# Patient Record
Sex: FEMALE | Race: WHITE | NOT HISPANIC OR LATINO | Employment: FULL TIME | URBAN - METROPOLITAN AREA
[De-identification: names, ages, dates, MRNs, and addresses within clinical notes are randomized per-mention and may not be internally consistent; named-entity substitution may affect disease eponyms.]

---

## 2017-03-03 ENCOUNTER — OFFICE VISIT (OUTPATIENT)
Dept: CARDIOLOGY | Facility: MEDICAL CENTER | Age: 57
End: 2017-03-03
Payer: COMMERCIAL

## 2017-03-03 VITALS
OXYGEN SATURATION: 95 % | DIASTOLIC BLOOD PRESSURE: 92 MMHG | HEART RATE: 62 BPM | SYSTOLIC BLOOD PRESSURE: 150 MMHG | BODY MASS INDEX: 28.68 KG/M2 | WEIGHT: 168 LBS | HEIGHT: 64 IN

## 2017-03-03 DIAGNOSIS — E78.2 MIXED HYPERLIPIDEMIA: ICD-10-CM

## 2017-03-03 DIAGNOSIS — F17.200 SMOKING: ICD-10-CM

## 2017-03-03 DIAGNOSIS — I10 ESSENTIAL HYPERTENSION: ICD-10-CM

## 2017-03-03 DIAGNOSIS — I70.90 ATHEROSCLEROSIS OF ARTERIES: ICD-10-CM

## 2017-03-03 DIAGNOSIS — R06.83 SNORING: ICD-10-CM

## 2017-03-03 PROCEDURE — 99214 OFFICE O/P EST MOD 30 MIN: CPT | Performed by: INTERNAL MEDICINE

## 2017-03-03 RX ORDER — CLINDAMYCIN HYDROCHLORIDE 300 MG/1
300 CAPSULE ORAL 3 TIMES DAILY
COMMUNITY
End: 2017-05-05

## 2017-03-03 RX ORDER — PRAVASTATIN SODIUM 20 MG
20 TABLET ORAL DAILY
Qty: 90 TAB | Refills: 3 | Status: SHIPPED | OUTPATIENT
Start: 2017-03-03 | End: 2018-07-16 | Stop reason: SDUPTHER

## 2017-03-03 RX ORDER — AMLODIPINE BESYLATE 10 MG/1
10 TABLET ORAL DAILY
Qty: 90 TAB | Refills: 3 | Status: SHIPPED | OUTPATIENT
Start: 2017-03-03 | End: 2017-05-05

## 2017-03-03 ASSESSMENT — ENCOUNTER SYMPTOMS
SHORTNESS OF BREATH: 0
NAUSEA: 0
COUGH: 1
EYE PAIN: 0
DEPRESSION: 0
ORTHOPNEA: 0
CLAUDICATION: 0
BRUISES/BLEEDS EASILY: 0
SPEECH CHANGE: 0
EYE DISCHARGE: 0
CHILLS: 0
PALPITATIONS: 0
LOSS OF CONSCIOUSNESS: 0
PND: 0
ABDOMINAL PAIN: 0
HEADACHES: 0
BLURRED VISION: 0
DOUBLE VISION: 0
HALLUCINATIONS: 0
FEVER: 0
SENSORY CHANGE: 0
DIZZINESS: 0
VOMITING: 0
BLOOD IN STOOL: 0
MYALGIAS: 0
FALLS: 0
WEIGHT LOSS: 0

## 2017-03-03 NOTE — MR AVS SNAPSHOT
"        Kelly Oakley   3/3/2017 1:00 PM   Office Visit   MRN: [id number]    Department:  [facility]   Dept Phone:  [phone]    Description:  Female : 1960   Provider:  Geoffrey Hi M.D.           Reason for Visit     Follow-Up           Allergies as of 3/3/2017     Allergen Noted Reactions    Macrodantin [Nitrofurantoin Macrocrystal] 10/25/2012   Anaphylaxis    Pcn [Penicillins] 10/25/2012   Anaphylaxis    Doxycycline 2013   Itching    Bactrim 2013   Vomiting    Biaxin [Clarithromycin] 10/25/2012   Vomiting    Omnicef 2013   Itching      You were diagnosed with     Essential hypertension   [6773489]       Mixed hyperlipidemia   [272.2.ICD-9-CM]       Atherosclerosis of arteries   [033996]       Snoring   [532775]       Smoking   [785453]         Vital Signs     Blood Pressure Pulse Height Weight Body Mass Index Oxygen Saturation    150/92 mmHg 62 1.626 m (5' 4.02\") 76.204 kg (168 lb) 28.82 kg/m2 95%    Smoking Status                   Current Every Day Smoker           Basic Information     Date Of Birth Sex Race Ethnicity Preferred Language    1960 Female White Non- English      Problem List              ICD-10-CM Priority Class Noted - Resolved    Lichen planus L43.9   2012 - Present    Briseno's esophagus K22.70   2012 - Present    MRSA (methicillin resistant Staphylococcus aureus) A49.02   2012 - Present    Adrenal mass (CMS-HCC) E27.9 High  2012 - Present    Snoring R06.83 High  2013 - Present    Atherosclerosis of arteries I70.8   Unknown - Present    Cigarette smoker one half pack a day or less F17.210 High  2015 - Present    HTN (hypertension) I10 High  2015 - Present    Chest pain at rest R07.9 High  2015 - Present    Fatigue R53.83 High  2015 - Present    HLD (hyperlipidemia) E78.5   2015 - Present    Adrenal nodule    Unknown - Present    Nocturnal hypoxemia G47.34   10/15/2015 - Present    Osteopenia " M85.80   11/20/2015 - Present    Subacute maxillary sinusitis J01.00   3/15/2016 - Present      Health Maintenance        Date Due Completion Dates    IMM DTaP/Tdap/Td Vaccine (1 - Tdap) 10/23/1979 ---    IMM INFLUENZA (1) 9/1/2016 ---    MAMMOGRAM 10/7/2017 10/7/2016, 10/2/2015, 7/25/2014, 6/28/2013, 2/17/2012    PAP SMEAR 9/23/2019 9/23/2016, 10/20/2013 (Done)    Override on 10/20/2013: Done    COLONOSCOPY 10/19/2022 10/19/2012            Current Immunizations     Pneumococcal polysaccharide vaccine (PPSV-23) 5/17/2013 12:30 PM      Below and/or attached are the medications your provider expects you to take. Review all of your home medications and newly ordered medications with your provider and/or pharmacist. Follow medication instructions as directed by your provider and/or pharmacist. Please keep your medication list with you and share with your provider. Update the information when medications are discontinued, doses are changed, or new medications (including over-the-counter products) are added; and carry medication information at all times in the event of emergency situations     Allergies:  MACRODANTIN - Anaphylaxis     PCN - Anaphylaxis     DOXYCYCLINE - Itching     BACTRIM - Vomiting     BIAXIN - Vomiting     OMNICEF - Itching               Medications  Valid as of: March 03, 2017 -  1:21 PM    Generic Name Brand Name Tablet Size Instructions for use    Albuterol Sulfate (Aero Soln) albuterol 108 (90 BASE) MCG/ACT Inhale 2 Puffs by mouth every 6 hours as needed for Shortness of Breath.        Alendronate Sodium (Tab) FOSAMAX 70 MG Take 1 Tab by mouth every 7 days.        ALPRAZolam (Tab) XANAX 0.5 MG Take 1 Tab by mouth 3 times a day as needed for Sleep or Anxiety.        AmLODIPine Besylate (Tab) NORVASC 10 MG Take 1 Tab by mouth every day.        Aspirin (Tablet Delayed Response) ECOTRIN 81 MG Take 81 mg by mouth every day.        BusPIRone HCl (Tab) BUSPAR 15 MG Take 1 Tab by mouth 2 times a day.           Chlorphen-Pseudoephed-APAP   Take  by mouth.        Clindamycin HCl (Cap) CLEOCIN 300 MG Take 300 mg by mouth 3 times a day.        Fluticasone Propionate (Suspension) FLONASE 50 MCG/ACT Spray 2 Sprays in nose every day.        Hydrocodone-Chlorpheniramine   Take  by mouth.        Loratadine (Tab) CLARITIN 10 MG Take 10 mg by mouth every day.        Omeprazole (CAPSULE DELAYED RELEASE) PRILOSEC 40 MG TAKE ONE CAPSULE BY MOUTH DAILY (GENERIC PRILOSEC)        Pravastatin Sodium (Tab) PRAVACHOL 20 MG Take 1 Tab by mouth every day.        Triamcinolone Acetonide (Cream) KENALOG 0.1 % Apply  to affected area(s) 2 times a day.        .                 Medicines prescribed today were sent to:     [facility][id number] - [place] - [address]    [address] [place] [address]    Phone: [phone] Fax: [phone]    Open 24 Hours?: No      Medication refill instructions:       If your prescription bottle indicates you have medication refills left, it is not necessary to call your provider’s office. Please contact your pharmacy and they will refill your medication.    If your prescription bottle indicates you do not have any refills left, you may request refills at any time through one of the following ways: The online [employer] system (except Urgent Care), by calling your provider’s office, or by asking your pharmacy to contact your provider’s office with a refill request. Medication refills are processed only during regular business hours and may not be available until the next business day. Your provider may request additional information or to have a follow-up visit with you prior to refilling your medication.   *Please Note: Medication refills are assigned a new Rx number when refilled electronically. Your pharmacy may indicate that no refills were authorized even though a new prescription for the same medication is available at the pharmacy. Please request the medicine by name with the pharmacy before contacting  your provider for a refill.        Your To Do List     Future Labs/Procedures Complete By Expires    COMP METABOLIC PANEL  As directed 3/4/2018      Referral     A referral request has been sent to our patient care coordination department. Please allow 3-5 business days for us to process this request and contact you either by phone or mail. If you do not hear from us by the 5th business day, please call us at [phone].           [employer] Access Code: Activation code not generated  Current Golf121t Status: Active

## 2017-03-03 NOTE — Clinical Note
[facility]   [address], [address]  [place] [address]  Phone: [phone]  Fax: [phone]              Kelly Oakley  1960    Encounter Date: 3/3/2017    Geoffrey Hi M.D.          PROGRESS NOTE:  Subjective:   Kelly Oakley is a 55 y.o. female who presents today cardiac care for hypertension and hyper lipidemia. She is tolerating pravastatin well. Her LDL has significantly decreased and normalized with pravastatin. Patient feels well overall. No chest pain or shortness of breath. Blood pressure is high today. She is still recovering from bronchitis since December. She has been dry coughing quite a bit.    Past Medical History   Diagnosis Date   • Anemia    • Depression    • Anxiety      Panic   • Arrhythmia    • Arthritis      Morning stiffness   • Briseno's  esophagus    • GERD (gastroesophageal reflux disease)    • Headache(784.0)      sinus headache   • Heart murmur      Dr. Krause-Stress trend   • IBD (inflammatory bowel disease)      Dr. Cunningham   • OSTEOPOROSIS      Osteopenia   • Ulcer (CMS-HCC)      Barretts esophogitis   • Urinary tract infection, site not specified    • History of HPV infection    • MRSA carrier 2008     nose cellulitis   • Adrenal nodule 2012 2015 / nonfunctional / benign   • S/P cholecystectomy    • S/P appendectomy    • S/P hysterectomy with oophorectomy      endometriosis   • Atherosclerosis of arteries 2013     seen on abdominal CT   • Cigarette smoker one half pack a day or less 5/28/2015   • HTN (hypertension) 9/11/2015   • Chest pain at rest 9/11/2015   • Fatigue 9/11/2015   • HLD (hyperlipidemia) 9/11/2015   • Nocturnal hypoxemia 10/15/2015     Past Surgical History   Procedure Laterality Date   • Abdominal hysterectomy total       mennorrogia   • Endometrial ablation     • Cholecystectomy     • Appendectomy     • Tonsillectomy     • Primary c section     • Tubal coagulation laparoscopic bilateral     • Colon  resection       Polyp removal   • Breast biopsy  1980's     benign left breast 35 years ago   • Us-needle core bx-breast panel       Family History   Problem Relation Age of Onset   • Cancer Mother      Breast/Liver   • Diabetes Mother    • Hypertension Mother    • Hyperlipidemia Mother    • Heart Disease Mother    • Lung Disease Father      Emphysema   • Psychiatry Father      Paranoid schitzophenia   • Psychiatry Sister      Paronoid Schitzo-disorder, Bipolar   • Arthritis Sister      RA, Fibromyalgia   • Cancer Maternal Aunt      Breast   • Cancer Paternal Aunt      Bone   • Arthritis Paternal Aunt    • Genetic Paternal Aunt      SLE   • Heart Disease Maternal Grandmother    • Hypertension Maternal Grandmother    • Hyperlipidemia Maternal Grandmother    • Genetic Maternal Grandmother      Brights disease   • Stroke Maternal Grandmother    • Heart Disease Maternal Grandfather    • Hypertension Maternal Grandfather    • Hyperlipidemia Maternal Grandfather    • Stroke Paternal Grandmother    • Hyperlipidemia Paternal Grandmother    • Hypertension Paternal Grandmother    • Heart Disease Paternal Grandfather    • Hypertension Paternal Grandfather    • Hyperlipidemia Paternal Grandfather      History   Smoking status   • Current Every Day Smoker -- 0.25 packs/day for 40 years   • Types: Cigarettes   Smokeless tobacco   • Never Used     Allergies   Allergen Reactions   • Macrodantin [Nitrofurantoin Macrocrystal] Anaphylaxis   • Pcn [Penicillins] Anaphylaxis   • Doxycycline Itching   • Bactrim Vomiting   • Biaxin [Clarithromycin] Vomiting   • Omnicef Itching     Outpatient Encounter Prescriptions as of 3/3/2017   Medication Sig Dispense Refill   • HYDROCODONE-CHLORPHENIRAMINE PO Take  by mouth.     • clindamycin (CLEOCIN) 300 MG Cap Take 300 mg by mouth 3 times a day.     • Chlorphen-Pseudoephed-APAP (CORICIDIN D PO) Take  by mouth.     • amlodipine (NORVASC) 10 MG Tab Take 1 Tab by mouth every day. 90 Tab 3   •  pravastatin (PRAVACHOL) 20 MG Tab Take 1 Tab by mouth every day. 90 Tab 3   • fluticasone (FLONASE) 50 MCG/ACT nasal spray Spray 2 Sprays in nose every day. 16 g 0   • omeprazole (PRILOSEC) 40 MG delayed-release capsule TAKE ONE CAPSULE BY MOUTH DAILY (GENERIC PRILOSEC) 90 Cap 4   • busPIRone (BUSPAR) 15 MG tablet Take 1 Tab by mouth 2 times a day. 180 Tab 4   • alprazolam (XANAX) 0.5 MG Tab Take 1 Tab by mouth 3 times a day as needed for Sleep or Anxiety. 270 Tab 4   • aspirin EC (ECOTRIN) 81 MG Tablet Delayed Response Take 81 mg by mouth every day.     • triamcinolone acetonide (KENALOG) 0.1 % Cream Apply  to affected area(s) 2 times a day.     • loratadine (CLARITIN) 10 MG TABS Take 10 mg by mouth every day.     • albuterol (VENTOLIN OR PROVENTIL) 108 (90 BASE) MCG/ACT AERS inhalation aerosol Inhale 2 Puffs by mouth every 6 hours as needed for Shortness of Breath. 1 Inhaler 0   • [DISCONTINUED] pravastatin (PRAVACHOL) 20 MG Tab Take 1 Tab by mouth every day. 90 Tab 3   • [DISCONTINUED] amlodipine (NORVASC) 2.5 MG Tab TAKE ONE TABLET BY MOUTH DAILY 90 Tab 2   • [DISCONTINUED] lisinopril (PRINIVIL) 5 MG Tab Take 1 Tab by mouth every day. 90 Tab 3   • [DISCONTINUED] amlodipine (NORVASC) 2.5 MG Tab Take 1 Tab by mouth every day. 90 Tab 4   • alendronate (FOSAMAX) 70 MG Tab Take 1 Tab by mouth every 7 days. 12 Tab 4     No facility-administered encounter medications on file as of 3/3/2017.     Review of Systems   Constitutional: Negative for fever, chills, weight loss and malaise/fatigue.   HENT: Negative for ear discharge, ear pain, hearing loss and nosebleeds.    Eyes: Negative for blurred vision, double vision, pain and discharge.   Respiratory: Positive for cough. Negative for shortness of breath.    Cardiovascular: Negative for chest pain, palpitations, orthopnea, claudication, leg swelling and PND.   Gastrointestinal: Negative for nausea, vomiting, abdominal pain, blood in stool and melena.   Genitourinary:  "Negative for dysuria and hematuria.   Musculoskeletal: Negative for myalgias, joint pain and falls.   Skin: Negative for itching and rash.   Neurological: Negative for dizziness, sensory change, speech change, loss of consciousness and headaches.   Endo/Heme/Allergies: Negative for environmental allergies. Does not bruise/bleed easily.   Psychiatric/Behavioral: Negative for depression, suicidal ideas and hallucinations.        Objective:   /92 mmHg  Pulse 62  Ht 1.626 m (5' 4.02\")  Wt 76.204 kg (168 lb)  BMI 28.82 kg/m2  SpO2 95%    Physical Exam   Constitutional: She is oriented to person, place, and time. No distress.   HENT:   Head: Normocephalic and atraumatic.   Eyes: EOM are normal.   Neck: No JVD present.   Cardiovascular: Normal rate, regular rhythm, normal heart sounds and intact distal pulses.  Exam reveals no gallop and no friction rub.    No murmur heard.  Pulmonary/Chest: No respiratory distress. She has no wheezes. She has no rales. She exhibits no tenderness.   Abdominal: She exhibits no distension. There is no tenderness. There is no rebound and no guarding.   Musculoskeletal: She exhibits no edema or tenderness.   Lymphadenopathy:     She has no cervical adenopathy.   Neurological: She is alert and oriented to person, place, and time.   Skin: Skin is dry.   Psychiatric: She has a normal mood and affect.   Nursing note and vitals reviewed.      Assessment:     1. Essential hypertension  amlodipine (NORVASC) 10 MG Tab    pravastatin (PRAVACHOL) 20 MG Tab    COMP METABOLIC PANEL    LIPID PANEL   2. Mixed hyperlipidemia  COMP METABOLIC PANEL    LIPID PANEL   3. Atherosclerosis of arteries  pravastatin (PRAVACHOL) 20 MG Tab    COMP METABOLIC PANEL    LIPID PANEL   4. Snoring  pravastatin (PRAVACHOL) 20 MG Tab    COMP METABOLIC PANEL    LIPID PANEL   5. Smoking  REFERRAL TO TOBACCO CESSATION PROGRAM       Medical Decision Making:  Today's Assessment / Status / Plan:     We will stop " lisinopril.    I will increase amlodipine to 10 mg by mouth once a day.  Continue pravastatin.  We'll refer for smoking cessation program.    I will see patient back in clinic with lab tests and studies results in 6 months.    I thank you Dr. Fajardo for referring patient to our Cardiology Clinic today.        Saulo Fajardo M.D.  [facility] [address] [facility] [address]  VIA Facsimile: [phone]

## 2017-03-03 NOTE — PROGRESS NOTES
Subjective:   Kelly Oakley is a 55 y.o. female who presents today cardiac care for hypertension and hyper lipidemia. She is tolerating pravastatin well. Her LDL has significantly decreased and normalized with pravastatin. Patient feels well overall. No chest pain or shortness of breath. Blood pressure is high today. She is still recovering from bronchitis since December. She has been dry coughing quite a bit.    Past Medical History   Diagnosis Date   • Anemia    • Depression    • Anxiety      Panic   • Arrhythmia    • Arthritis      Morning stiffness   • Briseno's  esophagus    • GERD (gastroesophageal reflux disease)    • Headache(784.0)      sinus headache   • Heart murmur      Dr. Krause-Stress trend   • IBD (inflammatory bowel disease)      Dr. Cunningham   • OSTEOPOROSIS      Osteopenia   • Ulcer (CMS-HCC)      Barretts esophogitis   • Urinary tract infection, site not specified    • History of HPV infection    • MRSA carrier 2008     nose cellulitis   • Adrenal nodule 2012 2015 / nonfunctional / benign   • S/P cholecystectomy    • S/P appendectomy    • S/P hysterectomy with oophorectomy      endometriosis   • Atherosclerosis of arteries 2013     seen on abdominal CT   • Cigarette smoker one half pack a day or less 5/28/2015   • HTN (hypertension) 9/11/2015   • Chest pain at rest 9/11/2015   • Fatigue 9/11/2015   • HLD (hyperlipidemia) 9/11/2015   • Nocturnal hypoxemia 10/15/2015     Past Surgical History   Procedure Laterality Date   • Abdominal hysterectomy total       mennorrogia   • Endometrial ablation     • Cholecystectomy     • Appendectomy     • Tonsillectomy     • Primary c section     • Tubal coagulation laparoscopic bilateral     • Colon resection       Polyp removal   • Breast biopsy  1980's     benign left breast 35 years ago   • Us-needle core bx-breast panel       Family History   Problem Relation Age of Onset   • Cancer Mother      Breast/Liver   • Diabetes Mother    • Hypertension Mother     • Hyperlipidemia Mother    • Heart Disease Mother    • Lung Disease Father      Emphysema   • Psychiatry Father      Paranoid schitzophenia   • Psychiatry Sister      Paronoid Schitzo-disorder, Bipolar   • Arthritis Sister      RA, Fibromyalgia   • Cancer Maternal Aunt      Breast   • Cancer Paternal Aunt      Bone   • Arthritis Paternal Aunt    • Genetic Paternal Aunt      SLE   • Heart Disease Maternal Grandmother    • Hypertension Maternal Grandmother    • Hyperlipidemia Maternal Grandmother    • Genetic Maternal Grandmother      Brights disease   • Stroke Maternal Grandmother    • Heart Disease Maternal Grandfather    • Hypertension Maternal Grandfather    • Hyperlipidemia Maternal Grandfather    • Stroke Paternal Grandmother    • Hyperlipidemia Paternal Grandmother    • Hypertension Paternal Grandmother    • Heart Disease Paternal Grandfather    • Hypertension Paternal Grandfather    • Hyperlipidemia Paternal Grandfather      History   Smoking status   • Current Every Day Smoker -- 0.25 packs/day for 40 years   • Types: Cigarettes   Smokeless tobacco   • Never Used     Allergies   Allergen Reactions   • Macrodantin [Nitrofurantoin Macrocrystal] Anaphylaxis   • Pcn [Penicillins] Anaphylaxis   • Doxycycline Itching   • Bactrim Vomiting   • Biaxin [Clarithromycin] Vomiting   • Omnicef Itching     Outpatient Encounter Prescriptions as of 3/3/2017   Medication Sig Dispense Refill   • HYDROCODONE-CHLORPHENIRAMINE PO Take  by mouth.     • clindamycin (CLEOCIN) 300 MG Cap Take 300 mg by mouth 3 times a day.     • Chlorphen-Pseudoephed-APAP (CORICIDIN D PO) Take  by mouth.     • amlodipine (NORVASC) 10 MG Tab Take 1 Tab by mouth every day. 90 Tab 3   • pravastatin (PRAVACHOL) 20 MG Tab Take 1 Tab by mouth every day. 90 Tab 3   • fluticasone (FLONASE) 50 MCG/ACT nasal spray Spray 2 Sprays in nose every day. 16 g 0   • omeprazole (PRILOSEC) 40 MG delayed-release capsule TAKE ONE CAPSULE BY MOUTH DAILY (GENERIC PRILOSEC)  90 Cap 4   • busPIRone (BUSPAR) 15 MG tablet Take 1 Tab by mouth 2 times a day. 180 Tab 4   • alprazolam (XANAX) 0.5 MG Tab Take 1 Tab by mouth 3 times a day as needed for Sleep or Anxiety. 270 Tab 4   • aspirin EC (ECOTRIN) 81 MG Tablet Delayed Response Take 81 mg by mouth every day.     • triamcinolone acetonide (KENALOG) 0.1 % Cream Apply  to affected area(s) 2 times a day.     • loratadine (CLARITIN) 10 MG TABS Take 10 mg by mouth every day.     • albuterol (VENTOLIN OR PROVENTIL) 108 (90 BASE) MCG/ACT AERS inhalation aerosol Inhale 2 Puffs by mouth every 6 hours as needed for Shortness of Breath. 1 Inhaler 0   • [DISCONTINUED] pravastatin (PRAVACHOL) 20 MG Tab Take 1 Tab by mouth every day. 90 Tab 3   • [DISCONTINUED] amlodipine (NORVASC) 2.5 MG Tab TAKE ONE TABLET BY MOUTH DAILY 90 Tab 2   • [DISCONTINUED] lisinopril (PRINIVIL) 5 MG Tab Take 1 Tab by mouth every day. 90 Tab 3   • [DISCONTINUED] amlodipine (NORVASC) 2.5 MG Tab Take 1 Tab by mouth every day. 90 Tab 4   • alendronate (FOSAMAX) 70 MG Tab Take 1 Tab by mouth every 7 days. 12 Tab 4     No facility-administered encounter medications on file as of 3/3/2017.     Review of Systems   Constitutional: Negative for fever, chills, weight loss and malaise/fatigue.   HENT: Negative for ear discharge, ear pain, hearing loss and nosebleeds.    Eyes: Negative for blurred vision, double vision, pain and discharge.   Respiratory: Positive for cough. Negative for shortness of breath.    Cardiovascular: Negative for chest pain, palpitations, orthopnea, claudication, leg swelling and PND.   Gastrointestinal: Negative for nausea, vomiting, abdominal pain, blood in stool and melena.   Genitourinary: Negative for dysuria and hematuria.   Musculoskeletal: Negative for myalgias, joint pain and falls.   Skin: Negative for itching and rash.   Neurological: Negative for dizziness, sensory change, speech change, loss of consciousness and headaches.   Endo/Heme/Allergies:  "Negative for environmental allergies. Does not bruise/bleed easily.   Psychiatric/Behavioral: Negative for depression, suicidal ideas and hallucinations.        Objective:   /92 mmHg  Pulse 62  Ht 1.626 m (5' 4.02\")  Wt 76.204 kg (168 lb)  BMI 28.82 kg/m2  SpO2 95%    Physical Exam   Constitutional: She is oriented to person, place, and time. No distress.   HENT:   Head: Normocephalic and atraumatic.   Eyes: EOM are normal.   Neck: No JVD present.   Cardiovascular: Normal rate, regular rhythm, normal heart sounds and intact distal pulses.  Exam reveals no gallop and no friction rub.    No murmur heard.  Pulmonary/Chest: No respiratory distress. She has no wheezes. She has no rales. She exhibits no tenderness.   Abdominal: She exhibits no distension. There is no tenderness. There is no rebound and no guarding.   Musculoskeletal: She exhibits no edema or tenderness.   Lymphadenopathy:     She has no cervical adenopathy.   Neurological: She is alert and oriented to person, place, and time.   Skin: Skin is dry.   Psychiatric: She has a normal mood and affect.   Nursing note and vitals reviewed.      Assessment:     1. Essential hypertension  amlodipine (NORVASC) 10 MG Tab    pravastatin (PRAVACHOL) 20 MG Tab    COMP METABOLIC PANEL    LIPID PANEL   2. Mixed hyperlipidemia  COMP METABOLIC PANEL    LIPID PANEL   3. Atherosclerosis of arteries  pravastatin (PRAVACHOL) 20 MG Tab    COMP METABOLIC PANEL    LIPID PANEL   4. Snoring  pravastatin (PRAVACHOL) 20 MG Tab    COMP METABOLIC PANEL    LIPID PANEL   5. Smoking  REFERRAL TO TOBACCO CESSATION PROGRAM       Medical Decision Making:  Today's Assessment / Status / Plan:     We will stop lisinopril.    I will increase amlodipine to 10 mg by mouth once a day.  Continue pravastatin.  We'll refer for smoking cessation program.    I will see patient back in clinic with lab tests and studies results in 6 months.    I thank you Dr. Fajardo for referring patient to our " Cardiology Clinic today.

## 2017-04-19 ENCOUNTER — HOSPITAL ENCOUNTER (OUTPATIENT)
Dept: RADIOLOGY | Facility: MEDICAL CENTER | Age: 57
End: 2017-04-19
Attending: FAMILY MEDICINE
Payer: COMMERCIAL

## 2017-04-19 DIAGNOSIS — M79.89 LEFT LEG SWELLING: ICD-10-CM

## 2017-04-19 PROCEDURE — 93971 EXTREMITY STUDY: CPT | Mod: LT

## 2017-04-25 RX ORDER — LISINOPRIL 5 MG/1
TABLET ORAL
Refills: 2 | OUTPATIENT
Start: 2017-04-25

## 2017-05-05 ENCOUNTER — APPOINTMENT (OUTPATIENT)
Dept: RADIOLOGY | Facility: MEDICAL CENTER | Age: 57
End: 2017-05-05
Attending: EMERGENCY MEDICINE
Payer: COMMERCIAL

## 2017-05-05 ENCOUNTER — HOSPITAL ENCOUNTER (OUTPATIENT)
Facility: MEDICAL CENTER | Age: 57
End: 2017-05-06
Attending: EMERGENCY MEDICINE | Admitting: HOSPITALIST
Payer: COMMERCIAL

## 2017-05-05 ENCOUNTER — RESOLUTE PROFESSIONAL BILLING HOSPITAL PROF FEE (OUTPATIENT)
Dept: HOSPITALIST | Facility: MEDICAL CENTER | Age: 57
End: 2017-05-05
Payer: COMMERCIAL

## 2017-05-05 DIAGNOSIS — R06.02 SHORTNESS OF BREATH: ICD-10-CM

## 2017-05-05 DIAGNOSIS — R07.9 ACUTE CHEST PAIN: ICD-10-CM

## 2017-05-05 PROBLEM — K21.9 GERD (GASTROESOPHAGEAL REFLUX DISEASE): Status: ACTIVE | Noted: 2017-05-05

## 2017-05-05 LAB
ALBUMIN SERPL BCP-MCNC: 4.8 G/DL (ref 3.2–4.9)
ALBUMIN/GLOB SERPL: 1.5 G/DL
ALP SERPL-CCNC: 72 U/L (ref 30–99)
ALT SERPL-CCNC: 19 U/L (ref 2–50)
ANION GAP SERPL CALC-SCNC: 10 MMOL/L (ref 0–11.9)
AST SERPL-CCNC: 21 U/L (ref 12–45)
BASOPHILS # BLD AUTO: 0.5 % (ref 0–1.8)
BASOPHILS # BLD: 0.03 K/UL (ref 0–0.12)
BILIRUB SERPL-MCNC: 0.4 MG/DL (ref 0.1–1.5)
BNP SERPL-MCNC: 22 PG/ML (ref 0–100)
BUN SERPL-MCNC: 12 MG/DL (ref 8–22)
CALCIUM SERPL-MCNC: 9.9 MG/DL (ref 8.5–10.5)
CHLORIDE SERPL-SCNC: 106 MMOL/L (ref 96–112)
CO2 SERPL-SCNC: 24 MMOL/L (ref 20–33)
CREAT SERPL-MCNC: 0.74 MG/DL (ref 0.5–1.4)
EKG IMPRESSION: NORMAL
EOSINOPHIL # BLD AUTO: 0.23 K/UL (ref 0–0.51)
EOSINOPHIL NFR BLD: 3.6 % (ref 0–6.9)
ERYTHROCYTE [DISTWIDTH] IN BLOOD BY AUTOMATED COUNT: 41 FL (ref 35.9–50)
FLUAV H1 2009 PAND RNA SPEC QL NAA+PROBE: NOT DETECTED
FLUAV RNA SPEC QL NAA+PROBE: NEGATIVE
FLUAV+FLUBV AG SPEC QL IA: NORMAL
FLUBV RNA SPEC QL NAA+PROBE: NEGATIVE
GFR SERPL CREATININE-BSD FRML MDRD: >60 ML/MIN/1.73 M 2
GLOBULIN SER CALC-MCNC: 3.3 G/DL (ref 1.9–3.5)
GLUCOSE SERPL-MCNC: 91 MG/DL (ref 65–99)
HCT VFR BLD AUTO: 41.7 % (ref 37–47)
HGB BLD-MCNC: 14.2 G/DL (ref 12–16)
IMM GRANULOCYTES # BLD AUTO: 0.03 K/UL (ref 0–0.11)
IMM GRANULOCYTES NFR BLD AUTO: 0.5 % (ref 0–0.9)
LYMPHOCYTES # BLD AUTO: 2.28 K/UL (ref 1–4.8)
LYMPHOCYTES NFR BLD: 35.7 % (ref 22–41)
MCH RBC QN AUTO: 30.2 PG (ref 27–33)
MCHC RBC AUTO-ENTMCNC: 34.1 G/DL (ref 33.6–35)
MCV RBC AUTO: 88.7 FL (ref 81.4–97.8)
MONOCYTES # BLD AUTO: 0.7 K/UL (ref 0–0.85)
MONOCYTES NFR BLD AUTO: 11 % (ref 0–13.4)
NEUTROPHILS # BLD AUTO: 3.11 K/UL (ref 2–7.15)
NEUTROPHILS NFR BLD: 48.7 % (ref 44–72)
NRBC # BLD AUTO: 0 K/UL
NRBC BLD AUTO-RTO: 0 /100 WBC
PLATELET # BLD AUTO: 211 K/UL (ref 164–446)
PMV BLD AUTO: 10.3 FL (ref 9–12.9)
POTASSIUM SERPL-SCNC: 4 MMOL/L (ref 3.6–5.5)
PROT SERPL-MCNC: 8.1 G/DL (ref 6–8.2)
RBC # BLD AUTO: 4.7 M/UL (ref 4.2–5.4)
SIGNIFICANT IND 70042: NORMAL
SITE SITE: NORMAL
SODIUM SERPL-SCNC: 140 MMOL/L (ref 135–145)
SOURCE SOURCE: NORMAL
TROPONIN I SERPL-MCNC: <0.01 NG/ML (ref 0–0.04)
TROPONIN I SERPL-MCNC: <0.01 NG/ML (ref 0–0.04)
WBC # BLD AUTO: 6.4 K/UL (ref 4.8–10.8)

## 2017-05-05 PROCEDURE — 71275 CT ANGIOGRAPHY CHEST: CPT

## 2017-05-05 PROCEDURE — 99285 EMERGENCY DEPT VISIT HI MDM: CPT

## 2017-05-05 PROCEDURE — G0378 HOSPITAL OBSERVATION PER HR: HCPCS

## 2017-05-05 PROCEDURE — 87400 INFLUENZA A/B EACH AG IA: CPT

## 2017-05-05 PROCEDURE — 93005 ELECTROCARDIOGRAM TRACING: CPT

## 2017-05-05 PROCEDURE — 93005 ELECTROCARDIOGRAM TRACING: CPT | Performed by: HOSPITALIST

## 2017-05-05 PROCEDURE — 93010 ELECTROCARDIOGRAM REPORT: CPT | Performed by: INTERNAL MEDICINE

## 2017-05-05 PROCEDURE — 700117 HCHG RX CONTRAST REV CODE 255: Performed by: EMERGENCY MEDICINE

## 2017-05-05 PROCEDURE — 83880 ASSAY OF NATRIURETIC PEPTIDE: CPT

## 2017-05-05 PROCEDURE — A9270 NON-COVERED ITEM OR SERVICE: HCPCS | Performed by: HOSPITALIST

## 2017-05-05 PROCEDURE — 87502 INFLUENZA DNA AMP PROBE: CPT

## 2017-05-05 PROCEDURE — 87503 INFLUENZA DNA AMP PROB ADDL: CPT

## 2017-05-05 PROCEDURE — 36415 COLL VENOUS BLD VENIPUNCTURE: CPT

## 2017-05-05 PROCEDURE — 99220 PR INITIAL OBSERVATION CARE,LEVL III: CPT | Performed by: HOSPITALIST

## 2017-05-05 PROCEDURE — 84484 ASSAY OF TROPONIN QUANT: CPT

## 2017-05-05 PROCEDURE — 85025 COMPLETE CBC W/AUTO DIFF WBC: CPT

## 2017-05-05 PROCEDURE — 700102 HCHG RX REV CODE 250 W/ 637 OVERRIDE(OP): Performed by: HOSPITALIST

## 2017-05-05 PROCEDURE — 80053 COMPREHEN METABOLIC PANEL: CPT

## 2017-05-05 RX ORDER — ASPIRIN 81 MG/1
324 TABLET, CHEWABLE ORAL DAILY
Status: DISCONTINUED | OUTPATIENT
Start: 2017-05-06 | End: 2017-05-06 | Stop reason: HOSPADM

## 2017-05-05 RX ORDER — ONDANSETRON 4 MG/1
4 TABLET, ORALLY DISINTEGRATING ORAL EVERY 4 HOURS PRN
Status: DISCONTINUED | OUTPATIENT
Start: 2017-05-05 | End: 2017-05-06 | Stop reason: HOSPADM

## 2017-05-05 RX ORDER — POLYETHYLENE GLYCOL 3350 17 G/17G
1 POWDER, FOR SOLUTION ORAL
Status: DISCONTINUED | OUTPATIENT
Start: 2017-05-05 | End: 2017-05-06 | Stop reason: HOSPADM

## 2017-05-05 RX ORDER — PRAVASTATIN SODIUM 20 MG
20 TABLET ORAL
Status: DISCONTINUED | OUTPATIENT
Start: 2017-05-05 | End: 2017-05-06 | Stop reason: HOSPADM

## 2017-05-05 RX ORDER — AMLODIPINE BESYLATE 5 MG/1
5 TABLET ORAL EVERY MORNING
COMMUNITY
End: 2017-09-01

## 2017-05-05 RX ORDER — ACETAMINOPHEN 325 MG/1
650 TABLET ORAL EVERY 6 HOURS PRN
Status: DISCONTINUED | OUTPATIENT
Start: 2017-05-05 | End: 2017-05-06 | Stop reason: HOSPADM

## 2017-05-05 RX ORDER — AMOXICILLIN 250 MG
2 CAPSULE ORAL 2 TIMES DAILY
Status: DISCONTINUED | OUTPATIENT
Start: 2017-05-05 | End: 2017-05-06 | Stop reason: HOSPADM

## 2017-05-05 RX ORDER — ONDANSETRON 2 MG/ML
4 INJECTION INTRAMUSCULAR; INTRAVENOUS EVERY 4 HOURS PRN
Status: DISCONTINUED | OUTPATIENT
Start: 2017-05-05 | End: 2017-05-06 | Stop reason: HOSPADM

## 2017-05-05 RX ORDER — PROMETHAZINE HYDROCHLORIDE 25 MG/1
12.5-25 SUPPOSITORY RECTAL EVERY 4 HOURS PRN
Status: DISCONTINUED | OUTPATIENT
Start: 2017-05-05 | End: 2017-05-06 | Stop reason: HOSPADM

## 2017-05-05 RX ORDER — PROMETHAZINE HYDROCHLORIDE 25 MG/1
12.5-25 TABLET ORAL EVERY 4 HOURS PRN
Status: DISCONTINUED | OUTPATIENT
Start: 2017-05-05 | End: 2017-05-06 | Stop reason: HOSPADM

## 2017-05-05 RX ORDER — HYDROCHLOROTHIAZIDE 25 MG/1
25 TABLET ORAL EVERY MORNING
COMMUNITY
End: 2017-09-01 | Stop reason: SDUPTHER

## 2017-05-05 RX ORDER — OMEPRAZOLE 20 MG/1
20 CAPSULE, DELAYED RELEASE ORAL DAILY
Status: DISCONTINUED | OUTPATIENT
Start: 2017-05-06 | End: 2017-05-06 | Stop reason: HOSPADM

## 2017-05-05 RX ORDER — AMLODIPINE BESYLATE 5 MG/1
5 TABLET ORAL EVERY MORNING
Status: DISCONTINUED | OUTPATIENT
Start: 2017-05-06 | End: 2017-05-06 | Stop reason: HOSPADM

## 2017-05-05 RX ORDER — NICOTINE 21 MG/24HR
14 PATCH, TRANSDERMAL 24 HOURS TRANSDERMAL
Status: DISCONTINUED | OUTPATIENT
Start: 2017-05-06 | End: 2017-05-06 | Stop reason: HOSPADM

## 2017-05-05 RX ORDER — ASPIRIN 300 MG/1
300 SUPPOSITORY RECTAL DAILY
Status: DISCONTINUED | OUTPATIENT
Start: 2017-05-06 | End: 2017-05-06 | Stop reason: HOSPADM

## 2017-05-05 RX ORDER — ASPIRIN 325 MG
325 TABLET ORAL DAILY
Status: DISCONTINUED | OUTPATIENT
Start: 2017-05-06 | End: 2017-05-06 | Stop reason: HOSPADM

## 2017-05-05 RX ORDER — BISACODYL 10 MG
10 SUPPOSITORY, RECTAL RECTAL
Status: DISCONTINUED | OUTPATIENT
Start: 2017-05-05 | End: 2017-05-06 | Stop reason: HOSPADM

## 2017-05-05 RX ORDER — HYDROCHLOROTHIAZIDE 25 MG/1
25 TABLET ORAL EVERY MORNING
Status: DISCONTINUED | OUTPATIENT
Start: 2017-05-06 | End: 2017-05-06 | Stop reason: HOSPADM

## 2017-05-05 RX ADMIN — IOHEXOL 80 ML: 350 INJECTION, SOLUTION INTRAVENOUS at 15:30

## 2017-05-05 RX ADMIN — PRAVASTATIN SODIUM 20 MG: 20 TABLET ORAL at 21:33

## 2017-05-05 RX ADMIN — NICOTINE POLACRILEX 2 MG: 2 GUM, CHEWING BUCCAL at 21:33

## 2017-05-05 ASSESSMENT — LIFESTYLE VARIABLES
EVER_SMOKED: YES
DO YOU DRINK ALCOHOL: NO
DO YOU DRINK ALCOHOL: NO

## 2017-05-05 ASSESSMENT — PAIN SCALES - WONG BAKER
WONGBAKER_NUMERICALRESPONSE: DOESN'T HURT AT ALL
WONGBAKER_NUMERICALRESPONSE: DOESN'T HURT AT ALL

## 2017-05-05 ASSESSMENT — ENCOUNTER SYMPTOMS
CHILLS: 1
DIAPHORESIS: 1
SHORTNESS OF BREATH: 1
HEMOPTYSIS: 0
NAUSEA: 1
COUGH: 1
BACK PAIN: 1

## 2017-05-05 ASSESSMENT — PAIN SCALES - GENERAL
PAINLEVEL_OUTOF10: 0
PAINLEVEL_OUTOF10: 0

## 2017-05-05 NOTE — ED NOTES
"Pt c/o chest discomfort , started on Tuesday , \" felt like flu-like sx\" , c/o dizziness, nausea, SOB, EKG done in triage   "

## 2017-05-05 NOTE — ED PROVIDER NOTES
"ED Provider Note    Scribed for Dr. Jaime Gamble M.D. by Prosper Kapoor. 5/5/2017, 12:53 PM.    Primary care provider: Saulo Fajardo M.D.  Means of arrival: Walk-in  History obtained from: Patient  History limited by: None    CHIEF COMPLAINT  Chief Complaint   Patient presents with   • Chest Pain   • Shortness of Breath       HPI  Kelly Oakley is a 56 y.o. female who presents to the Emergency Department due to shortness of breath, onset 2 weeks ago and worsened Tuesday morning. Per patient, she noticed left lower extremity edema when she first started feeling short of breath,. She was seen in the ED on 4/19/17 for her symptoms and had an ultrasound, which was overall unremarkable. The patient reports that the swelling has improved. However, she has had worsening shortness of breath and reports nausea, chills, and diaphoresis when she woke up Tuesday morning. The patient also states that she has had intermittent chest pain, which she describes as \"feeling gassy\" and \"crushing\" in nature. Per patient, her chest pain worsens with inspiration and occasionally radiates to her back or from the left to the center of her chest. She has associated symptoms of tingling to her left upper extremity but denies any left upper extremity pain.  The patient does not report any alleviating factors. She also reports fatigue but has been difficulties sleeping. She has had a recent cough but denies hemoptysis. Denies a previous history of cardiac problems, pulmonary problems, kidney problems, or DVT's. Per patient, she was taking lisinopril but her physician took her off of it and instead increased her dosage of amlodipine in March 2017. The patient has not completed a stress test since she \"cannot walk on treadmills\".    Review of chart shows no previous ED visits. Outpatient evaluation for low back pain stomach pain joint pain and essential hypertension.    REVIEW OF SYSTEMS  Review of Systems   Constitutional: Positive for " chills, malaise/fatigue and diaphoresis.   Respiratory: Positive for cough and shortness of breath. Negative for hemoptysis.    Cardiovascular: Positive for chest pain and leg swelling (Left (improving)).   Gastrointestinal: Positive for nausea.   Musculoskeletal: Positive for back pain.        Negative for left upper extremity pain.    Neurological:        Positive for tingling to left upper extremity.    All other systems reviewed and are negative.  C    PAST MEDICAL HISTORY   has a past medical history of Anemia; Depression; Anxiety; Arrhythmia; Arthritis; Briseno's  esophagus; GERD (gastroesophageal reflux disease); Headache; Heart murmur; IBD (inflammatory bowel disease); OSTEOPOROSIS; Ulcer (CMS-[facility]); Urinary tract infection, site not specified; History of HPV infection; MRSA carrier (2008); Adrenal nodule (2012); S/P cholecystectomy; S/P appendectomy; S/P hysterectomy with oophorectomy; Atherosclerosis of arteries (2013); Cigarette smoker one half pack a day or less (5/28/2015); HTN (hypertension) (9/11/2015); Chest pain at rest (9/11/2015); Fatigue (9/11/2015); HLD (hyperlipidemia) (9/11/2015); and Nocturnal hypoxemia (10/15/2015).    SURGICAL HISTORY   has past surgical history that includes abdominal hysterectomy total; endometrial ablation; cholecystectomy; appendectomy; tonsillectomy; primary c section; tubal coagulation laparoscopic bilateral; colon resection; breast biopsy (1980's); and us-needle core bx-breast panel.    SOCIAL HISTORY  Social History   Substance Use Topics   • Smoking status: Current Every Day Smoker -- 0.25 packs/day for 40 years     Types: Cigarettes   • Smokeless tobacco: Never Used   • Alcohol Use: 0.6 oz/week     1 Standard drinks or equivalent per week      Comment: rarely      History   Drug Use No       FAMILY HISTORY  Family History   Problem Relation Age of Onset   • Cancer Mother      Breast/Liver   • Diabetes Mother    • Hypertension Mother    • Hyperlipidemia Mother    •  "Heart Disease Mother    • Lung Disease Father      Emphysema   • Psychiatry Father      Paranoid schitzophenia   • Psychiatry Sister      Paronoid Schitzo-disorder, Bipolar   • Arthritis Sister      RA, Fibromyalgia   • Cancer Maternal Aunt      Breast   • Cancer Paternal Aunt      Bone   • Arthritis Paternal Aunt    • Genetic Paternal Aunt      SLE   • Heart Disease Maternal Grandmother    • Hypertension Maternal Grandmother    • Hyperlipidemia Maternal Grandmother    • Genetic Maternal Grandmother      Brights disease   • Stroke Maternal Grandmother    • Heart Disease Maternal Grandfather    • Hypertension Maternal Grandfather    • Hyperlipidemia Maternal Grandfather    • Stroke Paternal Grandmother    • Hyperlipidemia Paternal Grandmother    • Hypertension Paternal Grandmother    • Heart Disease Paternal Grandfather    • Hypertension Paternal Grandfather    • Hyperlipidemia Paternal Grandfather        CURRENT MEDICATIONS  No current facility-administered medications on file prior to encounter.     Current Outpatient Prescriptions on File Prior to Encounter   Medication Sig Dispense Refill   • pravastatin (PRAVACHOL) 20 MG Tab Take 1 Tab by mouth every day. 90 Tab 3   • omeprazole (PRILOSEC) 40 MG delayed-release capsule TAKE ONE CAPSULE BY MOUTH DAILY (GENERIC PRILOSEC) 90 Cap 4   • aspirin EC (ECOTRIN) 81 MG Tablet Delayed Response Take 81 mg by mouth every day.     • triamcinolone acetonide (KENALOG) 0.1 % Cream Apply 1 Application to affected area(s) 2 times a day.         ALLERGIES  Allergies   Allergen Reactions   • Macrodantin [Nitrofurantoin Macrocrystal] Anaphylaxis   • Pcn [Penicillins] Anaphylaxis   • Doxycycline Itching   • Bactrim Vomiting   • Biaxin [Clarithromycin] Vomiting   • Omnicef Itching       PHYSICAL EXAM  VITAL SIGNS: /80 mmHg  Pulse 70  Temp(Src) 36.2 °C (97.2 °F)  Resp 16  Ht 1.6 m (5' 2.99\")  Wt 77.3 kg (170 lb 6.7 oz)  BMI 30.20 kg/m2  SpO2 98%    Constitutional: Well " developed, Well nourished, No acute distress, Non-toxic appearance.   HENT: Normocephalic, Atraumatic, Bilateral external ears normal, Oropharynx moist, no evidence of dehydration, No oral exudates, Nose normal.   Eyes: PERRLA, EOMI, Conjunctiva normal, No discharge.   Neck: Normal range of motion, No tenderness, Supple, No stridor. No masses. No evidence of meningitis or meningismus.   Lymphatic: No lymphadenopathy noted.   Cardiovascular: Normal heart rate, Normal rhythm, No murmurs, No rubs, No gallops.   Thorax & Lungs: Normal breath sounds, No respiratory distress, No wheezing or rhonchi, No chest tenderness.   Abdomen: Bowel sounds normal, Soft, No tenderness, No masses, No pulsatile masses. No guarding or rebound. No evidence of peritoneal findings.  Skin: Warm, Dry, No erythema, No rash. No exanthem.   Back: No tenderness.   Extremities:  No edema, No tenderness, No cyanosis, No clubbing.   Musculoskeletal: Good range of motion in all major joints. No major deformities noted.   Neurologic: Alert & oriented x 3, Normal motor function, No focal deficits noted.   Psychiatric: Affect normal, mood normal.                                                              LABS  Results for orders placed or performed during the hospital encounter of 05/05/17   CBC w/ Differential   Result Value Ref Range    WBC 6.4 4.8 - 10.8 K/uL    RBC 4.70 4.20 - 5.40 M/uL    Hemoglobin 14.2 12.0 - 16.0 g/dL    Hematocrit 41.7 37.0 - 47.0 %    MCV 88.7 81.4 - 97.8 fL    MCH 30.2 27.0 - 33.0 pg    MCHC 34.1 33.6 - 35.0 g/dL    RDW 41.0 35.9 - 50.0 fL    Platelet Count 211 164 - 446 K/uL    MPV 10.3 9.0 - 12.9 fL    Neutrophils-Polys 48.70 44.00 - 72.00 %    Lymphocytes 35.70 22.00 - 41.00 %    Monocytes 11.00 0.00 - 13.40 %    Eosinophils 3.60 0.00 - 6.90 %    Basophils 0.50 0.00 - 1.80 %    Immature Granulocytes 0.50 0.00 - 0.90 %    Nucleated RBC 0.00 /100 WBC    Neutrophils (Absolute) 3.11 2.00 - 7.15 K/uL    Lymphs (Absolute) 2.28  1.00 - 4.80 K/uL    Monos (Absolute) 0.70 0.00 - 0.85 K/uL    Eos (Absolute) 0.23 0.00 - 0.51 K/uL    Baso (Absolute) 0.03 0.00 - 0.12 K/uL    Immature Granulocytes (abs) 0.03 0.00 - 0.11 K/uL    NRBC (Absolute) 0.00 K/uL   Complete Metabolic Panel (CMP)   Result Value Ref Range    Sodium 140 135 - 145 mmol/L    Potassium 4.0 3.6 - 5.5 mmol/L    Chloride 106 96 - 112 mmol/L    Co2 24 20 - 33 mmol/L    Anion Gap 10.0 0.0 - 11.9    Glucose 91 65 - 99 mg/dL    Bun 12 8 - 22 mg/dL    Creatinine 0.74 0.50 - 1.40 mg/dL    Calcium 9.9 8.5 - 10.5 mg/dL    AST(SGOT) 21 12 - 45 U/L    ALT(SGPT) 19 2 - 50 U/L    Alkaline Phosphatase 72 30 - 99 U/L    Total Bilirubin 0.4 0.1 - 1.5 mg/dL    Albumin 4.8 3.2 - 4.9 g/dL    Total Protein 8.1 6.0 - 8.2 g/dL    Globulin 3.3 1.9 - 3.5 g/dL    A-G Ratio 1.5 g/dL   Btype Natriuretic Peptide   Result Value Ref Range    B Natriuretic Peptide 22 0 - 100 pg/mL   Troponin STAT   Result Value Ref Range    Troponin I <0.01 0.00 - 0.04 ng/mL   Influenza Rapid   Result Value Ref Range    Significant Indicator NEG     Source RESP     Site Nasopharyngeal     Rapid Influenza A-B       Negative for Influenza A and Influenza B antigens.  Infection due to influenza A or B cannot be ruled out  since the antigen present in the specimen may be below the  detection limit of the test. Culture confirmation of  negative samples is recommended.     Influenza By PCR, A/B/H1N1   Result Value Ref Range    Influenza virus A RNA Negative Negative    Influenza virus B RNA Negative Negative    Influenza A 2009, H1N1, PCR Not Detected Negative   ESTIMATED GFR   Result Value Ref Range    GFR If African American >60 >60 mL/min/1.73 m 2    GFR If Non African American >60 >60 mL/min/1.73 m 2     *9All labs reviewed by me.    EKG  Interpreted by me    Rhythm: normal sinus   Rate: 69 which is normal  Axis: normal  Ectopy: none  Conduction: normal  ST Segments: no acute change  T Waves: no acute change  Q Waves:  none    Clinical Impression: no acute changes from EKG in July 2015 and normal EKG    RADIOLOGY  CT-CTA CHEST PULMONARY ARTERY W/ RECONS   Final Result      1.  No evidence pulmonary emboli.   2.  No pulmonary consolidation or pleural effusion identified.   3.  Mildly ectatic ascending thoracic aorta.   4.  1.1 cm left adrenal gland nodule and smaller left adrenal gland nodules.                 The radiologist's interpretation of all radiological studies have been reviewed by me.    COURSE & MEDICAL DECISION MAKING  Pertinent Labs & Imaging studies reviewed. (See chart for details)    Review of chart shows no previous ED visits. Outpatient evaluation for low back pain stomach pain joint pain and essential hypertension.    12:53 PM - Patient seen and examined at bedside. Ordered EKG and estimated GFR to evaluate her symptoms. The differential diagnoses include but are not limited to: Pulmonary embolism coronary artery syndrome noncardiac chest pain, generalized weakness    1:21 PM- Ordered Influenza by PCR, influenza rapid, troponin stat, BNP, CMP, CBC with differential, and CT-CTA chest pulmonary to further evaluate symptoms.     3:15 PM- Reviewed labs and radiological studies, which are overall unremarkable.     3:25 PM - Re-examined; The patient is resting in bed comfortably. I explained that her labs and radiological studies are overall unremarkable at this point, without evidence of pulmonary emboli. However, she will need to be admitted for further evaluation. Patient understands and agrees.     3:27 PM- Paged hospitalist.     5:45 PM - I discussed the patient's case and the above findings with Dr. Copeland (Hospitalist) who will see the patient.     During her observation she's had no significant dysrhythmia hypoxia or hypotension.     DISPOSITION:  Patient will be admitted to Dr. Copeland (Hospitalist) in guarded condition.     FINAL IMPRESSION  1. Acute chest pain    2. Shortness of breath          Prosper NICHOLSON  Antwon (Scribe), am scribing for, and in the presence of, Jaime Gamlbe M.D..    Electronically signed by: Prosper Kapoor ([place]), 5/5/2017    IJaime M.D. personally performed the services described in this documentation, as scribed by Prosper Kapoor in my presence, and it is both accurate and complete.    The note accurately reflects work and decisions made by me.  Jaime Gamble  5/5/2017  6:48 PM

## 2017-05-05 NOTE — IP AVS SNAPSHOT
" Home Care Instructions                                                                                                                  Name:Kelly Oakley  Medical Record Number:[id number]  CSN: [id number]    YOB: 1960   Age: 56 y.o.  Sex: female  HT:1.6 m (5' 2.99\") WT: 76.8 kg (169 lb 5 oz)          Admit Date: 5/5/2017     Discharge Date:   Today's Date: 5/6/2017  Attending Doctor:  No att. providers found                  Allergies:  Macrodantin; Pcn; Doxycycline; Bactrim; Biaxin; and Omnicef            Discharge Instructions       Discharge Instructions    Discharged to home by car with self. Discharged via walking, hospital escort: Yes.  Special equipment needed: Not Applicable    Be sure to schedule a follow-up appointment with your primary care doctor or any specialists as instructed.     Discharge Plan:   Diet Plan: Discussed  Activity Level: Discussed  Smoking Cessation Offered: Patient Counseled  Confirmed Follow up Appointment: Patient to Call and Schedule Appointment  Confirmed Symptoms Management: Discussed  Medication Reconciliation Updated: Yes  Influenza Vaccine Indication: Patient Refuses    I understand that a diet low in cholesterol, fat, and sodium is recommended for good health. Unless I have been given specific instructions below for another diet, I accept this instruction as my diet prescription.   Other diet:     Special Instructions: None    · Is patient discharged on Warfarin / Coumadin?   No     · Is patient Post Blood Transfusion?  No    Depression / Suicide Risk    As you are discharged from this [facility] facility, it is important to learn how to keep safe from harming yourself.    Recognize the warning signs:  · Abrupt changes in personality, positive or negative- including increase in energy   · Giving away possessions  · Change in eating patterns- significant weight changes-  positive or negative  · Change in sleeping patterns- unable to sleep or sleeping all the " time   · Unwillingness or inability to communicate  · Depression  · Unusual sadness, discouragement and loneliness  · Talk of wanting to die  · Neglect of personal appearance   · Rebelliousness- reckless behavior  · Withdrawal from people/activities they love  · Confusion- inability to concentrate     If you or a loved one observes any of these behaviors or has concerns about self-harm, here's what you can do:  · Talk about it- your feelings and reasons for harming yourself  · Remove any means that you might use to hurt yourself (examples: pills, rope, extension cords, firearm)  · Get professional help from the community (Mental Health, Substance Abuse, psychological counseling)  · Do not be alone:Call your Safe Contact- someone whom you trust who will be there for you.  · Call your local CRISIS HOTLINE 844-8513 or 923-711-7580  · Call your local Children's Mobile Crisis Response Team [place] (193) 389-1959 or www.Groove  · Call the toll free National Suicide Prevention Hotlines   · National Suicide Prevention Lifeline 017-601-YDPS (1107)  · National Hope Line Network 800-SUICIDE (325-1118)    Nonspecific Chest Pain   Chest pain can be caused by many different conditions. There is always a chance that your pain could be related to something serious, such as a heart attack or a blood clot in your lungs. Chest pain can also be caused by conditions that are not life-threatening. If you have chest pain, it is very important to follow up with your health care provider.  CAUSES   Chest pain can be caused by:  · Heartburn.  · Pneumonia or bronchitis.  · Anxiety or stress.  · Inflammation around your heart (pericarditis) or lung (pleuritis or pleurisy).  · A blood clot in your lung.  · A collapsed lung (pneumothorax). It can develop suddenly on its own (spontaneous pneumothorax) or from trauma to the chest.  · Shingles infection (varicella-zoster virus).  · Heart attack.  · Damage to the bones, muscles, and  cartilage that make up your chest wall. This can include:  ¨ Bruised bones due to injury.  ¨ Strained muscles or cartilage due to frequent or repeated coughing or overwork.  ¨ Fracture to one or more ribs.  ¨ Sore cartilage due to inflammation (costochondritis).  RISK FACTORS   Risk factors for chest pain may include:  · Activities that increase your risk for trauma or injury to your chest.  · Respiratory infections or conditions that cause frequent coughing.  · Medical conditions or overeating that can cause heartburn.  · Heart disease or family history of heart disease.  · Conditions or health behaviors that increase your risk of developing a blood clot.  · Having had chicken pox (varicella zoster).  SIGNS AND SYMPTOMS  Chest pain can feel like:  · Burning or tingling on the surface of your chest or deep in your chest.  · Crushing, pressure, aching, or squeezing pain.  · Dull or sharp pain that is worse when you move, cough, or take a deep breath.  · Pain that is also felt in your back, neck, shoulder, or arm, or pain that spreads to any of these areas.  Your chest pain may come and go, or it may stay constant.  DIAGNOSIS  Lab tests or other studies may be needed to find the cause of your pain. Your health care provider may have you take a test called an ambulatory ECG (electrocardiogram). An ECG records your heartbeat patterns at the time the test is performed. You may also have other tests, such as:  · Transthoracic echocardiogram (TTE). During echocardiography, sound waves are used to create a picture of all of the heart structures and to look at how blood flows through your heart.  · Transesophageal echocardiogram (JOSEY). This is a more advanced imaging test that obtains images from inside your body. It allows your health care provider to see your heart in finer detail.  · Cardiac monitoring. This allows your health care provider to monitor your heart rate and rhythm in real time.  · Holter monitor. This is a  portable device that records your heartbeat and can help to diagnose abnormal heartbeats. It allows your health care provider to track your heart activity for several days, if needed.  · Stress tests. These can be done through exercise or by taking medicine that makes your heart beat more quickly.  · Blood tests.  · Imaging tests.  TREATMENT   Your treatment depends on what is causing your chest pain. Treatment may include:  · Medicines. These may include:  ¨ Acid blockers for heartburn.  ¨ Anti-inflammatory medicine.  ¨ Pain medicine for inflammatory conditions.  ¨ Antibiotic medicine, if an infection is present.  ¨ Medicines to dissolve blood clots.  ¨ Medicines to treat coronary artery disease.  · Supportive care for conditions that do not require medicines. This may include:  ¨ Resting.  ¨ Applying heat or cold packs to injured areas.  ¨ Limiting activities until pain decreases.  HOME CARE INSTRUCTIONS  · If you were prescribed an antibiotic medicine, finish it all even if you start to feel better.  · Avoid any activities that bring on chest pain.  · Do not use any tobacco products, including cigarettes, chewing tobacco, or electronic cigarettes. If you need help quitting, ask your health care provider.  · Do not drink alcohol.  · Take medicines only as directed by your health care provider.  · Keep all follow-up visits as directed by your health care provider. This is important. This includes any further testing if your chest pain does not go away.  · If heartburn is the cause for your chest pain, you may be told to keep your head raised (elevated) while sleeping. This reduces the chance that acid will go from your stomach into your esophagus.  · Make lifestyle changes as directed by your health care provider. These may include:  ¨ Getting regular exercise. Ask your health care provider to suggest some activities that are safe for you.  ¨ Eating a heart-healthy diet. A registered dietitian can help you to learn  healthy eating options.  ¨ Maintaining a healthy weight.  ¨ Managing diabetes, if necessary.  ¨ Reducing stress.  SEEK MEDICAL CARE IF:  · Your chest pain does not go away after treatment.  · You have a rash with blisters on your chest.  · You have a fever.  SEEK IMMEDIATE MEDICAL CARE IF:   · Your chest pain is worse.  · You have an increasing cough, or you cough up blood.  · You have severe abdominal pain.  · You have severe weakness.  · You faint.  · You have chills.  · You have sudden, unexplained chest discomfort.  · You have sudden, unexplained discomfort in your arms, back, neck, or jaw.  · You have shortness of breath at any time.  · You suddenly start to sweat, or your skin gets clammy.  · You feel nauseous or you vomit.  · You suddenly feel light-headed or dizzy.  · Your heart begins to beat quickly, or it feels like it is skipping beats.  These symptoms may represent a serious problem that is an emergency. Do not wait to see if the symptoms will go away. Get medical help right away. Call your local emergency services (911 in the [place] Do not drive yourself to the hospital.     This information is not intended to replace advice given to you by your health care provider. Make sure you discuss any questions you have with your health care provider.     Document Released: 09/27/2006 Document Revised: 01/08/2016 Document Reviewed: 07/24/2015  [employer] Interactive Patient Education ©2016 [employer]        Your appointments     May 30, 2017 10:00 AM   HOSPITAL FOLLOW UP with CARL Dee   [facility] (--)    [address]  [facility] [address] [phone]              Follow-up Information     1. Follow up with John Cunningham M.D.. Call in 2 days.    Specialty:  Gastroenterology    Why:  to discuss recent hospitalization and need for follow-up appointment    Contact information    [address]  [address]  [facility] [id number] [phone]          2.  Follow up with Saulo Fajardo M.D.. Call in 2 days.    Specialty:  Family Medicine    Why:  to discuss recent hospitalization and need for follow-up appointment    Contact information    [address]  [place] [id number] [phone]          3. Follow up with CARL Dee. Go on 5/30/2017.    Specialty:  Cardiology    Why:  your appointment has been scheduled for 10am. This is a Nurse Practitioner who works with Dr. Hi.    Contact information    [address]  [address]  [place] [address] [phone]           Discharge Medication Instructions:    Below are the medications your physician expects you to take upon discharge:    Review all your home medications and newly ordered medications with your doctor and/or pharmacist. Follow medication instructions as directed by your doctor and/or pharmacist.    Please keep your medication list with you and share with your physician.               Medication List      START taking these medications        Instructions    Morning Afternoon Evening Bedtime    nicotine 7 MG/24HR Pt24   Commonly known as:  NICODERM        Apply 1 Patch to skin as directed every 24 hours.   Dose:  1 Patch                          CONTINUE taking these medications        Instructions    Morning Afternoon Evening Bedtime    amlodipine 5 MG Tabs   Last time this was given:  5 mg on 5/6/2017  9:37 AM   Commonly known as:  NORVASC        Take 5 mg by mouth every morning.   Dose:  5 mg                        aspirin EC 81 MG Tbec   Commonly known as:  ECOTRIN        Take 81 mg by mouth every day.   Dose:  81 mg                        hydrochlorothiazide 25 MG Tabs   Last time this was given:  25 mg on 5/6/2017  9:37 AM   Commonly known as:  HYDRODIURIL        Take 25 mg by mouth every morning.   Dose:  25 mg                        omeprazole 40 MG delayed-release capsule   Last time this was given:  20 mg on 5/6/2017 10:21 AM   Commonly known as:  PRILOSEC        TAKE ONE CAPSULE BY MOUTH  DAILY (GENERIC PRILOSEC)                        pravastatin 20 MG Tabs   Last time this was given:  20 mg on 5/5/2017  9:33 PM   Commonly known as:  PRAVACHOL        Doctor's comments:  Request for 90 day supply   Take 1 Tab by mouth every day.   Dose:  20 mg                        triamcinolone acetonide 0.1 % Crea   Commonly known as:  KENALOG        Apply 1 Application to affected area(s) 2 times a day.   Dose:  1 Application                             Where to Get Your Medications      Information about where to get these medications is not yet available     ! Ask your nurse or doctor about these medications    - nicotine 7 MG/24HR Pt24            Instructions           Diet / Nutrition:    Follow any diet instructions given to you by your doctor or the dietician, including how much salt (sodium) you are allowed each day.    If you are overweight, talk to your doctor about a weight reduction plan.    Activity:    Remain physically active following your doctor's instructions about exercise and activity.    Rest often.     Any time you become even a little tired or short of breath, SIT DOWN and rest.    Worsening Symptoms:    Report any of the following signs and symptoms to the doctor's office immediately:    *Pain of jaw, arm, or neck  *Chest pain not relieved by medication                               *Dizziness or loss of consciousness  *Difficulty breathing even when at rest   *More tired than usual                                       *Bleeding drainage or swelling of surgical site  *Swelling of feet, ankles, legs or stomach                 *Fever (>100ºF)  *Pink or blood tinged sputum  *Weight gain (3lbs/day or 5lbs /week)           *Shock from internal defibrillator (if applicable)  *Palpitations or irregular heartbeats                *Cool and/or numb extremities    Stroke Awareness    Common Risk Factors for Stroke include:    Age  Atrial Fibrillation  Carotid Artery Stenosis  Diabetes  Mellitus  Excessive alcohol consumption  High blood pressure  Overweight   Physical inactivity  Smoking    Warning signs and symptoms of a stroke include:    *Sudden numbness or weakness of the face, arm or leg (especially on one side of the body).  *Sudden confusion, trouble speaking or understanding.  *Sudden trouble seeing in one or both eyes.  *Sudden trouble walking, dizziness, loss of balance or coordination.Sudden severe headache with no known cause.    It is very important to get treatment quickly when a stroke occurs. If you experience any of the above warning signs, call 911 immediately.                   Disclaimer         Quit Smoking / Tobacco Use:    I understand the use of any tobacco products increases my chance of suffering from future heart disease or stroke and could cause other illnesses which may shorten my life. Quitting the use of tobacco products is the single most important thing I can do to improve my health. For further information on smoking / tobacco cessation call a Toll Free Quit Line at 1-423.389.2373 (*National Cancer [place]) or 1-724.533.7753 (American Lung Association) or you can access the web based program at www.lungDoormen..org.    [place] Tobacco Users Help Line:  (235) 230-6217       Toll Free: 1-270.600.2911  Quit Tobacco Program [facility] Management Services [phone]    Crisis Hotline:    [place] Crisis Hotline:  5-777-CFVNTFS or 1-670.114.4354    [place] Crisis Hotline:    1-567.162.9471 or 161-067-8522    Discharge Survey:   Thank you for choosing [facility]. We hope we did everything we could to make your hospital stay a pleasant one. You may be receiving a phone survey and we would appreciate your time and participation in answering the questions. Your input is very valuable to us in our efforts to improve our service to our patients and their families.        My signature on this form indicates that:    1. I have reviewed and understand the above  information.  2. My questions regarding this information have been answered to my satisfaction.  3. I have formulated a plan with my discharge nurse to obtain my prescribed medications for home.                  Disclaimer         __________________________________                     __________       ________                       Patient Signature                                                 Date                    Time

## 2017-05-06 ENCOUNTER — APPOINTMENT (OUTPATIENT)
Dept: RADIOLOGY | Facility: MEDICAL CENTER | Age: 57
End: 2017-05-06
Attending: HOSPITALIST
Payer: COMMERCIAL

## 2017-05-06 ENCOUNTER — PATIENT OUTREACH (OUTPATIENT)
Dept: HEALTH INFORMATION MANAGEMENT | Facility: OTHER | Age: 57
End: 2017-05-06

## 2017-05-06 VITALS
TEMPERATURE: 98 F | SYSTOLIC BLOOD PRESSURE: 136 MMHG | HEART RATE: 70 BPM | RESPIRATION RATE: 18 BRPM | BODY MASS INDEX: 30 KG/M2 | WEIGHT: 169.31 LBS | OXYGEN SATURATION: 94 % | HEIGHT: 63 IN | DIASTOLIC BLOOD PRESSURE: 65 MMHG

## 2017-05-06 LAB
ANION GAP SERPL CALC-SCNC: 10 MMOL/L (ref 0–11.9)
BASOPHILS # BLD AUTO: 1.1 % (ref 0–1.8)
BASOPHILS # BLD: 0.07 K/UL (ref 0–0.12)
BUN SERPL-MCNC: 17 MG/DL (ref 8–22)
CALCIUM SERPL-MCNC: 9 MG/DL (ref 8.5–10.5)
CHLORIDE SERPL-SCNC: 108 MMOL/L (ref 96–112)
CHOLEST SERPL-MCNC: 152 MG/DL (ref 100–199)
CO2 SERPL-SCNC: 22 MMOL/L (ref 20–33)
CREAT SERPL-MCNC: 0.88 MG/DL (ref 0.5–1.4)
EOSINOPHIL # BLD AUTO: 0.22 K/UL (ref 0–0.51)
EOSINOPHIL NFR BLD: 3.4 % (ref 0–6.9)
ERYTHROCYTE [DISTWIDTH] IN BLOOD BY AUTOMATED COUNT: 40.9 FL (ref 35.9–50)
GFR SERPL CREATININE-BSD FRML MDRD: >60 ML/MIN/1.73 M 2
GLUCOSE SERPL-MCNC: 119 MG/DL (ref 65–99)
HCT VFR BLD AUTO: 38.2 % (ref 37–47)
HDLC SERPL-MCNC: 37 MG/DL
HGB BLD-MCNC: 13.1 G/DL (ref 12–16)
IMM GRANULOCYTES # BLD AUTO: 0.04 K/UL (ref 0–0.11)
IMM GRANULOCYTES NFR BLD AUTO: 0.6 % (ref 0–0.9)
LDLC SERPL CALC-MCNC: 83 MG/DL
LYMPHOCYTES # BLD AUTO: 2.22 K/UL (ref 1–4.8)
LYMPHOCYTES NFR BLD: 34.5 % (ref 22–41)
MCH RBC QN AUTO: 30.5 PG (ref 27–33)
MCHC RBC AUTO-ENTMCNC: 34.3 G/DL (ref 33.6–35)
MCV RBC AUTO: 88.8 FL (ref 81.4–97.8)
MONOCYTES # BLD AUTO: 0.75 K/UL (ref 0–0.85)
MONOCYTES NFR BLD AUTO: 11.7 % (ref 0–13.4)
NEUTROPHILS # BLD AUTO: 3.13 K/UL (ref 2–7.15)
NEUTROPHILS NFR BLD: 48.7 % (ref 44–72)
NRBC # BLD AUTO: 0 K/UL
NRBC BLD AUTO-RTO: 0 /100 WBC
PLATELET # BLD AUTO: 189 K/UL (ref 164–446)
PMV BLD AUTO: 10.2 FL (ref 9–12.9)
POTASSIUM SERPL-SCNC: 3.8 MMOL/L (ref 3.6–5.5)
RBC # BLD AUTO: 4.3 M/UL (ref 4.2–5.4)
SODIUM SERPL-SCNC: 140 MMOL/L (ref 135–145)
TRIGL SERPL-MCNC: 161 MG/DL (ref 0–149)
TROPONIN I SERPL-MCNC: <0.01 NG/ML (ref 0–0.04)
WBC # BLD AUTO: 6.4 K/UL (ref 4.8–10.8)

## 2017-05-06 PROCEDURE — 80061 LIPID PANEL: CPT

## 2017-05-06 PROCEDURE — A9270 NON-COVERED ITEM OR SERVICE: HCPCS | Performed by: HOSPITALIST

## 2017-05-06 PROCEDURE — 700102 HCHG RX REV CODE 250 W/ 637 OVERRIDE(OP): Performed by: NURSE PRACTITIONER

## 2017-05-06 PROCEDURE — 700111 HCHG RX REV CODE 636 W/ 250 OVERRIDE (IP): Performed by: HOSPITALIST

## 2017-05-06 PROCEDURE — 93970 EXTREMITY STUDY: CPT | Mod: 26 | Performed by: SURGERY

## 2017-05-06 PROCEDURE — A9270 NON-COVERED ITEM OR SERVICE: HCPCS | Performed by: NURSE PRACTITIONER

## 2017-05-06 PROCEDURE — 85025 COMPLETE CBC W/AUTO DIFF WBC: CPT

## 2017-05-06 PROCEDURE — G0378 HOSPITAL OBSERVATION PER HR: HCPCS

## 2017-05-06 PROCEDURE — 99217 PR OBSERVATION CARE DISCHARGE: CPT | Performed by: HOSPITALIST

## 2017-05-06 PROCEDURE — 36415 COLL VENOUS BLD VENIPUNCTURE: CPT

## 2017-05-06 PROCEDURE — A9502 TC99M TETROFOSMIN: HCPCS

## 2017-05-06 PROCEDURE — 84484 ASSAY OF TROPONIN QUANT: CPT

## 2017-05-06 PROCEDURE — 700102 HCHG RX REV CODE 250 W/ 637 OVERRIDE(OP): Performed by: HOSPITALIST

## 2017-05-06 PROCEDURE — 80048 BASIC METABOLIC PNL TOTAL CA: CPT

## 2017-05-06 PROCEDURE — 93970 EXTREMITY STUDY: CPT

## 2017-05-06 PROCEDURE — 700111 HCHG RX REV CODE 636 W/ 250 OVERRIDE (IP)

## 2017-05-06 RX ORDER — OMEPRAZOLE 20 MG/1
20 CAPSULE, DELAYED RELEASE ORAL ONCE
Status: COMPLETED | OUTPATIENT
Start: 2017-05-06 | End: 2017-05-06

## 2017-05-06 RX ORDER — REGADENOSON 0.08 MG/ML
INJECTION, SOLUTION INTRAVENOUS
Status: COMPLETED
Start: 2017-05-06 | End: 2017-05-06

## 2017-05-06 RX ADMIN — OMEPRAZOLE 20 MG: 20 CAPSULE, DELAYED RELEASE ORAL at 10:21

## 2017-05-06 RX ADMIN — HYDROCHLOROTHIAZIDE 25 MG: 25 TABLET ORAL at 09:37

## 2017-05-06 RX ADMIN — ASPIRIN 325 MG: 325 TABLET, COATED ORAL at 09:37

## 2017-05-06 RX ADMIN — AMLODIPINE BESYLATE 5 MG: 5 TABLET ORAL at 09:37

## 2017-05-06 RX ADMIN — OMEPRAZOLE 20 MG: 20 CAPSULE, DELAYED RELEASE ORAL at 09:36

## 2017-05-06 RX ADMIN — NICOTINE 14 MG: 14 PATCH, EXTENDED RELEASE TRANSDERMAL at 05:42

## 2017-05-06 RX ADMIN — REGADENOSON 0.4 MG: 0.08 INJECTION, SOLUTION INTRAVENOUS at 08:18

## 2017-05-06 ASSESSMENT — PAIN SCALES - GENERAL
PAINLEVEL_OUTOF10: 0

## 2017-05-06 ASSESSMENT — PAIN SCALES - WONG BAKER
WONGBAKER_NUMERICALRESPONSE: DOESN'T HURT AT ALL
WONGBAKER_NUMERICALRESPONSE: DOESN'T HURT AT ALL

## 2017-05-06 NOTE — PROGRESS NOTES
IV D/C'd.  Discharge instructions provided to pt.  Pt states understanding.  Pt states all questions have been answered.  Copy of discharge provided to pt.  Signed copy in chart.  Prescriptions provided to pt, copy in chart. Pt states that all personal belongings are in possession.Pt escorted off unit without incident.

## 2017-05-06 NOTE — H&P
PRIMARY CARE PROVIDER:  Saulo Fajardo MD    CHIEF COMPLAINT:  Chest pain.    HISTORY OF PRESENT ILLNESS:  This is a 56-year-old female, who presents to the   ER with onset of a 2-week history of shortness of breath.  Patient states she   just walks 5:00 last week and was feeling very winded and short of breath   where she normally was able to walk without any difficulties.  She was noted   by her  to be extremely short of breath.  She is a longtime smoker,   quarter pack of cigarettes per day for the last 40 years.  No history of COPD.    She has had multiple stress tests in the past for chest pain, last being 3   years ago.  She also had a cardiac catheterization in her 20s that was clean.    She is followed by Dr. Hauser, cardiology.  She also states that she had onset   of chest pain at rest that felt like a sharp, burning and heaviness.  She   also describes as crushing of the anterior chest wall, radiated to the left   posterior back of chest wall as well as to the left shoulder and left upper   arm.  She is nauseated and this woke patient Tuesday morning with a cold   sweat.  Patient was also quite concerned of a left lower extremity edema.  She   states she has never had edema in her left lower leg before that is clearly   larger than her right leg.  She was seen 04/19/2017 for lower extremity edema   and had an ultrasound, which was negative for DVT.  She states that the   anterior chest pain is going off and on for the past week.  She is not sure if   its her Briseno's esophagus, but she is concerned enough she is worried about   her that it could be cardiac.  She denies cough or hemoptysis.  She denies   any previous DVT or PE.    REVIEW OF SYSTEMS:  Patient denies any nausea, vomiting, or abdominal pain.    She does have some nausea.  She denies any neurological deficits, headache, or   seizure.    SOCIAL HISTORY:  She smokes a quarter-pack of cigarettes per day for 40 years.    She has been trying  to quit, but unable to.  She does drink only   occasionally.  She lives with her .  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  She does take care of her   daughter, granddaughter, and is quite active with housecleaning and other   activities.  She is feeling somewhat troubled and fact that she is having the   food down because of shortness of breath.    PAST SURGICAL HISTORY:  Total abdominal hysterectomy, endometrial ablation,   cholecystectomy, appendectomy, tonsillectomy, primary , tubal   ligation, colon resection, and breast biopsy in .    CURRENT MEDICATIONS:  Pravachol, Prilosec, aspirin enteric-coated 81 mg daily,   and Kenalog 0.1% cream.    ALLERGIES:  TO MACRODANTIN, PENICILLIN, DOXYCYCLINE, BACTRIM, BIAXIN, AND   OMNICEF.    PAST MEDICAL HISTORY:  Anemia, depression, anxiety, panic attacks, Briseno's   esophagitis, arthritis, gastroesophageal reflux disease, inflammatory bowel   disease, osteoporosis, urinary tract infection, adrenal nodule, benign   nonfunctional, hyperlipidemia, nocturnal hypoxemia history, fatigue, and   hypertension.    FAMILY HISTORY:  Her mother had breast and liver cancer as well as diabetes,   hypertension, hyperlipidemia, and heart disease.  Father had emphysema and   paranoid schizophrenia.  Sister has also paranoid schizophrenia, bipolar   disorder, rheumatoid arthritis, and fibromyalgia.    PHYSICAL EXAMINATION:  VITAL SIGNS:  Blood pressure 139/80, pulse 78, temperature 36.2, respirations   16, weight 77 kilograms, BMI 30, and O2 sat 98% on room air.  GENERAL:  She is quite does appear to be quite anxious, but overall does not   appear to be in toxic appearance.  She appears to be well-nourished,   well-developed female.  I do smell cigarette smoking patient.  HEENT:  Normocephalic, atraumatic, moist mucous membranes.  Symmetrical smile   and eyebrow raise.  Otherwise, pupils equal, round, reactive to light    bilaterally.  EOMI intact.  Conjunctivae are normal.  No icterus.  No   discharge.  NECK:  Supple.  Trachea is midline.  No JVD or crepitus.  LYMPHATICS:  There is no palpable lymphadenopathy of the anterior cervical or   supraclavicular.  CARDIOVASCULAR:  Normal rate and rhythm.  No murmur, rub or gallop.  No   reproducible chest wall pain with palpation.  LUNGS:  Clear to auscultation in all lung fields.  No crackles, wheezes, or   rhonchi appreciated.  BACK:  There is no CVA tenderness.  There is no step-off.  SKIN:  There is no cellulitis, rash, or bruising.  EXTREMITIES:  She does have increased edema of the left leg versus the right   leg and to miss of the calf on the left.  She has no cyanosis or clubbing.    Normal distal pulses apparent.  MUSCULOSKELETAL:  Full range of motion in all major joints and all 4   extremities without pain with palpation of both lower extremities other than   the left calf.  NEUROLOGIC:  Cranial nerves II-XII normal.  Motor 5/5 in all 4 extremities.    Sensory intact in all 4 extremities.  No focal neurological deficits.  PSYCHIATRIC:  Normal affect, mood, and judgment; however, patient does appear   to be quite anxious, alert and oriented x3.    DIAGNOSTICS:  She did have a CTA of the chest that was negative for PE.  It   does; however, note that 1.1 cm left adrenal gland nodule, which patient has a   history of unknown benign adrenal gland nodule.  No pulmonary consolidation   seen.  EKG 12-lead, normal sinus rhythm.  No ST elevations or depressions are   seen, rate is 69.  Influenza A and B negative.    LABORATORY DATA:  WBC 6.4, H and H 14 and 41, platelets 211 with a normal   differential.  Sodium 140, potassium 4.0, chloride 106, carbon dioxide 24,   glucose 91, BUN 12, and creatinine 0.74.  LFTs normal.  Alk phos 72.  BNP 22.    Troponin negative.    IMPRESSION:  1.  Acute chest pain, off and on for 2 weeks, dyspnea on exertion, no   orthopnea, in a smoker for 40 years.   This very well could be related to her   smoking.  I did  the patient for cessation.  Nicotine replacement will   be provided.  Oxygen, RT protocol as needed with albuterol p.r.n.  She does   not appear to have any wheezing currently.  2.  Chest pain, anterior chest wall, occurring at rest, radiating to arm and   back.  CTA of the chest negative and her EKG is negative.  Troponin negative.    We will set up for nuclear medicine stress test for her in the morning after   serial troponins, aspirin, lipid profile.  She will continue Pravachol 20 mg   at bedtime.  3.  Hypertension.  Continue with amlodipine and hydrochlorothiazide 25 mg   daily.  4.  History of Briseno's esophagitis with gastroesophageal reflux disease.    Continue Prilosec 20 mg daily.  5.  Left lower extremity edema.  She has had 1 Doppler ultrasound on   04/19/2017 that was negative for deep venous thrombosis; however, it is quite   impressive to be greater than the right given her symptoms.  May need to be   checked a repeat ultrasound of the lower extremities, telemetry monitoring.    Anticipate less than 2 midnights stay.  Admission time is 65 minutes.       ____________________________________     MD JOSE RAUL Alicea / JOE    DD:  05/05/2017 23:24:10  DT:  05/06/2017 00:46:17    D#:  [id number]  Job#:  [id number]

## 2017-05-06 NOTE — PROGRESS NOTES
Received from green pod, aox4, sr on monitor, steady on her  feet. Denies pain or sob. Call light within reach. Needs attended. Plan of care discussed and understood.

## 2017-05-06 NOTE — PROGRESS NOTES
Received report from evening RN.  Pt is A/Ox4.  Respirations are even and unlabored.  Pt denies any CP or SOB at this time.  POC reviewed, verbalized understanding.  Will continue to closely monitor. Call light within reach.

## 2017-05-06 NOTE — DISCHARGE PLANNING
Care Transition Team Assessment    Information Source  Orientation : Oriented x 4  Information Given By: Patient  Informant's Name:  (Kelly Oakley)  Who is responsible for making decisions for patient? : Patient    Readmission Evaluation  Is this a readmission?: No    Elopement Risk  Legal Hold: No  Ambulatory or Self Mobile in Wheelchair: No-Not an Elopement Risk  Elopement Risk: Not at Risk for Elopement    Interdisciplinary Discharge Planning  Does Admitting Nurse Feel This Could be a Complex Discharge?: No  Primary Care Physician:  (Saulo Fajardo MD)  Lives with - Patient's Self Care Capacity: Spouse  Patient or legal guardian wants to designate a caregiver (see row info): No  Support Systems: Spouse / Significant Other  Housing / Facility: [address]  Do You Take your Prescribed Medications Regularly: Yes  Able to Return to Previous ADL's: Yes  Mobility Issues: No  Prior Services: None  Patient Expects to be Discharged to::  (Home)  Assistance Needed: No  Durable Medical Equipment: Not Applicable    Discharge Preparedness  What is your plan after discharge?:  (Home)  What are your discharge supports?: Spouse  Prior Functional Level: Ambulatory  Difficulity with ADLs: None  Difficulity with IADLs: None    Functional Assesment  Prior Functional Level: Ambulatory    Finances  Financial Barriers to Discharge: No  Prescription Coverage: Yes    Vision / Hearing Impairment  Vision Impairment : No  Hearing Impairment : No         Advance Directive  Advance Directive?: None  Advance Directive offered?: AD Booklet given    Domestic Abuse  Have you ever been the victim of abuse or violence?: No  Physical Abuse or Sexual Abuse: No  Verbal Abuse or Emotional Abuse: No  Possible Abuse Reported to:: Not Applicable    Psychological Assessment  History of Substance Abuse: None  History of Psychiatric Problems: Yes (Depression & Anxiety)  Non-compliant with Treatment: No  Newly Diagnosed Illness: No    Discharge Risks or  Barriers  Discharge risks or barriers?: No    Anticipated Discharge Information  Anticipated discharge disposition: Home  Discharge Address:  ([address] [place])  Discharge Contact Phone Number:  (Fernando Oakley  Spouse  [phone])

## 2017-05-06 NOTE — DISCHARGE INSTRUCTIONS
Discharge Instructions    Discharged to home by car with self. Discharged via walking, hospital escort: Yes.  Special equipment needed: Not Applicable    Be sure to schedule a follow-up appointment with your primary care doctor or any specialists as instructed.     Discharge Plan:   Diet Plan: Discussed  Activity Level: Discussed  Smoking Cessation Offered: Patient Counseled  Confirmed Follow up Appointment: Patient to Call and Schedule Appointment  Confirmed Symptoms Management: Discussed  Medication Reconciliation Updated: Yes  Influenza Vaccine Indication: Patient Refuses    I understand that a diet low in cholesterol, fat, and sodium is recommended for good health. Unless I have been given specific instructions below for another diet, I accept this instruction as my diet prescription.   Other diet:     Special Instructions: None    · Is patient discharged on Warfarin / Coumadin?   No     · Is patient Post Blood Transfusion?  No    Depression / Suicide Risk    As you are discharged from this [facility] facility, it is important to learn how to keep safe from harming yourself.    Recognize the warning signs:  · Abrupt changes in personality, positive or negative- including increase in energy   · Giving away possessions  · Change in eating patterns- significant weight changes-  positive or negative  · Change in sleeping patterns- unable to sleep or sleeping all the time   · Unwillingness or inability to communicate  · Depression  · Unusual sadness, discouragement and loneliness  · Talk of wanting to die  · Neglect of personal appearance   · Rebelliousness- reckless behavior  · Withdrawal from people/activities they love  · Confusion- inability to concentrate     If you or a loved one observes any of these behaviors or has concerns about self-harm, here's what you can do:  · Talk about it- your feelings and reasons for harming yourself  · Remove any means that you might use to hurt yourself (examples: pills,  rope, extension cords, firearm)  · Get professional help from the community (Mental Health, Substance Abuse, psychological counseling)  · Do not be alone:Call your Safe Contact- someone whom you trust who will be there for you.  · Call your local CRISIS HOTLINE 733-1297 or 920-096-5521  · Call your local Children's Mobile Crisis Response Team [place] (694) 514-1896 or www.TravelAI  · Call the toll free National Suicide Prevention Hotlines   · National Suicide Prevention Lifeline 609-723-UUDG (3760)  · [employer] Hope Line Network 800-SUICIDE (175-4808)    Nonspecific Chest Pain   Chest pain can be caused by many different conditions. There is always a chance that your pain could be related to something serious, such as a heart attack or a blood clot in your lungs. Chest pain can also be caused by conditions that are not life-threatening. If you have chest pain, it is very important to follow up with your health care provider.  CAUSES   Chest pain can be caused by:  · Heartburn.  · Pneumonia or bronchitis.  · Anxiety or stress.  · Inflammation around your heart (pericarditis) or lung (pleuritis or pleurisy).  · A blood clot in your lung.  · A collapsed lung (pneumothorax). It can develop suddenly on its own (spontaneous pneumothorax) or from trauma to the chest.  · Shingles infection (varicella-zoster virus).  · Heart attack.  · Damage to the bones, muscles, and cartilage that make up your chest wall. This can include:  ¨ Bruised bones due to injury.  ¨ Strained muscles or cartilage due to frequent or repeated coughing or overwork.  ¨ Fracture to one or more ribs.  ¨ Sore cartilage due to inflammation (costochondritis).  RISK FACTORS   Risk factors for chest pain may include:  · Activities that increase your risk for trauma or injury to your chest.  · Respiratory infections or conditions that cause frequent coughing.  · Medical conditions or overeating that can cause heartburn.  · Heart disease or family  history of heart disease.  · Conditions or health behaviors that increase your risk of developing a blood clot.  · Having had chicken pox (varicella zoster).  SIGNS AND SYMPTOMS  Chest pain can feel like:  · Burning or tingling on the surface of your chest or deep in your chest.  · Crushing, pressure, aching, or squeezing pain.  · Dull or sharp pain that is worse when you move, cough, or take a deep breath.  · Pain that is also felt in your back, neck, shoulder, or arm, or pain that spreads to any of these areas.  Your chest pain may come and go, or it may stay constant.  DIAGNOSIS  Lab tests or other studies may be needed to find the cause of your pain. Your health care provider may have you take a test called an ambulatory ECG (electrocardiogram). An ECG records your heartbeat patterns at the time the test is performed. You may also have other tests, such as:  · Transthoracic echocardiogram (TTE). During echocardiography, sound waves are used to create a picture of all of the heart structures and to look at how blood flows through your heart.  · Transesophageal echocardiogram (JOSEY). This is a more advanced imaging test that obtains images from inside your body. It allows your health care provider to see your heart in finer detail.  · Cardiac monitoring. This allows your health care provider to monitor your heart rate and rhythm in real time.  · Holter monitor. This is a portable device that records your heartbeat and can help to diagnose abnormal heartbeats. It allows your health care provider to track your heart activity for several days, if needed.  · Stress tests. These can be done through exercise or by taking medicine that makes your heart beat more quickly.  · Blood tests.  · Imaging tests.  TREATMENT   Your treatment depends on what is causing your chest pain. Treatment may include:  · Medicines. These may include:  ¨ Acid blockers for heartburn.  ¨ Anti-inflammatory medicine.  ¨ Pain medicine for  inflammatory conditions.  ¨ Antibiotic medicine, if an infection is present.  ¨ Medicines to dissolve blood clots.  ¨ Medicines to treat coronary artery disease.  · Supportive care for conditions that do not require medicines. This may include:  ¨ Resting.  ¨ Applying heat or cold packs to injured areas.  ¨ Limiting activities until pain decreases.  HOME CARE INSTRUCTIONS  · If you were prescribed an antibiotic medicine, finish it all even if you start to feel better.  · Avoid any activities that bring on chest pain.  · Do not use any tobacco products, including cigarettes, chewing tobacco, or electronic cigarettes. If you need help quitting, ask your health care provider.  · Do not drink alcohol.  · Take medicines only as directed by your health care provider.  · Keep all follow-up visits as directed by your health care provider. This is important. This includes any further testing if your chest pain does not go away.  · If heartburn is the cause for your chest pain, you may be told to keep your head raised (elevated) while sleeping. This reduces the chance that acid will go from your stomach into your esophagus.  · Make lifestyle changes as directed by your health care provider. These may include:  ¨ Getting regular exercise. Ask your health care provider to suggest some activities that are safe for you.  ¨ Eating a heart-healthy diet. A registered dietitian can help you to learn healthy eating options.  ¨ Maintaining a healthy weight.  ¨ Managing diabetes, if necessary.  ¨ Reducing stress.  SEEK MEDICAL CARE IF:  · Your chest pain does not go away after treatment.  · You have a rash with blisters on your chest.  · You have a fever.  SEEK IMMEDIATE MEDICAL CARE IF:   · Your chest pain is worse.  · You have an increasing cough, or you cough up blood.  · You have severe abdominal pain.  · You have severe weakness.  · You faint.  · You have chills.  · You have sudden, unexplained chest discomfort.  · You have sudden,  unexplained discomfort in your arms, back, neck, or jaw.  · You have shortness of breath at any time.  · You suddenly start to sweat, or your skin gets clammy.  · You feel nauseous or you vomit.  · You suddenly feel light-headed or dizzy.  · Your heart begins to beat quickly, or it feels like it is skipping beats.  These symptoms may represent a serious problem that is an emergency. Do not wait to see if the symptoms will go away. Get medical help right away. Call your local emergency services (911 in the [place] Do not drive yourself to the hospital.     This information is not intended to replace advice given to you by your health care provider. Make sure you discuss any questions you have with your health care provider.     Document Released: 09/27/2006 Document Revised: 01/08/2016 Document Reviewed: 07/24/2015  [employer] Interactive Patient Education ©2016 [employer]

## 2017-05-07 NOTE — DISCHARGE SUMMARY
DATE OF ADMISSION:  05/05/2017    DATE OF DISCHARGE:  05/06/2017    DISCHARGE DIAGNOSIS:  Chest pain, resolved.    CHRONIC MEDICAL PROBLEMS:  1.  Hypertension.  2.  Hyperlipidemia.  3.  Cigarette smoker, one half pack a day or less.  4.  Gastroesophageal reflux disease.  5.  Briseno's esophagus.    STUDIES:  Hematology:  WBC 6.4, hemoglobin and hematocrit 13.1 and 38.2,   platelets of 189.  Chemistry panel:  Sodium 140, potassium 3.8, otherwise   unremarkable chemistry panel.  Lipid panel, total cholesterol 152,   triglycerides 161, HDL 37 and LDL 83.  Troponins were negative x3, 0.01.  BNP   of 22.  Influenza A or B not detected or negative.    IMAGING STUDIES:  1.  CTA of the chest indicates no evidence of pulmonary emboli.  No pulmonary   consolidation or pleural effusion identified.  Mildly ectatic ascending   thoracic aorta.  A 1.1 cm left adrenal gland nodule and smaller left adrenal   gland nodule.  This is similar to previous history.  2.  Nuclear medicine cardiac stress test indicates no definite myocardial   infarct or reversible ischemia.  Normal left ventricular function.  3.  Lower extremity venous duplex indicates complete color filling and   compressibility with normal venous flow dynamics in bilateral lower   extremities.  4.  EKG indicates sinus bradycardia with atrial premature complex and a rate   of 59.    CONSULTS:  None.    PROCEDURES:  None.    PRIMARY CARE PROVIDER:  Saulo Fajardo MD    CODE STATUS:  Full.    HOSPITAL COURSE:  H and P on admission done by Dr. Marianela Copeland, please see   her full H and P for further details.  Briefly, this is a 56-year-old female,   who presents to the emergency department with chest pain.  Patient was   reporting a 2-week history of shortness of breath and some chest pain and   cough.  Patient is a longtime smoker; however, she recently discontinued the   ACE inhibitor as this was thought to may be contributing to her cough.  Her   Norvasc was increased and  then she noticed increased bilateral lower extremity   swelling.  Patient was concerned about the chest pain and then was having leg   swelling and presented to the emergency department for further evaluation.    She was admitted to the clinical decision unit in stable condition.  We   obtained serial troponins, which were negative and then we went ahead with CTA   of the chest, nuclear medicine cardiac stress test and the lower extremity   venous duplex ultrasound, which were all essentially unremarkable for any   acute findings.  We did discuss the role of Briseno's esophagus and how this   can contribute to the heartburn/chest pain sensation.  The patient was   agreeable that this may be contributing.  We also discussed the possibility of   a Baker's cyst given the location of the patient's lower extremity pain, but   we have definitively ruled out any deep vein thrombosis in lower extremities   and in the chest.  The patient was appreciative of this information.  She was   reassured by the nuclear medicine stress test being normal and she reports   that she will follow up with Dr. Hi's office as requested.    An appointment was made for this patient with Dr. Hi's nurse practitioner and   patient was appreciative of this.  On day of discharge, the patient is sitting   up in bed, eating and drinking without any difficulty and reports that she   feels well and would like to go home.  We discussed the importance of   following up with her primary care provider and then letting Dr. Cunningham know as   well.  He is her gastroenterologist.  Lastly, an appointment was made with   cardiology for this patient, and she was appreciative of the care.    DISPOSITION:  To home.    CONDITION:  He is stable.    ACTIVITY:  As tolerated.    DIET:  Continue healthy cardiac diet, avoiding drug, alcohol, and continuing   to decrease nicotine as able.    MEDICATIONS:  1.  Nicotine 7 mg 24-hour patch, apply to skin every 24 hours.  2.   Amlodipine 5 mg tablets, take 1 by mouth every morning.  3.  Aspirin 81 mg tablet, take 1 by mouth every day.  4.  Hydrochlorothiazide 25 mg tablets, take 1 by mouth every morning.  5.  Prilosec 40 mg delayed release capsule, take 1 by mouth daily.  6.  Pravastatin 20 mg tablets, take 1 by mouth every day.  7.  Kenalog cream, use as directed.    FOLLOWUP:  1.  Dr. John Cunningham, patient is advised to call in 2 days to discuss recent   hospitalization and need for followup.  2.  Dr. Saulo Fajardo, patient is advised to call in 2 days to discuss   hospitalization and followup appointments.  3.  Geovanny Giles, patient is advised to go to the appointment that has been   scheduled for you on May 30th at 10:00 a.m.    INSTRUCTIONS:  Advised to return to the emergency department with any   worsening or concerning symptoms and also strongly advised to follow up as   discussed with the primary care provider and specialists.    TIME SPENT ON DISCHARGE:  37 minutes.       ____________________________________     PERFECTO Heredia / JOE    DD:  05/06/2017 11:46:16  DT:  05/07/2017 09:54:57    D#:  [id number]  Job#:  [id number]    cc: JOHN CUNNINGHAM MD, GEOVANNY GROVE, Saulo Fajardo MD

## 2017-05-08 LAB — EKG IMPRESSION: NORMAL

## 2017-07-29 NOTE — DISCHARGE SUMMARY
DATE OF ADMISSION:  05/05/2017    DATE OF DISCHARGE:  05/06/2017    DISCHARGE DIAGNOSIS:  Chest pain, resolved.    CHRONIC MEDICAL PROBLEMS:  1.  Hypertension.  2.  Hyperlipidemia.  3.  Cigarette smoker, one half pack a day or less.  4.  Gastroesophageal reflux disease.  5.  Briseno's esophagus.    STUDIES:  Hematology:  WBC 6.4, hemoglobin and hematocrit 13.1 and 38.2,   platelets of 189.  Chemistry panel:  Sodium 140, potassium 3.8, otherwise   unremarkable chemistry panel.  Lipid panel, total cholesterol 152,   triglycerides 161, HDL 37 and LDL 83.  Troponins were negative x3, 0.01.  BNP   of 22.  Influenza A or B not detected or negative.    IMAGING STUDIES:  1.  CTA of the chest indicates no evidence of pulmonary emboli.  No pulmonary   consolidation or pleural effusion identified.  Mildly ectatic ascending   thoracic aorta.  A 1.1 cm left adrenal gland nodule and smaller left adrenal   gland nodule.  This is similar to previous history.  2.  Nuclear medicine cardiac stress test indicates no definite myocardial   infarct or reversible ischemia.  Normal left ventricular function.  3.  Lower extremity venous duplex indicates complete color filling and   compressibility with normal venous flow dynamics in bilateral lower   extremities.  4.  EKG indicates sinus bradycardia with atrial premature complex and a rate   of 59.    CONSULTS:  None.    PROCEDURES:  None.    PRIMARY CARE PROVIDER:  Saulo Fajardo MD    CODE STATUS:  Full.    HOSPITAL COURSE:  H and P on admission done by Dr. Marianela Copeland, please see   her full H and P for further details.  Briefly, this is a 56-year-old female,   who presents to the emergency department with chest pain.  Patient was   reporting a 2-week history of shortness of breath and some chest pain and   cough.  Patient is a longtime smoker; however, she recently discontinued the   ACE inhibitor as this was thought to may be contributing to her cough.  Her   Norvasc was increased and  then she noticed increased bilateral lower extremity   swelling.  Patient was concerned about the chest pain and then was having leg   swelling and presented to the emergency department for further evaluation.    She was admitted to the clinical decision unit in stable condition.  We   obtained serial troponins, which were negative and then we went ahead with CTA   of the chest, nuclear medicine cardiac stress test and the lower extremity   venous duplex ultrasound, which were all essentially unremarkable for any   acute findings.  We did discuss the role of Briseno's esophagus and how this   can contribute to the heartburn/chest pain sensation.  The patient was   agreeable that this may be contributing.  We also discussed the possibility of   a Baker's cyst given the location of the patient's lower extremity pain, but   we have definitively ruled out any deep vein thrombosis in lower extremities   and in the chest.  The patient was appreciative of this information.  She was   reassured by the nuclear medicine stress test being normal and she reports   that she will follow up with Dr. Hi's office as requested.    An appointment was made for this patient with Dr. Hi's nurse practitioner and   patient was appreciative of this.  On day of discharge, the patient is sitting   up in bed, eating and drinking without any difficulty and reports that she   feels well and would like to go home.  We discussed the importance of   following up with her primary care provider and then letting Dr. Cunningham know as   well.  He is her gastroenterologist.  Lastly, an appointment was made with   cardiology for this patient, and she was appreciative of the care.    DISPOSITION:  To home.    CONDITION:  He is stable.    ACTIVITY:  As tolerated.    DIET:  Continue healthy cardiac diet, avoiding drug, alcohol, and continuing   to decrease nicotine as able.    MEDICATIONS:  1.  Nicotine 7 mg 24-hour patch, apply to skin every 24 hours.  2.   Amlodipine 5 mg tablets, take 1 by mouth every morning.  3.  Aspirin 81 mg tablet, take 1 by mouth every day.  4.  Hydrochlorothiazide 25 mg tablets, take 1 by mouth every morning.  5.  Prilosec 40 mg delayed release capsule, take 1 by mouth daily.  6.  Pravastatin 20 mg tablets, take 1 by mouth every day.  7.  Kenalog cream, use as directed.    FOLLOWUP:  1.  Dr. John Cunningham, patient is advised to call in 2 days to discuss recent   hospitalization and need for followup.  2.  Dr. Saulo Fajardo, patient is advised to call in 2 days to discuss   hospitalization and followup appointments.  3.  Geovanny Giles, patient is advised to go to the appointment that has been   scheduled for you on May 30th at 10:00 a.m.    INSTRUCTIONS:  Advised to return to the emergency department with any   worsening or concerning symptoms and also strongly advised to follow up as   discussed with the primary care provider and specialists.    TIME SPENT ON DISCHARGE:  37 minutes.       ____________________________________     PERFECTO Heredia / JOE    DD:  05/06/2017 11:46:16  DT:  05/07/2017 09:54:57    D#:  [id number]  Job#:  [id number]    cc: JOHN CUNNINGHAM MD, GEOVANNY GROVE, Saulo Fajardo MD

## 2017-08-30 ENCOUNTER — HOSPITAL ENCOUNTER (OUTPATIENT)
Dept: LAB | Facility: MEDICAL CENTER | Age: 57
End: 2017-08-30
Attending: INTERNAL MEDICINE
Payer: COMMERCIAL

## 2017-08-30 DIAGNOSIS — R06.83 SNORING: ICD-10-CM

## 2017-08-30 DIAGNOSIS — E78.2 MIXED HYPERLIPIDEMIA: ICD-10-CM

## 2017-08-30 DIAGNOSIS — I70.90 ATHEROSCLEROSIS OF ARTERIES: ICD-10-CM

## 2017-08-30 DIAGNOSIS — I10 ESSENTIAL HYPERTENSION: ICD-10-CM

## 2017-08-30 LAB
ALBUMIN SERPL BCP-MCNC: 4.2 G/DL (ref 3.2–4.9)
ALBUMIN/GLOB SERPL: 1.5 G/DL
ALP SERPL-CCNC: 66 U/L (ref 30–99)
ALT SERPL-CCNC: 29 U/L (ref 2–50)
ANION GAP SERPL CALC-SCNC: 8 MMOL/L (ref 0–11.9)
AST SERPL-CCNC: 24 U/L (ref 12–45)
BILIRUB SERPL-MCNC: 0.6 MG/DL (ref 0.1–1.5)
BUN SERPL-MCNC: 15 MG/DL (ref 8–22)
CALCIUM SERPL-MCNC: 9.4 MG/DL (ref 8.5–10.5)
CHLORIDE SERPL-SCNC: 110 MMOL/L (ref 96–112)
CHOLEST SERPL-MCNC: 165 MG/DL (ref 100–199)
CO2 SERPL-SCNC: 23 MMOL/L (ref 20–33)
CREAT SERPL-MCNC: 0.84 MG/DL (ref 0.5–1.4)
FASTING STATUS PATIENT QL REPORTED: NORMAL
GFR SERPL CREATININE-BSD FRML MDRD: >60 ML/MIN/1.73 M 2
GLOBULIN SER CALC-MCNC: 2.8 G/DL (ref 1.9–3.5)
GLUCOSE SERPL-MCNC: 94 MG/DL (ref 65–99)
HDLC SERPL-MCNC: 46 MG/DL
LDLC SERPL CALC-MCNC: 98 MG/DL
POTASSIUM SERPL-SCNC: 3.9 MMOL/L (ref 3.6–5.5)
PROT SERPL-MCNC: 7 G/DL (ref 6–8.2)
SODIUM SERPL-SCNC: 141 MMOL/L (ref 135–145)
TRIGL SERPL-MCNC: 106 MG/DL (ref 0–149)

## 2017-08-30 PROCEDURE — 80061 LIPID PANEL: CPT

## 2017-08-30 PROCEDURE — 36415 COLL VENOUS BLD VENIPUNCTURE: CPT

## 2017-08-30 PROCEDURE — 80053 COMPREHEN METABOLIC PANEL: CPT

## 2017-09-01 ENCOUNTER — OFFICE VISIT (OUTPATIENT)
Dept: CARDIOLOGY | Facility: MEDICAL CENTER | Age: 57
End: 2017-09-01
Payer: COMMERCIAL

## 2017-09-01 VITALS
HEIGHT: 63 IN | HEART RATE: 62 BPM | DIASTOLIC BLOOD PRESSURE: 80 MMHG | SYSTOLIC BLOOD PRESSURE: 136 MMHG | OXYGEN SATURATION: 95 % | WEIGHT: 174 LBS | BODY MASS INDEX: 30.83 KG/M2

## 2017-09-01 DIAGNOSIS — E78.2 MIXED HYPERLIPIDEMIA: ICD-10-CM

## 2017-09-01 DIAGNOSIS — M79.89 LEG SWELLING: ICD-10-CM

## 2017-09-01 DIAGNOSIS — I10 ESSENTIAL HYPERTENSION: ICD-10-CM

## 2017-09-01 DIAGNOSIS — Z72.0 TOBACCO ABUSE: ICD-10-CM

## 2017-09-01 PROCEDURE — 99214 OFFICE O/P EST MOD 30 MIN: CPT | Performed by: INTERNAL MEDICINE

## 2017-09-01 RX ORDER — HYDROCHLOROTHIAZIDE 25 MG/1
25 TABLET ORAL 2 TIMES DAILY
Qty: 60 TAB | Refills: 11 | Status: SHIPPED | OUTPATIENT
Start: 2017-09-01 | End: 2019-04-12 | Stop reason: SDUPTHER

## 2017-09-01 RX ORDER — HYDROCHLOROTHIAZIDE 12.5 MG/1
TABLET ORAL
COMMUNITY
Start: 2017-08-20 | End: 2017-09-01

## 2017-09-01 ASSESSMENT — ENCOUNTER SYMPTOMS
BLURRED VISION: 0
MYALGIAS: 0
SENSORY CHANGE: 0
SPEECH CHANGE: 0
WEIGHT LOSS: 0
EYE PAIN: 0
HEADACHES: 0
FEVER: 0
BRUISES/BLEEDS EASILY: 0
ORTHOPNEA: 0
DIZZINESS: 0
PALPITATIONS: 0
EYE DISCHARGE: 0
CLAUDICATION: 0
NAUSEA: 0
HALLUCINATIONS: 0
DEPRESSION: 0
BLOOD IN STOOL: 0
ABDOMINAL PAIN: 0
LOSS OF CONSCIOUSNESS: 0
FALLS: 0
CHILLS: 0
SHORTNESS OF BREATH: 0
COUGH: 0
PND: 0
DOUBLE VISION: 0
VOMITING: 0

## 2017-09-01 NOTE — PROGRESS NOTES
Subjective:   Kelly Oakley is a 55 y.o. female who presents today cardiac care for hypertension and hyper lipidemia. She is tolerating pravastatin well. Her LDL has significantly decreased and normalized with pravastatin. Patient feels well overall. No chest pain or shortness of breath.     She reports of having significant legs edema. She does have significant varicose veins. She was on amlodipine therapy and hydrochlorothiazide.    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 Krause-Stress trend   • History of HPV infection    • IBD (inflammatory bowel disease)     Dr. Cunningham   • OSTEOPOROSIS     Osteopenia   • S/P appendectomy    • S/P cholecystectomy    • S/P hysterectomy with oophorectomy     endometriosis   • Ulcer (CMS-HCC)     Barretts esophogitis   • Urinary tract infection, site not specified      Past Surgical History:   Procedure Laterality Date   • BREAST BIOPSY  1980's    benign left breast 35 years ago   • ABDOMINAL HYSTERECTOMY TOTAL      mennorrogia   • APPENDECTOMY     • CHOLECYSTECTOMY     • COLON RESECTION      Polyp removal   • ENDOMETRIAL ABLATION     • PRIMARY C SECTION     • TONSILLECTOMY     • TUBAL COAGULATION LAPAROSCOPIC BILATERAL     • US-NEEDLE CORE BX-BREAST PANEL       Family History   Problem Relation Age of Onset   • Cancer Mother      Breast/Liver   • Diabetes Mother    • Hypertension Mother     • Hyperlipidemia Mother    • Heart Disease Mother    • Lung Disease Father      Emphysema   • Psychiatry Father      Paranoid schitzophenia   • Psychiatry Sister      Paronoid Schitzo-disorder, Bipolar   • Arthritis Sister      RA, Fibromyalgia   • Cancer Maternal Aunt      Breast   • Cancer Paternal Aunt      Bone   • Arthritis Paternal Aunt    • Genetic Paternal Aunt      SLE   • Heart Disease Maternal Grandmother    • Hypertension Maternal Grandmother    • Hyperlipidemia Maternal Grandmother    • Genetic Maternal Grandmother      Brights disease   • Stroke Maternal Grandmother    • Heart Disease Maternal Grandfather    • Hypertension Maternal Grandfather    • Hyperlipidemia Maternal Grandfather    • Stroke Paternal Grandmother    • Hyperlipidemia Paternal Grandmother    • Hypertension Paternal Grandmother    • Heart Disease Paternal Grandfather    • Hypertension Paternal Grandfather    • Hyperlipidemia Paternal Grandfather      History   Smoking Status   • Current Every Day Smoker   • Packs/day: 0.25   • Years: 40.00   • Types: Cigarettes   Smokeless Tobacco   • Never Used     Allergies   Allergen Reactions   • Macrodantin [Nitrofurantoin Macrocrystal] Anaphylaxis   • Pcn [Penicillins] Anaphylaxis   • Doxycycline Itching   • Bactrim Vomiting   • Biaxin [Clarithromycin] Vomiting   • Omnicef Itching     Outpatient Encounter Prescriptions as of 9/1/2017   Medication Sig Dispense Refill   • hydrochlorothiazide (HYDRODIURIL) 25 MG Tab Take 1 Tab by mouth 2 Times a Day. 60 Tab 11   • nicotine (NICODERM) 7 MG/24HR PATCH 24 HR Apply 1 Patch to skin as directed every 24 hours. 30 Patch 0   • pravastatin (PRAVACHOL) 20 MG Tab Take 1 Tab by mouth every day. 90 Tab 3   • omeprazole (PRILOSEC) 40 MG delayed-release capsule TAKE ONE CAPSULE BY MOUTH DAILY (GENERIC PRILOSEC) 90 Cap 4   • aspirin EC (ECOTRIN) 81 MG Tablet Delayed Response Take 81 mg by mouth every day.     • [DISCONTINUED] hydrochlorothiazide (HYDRODIURIL)  "12.5 MG tablet      • [DISCONTINUED] amlodipine (NORVASC) 5 MG Tab Take 5 mg by mouth every morning.     • [DISCONTINUED] hydrochlorothiazide (HYDRODIURIL) 25 MG Tab Take 25 mg by mouth every morning.     • triamcinolone acetonide (KENALOG) 0.1 % Cream Apply 1 Application to affected area(s) 2 times a day.       No facility-administered encounter medications on file as of 9/1/2017.      Review of Systems   Constitutional: Negative for chills, fever, malaise/fatigue and weight loss.   HENT: Negative for ear discharge, ear pain, hearing loss and nosebleeds.    Eyes: Negative for blurred vision, double vision, pain and discharge.   Respiratory: Negative for cough and shortness of breath.    Cardiovascular: Positive for leg swelling. Negative for chest pain, palpitations, orthopnea, claudication and PND.   Gastrointestinal: Negative for abdominal pain, blood in stool, melena, nausea and vomiting.   Genitourinary: Negative for dysuria and hematuria.   Musculoskeletal: Negative for falls, joint pain and myalgias.   Skin: Negative for itching and rash.   Neurological: Negative for dizziness, sensory change, speech change, loss of consciousness and headaches.   Endo/Heme/Allergies: Negative for environmental allergies. Does not bruise/bleed easily.   Psychiatric/Behavioral: Negative for depression, hallucinations and suicidal ideas.        Objective:   /80   Pulse 62   Ht 1.6 m (5' 3\")   Wt 78.9 kg (174 lb)   SpO2 95%   BMI 30.82 kg/m²     Physical Exam   Constitutional: She is oriented to person, place, and time. She appears well-developed and well-nourished.   HENT:   Head: Normocephalic and atraumatic.   Eyes: EOM are normal.   Neck: No JVD present.   Cardiovascular: Normal rate, regular rhythm, normal heart sounds and intact distal pulses.  Exam reveals no gallop and no friction rub.    No murmur heard.  Pulmonary/Chest: No respiratory distress. She has no wheezes. She has no rales. She exhibits no " tenderness.   Abdominal: She exhibits no distension. There is no tenderness. There is no rebound and no guarding.   Musculoskeletal: She exhibits edema. She exhibits no tenderness.   Lymphadenopathy:     She has no cervical adenopathy.   Neurological: She is alert and oriented to person, place, and time.   Skin: Skin is dry.   Psychiatric: She has a normal mood and affect.       Assessment:     1. Essential hypertension  hydrochlorothiazide (HYDRODIURIL) 25 MG Tab    BASIC METABOLIC PANEL   2. Mixed hyperlipidemia     3. Leg swelling  hydrochlorothiazide (HYDRODIURIL) 25 MG Tab    BASIC METABOLIC PANEL   4. Tobacco abuse         Medical Decision Making:  Today's Assessment / Status / Plan:   I do suspect that her legs swelling might be related to amlodipine therapy exacerbating her baseline venous insufficiency.  We will stop amlodipine therapy.  I will change hydrochlorothiazide therapy to 25 mg by mouth twice a day.  In the future, we will consider venous ablation procedure.

## 2017-09-01 NOTE — LETTER
[facility]   [address] [place] [address]  [place] [address]  Phone: [phone]  Fax: [phone]              Kelly Oakley  1960    Encounter Date: 9/1/2017    Geoffrey Hi M.D.          PROGRESS NOTE:  Subjective:   Kelly Oakley is a 55 y.o. female who presents today cardiac care for hypertension and hyper lipidemia. She is tolerating pravastatin well. Her LDL has significantly decreased and normalized with pravastatin. Patient feels well overall. No chest pain or shortness of breath.     She reports of having significant legs edema. She does have significant varicose veins. She was on amlodipine therapy and hydrochlorothiazide.    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 Krause-Stress trend   • History of HPV infection    • IBD (inflammatory bowel disease)     Dr. Cunningham   • OSTEOPOROSIS     Osteopenia   • S/P appendectomy    • S/P cholecystectomy    • S/P hysterectomy with oophorectomy     endometriosis   • Ulcer (CMS-HCC)     Barretts esophogitis   • Urinary tract infection, site not specified      Past Surgical History:   Procedure Laterality Date   • BREAST BIOPSY  1980's    benign left breast 35 years ago   • ABDOMINAL HYSTERECTOMY TOTAL      mennorrogia   • APPENDECTOMY     • CHOLECYSTECTOMY     • COLON RESECTION      Polyp removal   • ENDOMETRIAL  ABLATION     • PRIMARY C SECTION     • TONSILLECTOMY     • TUBAL COAGULATION LAPAROSCOPIC BILATERAL     • US-NEEDLE CORE BX-BREAST PANEL       Family History   Problem Relation Age of Onset   • Cancer Mother      Breast/Liver   • Diabetes Mother    • Hypertension Mother    • Hyperlipidemia Mother    • Heart Disease Mother    • Lung Disease Father      Emphysema   • Psychiatry Father      Paranoid schitzophenia   • Psychiatry Sister      Paronoid Schitzo-disorder, Bipolar   • Arthritis Sister      RA, Fibromyalgia   • Cancer Maternal Aunt      Breast   • Cancer Paternal Aunt      Bone   • Arthritis Paternal Aunt    • Genetic Paternal Aunt      SLE   • Heart Disease Maternal Grandmother    • Hypertension Maternal Grandmother    • Hyperlipidemia Maternal Grandmother    • Genetic Maternal Grandmother      Brights disease   • Stroke Maternal Grandmother    • Heart Disease Maternal Grandfather    • Hypertension Maternal Grandfather    • Hyperlipidemia Maternal Grandfather    • Stroke Paternal Grandmother    • Hyperlipidemia Paternal Grandmother    • Hypertension Paternal Grandmother    • Heart Disease Paternal Grandfather    • Hypertension Paternal Grandfather    • Hyperlipidemia Paternal Grandfather      History   Smoking Status   • Current Every Day Smoker   • Packs/day: 0.25   • Years: 40.00   • Types: Cigarettes   Smokeless Tobacco   • Never Used     Allergies   Allergen Reactions   • Macrodantin [Nitrofurantoin Macrocrystal] Anaphylaxis   • Pcn [Penicillins] Anaphylaxis   • Doxycycline Itching   • Bactrim Vomiting   • Biaxin [Clarithromycin] Vomiting   • Omnicef Itching     Outpatient Encounter Prescriptions as of 9/1/2017   Medication Sig Dispense Refill   • hydrochlorothiazide (HYDRODIURIL) 25 MG Tab Take 1 Tab by mouth 2 Times a Day. 60 Tab 11   • nicotine (NICODERM) 7 MG/24HR PATCH 24 HR Apply 1 Patch to skin as directed every 24 hours. 30 Patch 0   • pravastatin (PRAVACHOL) 20 MG Tab Take 1 Tab by mouth every  "day. 90 Tab 3   • omeprazole (PRILOSEC) 40 MG delayed-release capsule TAKE ONE CAPSULE BY MOUTH DAILY (GENERIC PRILOSEC) 90 Cap 4   • aspirin EC (ECOTRIN) 81 MG Tablet Delayed Response Take 81 mg by mouth every day.     • [DISCONTINUED] hydrochlorothiazide (HYDRODIURIL) 12.5 MG tablet      • [DISCONTINUED] amlodipine (NORVASC) 5 MG Tab Take 5 mg by mouth every morning.     • [DISCONTINUED] hydrochlorothiazide (HYDRODIURIL) 25 MG Tab Take 25 mg by mouth every morning.     • triamcinolone acetonide (KENALOG) 0.1 % Cream Apply 1 Application to affected area(s) 2 times a day.       No facility-administered encounter medications on file as of 9/1/2017.      Review of Systems   Constitutional: Negative for chills, fever, malaise/fatigue and weight loss.   HENT: Negative for ear discharge, ear pain, hearing loss and nosebleeds.    Eyes: Negative for blurred vision, double vision, pain and discharge.   Respiratory: Negative for cough and shortness of breath.    Cardiovascular: Positive for leg swelling. Negative for chest pain, palpitations, orthopnea, claudication and PND.   Gastrointestinal: Negative for abdominal pain, blood in stool, melena, nausea and vomiting.   Genitourinary: Negative for dysuria and hematuria.   Musculoskeletal: Negative for falls, joint pain and myalgias.   Skin: Negative for itching and rash.   Neurological: Negative for dizziness, sensory change, speech change, loss of consciousness and headaches.   Endo/Heme/Allergies: Negative for environmental allergies. Does not bruise/bleed easily.   Psychiatric/Behavioral: Negative for depression, hallucinations and suicidal ideas.        Objective:   /80   Pulse 62   Ht 1.6 m (5' 3\")   Wt 78.9 kg (174 lb)   SpO2 95%   BMI 30.82 kg/m²      Physical Exam   Constitutional: She is oriented to person, place, and time. She appears well-developed and well-nourished.   HENT:   Head: Normocephalic and atraumatic.   Eyes: EOM are normal.   Neck: No JVD " present.   Cardiovascular: Normal rate, regular rhythm, normal heart sounds and intact distal pulses.  Exam reveals no gallop and no friction rub.    No murmur heard.  Pulmonary/Chest: No respiratory distress. She has no wheezes. She has no rales. She exhibits no tenderness.   Abdominal: She exhibits no distension. There is no tenderness. There is no rebound and no guarding.   Musculoskeletal: She exhibits edema. She exhibits no tenderness.   Lymphadenopathy:     She has no cervical adenopathy.   Neurological: She is alert and oriented to person, place, and time.   Skin: Skin is dry.   Psychiatric: She has a normal mood and affect.       Assessment:     1. Essential hypertension  hydrochlorothiazide (HYDRODIURIL) 25 MG Tab    BASIC METABOLIC PANEL   2. Mixed hyperlipidemia     3. Leg swelling  hydrochlorothiazide (HYDRODIURIL) 25 MG Tab    BASIC METABOLIC PANEL   4. Tobacco abuse         Medical Decision Making:  Today's Assessment / Status / Plan:   I do suspect that her legs swelling might be related to amlodipine therapy exacerbating her baseline venous insufficiency.  We will stop amlodipine therapy.  I will change hydrochlorothiazide therapy to 25 mg by mouth twice a day.  In the future, we will consider venous ablation procedure.      Saulo Fajardo M.D.  [facility] [address] [facility] [address]  VIA Facsimile: [phone]

## 2017-10-12 ENCOUNTER — HOSPITAL ENCOUNTER (OUTPATIENT)
Dept: LAB | Facility: MEDICAL CENTER | Age: 57
End: 2017-10-12
Attending: INTERNAL MEDICINE
Payer: COMMERCIAL

## 2017-10-12 DIAGNOSIS — I10 ESSENTIAL HYPERTENSION: ICD-10-CM

## 2017-10-12 DIAGNOSIS — M79.89 LEG SWELLING: ICD-10-CM

## 2017-10-12 LAB
ANION GAP SERPL CALC-SCNC: 11 MMOL/L (ref 0–11.9)
BUN SERPL-MCNC: 14 MG/DL (ref 8–22)
CALCIUM SERPL-MCNC: 9.4 MG/DL (ref 8.5–10.5)
CHLORIDE SERPL-SCNC: 100 MMOL/L (ref 96–112)
CO2 SERPL-SCNC: 21 MMOL/L (ref 20–33)
CREAT SERPL-MCNC: 0.89 MG/DL (ref 0.5–1.4)
GFR SERPL CREATININE-BSD FRML MDRD: >60 ML/MIN/1.73 M 2
GLUCOSE SERPL-MCNC: 118 MG/DL (ref 65–99)
POTASSIUM SERPL-SCNC: 3.6 MMOL/L (ref 3.6–5.5)
SODIUM SERPL-SCNC: 132 MMOL/L (ref 135–145)

## 2017-10-12 PROCEDURE — 36415 COLL VENOUS BLD VENIPUNCTURE: CPT

## 2017-10-12 PROCEDURE — 80048 BASIC METABOLIC PNL TOTAL CA: CPT

## 2017-10-18 ENCOUNTER — TELEPHONE (OUTPATIENT)
Dept: CARDIOLOGY | Facility: MEDICAL CENTER | Age: 57
End: 2017-10-18

## 2017-10-18 NOTE — TELEPHONE ENCOUNTER
losing health insurance, has questions   Received: Yesterday   Message Contents   Soniya Quintero R.N.   Phone Number: [phone]             TT/trevor     Pt calling to ask for TT's opinion.  Pt is being laid off next month and losing her health insurance, possibly going on Medicaid.  She wants to know if, based on recent blood work & visits, could her PCP continue with her cardiac care.  She is very worried and attempting to make financial decisions going forward.  Pt would appreciate a call tomorrow, will be home all day .      Returned patient call. Discussed 10/31 appt for FU and check-in. Pt will be seen at 4:30. Pt very appreciative of call.

## 2017-10-31 ENCOUNTER — OFFICE VISIT (OUTPATIENT)
Dept: CARDIOLOGY | Facility: MEDICAL CENTER | Age: 57
End: 2017-10-31
Payer: COMMERCIAL

## 2017-10-31 VITALS
SYSTOLIC BLOOD PRESSURE: 160 MMHG | WEIGHT: 175 LBS | HEART RATE: 79 BPM | OXYGEN SATURATION: 95 % | BODY MASS INDEX: 29.88 KG/M2 | HEIGHT: 64 IN | DIASTOLIC BLOOD PRESSURE: 90 MMHG

## 2017-10-31 DIAGNOSIS — I10 ESSENTIAL HYPERTENSION: ICD-10-CM

## 2017-10-31 DIAGNOSIS — E78.2 MIXED HYPERLIPIDEMIA: ICD-10-CM

## 2017-10-31 PROCEDURE — 99214 OFFICE O/P EST MOD 30 MIN: CPT | Performed by: INTERNAL MEDICINE

## 2017-10-31 RX ORDER — ESTRADIOL 2 MG/1
RING VAGINAL
COMMUNITY
Start: 2017-10-16 | End: 2019-04-12

## 2017-10-31 ASSESSMENT — ENCOUNTER SYMPTOMS
SHORTNESS OF BREATH: 0
CLAUDICATION: 0
DEPRESSION: 0
WEIGHT LOSS: 0
LOSS OF CONSCIOUSNESS: 0
BLOOD IN STOOL: 0
MYALGIAS: 0
DIZZINESS: 0
FEVER: 0
VOMITING: 0
EYE PAIN: 0
ABDOMINAL PAIN: 0
PND: 0
FALLS: 0
BLURRED VISION: 0
COUGH: 0
HALLUCINATIONS: 0
BRUISES/BLEEDS EASILY: 0
DOUBLE VISION: 0
EYE DISCHARGE: 0
SPEECH CHANGE: 0
ORTHOPNEA: 0
NAUSEA: 0
SENSORY CHANGE: 0
CHILLS: 0
PALPITATIONS: 0
HEADACHES: 0

## 2017-10-31 NOTE — LETTER
[facility]   [address] [facility] [address]  [place] [address]  Phone: [phone]  Fax: [phone]              Kelly Oakley  1960    Encounter Date: 10/31/2017    Geoffrey Hi M.D.          PROGRESS NOTE:  Subjective:   Kelly Oakley is a 57 y.o. female who presents today cardiac care for hypertension and hyper lipidemia. She is tolerating pravastatin well. Her LDL has significantly decreased and normalized with pravastatin. Patient feels well overall. No chest pain or shortness of breath.      She reports of having significant legs edema. She does have significant varicose veins. She was on amlodipine therapy and hydrochlorothiazide.     Blood pressure is high.    Past Medical History:   Diagnosis Date   • Adrenal nodule 2012 2015 / nonfunctional / benign   • Anemia    • Anxiety     Panic   • Arrhythmia    • Arthritis     Morning stiffness   • Atherosclerosis of arteries 2013    seen on abdominal CT   • Briseno's  esophagus    • Chest pain at rest 9/11/2015   • Cigarette smoker one half pack a day or less 5/28/2015   • Depression    • Fatigue 9/11/2015   • GERD (gastroesophageal reflux disease)    • Headache(784.0)     sinus headache   • Heart murmur     Dr. Krause-Stress trend   • History of HPV infection    • HLD (hyperlipidemia) 9/11/2015   • HTN (hypertension) 9/11/2015   • IBD (inflammatory bowel disease)     Dr. Cunningham   • MRSA carrier 2008    nose cellulitis   • Nocturnal hypoxemia 10/15/2015   • OSTEOPOROSIS     Osteopenia   • S/P appendectomy    • S/P cholecystectomy    • S/P hysterectomy with oophorectomy     endometriosis   • Ulcer (CMS-HCC)     Barretts esophogitis   • Urinary tract infection, site not specified      Past Surgical History:   Procedure Laterality Date   • BREAST BIOPSY  1980's    benign left breast 35 years ago   • ABDOMINAL HYSTERECTOMY TOTAL      mennorrogia   • APPENDECTOMY     • CHOLECYSTECTOMY     • COLON RESECTION         Polyp removal   • ENDOMETRIAL ABLATION     • PRIMARY C SECTION     • TONSILLECTOMY     • TUBAL COAGULATION LAPAROSCOPIC BILATERAL     • US-NEEDLE CORE BX-BREAST PANEL       Family History   Problem Relation Age of Onset   • Cancer Mother      Breast/Liver   • Diabetes Mother    • Hypertension Mother    • Hyperlipidemia Mother    • Heart Disease Mother    • Lung Disease Father      Emphysema   • Psychiatry Father      Paranoid schitzophenia   • Psychiatry Sister      Paronoid Schitzo-disorder, Bipolar   • Arthritis Sister      RA, Fibromyalgia   • Cancer Maternal Aunt      Breast   • Cancer Paternal Aunt      Bone   • Arthritis Paternal Aunt    • Genetic Paternal Aunt      SLE   • Heart Disease Maternal Grandmother    • Hypertension Maternal Grandmother    • Hyperlipidemia Maternal Grandmother    • Genetic Maternal Grandmother      Brights disease   • Stroke Maternal Grandmother    • Heart Disease Maternal Grandfather    • Hypertension Maternal Grandfather    • Hyperlipidemia Maternal Grandfather    • Stroke Paternal Grandmother    • Hyperlipidemia Paternal Grandmother    • Hypertension Paternal Grandmother    • Heart Disease Paternal Grandfather    • Hypertension Paternal Grandfather    • Hyperlipidemia Paternal Grandfather      History   Smoking Status   • Current Every Day Smoker   • Packs/day: 0.25   • Years: 40.00   • Types: Cigarettes   Smokeless Tobacco   • Never Used     Allergies   Allergen Reactions   • Macrodantin [Nitrofurantoin Macrocrystal] Anaphylaxis   • Pcn [Penicillins] Anaphylaxis   • Doxycycline Itching   • Bactrim Vomiting   • Biaxin [Clarithromycin] Vomiting   • Norvasc [Amlodipine]      Leg swelling   • Omnicef Itching     Outpatient Encounter Prescriptions as of 10/31/2017   Medication Sig Dispense Refill   • ESTRING 2 MG vaginal ring      • metoprolol (LOPRESSOR) 25 MG Tab Take 1 Tab by mouth 2 times a day. 60 Tab 11   • hydrochlorothiazide (HYDRODIURIL) 25 MG Tab Take 1 Tab by mouth 2  "Times a Day. 60 Tab 11   • pravastatin (PRAVACHOL) 20 MG Tab Take 1 Tab by mouth every day. 90 Tab 3   • omeprazole (PRILOSEC) 40 MG delayed-release capsule TAKE ONE CAPSULE BY MOUTH DAILY (GENERIC PRILOSEC) 90 Cap 4   • aspirin EC (ECOTRIN) 81 MG Tablet Delayed Response Take 81 mg by mouth every day.     • triamcinolone acetonide (KENALOG) 0.1 % Cream Apply 1 Application to affected area(s) 2 times a day.     • [DISCONTINUED] nicotine (NICODERM) 7 MG/24HR PATCH 24 HR Apply 1 Patch to skin as directed every 24 hours. 30 Patch 0     No facility-administered encounter medications on file as of 10/31/2017.      Review of Systems   Constitutional: Negative for chills, fever, malaise/fatigue and weight loss.   HENT: Negative for ear discharge, ear pain, hearing loss and nosebleeds.    Eyes: Negative for blurred vision, double vision, pain and discharge.   Respiratory: Negative for cough and shortness of breath.    Cardiovascular: Negative for chest pain, palpitations, orthopnea, claudication, leg swelling and PND.   Gastrointestinal: Negative for abdominal pain, blood in stool, melena, nausea and vomiting.   Genitourinary: Negative for dysuria and hematuria.   Musculoskeletal: Negative for falls, joint pain and myalgias.   Skin: Negative for itching and rash.   Neurological: Negative for dizziness, sensory change, speech change, loss of consciousness and headaches.   Endo/Heme/Allergies: Negative for environmental allergies. Does not bruise/bleed easily.   Psychiatric/Behavioral: Negative for depression, hallucinations and suicidal ideas.        Objective:   /90   Pulse 79   Ht 1.626 m (5' 4\")   Wt 79.4 kg (175 lb)   SpO2 95%   BMI 30.04 kg/m²      Physical Exam   Constitutional: She is oriented to person, place, and time. She appears well-developed and well-nourished.   HENT:   Head: Normocephalic and atraumatic.   Eyes: EOM are normal.   Neck: No JVD present.   Cardiovascular: Normal rate, regular rhythm, " normal heart sounds and intact distal pulses.  Exam reveals no gallop and no friction rub.    No murmur heard.  Pulmonary/Chest: No respiratory distress. She has no wheezes. She has no rales. She exhibits no tenderness.   Abdominal: She exhibits no distension. There is no tenderness. There is no rebound and no guarding.   Musculoskeletal: She exhibits no edema or tenderness.   Lymphadenopathy:     She has no cervical adenopathy.   Neurological: She is alert and oriented to person, place, and time.   Skin: Skin is dry.   Psychiatric: She has a normal mood and affect.   Nursing note and vitals reviewed.      Assessment:     1. Essential hypertension     2. Mixed hyperlipidemia         Medical Decision Making:  Today's Assessment / Status / Plan:   Will start Metoprolol 25 mg po bid.  Continue HCTZ at 25 mg po bid.    I will see patient back in clinic with lab tests and studies results in 2 months.    I thank you Dr. Fajardo for referring patient to our Cardiology Clinic today.        Saulo Fajardo M.D.  [address] [place] [id number]  VIA Facsimile: [phone]

## 2017-10-31 NOTE — PROGRESS NOTES
Subjective:   Kelly Oakley is a 57 y.o. female who presents today cardiac care for hypertension and hyper lipidemia. She is tolerating pravastatin well. Her LDL has significantly decreased and normalized with pravastatin. Patient feels well overall. No chest pain or shortness of breath.      She reports of having significant legs edema. She does have significant varicose veins. She was on amlodipine therapy and hydrochlorothiazide.     Blood pressure is high.    Past Medical History:   Diagnosis Date   • Adrenal nodule 2012 2015 / nonfunctional / benign   • Anemia    • Anxiety     Panic   • Arrhythmia    • Arthritis     Morning stiffness   • Atherosclerosis of arteries 2013    seen on abdominal CT   • Briseno's  esophagus    • Chest pain at rest 9/11/2015   • Cigarette smoker one half pack a day or less 5/28/2015   • Depression    • Fatigue 9/11/2015   • GERD (gastroesophageal reflux disease)    • Headache(784.0)     sinus headache   • Heart murmur     Dr. Krause-Stress trend   • History of HPV infection    • HLD (hyperlipidemia) 9/11/2015   • HTN (hypertension) 9/11/2015   • IBD (inflammatory bowel disease)     Dr. Cunningham   • MRSA carrier 2008    nose cellulitis   • Nocturnal hypoxemia 10/15/2015   • OSTEOPOROSIS     Osteopenia   • S/P appendectomy    • S/P cholecystectomy    • S/P hysterectomy with oophorectomy     endometriosis   • Ulcer (CMS-HCC)     Barretts esophogitis   • Urinary tract infection, site not specified      Past Surgical History:   Procedure Laterality Date   • BREAST BIOPSY  1980's    benign left breast 35 years ago   • ABDOMINAL HYSTERECTOMY TOTAL      mennorrogia   • APPENDECTOMY     • CHOLECYSTECTOMY     • COLON RESECTION      Polyp removal   • ENDOMETRIAL ABLATION     • PRIMARY C SECTION     • TONSILLECTOMY     • TUBAL COAGULATION LAPAROSCOPIC BILATERAL     • US-NEEDLE CORE BX-BREAST PANEL       Family History   Problem Relation Age of Onset   • Cancer Mother      Breast/Liver   •  Diabetes Mother    • Hypertension Mother    • Hyperlipidemia Mother    • Heart Disease Mother    • Lung Disease Father      Emphysema   • Psychiatry Father      Paranoid schitzophenia   • Psychiatry Sister      Paronoid Schitzo-disorder, Bipolar   • Arthritis Sister      RA, Fibromyalgia   • Cancer Maternal Aunt      Breast   • Cancer Paternal Aunt      Bone   • Arthritis Paternal Aunt    • Genetic Paternal Aunt      SLE   • Heart Disease Maternal Grandmother    • Hypertension Maternal Grandmother    • Hyperlipidemia Maternal Grandmother    • Genetic Maternal Grandmother      Brights disease   • Stroke Maternal Grandmother    • Heart Disease Maternal Grandfather    • Hypertension Maternal Grandfather    • Hyperlipidemia Maternal Grandfather    • Stroke Paternal Grandmother    • Hyperlipidemia Paternal Grandmother    • Hypertension Paternal Grandmother    • Heart Disease Paternal Grandfather    • Hypertension Paternal Grandfather    • Hyperlipidemia Paternal Grandfather      History   Smoking Status   • Current Every Day Smoker   • Packs/day: 0.25   • Years: 40.00   • Types: Cigarettes   Smokeless Tobacco   • Never Used     Allergies   Allergen Reactions   • Macrodantin [Nitrofurantoin Macrocrystal] Anaphylaxis   • Pcn [Penicillins] Anaphylaxis   • Doxycycline Itching   • Bactrim Vomiting   • Biaxin [Clarithromycin] Vomiting   • Norvasc [Amlodipine]      Leg swelling   • Omnicef Itching     Outpatient Encounter Prescriptions as of 10/31/2017   Medication Sig Dispense Refill   • ESTRING 2 MG vaginal ring      • metoprolol (LOPRESSOR) 25 MG Tab Take 1 Tab by mouth 2 times a day. 60 Tab 11   • hydrochlorothiazide (HYDRODIURIL) 25 MG Tab Take 1 Tab by mouth 2 Times a Day. 60 Tab 11   • pravastatin (PRAVACHOL) 20 MG Tab Take 1 Tab by mouth every day. 90 Tab 3   • omeprazole (PRILOSEC) 40 MG delayed-release capsule TAKE ONE CAPSULE BY MOUTH DAILY (GENERIC PRILOSEC) 90 Cap 4   • aspirin EC (ECOTRIN) 81 MG Tablet Delayed  "Response Take 81 mg by mouth every day.     • triamcinolone acetonide (KENALOG) 0.1 % Cream Apply 1 Application to affected area(s) 2 times a day.     • [DISCONTINUED] nicotine (NICODERM) 7 MG/24HR PATCH 24 HR Apply 1 Patch to skin as directed every 24 hours. 30 Patch 0     No facility-administered encounter medications on file as of 10/31/2017.      Review of Systems   Constitutional: Negative for chills, fever, malaise/fatigue and weight loss.   HENT: Negative for ear discharge, ear pain, hearing loss and nosebleeds.    Eyes: Negative for blurred vision, double vision, pain and discharge.   Respiratory: Negative for cough and shortness of breath.    Cardiovascular: Negative for chest pain, palpitations, orthopnea, claudication, leg swelling and PND.   Gastrointestinal: Negative for abdominal pain, blood in stool, melena, nausea and vomiting.   Genitourinary: Negative for dysuria and hematuria.   Musculoskeletal: Negative for falls, joint pain and myalgias.   Skin: Negative for itching and rash.   Neurological: Negative for dizziness, sensory change, speech change, loss of consciousness and headaches.   Endo/Heme/Allergies: Negative for environmental allergies. Does not bruise/bleed easily.   Psychiatric/Behavioral: Negative for depression, hallucinations and suicidal ideas.        Objective:   /90   Pulse 79   Ht 1.626 m (5' 4\")   Wt 79.4 kg (175 lb)   SpO2 95%   BMI 30.04 kg/m²     Physical Exam   Constitutional: She is oriented to person, place, and time. She appears well-developed and well-nourished.   HENT:   Head: Normocephalic and atraumatic.   Eyes: EOM are normal.   Neck: No JVD present.   Cardiovascular: Normal rate, regular rhythm, normal heart sounds and intact distal pulses.  Exam reveals no gallop and no friction rub.    No murmur heard.  Pulmonary/Chest: No respiratory distress. She has no wheezes. She has no rales. She exhibits no tenderness.   Abdominal: She exhibits no distension. There " is no tenderness. There is no rebound and no guarding.   Musculoskeletal: She exhibits no edema or tenderness.   Lymphadenopathy:     She has no cervical adenopathy.   Neurological: She is alert and oriented to person, place, and time.   Skin: Skin is dry.   Psychiatric: She has a normal mood and affect.   Nursing note and vitals reviewed.      Assessment:     1. Essential hypertension     2. Mixed hyperlipidemia         Medical Decision Making:  Today's Assessment / Status / Plan:   Will start Metoprolol 25 mg po bid.  Continue HCTZ at 25 mg po bid.    I will see patient back in clinic with lab tests and studies results in 2 months.    I thank you Dr. Fajardo for referring patient to our Cardiology Clinic today.

## 2017-11-02 ENCOUNTER — TELEPHONE (OUTPATIENT)
Dept: CARDIOLOGY | Facility: MEDICAL CENTER | Age: 57
End: 2017-11-02

## 2017-11-02 NOTE — TELEPHONE ENCOUNTER
"refusing to take the Metoprolol   Received: Today   Message Contents   Agatha Quintero R.N.             TT/Ela       Patient doesn't want to take the Metoprolol because the side effects scare her. She wants a different medication. She can be reached at [phone].      Returned patient call. Pt does not want to take after reading warning labels. Pt is also concerned about what if she forgets to take it. She says the warnings states if she skips a dose she can have a heart attack. Attempted to educate patient on type of medication, need for medication. Pt would like either a different medication or one she only has to take once per day. Pt wants to know if she can take something else \"just to control my blood pressure\". To  To to advise.  "

## 2017-11-15 ENCOUNTER — HOSPITAL ENCOUNTER (OUTPATIENT)
Dept: RADIOLOGY | Facility: MEDICAL CENTER | Age: 57
End: 2017-11-15
Attending: NURSE PRACTITIONER
Payer: COMMERCIAL

## 2017-11-15 DIAGNOSIS — Z12.31 SCREENING MAMMOGRAM, ENCOUNTER FOR: ICD-10-CM

## 2017-11-15 DIAGNOSIS — M85.80 OSTEOPENIA, UNSPECIFIED LOCATION: ICD-10-CM

## 2017-11-15 DIAGNOSIS — F17.200 SMOKER: ICD-10-CM

## 2017-11-15 PROCEDURE — G0202 SCR MAMMO BI INCL CAD: HCPCS

## 2017-11-15 PROCEDURE — 77080 DXA BONE DENSITY AXIAL: CPT

## 2017-12-15 ENCOUNTER — HOSPITAL ENCOUNTER (OUTPATIENT)
Dept: LAB | Facility: MEDICAL CENTER | Age: 57
End: 2017-12-15
Attending: FAMILY MEDICINE
Payer: COMMERCIAL

## 2017-12-15 LAB
ALBUMIN SERPL BCP-MCNC: 4.3 G/DL (ref 3.2–4.9)
ALBUMIN/GLOB SERPL: 1.5 G/DL
ALP SERPL-CCNC: 62 U/L (ref 30–99)
ALT SERPL-CCNC: 25 U/L (ref 2–50)
ANION GAP SERPL CALC-SCNC: 9 MMOL/L (ref 0–11.9)
AST SERPL-CCNC: 25 U/L (ref 12–45)
BILIRUB SERPL-MCNC: 0.5 MG/DL (ref 0.1–1.5)
BUN SERPL-MCNC: 17 MG/DL (ref 8–22)
CALCIUM SERPL-MCNC: 9.5 MG/DL (ref 8.5–10.5)
CHLORIDE SERPL-SCNC: 90 MMOL/L (ref 96–112)
CHOLEST SERPL-MCNC: 141 MG/DL (ref 100–199)
CO2 SERPL-SCNC: 25 MMOL/L (ref 20–33)
CREAT SERPL-MCNC: 0.85 MG/DL (ref 0.5–1.4)
ERYTHROCYTE [DISTWIDTH] IN BLOOD BY AUTOMATED COUNT: 38.7 FL (ref 35.9–50)
EST. AVERAGE GLUCOSE BLD GHB EST-MCNC: 126 MG/DL
GFR SERPL CREATININE-BSD FRML MDRD: >60 ML/MIN/1.73 M 2
GLOBULIN SER CALC-MCNC: 2.8 G/DL (ref 1.9–3.5)
GLUCOSE SERPL-MCNC: 94 MG/DL (ref 65–99)
HBA1C MFR BLD: 6 % (ref 0–5.6)
HCT VFR BLD AUTO: 39.3 % (ref 37–47)
HDLC SERPL-MCNC: 47 MG/DL
HGB BLD-MCNC: 14.2 G/DL (ref 12–16)
LDLC SERPL CALC-MCNC: 67 MG/DL
MCH RBC QN AUTO: 31.1 PG (ref 27–33)
MCHC RBC AUTO-ENTMCNC: 36.1 G/DL (ref 33.6–35)
MCV RBC AUTO: 86.2 FL (ref 81.4–97.8)
PLATELET # BLD AUTO: 212 K/UL (ref 164–446)
PMV BLD AUTO: 10.1 FL (ref 9–12.9)
POTASSIUM SERPL-SCNC: 3.6 MMOL/L (ref 3.6–5.5)
PROT SERPL-MCNC: 7.1 G/DL (ref 6–8.2)
RBC # BLD AUTO: 4.56 M/UL (ref 4.2–5.4)
SODIUM SERPL-SCNC: 124 MMOL/L (ref 135–145)
TRIGL SERPL-MCNC: 136 MG/DL (ref 0–149)
WBC # BLD AUTO: 5.5 K/UL (ref 4.8–10.8)

## 2017-12-15 PROCEDURE — 85027 COMPLETE CBC AUTOMATED: CPT

## 2017-12-15 PROCEDURE — 36415 COLL VENOUS BLD VENIPUNCTURE: CPT

## 2017-12-15 PROCEDURE — 80053 COMPREHEN METABOLIC PANEL: CPT

## 2017-12-15 PROCEDURE — 83036 HEMOGLOBIN GLYCOSYLATED A1C: CPT

## 2017-12-15 PROCEDURE — 80061 LIPID PANEL: CPT

## 2018-02-09 ENCOUNTER — OFFICE VISIT (OUTPATIENT)
Dept: CARDIOLOGY | Facility: MEDICAL CENTER | Age: 58
End: 2018-02-09
Payer: MEDICAID

## 2018-02-09 VITALS
BODY MASS INDEX: 30.05 KG/M2 | WEIGHT: 176 LBS | SYSTOLIC BLOOD PRESSURE: 128 MMHG | HEIGHT: 64 IN | DIASTOLIC BLOOD PRESSURE: 66 MMHG | HEART RATE: 74 BPM | OXYGEN SATURATION: 96 %

## 2018-02-09 DIAGNOSIS — E78.5 DYSLIPIDEMIA: ICD-10-CM

## 2018-02-09 DIAGNOSIS — I10 HTN (HYPERTENSION), MALIGNANT: ICD-10-CM

## 2018-02-09 DIAGNOSIS — Z72.0 TOBACCO ABUSE: ICD-10-CM

## 2018-02-09 DIAGNOSIS — Z79.899 HIGH RISK MEDICATION USE: ICD-10-CM

## 2018-02-09 PROCEDURE — 99406 BEHAV CHNG SMOKING 3-10 MIN: CPT | Performed by: INTERNAL MEDICINE

## 2018-02-09 PROCEDURE — 99214 OFFICE O/P EST MOD 30 MIN: CPT | Mod: 25 | Performed by: INTERNAL MEDICINE

## 2018-02-09 RX ORDER — BUSPIRONE HYDROCHLORIDE 7.5 MG/1
7.5 TABLET ORAL EVERY EVENING
Status: ON HOLD | COMMUNITY
End: 2020-10-01

## 2018-02-09 RX ORDER — LOSARTAN POTASSIUM 100 MG/1
100 TABLET ORAL DAILY
COMMUNITY
End: 2019-04-12 | Stop reason: SDUPTHER

## 2018-02-09 ASSESSMENT — ENCOUNTER SYMPTOMS
DEPRESSION: 0
HALLUCINATIONS: 0
SHORTNESS OF BREATH: 0
DIZZINESS: 0
LOSS OF CONSCIOUSNESS: 0
FEVER: 0
CHILLS: 0
SENSORY CHANGE: 0
HEADACHES: 0
BRUISES/BLEEDS EASILY: 0
EYE DISCHARGE: 0
ORTHOPNEA: 0
WEIGHT LOSS: 0
COUGH: 0
SPEECH CHANGE: 0
BLOOD IN STOOL: 0
BLURRED VISION: 0
PND: 0
PALPITATIONS: 0
FALLS: 0
NAUSEA: 0
EYE PAIN: 0
ABDOMINAL PAIN: 0
DOUBLE VISION: 0
VOMITING: 0
MYALGIAS: 0
CLAUDICATION: 0

## 2018-02-09 NOTE — LETTER
[facility]   [address] [place] [address] [address]  [place] [address]  Phone: [phone]  Fax: [phone]              Kelly Oakley  1960    Encounter Date: 2/9/2018    Geoffrey Hi M.D.          PROGRESS NOTE:  Subjective:   Kelly Oakley is a 57 y.o. female who presents today cardiac care for hypertension and hyper lipidemia. She is tolerating pravastatin well. Her LDL has significantly decreased and normalized with pravastatin. Patient feels well overall. No chest pain or shortness of breath.      On losartan and HCTZ    Chief Complaint: HTN, HLP.    Past Medical History:   Diagnosis Date   • Adrenal nodule 2012 2015 / nonfunctional / benign   • Anemia    • Anxiety     Panic   • Arrhythmia    • Arthritis     Morning stiffness   • Atherosclerosis of arteries 2013    seen on abdominal CT   • Briseno's  esophagus    • Chest pain at rest 9/11/2015   • Cigarette smoker one half pack a day or less 5/28/2015   • Depression    • Fatigue 9/11/2015   • GERD (gastroesophageal reflux disease)    • Headache(784.0)     sinus headache   • Heart murmur     Dr. Krause-Stress trend   • History of HPV infection    • HLD (hyperlipidemia) 9/11/2015   • HTN (hypertension) 9/11/2015   • IBD (inflammatory bowel disease)     Dr. Cunningham   • MRSA carrier 2008    nose cellulitis   • Nocturnal hypoxemia 10/15/2015   • OSTEOPOROSIS     Osteopenia   • S/P appendectomy    • S/P cholecystectomy    • S/P hysterectomy with oophorectomy     endometriosis   • Ulcer (CMS-HCC)     Barretts esophogitis   • Urinary tract infection, site not specified      Past Surgical History:   Procedure Laterality Date   • BREAST BIOPSY  1980's    benign left breast 35 years ago   • ABDOMINAL HYSTERECTOMY TOTAL      mennorrogia   • APPENDECTOMY     • CHOLECYSTECTOMY     • COLON RESECTION      Polyp removal   • ENDOMETRIAL ABLATION     • PRIMARY C SECTION     • TONSILLECTOMY     • TUBAL COAGULATION LAPAROSCOPIC  BILATERAL     • US-NEEDLE CORE BX-BREAST PANEL       Family History   Problem Relation Age of Onset   • Cancer Mother      Breast/Liver   • Diabetes Mother    • Hypertension Mother    • Hyperlipidemia Mother    • Heart Disease Mother    • Lung Disease Father      Emphysema   • Psychiatry Father      Paranoid schitzophenia   • Psychiatry Sister      Paronoid Schitzo-disorder, Bipolar   • Arthritis Sister      RA, Fibromyalgia   • Cancer Maternal Aunt      Breast   • Cancer Paternal Aunt      Bone   • Arthritis Paternal Aunt    • Genetic Paternal Aunt      SLE   • Heart Disease Maternal Grandmother    • Hypertension Maternal Grandmother    • Hyperlipidemia Maternal Grandmother    • Genetic Maternal Grandmother      Brights disease   • Stroke Maternal Grandmother    • Heart Disease Maternal Grandfather    • Hypertension Maternal Grandfather    • Hyperlipidemia Maternal Grandfather    • Stroke Paternal Grandmother    • Hyperlipidemia Paternal Grandmother    • Hypertension Paternal Grandmother    • Heart Disease Paternal Grandfather    • Hypertension Paternal Grandfather    • Hyperlipidemia Paternal Grandfather      History   Smoking Status   • Current Every Day Smoker   • Packs/day: 0.25   • Years: 40.00   • Types: Cigarettes   Smokeless Tobacco   • Never Used     Allergies   Allergen Reactions   • Macrodantin [Nitrofurantoin Macrocrystal] Anaphylaxis   • Pcn [Penicillins] Anaphylaxis   • Doxycycline Itching   • Bactrim Vomiting   • Biaxin [Clarithromycin] Vomiting   • Norvasc [Amlodipine]      Leg swelling   • Omnicef Itching     Outpatient Encounter Prescriptions as of 2/9/2018   Medication Sig Dispense Refill   • busPIRone (BUSPAR) 7.5 MG tablet Take 7.5 mg by mouth 2 times a day.     • losartan (COZAAR) 100 MG Tab Take 100 mg by mouth every day.     • ESTRING 2 MG vaginal ring      • hydrochlorothiazide (HYDRODIURIL) 25 MG Tab Take 1 Tab by mouth 2 Times a Day. 60 Tab 11   • pravastatin (PRAVACHOL) 20 MG Tab Take 1  "Tab by mouth every day. 90 Tab 3   • omeprazole (PRILOSEC) 40 MG delayed-release capsule TAKE ONE CAPSULE BY MOUTH DAILY (GENERIC PRILOSEC) 90 Cap 4   • aspirin EC (ECOTRIN) 81 MG Tablet Delayed Response Take 81 mg by mouth every day.     • triamcinolone acetonide (KENALOG) 0.1 % Cream Apply 1 Application to affected area(s) 2 times a day.     • [DISCONTINUED] metoprolol (LOPRESSOR) 25 MG Tab Take 1 Tab by mouth 2 times a day. 60 Tab 11     No facility-administered encounter medications on file as of 2/9/2018.      Review of Systems   Constitutional: Negative for chills, fever, malaise/fatigue and weight loss.   HENT: Negative for ear discharge, ear pain, hearing loss and nosebleeds.    Eyes: Negative for blurred vision, double vision, pain and discharge.   Respiratory: Negative for cough and shortness of breath.    Cardiovascular: Negative for chest pain, palpitations, orthopnea, claudication, leg swelling and PND.   Gastrointestinal: Negative for abdominal pain, blood in stool, melena, nausea and vomiting.   Genitourinary: Negative for dysuria and hematuria.   Musculoskeletal: Negative for falls, joint pain and myalgias.   Skin: Negative for itching and rash.   Neurological: Negative for dizziness, sensory change, speech change, loss of consciousness and headaches.   Endo/Heme/Allergies: Negative for environmental allergies. Does not bruise/bleed easily.   Psychiatric/Behavioral: Negative for depression, hallucinations and suicidal ideas.        Objective:   /66   Pulse 74   Ht 1.626 m (5' 4\")   Wt 79.8 kg (176 lb)   SpO2 96%   BMI 30.21 kg/m²      Physical Exam   Constitutional: She is oriented to person, place, and time. She appears well-developed and well-nourished.   HENT:   Head: Normocephalic and atraumatic.   Eyes: EOM are normal.   Neck: No JVD present.   Cardiovascular: Normal rate, regular rhythm, normal heart sounds and intact distal pulses.  Exam reveals no gallop and no friction rub.    No " murmur heard.  Pulmonary/Chest: No respiratory distress. She has no wheezes. She has no rales. She exhibits no tenderness.   Abdominal: She exhibits no distension. There is no tenderness. There is no rebound and no guarding.   Musculoskeletal: She exhibits no edema or tenderness.   Lymphadenopathy:     She has no cervical adenopathy.   Neurological: She is alert and oriented to person, place, and time.   Skin: Skin is dry.   Psychiatric: She has a normal mood and affect.   Nursing note and vitals reviewed.      Assessment:     1. HTN (hypertension), malignant  COMP METABOLIC PANEL    LIPID PANEL   2. Dyslipidemia  COMP METABOLIC PANEL    LIPID PANEL   3. High risk medication use  COMP METABOLIC PANEL    LIPID PANEL   4. Tobacco abuse         Medical Decision Making:  Today's Assessment / Status / Plan:     Blood pressure is well controlled.    Cont current medications at current dose.     I spent 5 minutes advising patient on the importance of smoking cessation.    I will see patient back in clinic with lab tests and studies results in 12 months.    I thank you Dr. Fajardo for referring patient to our Cardiology Clinic today.          Saulo Fajardo M.D.  [address] [place] [id number]  VIA Facsimile: [phone]

## 2018-02-10 NOTE — PROGRESS NOTES
Subjective:   Kelly Oakley is a 57 y.o. female who presents today cardiac care for hypertension and hyper lipidemia. She is tolerating pravastatin well. Her LDL has significantly decreased and normalized with pravastatin. Patient feels well overall. No chest pain or shortness of breath.      On losartan and HCTZ    Chief Complaint: HTN, HLP.    Past Medical History:   Diagnosis Date   • Adrenal nodule 2012 2015 / nonfunctional / benign   • Anemia    • Anxiety     Panic   • Arrhythmia    • Arthritis     Morning stiffness   • Atherosclerosis of arteries 2013    seen on abdominal CT   • Briseno's  esophagus    • Chest pain at rest 9/11/2015   • Cigarette smoker one half pack a day or less 5/28/2015   • Depression    • Fatigue 9/11/2015   • GERD (gastroesophageal reflux disease)    • Headache(784.0)     sinus headache   • Heart murmur     Dr. Krause-Stress trend   • History of HPV infection    • HLD (hyperlipidemia) 9/11/2015   • HTN (hypertension) 9/11/2015   • IBD (inflammatory bowel disease)     Dr. Cunningham   • MRSA carrier 2008    nose cellulitis   • Nocturnal hypoxemia 10/15/2015   • OSTEOPOROSIS     Osteopenia   • S/P appendectomy    • S/P cholecystectomy    • S/P hysterectomy with oophorectomy     endometriosis   • Ulcer (CMS-HCC)     Barretts esophogitis   • Urinary tract infection, site not specified      Past Surgical History:   Procedure Laterality Date   • BREAST BIOPSY  1980's    benign left breast 35 years ago   • ABDOMINAL HYSTERECTOMY TOTAL      mennorrogia   • APPENDECTOMY     • CHOLECYSTECTOMY     • COLON RESECTION      Polyp removal   • ENDOMETRIAL ABLATION     • PRIMARY C SECTION     • TONSILLECTOMY     • TUBAL COAGULATION LAPAROSCOPIC BILATERAL     • US-NEEDLE CORE BX-BREAST PANEL       Family History   Problem Relation Age of Onset   • Cancer Mother      Breast/Liver   • Diabetes Mother    • Hypertension Mother    • Hyperlipidemia Mother    • Heart Disease Mother    • Lung Disease Father       Emphysema   • Psychiatry Father      Paranoid schitzophenia   • Psychiatry Sister      Paronoid Schitzo-disorder, Bipolar   • Arthritis Sister      RA, Fibromyalgia   • Cancer Maternal Aunt      Breast   • Cancer Paternal Aunt      Bone   • Arthritis Paternal Aunt    • Genetic Paternal Aunt      SLE   • Heart Disease Maternal Grandmother    • Hypertension Maternal Grandmother    • Hyperlipidemia Maternal Grandmother    • Genetic Maternal Grandmother      Brights disease   • Stroke Maternal Grandmother    • Heart Disease Maternal Grandfather    • Hypertension Maternal Grandfather    • Hyperlipidemia Maternal Grandfather    • Stroke Paternal Grandmother    • Hyperlipidemia Paternal Grandmother    • Hypertension Paternal Grandmother    • Heart Disease Paternal Grandfather    • Hypertension Paternal Grandfather    • Hyperlipidemia Paternal Grandfather      History   Smoking Status   • Current Every Day Smoker   • Packs/day: 0.25   • Years: 40.00   • Types: Cigarettes   Smokeless Tobacco   • Never Used     Allergies   Allergen Reactions   • Macrodantin [Nitrofurantoin Macrocrystal] Anaphylaxis   • Pcn [Penicillins] Anaphylaxis   • Doxycycline Itching   • Bactrim Vomiting   • Biaxin [Clarithromycin] Vomiting   • Norvasc [Amlodipine]      Leg swelling   • Omnicef Itching     Outpatient Encounter Prescriptions as of 2/9/2018   Medication Sig Dispense Refill   • busPIRone (BUSPAR) 7.5 MG tablet Take 7.5 mg by mouth 2 times a day.     • losartan (COZAAR) 100 MG Tab Take 100 mg by mouth every day.     • ESTRING 2 MG vaginal ring      • hydrochlorothiazide (HYDRODIURIL) 25 MG Tab Take 1 Tab by mouth 2 Times a Day. 60 Tab 11   • pravastatin (PRAVACHOL) 20 MG Tab Take 1 Tab by mouth every day. 90 Tab 3   • omeprazole (PRILOSEC) 40 MG delayed-release capsule TAKE ONE CAPSULE BY MOUTH DAILY (GENERIC PRILOSEC) 90 Cap 4   • aspirin EC (ECOTRIN) 81 MG Tablet Delayed Response Take 81 mg by mouth every day.     • triamcinolone acetonide  "(KENALOG) 0.1 % Cream Apply 1 Application to affected area(s) 2 times a day.     • [DISCONTINUED] metoprolol (LOPRESSOR) 25 MG Tab Take 1 Tab by mouth 2 times a day. 60 Tab 11     No facility-administered encounter medications on file as of 2/9/2018.      Review of Systems   Constitutional: Negative for chills, fever, malaise/fatigue and weight loss.   HENT: Negative for ear discharge, ear pain, hearing loss and nosebleeds.    Eyes: Negative for blurred vision, double vision, pain and discharge.   Respiratory: Negative for cough and shortness of breath.    Cardiovascular: Negative for chest pain, palpitations, orthopnea, claudication, leg swelling and PND.   Gastrointestinal: Negative for abdominal pain, blood in stool, melena, nausea and vomiting.   Genitourinary: Negative for dysuria and hematuria.   Musculoskeletal: Negative for falls, joint pain and myalgias.   Skin: Negative for itching and rash.   Neurological: Negative for dizziness, sensory change, speech change, loss of consciousness and headaches.   Endo/Heme/Allergies: Negative for environmental allergies. Does not bruise/bleed easily.   Psychiatric/Behavioral: Negative for depression, hallucinations and suicidal ideas.        Objective:   /66   Pulse 74   Ht 1.626 m (5' 4\")   Wt 79.8 kg (176 lb)   SpO2 96%   BMI 30.21 kg/m²     Physical Exam   Constitutional: She is oriented to person, place, and time. She appears well-developed and well-nourished.   HENT:   Head: Normocephalic and atraumatic.   Eyes: EOM are normal.   Neck: No JVD present.   Cardiovascular: Normal rate, regular rhythm, normal heart sounds and intact distal pulses.  Exam reveals no gallop and no friction rub.    No murmur heard.  Pulmonary/Chest: No respiratory distress. She has no wheezes. She has no rales. She exhibits no tenderness.   Abdominal: She exhibits no distension. There is no tenderness. There is no rebound and no guarding.   Musculoskeletal: She exhibits no edema " or tenderness.   Lymphadenopathy:     She has no cervical adenopathy.   Neurological: She is alert and oriented to person, place, and time.   Skin: Skin is dry.   Psychiatric: She has a normal mood and affect.   Nursing note and vitals reviewed.      Assessment:     1. HTN (hypertension), malignant  COMP METABOLIC PANEL    LIPID PANEL   2. Dyslipidemia  COMP METABOLIC PANEL    LIPID PANEL   3. High risk medication use  COMP METABOLIC PANEL    LIPID PANEL   4. Tobacco abuse         Medical Decision Making:  Today's Assessment / Status / Plan:     Blood pressure is well controlled.    Cont current medications at current dose.     I spent 5 minutes advising patient on the importance of smoking cessation.    I will see patient back in clinic with lab tests and studies results in 12 months.    I thank you Dr. Fajardo for referring patient to our Cardiology Clinic today.

## 2018-05-25 ENCOUNTER — HOSPITAL ENCOUNTER (OUTPATIENT)
Dept: RADIOLOGY | Facility: MEDICAL CENTER | Age: 58
End: 2018-05-25
Attending: STUDENT IN AN ORGANIZED HEALTH CARE EDUCATION/TRAINING PROGRAM
Payer: MEDICAID

## 2018-05-25 DIAGNOSIS — R07.81 PLEURITIC CHEST PAIN: ICD-10-CM

## 2018-05-25 PROCEDURE — 700117 HCHG RX CONTRAST REV CODE 255: Performed by: STUDENT IN AN ORGANIZED HEALTH CARE EDUCATION/TRAINING PROGRAM

## 2018-05-25 PROCEDURE — 71260 CT THORAX DX C+: CPT

## 2018-05-25 RX ADMIN — IOHEXOL 100 ML: 350 INJECTION, SOLUTION INTRAVENOUS at 14:56

## 2018-07-16 DIAGNOSIS — I10 ESSENTIAL HYPERTENSION: ICD-10-CM

## 2018-07-16 DIAGNOSIS — I70.90 ATHEROSCLEROSIS OF ARTERIES: ICD-10-CM

## 2018-07-16 DIAGNOSIS — R06.83 SNORING: ICD-10-CM

## 2018-07-16 RX ORDER — PRAVASTATIN SODIUM 20 MG
20 TABLET ORAL DAILY
Qty: 90 TAB | Refills: 3 | Status: SHIPPED | OUTPATIENT
Start: 2018-07-16 | End: 2019-04-12 | Stop reason: SDUPTHER

## 2018-12-08 ENCOUNTER — APPOINTMENT (OUTPATIENT)
Dept: RADIOLOGY | Facility: MEDICAL CENTER | Age: 58
End: 2018-12-08
Attending: FAMILY MEDICINE
Payer: MEDICAID

## 2019-01-05 ENCOUNTER — APPOINTMENT (OUTPATIENT)
Dept: RADIOLOGY | Facility: MEDICAL CENTER | Age: 59
End: 2019-01-05
Attending: FAMILY MEDICINE
Payer: MEDICAID

## 2019-01-17 ENCOUNTER — TELEPHONE (OUTPATIENT)
Dept: CARDIOLOGY | Facility: MEDICAL CENTER | Age: 59
End: 2019-01-17

## 2019-03-07 ENCOUNTER — TELEPHONE (OUTPATIENT)
Dept: CARDIOLOGY | Facility: MEDICAL CENTER | Age: 59
End: 2019-03-07

## 2019-03-07 NOTE — TELEPHONE ENCOUNTER
b/p high - pt has flu   Received: Today   Message Contents   Soniya Quintero R.N.   Phone Number: [phone]             TT/trevor     Pt calling to report visit to [facility] for flu yesterday, at which time B/P was 199/96.       This morning b/p is: 167/84; 166/91, pt is concerned about how high it is.  Please call to discuss, [phone].      Returned patient call. Pt states BP this AM was 156/91, per timing this was approx 40 mins after AM meds were taken.  Pt confirms she has been coughing. Pt reports being started on Tamiflu. Denies n/v. Keeping cardiac meds down OK. No antibiotic. UC advised to return for chest discomfort which may be pneumonia. Smoking cessation discussed. Pt states she is down to 5-6 cigs a day. Pt quit, but her dog passed and she resumed. She states she has a step 2 patch and may try that again. Pt urged to return to  as directed and advised and BP parameters given. Instructions on when to take BP and to records and worsening symptoms discussed. Pt appreciative of call back.

## 2019-04-12 ENCOUNTER — OFFICE VISIT (OUTPATIENT)
Dept: CARDIOLOGY | Facility: MEDICAL CENTER | Age: 59
End: 2019-04-12
Payer: MEDICAID

## 2019-04-12 VITALS
BODY MASS INDEX: 29.23 KG/M2 | HEIGHT: 63 IN | OXYGEN SATURATION: 98 % | HEART RATE: 60 BPM | SYSTOLIC BLOOD PRESSURE: 136 MMHG | WEIGHT: 165 LBS | DIASTOLIC BLOOD PRESSURE: 74 MMHG

## 2019-04-12 DIAGNOSIS — Z72.0 TOBACCO ABUSE: ICD-10-CM

## 2019-04-12 DIAGNOSIS — I70.90 ATHEROSCLEROSIS OF ARTERIES: ICD-10-CM

## 2019-04-12 DIAGNOSIS — R06.83 SNORING: ICD-10-CM

## 2019-04-12 DIAGNOSIS — M79.89 LEG SWELLING: ICD-10-CM

## 2019-04-12 DIAGNOSIS — E78.2 MIXED HYPERLIPIDEMIA: ICD-10-CM

## 2019-04-12 DIAGNOSIS — I10 HTN (HYPERTENSION), MALIGNANT: ICD-10-CM

## 2019-04-12 DIAGNOSIS — I10 ESSENTIAL HYPERTENSION: ICD-10-CM

## 2019-04-12 PROCEDURE — 99406 BEHAV CHNG SMOKING 3-10 MIN: CPT | Performed by: INTERNAL MEDICINE

## 2019-04-12 PROCEDURE — 99214 OFFICE O/P EST MOD 30 MIN: CPT | Mod: 25 | Performed by: INTERNAL MEDICINE

## 2019-04-12 RX ORDER — LOSARTAN POTASSIUM 100 MG/1
100 TABLET ORAL DAILY
Qty: 30 TAB | Refills: 11 | Status: SHIPPED | OUTPATIENT
Start: 2019-04-12 | End: 2019-10-16

## 2019-04-12 RX ORDER — ESOMEPRAZOLE MAGNESIUM 20 MG/1
TABLET ORAL
Refills: 11 | COMMUNITY
Start: 2019-03-24 | End: 2019-04-12

## 2019-04-12 RX ORDER — HYDROCHLOROTHIAZIDE 25 MG/1
25 TABLET ORAL 2 TIMES DAILY
Qty: 60 TAB | Refills: 11 | Status: SHIPPED | OUTPATIENT
Start: 2019-04-12 | End: 2020-03-20 | Stop reason: SDUPTHER

## 2019-04-12 RX ORDER — PRAVASTATIN SODIUM 20 MG
20 TABLET ORAL DAILY
Qty: 90 TAB | Refills: 3 | Status: SHIPPED | OUTPATIENT
Start: 2019-04-12 | End: 2020-03-17 | Stop reason: SDUPTHER

## 2019-04-12 RX ORDER — CEFIXIME 400 MG/1
CAPSULE ORAL DAILY
Status: ON HOLD | COMMUNITY
End: 2020-09-24

## 2019-04-12 ASSESSMENT — ENCOUNTER SYMPTOMS
CLAUDICATION: 0
NAUSEA: 0
EYE DISCHARGE: 0
BLOOD IN STOOL: 0
VOMITING: 0
WEIGHT LOSS: 0
HEADACHES: 0
MYALGIAS: 0
HALLUCINATIONS: 0
SENSORY CHANGE: 0
FEVER: 0
PALPITATIONS: 0
ORTHOPNEA: 0
ABDOMINAL PAIN: 0
DEPRESSION: 0
BLURRED VISION: 0
DOUBLE VISION: 0
COUGH: 0
SHORTNESS OF BREATH: 0
PND: 0
DIZZINESS: 0
FALLS: 0
SPEECH CHANGE: 0
EYE PAIN: 0
BRUISES/BLEEDS EASILY: 0
CHILLS: 0
LOSS OF CONSCIOUSNESS: 0

## 2019-04-12 NOTE — PROGRESS NOTES
Chief Complaint   Patient presents with   • Hypertension     F/V: 1 YR       Subjective:   Kelly Oakley is a 57 y.o. female who presents today cardiac care for hypertension and hyper lipidemia. She is tolerating pravastatin well. Her LDL has significantly decreased and normalized with pravastatin. Patient feels well overall. No chest pain or shortness of breath.     Blood pressure is well controlled.    Still smoking.    Past Medical History:   Diagnosis Date   • Adrenal nodule 2012 2015 / nonfunctional / benign   • Anemia    • Anxiety     Panic   • Arrhythmia    • Arthritis     Morning stiffness   • Atherosclerosis of arteries 2013    seen on abdominal CT   • Briseno's  esophagus    • Chest pain at rest 9/11/2015   • Cigarette smoker one half pack a day or less 5/28/2015   • Depression    • Fatigue 9/11/2015   • GERD (gastroesophageal reflux disease)    • Headache(784.0)     sinus headache   • Heart murmur     Dr. Krause-Stress trend   • History of HPV infection    • HLD (hyperlipidemia) 9/11/2015   • HTN (hypertension) 9/11/2015   • IBD (inflammatory bowel disease)     Dr. Cunningham   • MRSA carrier 2008    nose cellulitis   • Nocturnal hypoxemia 10/15/2015   • OSTEOPOROSIS     Osteopenia   • S/P appendectomy    • S/P cholecystectomy    • S/P hysterectomy with oophorectomy     endometriosis   • Ulcer     Barretts esophogitis   • Urinary tract infection, site not specified      Past Surgical History:   Procedure Laterality Date   • BREAST BIOPSY  1980's    benign left breast 35 years ago   • ABDOMINAL HYSTERECTOMY TOTAL      mennorrogia   • APPENDECTOMY     • CHOLECYSTECTOMY     • COLON RESECTION      Polyp removal   • ENDOMETRIAL ABLATION     • PRIMARY C SECTION     • TONSILLECTOMY     • TUBAL COAGULATION LAPAROSCOPIC BILATERAL     • US-NEEDLE CORE BX-BREAST PANEL       Family History   Problem Relation Age of Onset   • Cancer Mother         Breast/Liver   • Diabetes Mother    • Hypertension Mother    •  Hyperlipidemia Mother    • Heart Disease Mother    • Lung Disease Father         Emphysema   • Psychiatry Father         Paranoid schitzophenia   • Psychiatry Sister         Paronoid Schitzo-disorder, Bipolar   • Arthritis Sister         RA, Fibromyalgia   • Cancer Maternal Aunt         Breast   • Cancer Paternal Aunt         Bone   • Arthritis Paternal Aunt    • Genetic Paternal Aunt         SLE   • Heart Disease Maternal Grandmother    • Hypertension Maternal Grandmother    • Hyperlipidemia Maternal Grandmother    • Genetic Maternal Grandmother         Brights disease   • Stroke Maternal Grandmother    • Heart Disease Maternal Grandfather    • Hypertension Maternal Grandfather    • Hyperlipidemia Maternal Grandfather    • Stroke Paternal Grandmother    • Hyperlipidemia Paternal Grandmother    • Hypertension Paternal Grandmother    • Heart Disease Paternal Grandfather    • Hypertension Paternal Grandfather    • Hyperlipidemia Paternal Grandfather      Social History     Social History   • Marital status:      Spouse name: N/A   • Number of children: N/A   • Years of education: N/A     Occupational History   • Not on file.     Social History Main Topics   • Smoking status: Current Every Day Smoker     Packs/day: 0.25     Years: 40.00     Types: Cigarettes   • Smokeless tobacco: Never Used   • Alcohol use 0.6 oz/week     1 Standard drinks or equivalent per week      Comment: rarely   • Drug use: No   • Sexual activity: No      Comment:      Other Topics Concern   • Not on file     Social History Narrative   • No narrative on file     Allergies   Allergen Reactions   • Macrodantin [Nitrofurantoin Macrocrystal] Anaphylaxis   • Pcn [Penicillins] Anaphylaxis   • Doxycycline Itching   • Bactrim Vomiting   • Biaxin [Clarithromycin] Vomiting   • Norvasc [Amlodipine]      Leg swelling   • Omnicef Itching     Outpatient Encounter Prescriptions as of 4/12/2019   Medication Sig Dispense Refill   • Cefixime  (SUPRAX) 400 MG Cap Take  by mouth every day. X 5 days     • losartan (COZAAR) 100 MG Tab Take 1 Tab by mouth every day. 30 Tab 11   • pravastatin (PRAVACHOL) 20 MG Tab Take 1 Tab by mouth every day. 90 Tab 3   • hydroCHLOROthiazide (HYDRODIURIL) 25 MG Tab Take 1 Tab by mouth 2 Times a Day. 60 Tab 11   • busPIRone (BUSPAR) 7.5 MG tablet Take 7.5 mg by mouth 2 times a day.     • omeprazole (PRILOSEC) 40 MG delayed-release capsule TAKE ONE CAPSULE BY MOUTH DAILY (GENERIC PRILOSEC) 90 Cap 4   • aspirin EC (ECOTRIN) 81 MG Tablet Delayed Response Take 81 mg by mouth every day.     • triamcinolone acetonide (KENALOG) 0.1 % Cream Apply 1 Application to affected area(s) 2 times a day.     • [DISCONTINUED] NEXIUM 24HR 20 MG Tablet Delayed Response   11   • [DISCONTINUED] pravastatin (PRAVACHOL) 20 MG Tab Take 1 Tab by mouth every day. 90 Tab 3   • [DISCONTINUED] losartan (COZAAR) 100 MG Tab Take 100 mg by mouth every day.     • [DISCONTINUED] ESTRING 2 MG vaginal ring      • [DISCONTINUED] hydrochlorothiazide (HYDRODIURIL) 25 MG Tab Take 1 Tab by mouth 2 Times a Day. 60 Tab 11     No facility-administered encounter medications on file as of 4/12/2019.      Review of Systems   Constitutional: Negative for chills, fever, malaise/fatigue and weight loss.   HENT: Negative for ear discharge, ear pain, hearing loss and nosebleeds.    Eyes: Negative for blurred vision, double vision, pain and discharge.   Respiratory: Negative for cough and shortness of breath.    Cardiovascular: Negative for chest pain, palpitations, orthopnea, claudication, leg swelling and PND.   Gastrointestinal: Negative for abdominal pain, blood in stool, melena, nausea and vomiting.   Genitourinary: Negative for dysuria and hematuria.   Musculoskeletal: Negative for falls, joint pain and myalgias.   Skin: Negative for itching and rash.   Neurological: Negative for dizziness, sensory change, speech change, loss of consciousness and headaches.  "  Endo/Heme/Allergies: Negative for environmental allergies. Does not bruise/bleed easily.   Psychiatric/Behavioral: Negative for depression, hallucinations and suicidal ideas.        Objective:   /74 (BP Location: Right arm, Patient Position: Sitting, BP Cuff Size: Adult)   Pulse 60   Ht 1.6 m (5' 3\")   Wt 74.8 kg (165 lb)   SpO2 98%   BMI 29.23 kg/m²     Physical Exam   Constitutional: She is oriented to person, place, and time. No distress.   HENT:   Head: Normocephalic and atraumatic.   Right Ear: External ear normal.   Left Ear: External ear normal.   Eyes: Right eye exhibits no discharge. Left eye exhibits no discharge.   Neck: No JVD present. No thyromegaly present.   Cardiovascular: Normal rate, regular rhythm, normal heart sounds and intact distal pulses.  Exam reveals no gallop and no friction rub.    No murmur heard.  Pulmonary/Chest: Breath sounds normal. No respiratory distress.   Abdominal: Bowel sounds are normal. She exhibits no distension. There is no tenderness.   Musculoskeletal: She exhibits no edema or tenderness.   Neurological: She is alert and oriented to person, place, and time. No cranial nerve deficit.   Skin: Skin is warm and dry. She is not diaphoretic.   Psychiatric: She has a normal mood and affect. Her behavior is normal.   Nursing note and vitals reviewed.      Assessment:     1. Atherosclerosis of arteries  Comp Metabolic Panel    LIPID PANEL    pravastatin (PRAVACHOL) 20 MG Tab   2. HTN (hypertension), malignant  Comp Metabolic Panel    LIPID PANEL    losartan (COZAAR) 100 MG Tab    hydroCHLOROthiazide (HYDRODIURIL) 25 MG Tab   3. Mixed hyperlipidemia  Comp Metabolic Panel    LIPID PANEL   4. Tobacco abuse  Comp Metabolic Panel    LIPID PANEL   5. Essential hypertension     6. Snoring  pravastatin (PRAVACHOL) 20 MG Tab   7. Leg swelling  hydroCHLOROthiazide (HYDRODIURIL) 25 MG Tab       Medical Decision Making:  Today's Assessment / Status / Plan:   At this time patient " is clinically stable in terms of her cardiac standpoint.  Cont current medications at current dose as above.  I spent 5 minutes talking to patient about the danger of smoking. I advised patient and counseled patient on smoking cessation. Patient has promised to achieve goal of zero cigarettes per day.      I will see patient back in clinic with lab tests and studies results in 6 months.    I thank you Dr. Fajardo for referring patient to our Cardiology Clinic today.

## 2019-04-12 NOTE — LETTER
[facility]   [address]  [place] [address]  Phone: [phone]  Fax: [phone]              Kelly Oakley  1960    Encounter Date: 4/12/2019    Geoffrey Hi M.D.          PROGRESS NOTE:  Chief Complaint   Patient presents with   • Hypertension     F/V: 1 YR       Subjective:   Kelly Oakley is a 57 y.o. female who presents today cardiac care for hypertension and hyper lipidemia. She is tolerating pravastatin well. Her LDL has significantly decreased and normalized with pravastatin. Patient feels well overall. No chest pain or shortness of breath.     Blood pressure is well controlled.    Still smoking.    Past Medical History:   Diagnosis Date   • Adrenal nodule 2012 2015 / nonfunctional / benign   • Anemia    • Anxiety     Panic   • Arrhythmia    • Arthritis     Morning stiffness   • Atherosclerosis of arteries 2013    seen on abdominal CT   • Briseno's  esophagus    • Chest pain at rest 9/11/2015   • Cigarette smoker one half pack a day or less 5/28/2015   • Depression    • Fatigue 9/11/2015   • GERD (gastroesophageal reflux disease)    • Headache(784.0)     sinus headache   • Heart murmur     Dr. Krause-Stress trend   • History of HPV infection    • HLD (hyperlipidemia) 9/11/2015   • HTN (hypertension) 9/11/2015   • IBD (inflammatory bowel disease)     Dr. Cunningham   • MRSA carrier 2008    nose cellulitis   • Nocturnal hypoxemia 10/15/2015   • OSTEOPOROSIS     Osteopenia   • S/P appendectomy    • S/P cholecystectomy    • S/P hysterectomy with oophorectomy     endometriosis   • Ulcer     Barretts esophogitis   • Urinary tract infection, site not specified      Past Surgical History:   Procedure Laterality Date   • BREAST BIOPSY  1980's    benign left breast 35 years ago   • ABDOMINAL HYSTERECTOMY TOTAL      mennorrogia   • APPENDECTOMY     • CHOLECYSTECTOMY     • COLON RESECTION      Polyp removal   • ENDOMETRIAL ABLATION     •  PRIMARY C SECTION     • TONSILLECTOMY     • TUBAL COAGULATION LAPAROSCOPIC BILATERAL     • US-NEEDLE CORE BX-BREAST PANEL       Family History   Problem Relation Age of Onset   • Cancer Mother         Breast/Liver   • Diabetes Mother    • Hypertension Mother    • Hyperlipidemia Mother    • Heart Disease Mother    • Lung Disease Father         Emphysema   • Psychiatry Father         Paranoid schitzophenia   • Psychiatry Sister         Paronoid Schitzo-disorder, Bipolar   • Arthritis Sister         RA, Fibromyalgia   • Cancer Maternal Aunt         Breast   • Cancer Paternal Aunt         Bone   • Arthritis Paternal Aunt    • Genetic Paternal Aunt         SLE   • Heart Disease Maternal Grandmother    • Hypertension Maternal Grandmother    • Hyperlipidemia Maternal Grandmother    • Genetic Maternal Grandmother         Brights disease   • Stroke Maternal Grandmother    • Heart Disease Maternal Grandfather    • Hypertension Maternal Grandfather    • Hyperlipidemia Maternal Grandfather    • Stroke Paternal Grandmother    • Hyperlipidemia Paternal Grandmother    • Hypertension Paternal Grandmother    • Heart Disease Paternal Grandfather    • Hypertension Paternal Grandfather    • Hyperlipidemia Paternal Grandfather      Social History     Social History   • Marital status:      Spouse name: N/A   • Number of children: N/A   • Years of education: N/A     Occupational History   • Not on file.     Social History Main Topics   • Smoking status: Current Every Day Smoker     Packs/day: 0.25     Years: 40.00     Types: Cigarettes   • Smokeless tobacco: Never Used   • Alcohol use 0.6 oz/week     1 Standard drinks or equivalent per week      Comment: rarely   • Drug use: No   • Sexual activity: No      Comment:      Other Topics Concern   • Not on file     Social History Narrative   • No narrative on file     Allergies   Allergen Reactions   • Macrodantin [Nitrofurantoin Macrocrystal] Anaphylaxis   • Pcn [Penicillins]  Anaphylaxis   • Doxycycline Itching   • Bactrim Vomiting   • Biaxin [Clarithromycin] Vomiting   • Norvasc [Amlodipine]      Leg swelling   • Omnicef Itching     Outpatient Encounter Prescriptions as of 4/12/2019   Medication Sig Dispense Refill   • Cefixime (SUPRAX) 400 MG Cap Take  by mouth every day. X 5 days     • losartan (COZAAR) 100 MG Tab Take 1 Tab by mouth every day. 30 Tab 11   • pravastatin (PRAVACHOL) 20 MG Tab Take 1 Tab by mouth every day. 90 Tab 3   • hydroCHLOROthiazide (HYDRODIURIL) 25 MG Tab Take 1 Tab by mouth 2 Times a Day. 60 Tab 11   • busPIRone (BUSPAR) 7.5 MG tablet Take 7.5 mg by mouth 2 times a day.     • omeprazole (PRILOSEC) 40 MG delayed-release capsule TAKE ONE CAPSULE BY MOUTH DAILY (GENERIC PRILOSEC) 90 Cap 4   • aspirin EC (ECOTRIN) 81 MG Tablet Delayed Response Take 81 mg by mouth every day.     • triamcinolone acetonide (KENALOG) 0.1 % Cream Apply 1 Application to affected area(s) 2 times a day.     • [DISCONTINUED] NEXIUM 24HR 20 MG Tablet Delayed Response   11   • [DISCONTINUED] pravastatin (PRAVACHOL) 20 MG Tab Take 1 Tab by mouth every day. 90 Tab 3   • [DISCONTINUED] losartan (COZAAR) 100 MG Tab Take 100 mg by mouth every day.     • [DISCONTINUED] ESTRING 2 MG vaginal ring      • [DISCONTINUED] hydrochlorothiazide (HYDRODIURIL) 25 MG Tab Take 1 Tab by mouth 2 Times a Day. 60 Tab 11     No facility-administered encounter medications on file as of 4/12/2019.      Review of Systems   Constitutional: Negative for chills, fever, malaise/fatigue and weight loss.   HENT: Negative for ear discharge, ear pain, hearing loss and nosebleeds.    Eyes: Negative for blurred vision, double vision, pain and discharge.   Respiratory: Negative for cough and shortness of breath.    Cardiovascular: Negative for chest pain, palpitations, orthopnea, claudication, leg swelling and PND.   Gastrointestinal: Negative for abdominal pain, blood in stool, melena, nausea and vomiting.   Genitourinary:  "Negative for dysuria and hematuria.   Musculoskeletal: Negative for falls, joint pain and myalgias.   Skin: Negative for itching and rash.   Neurological: Negative for dizziness, sensory change, speech change, loss of consciousness and headaches.   Endo/Heme/Allergies: Negative for environmental allergies. Does not bruise/bleed easily.   Psychiatric/Behavioral: Negative for depression, hallucinations and suicidal ideas.        Objective:   /74 (BP Location: Right arm, Patient Position: Sitting, BP Cuff Size: Adult)   Pulse 60   Ht 1.6 m (5' 3\")   Wt 74.8 kg (165 lb)   SpO2 98%   BMI 29.23 kg/m²      Physical Exam   Constitutional: She is oriented to person, place, and time. No distress.   HENT:   Head: Normocephalic and atraumatic.   Right Ear: External ear normal.   Left Ear: External ear normal.   Eyes: Right eye exhibits no discharge. Left eye exhibits no discharge.   Neck: No JVD present. No thyromegaly present.   Cardiovascular: Normal rate, regular rhythm, normal heart sounds and intact distal pulses.  Exam reveals no gallop and no friction rub.    No murmur heard.  Pulmonary/Chest: Breath sounds normal. No respiratory distress.   Abdominal: Bowel sounds are normal. She exhibits no distension. There is no tenderness.   Musculoskeletal: She exhibits no edema or tenderness.   Neurological: She is alert and oriented to person, place, and time. No cranial nerve deficit.   Skin: Skin is warm and dry. She is not diaphoretic.   Psychiatric: She has a normal mood and affect. Her behavior is normal.   Nursing note and vitals reviewed.      Assessment:     1. Atherosclerosis of arteries  Comp Metabolic Panel    LIPID PANEL    pravastatin (PRAVACHOL) 20 MG Tab   2. HTN (hypertension), malignant  Comp Metabolic Panel    LIPID PANEL    losartan (COZAAR) 100 MG Tab    hydroCHLOROthiazide (HYDRODIURIL) 25 MG Tab   3. Mixed hyperlipidemia  Comp Metabolic Panel    LIPID PANEL   4. Tobacco abuse  Comp Metabolic Panel "    LIPID PANEL   5. Essential hypertension     6. Snoring  pravastatin (PRAVACHOL) 20 MG Tab   7. Leg swelling  hydroCHLOROthiazide (HYDRODIURIL) 25 MG Tab       Medical Decision Making:  Today's Assessment / Status / Plan:   At this time patient is clinically stable in terms of her cardiac standpoint.  Cont current medications at current dose as above.  I spent 5 minutes talking to patient about the danger of smoking. I advised patient and counseled patient on smoking cessation. Patient has promised to achieve goal of zero cigarettes per day.      I will see patient back in clinic with lab tests and studies results in 6 months.    I thank you Dr. Fajardo for referring patient to our Cardiology Clinic today.          Saulo Fajardo M.D.  [address] [place] [id number]  VIA Facsimile: [phone]

## 2019-04-19 ENCOUNTER — HOSPITAL ENCOUNTER (EMERGENCY)
Facility: MEDICAL CENTER | Age: 59
End: 2019-04-19
Attending: EMERGENCY MEDICINE
Payer: MEDICAID

## 2019-04-19 VITALS
DIASTOLIC BLOOD PRESSURE: 84 MMHG | BODY MASS INDEX: 29.1 KG/M2 | HEART RATE: 61 BPM | HEIGHT: 63 IN | TEMPERATURE: 98.5 F | RESPIRATION RATE: 17 BRPM | SYSTOLIC BLOOD PRESSURE: 161 MMHG | WEIGHT: 164.24 LBS | OXYGEN SATURATION: 97 %

## 2019-04-19 DIAGNOSIS — H04.301 DACROCYSTITIS, RIGHT: ICD-10-CM

## 2019-04-19 PROCEDURE — 99281 EMR DPT VST MAYX REQ PHY/QHP: CPT

## 2019-04-20 NOTE — ED NOTES
Pt ambulatory with steady gait, pt verbalized understanding of poc and discharge , no questions ,  VSS and pt in nad at this time   Natalie

## 2019-04-20 NOTE — ED PROVIDER NOTES
"ED Provider Note    Scribed for Lopez Connell M.D. by Cait Michael. 4/19/2019, 6:29 PM.    Primary care provider: Saulo Fajardo M.D.  Means of arrival: Walk in  History obtained from: Patient  History limited by: None     CHIEF COMPLAINT  Chief Complaint   Patient presents with   • Facial Swelling       HPI  Kelly Oakley is a 58 y.o. female with history of hyperlipidemia and hypertension who presents to the Emergency Department for evaluation of facial swelling onset yesterday. The patient states the swelling initially started to the right side of her face extending to her nose and noticed a mild \"bump\" near the right side of her nose at that time. The swelling to the right side of her face has since improved, but patient reports she awoke with swelling to the left side of her face today. Prior to onset of facial swelling, she saw her PCP 3 days ago for complaints of a headache and fever. The patient was placed on a course of cleocin and found to have fluid collection to the right side of her nose. She was released home and recommended to use warm compresses, but states the compresses have provided no alleviation. The patient is also concerned as she blew her nose today and found a \"rock\" like material that was yellowish-orange and bloody in appearance in her kleenex. She has prior history of MRSA 11 years ago, and states it was localized to her nose at that time. The patient additionally has a significant history of sinus issues including allergies.     REVIEW OF SYSTEMS  See HPI for further details.    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    SURGICAL HISTORY   has a past surgical history that includes abdominal hysterectomy total; endometrial ablation; cholecystectomy; appendectomy; tonsillectomy; primary c section; tubal coagulation laparoscopic bilateral; colon resection; breast biopsy (1980's); and us-needle core bx-breast panel.    SOCIAL HISTORY  Social History   Substance Use Topics   • Smoking status: Current Every Day Smoker     Packs/day: 0.25     Years: 40.00     Types: Cigarettes   • Smokeless tobacco: Never Used   • Alcohol use 0.6 oz/week     1 Standard drinks or equivalent per week      Comment: rarely      History   Drug Use No       FAMILY HISTORY  Family History   Problem Relation Age of Onset   • Cancer Mother         Breast/Liver   • Diabetes Mother    • Hypertension Mother    • Hyperlipidemia Mother    • Heart Disease Mother    • Lung Disease Father         Emphysema   • Psychiatry Father         Paranoid schitzophenia   • Psychiatry Sister         Paronoid Schitzo-disorder, Bipolar   • Arthritis Sister         RA, Fibromyalgia   • Cancer Maternal Aunt         Breast   • Cancer Paternal Aunt         Bone   • Arthritis Paternal Aunt    • Genetic Paternal Aunt         SLE   • Heart Disease Maternal Grandmother    • Hypertension Maternal Grandmother    • Hyperlipidemia Maternal Grandmother    • Genetic Maternal Grandmother         Brights disease   • Stroke Maternal Grandmother    • Heart Disease Maternal Grandfather    • Hypertension Maternal Grandfather    • Hyperlipidemia Maternal Grandfather    • Stroke Paternal Grandmother    • Hyperlipidemia Paternal Grandmother    • Hypertension Paternal Grandmother    • Heart Disease Paternal Grandfather    •  "Hypertension Paternal Grandfather    • Hyperlipidemia Paternal Grandfather        CURRENT MEDICATIONS  Reviewed.  See Encounter Summary.     ALLERGIES  Allergies   Allergen Reactions   • Macrodantin [Nitrofurantoin Macrocrystal] Anaphylaxis   • Pcn [Penicillins] Anaphylaxis   • Doxycycline Itching   • Bactrim Vomiting   • Biaxin [Clarithromycin] Vomiting   • Norvasc [Amlodipine]      Leg swelling   • Omnicef Itching       PHYSICAL EXAM  VITAL SIGNS: BP (!) 161/84   Pulse 61   Temp 36.9 °C (98.5 °F) (Temporal)   Resp 17   Ht 1.6 m (5' 3\")   Wt 74.5 kg (164 lb 3.9 oz)   SpO2 97%   BMI 29.09 kg/m²   Constitutional: Alert in no apparent distress.  HENT: No signs of trauma, Bilateral external ears normal, Nose normal. Tenderness to right nose and medial inferior lid with small fluid collection just inferior to lower lid. Turbinates slightly inflammed bilaterally. No evidence of preseptal or postseptal cellulitis.  Eyes: Pupils are equal and reactive, Conjunctiva normal, Non-icteric. No pain with EOM.  Neck: Normal range of motion, No tenderness, Supple, No stridor.   Cardiovascular: Regular rate and rhythm, no murmurs.   Thorax & Lungs: Normal breath sounds, No respiratory distress, No wheezing, No chest tenderness.   Abdomen: Bowel sounds normal, Soft, No tenderness, No masses, No pulsatile masses. No peritoneal signs.  Skin: Warm, Dry, No erythema, No rash.   Back: No bony tenderness, No CVA tenderness.   Extremities: Intact distal pulses, No edema, No tenderness, No cyanosis  Musculoskeletal: Good range of motion in all major joints. No tenderness to palpation or major deformities noted.   Neurologic: Alert , Normal motor function, Normal sensory function, No focal deficits noted.   Psychiatric: Affect normal, Judgment normal, Mood normal.     COURSE & MEDICAL DECISION MAKING  Pertinent Labs & Imaging studies reviewed. (See chart for details)    6:29 PM - Patient seen and examined at bedside. She is well " appearing with stable vital signs. Patient's physical exam is reassuring. She can be discharged home at this time and is recommended to continue course of antibiotics as prescribed. The patient is additionally recommended to take alternating doses of Tylenol and Motrin as needed for symptom management and utilize warm compresses. She will be referred to PCP for follow up. The patient is instructed to return to the ED for worsening swelling or any other concerning symptoms.     Decision Making:  This is a 58 y.o. year old female who presents with above complaint.  Patient does note that she was diagnosed with influenza in March.  She is now been dealing with ongoing sinus congestion.  She did see her PCP 2 days ago we started her on clindamycin.  I do believe that this is an appropriate antibiotic choice not only given her multitude of antibiotic allergies but given the secondary additional differential from sinusitis to additional dacryocystitis.  I will have her continue with warm compresses and finish the antibiotics.  She is understanding of return precautions should she have any significant changes over the holiday weekend but I do believe that she can otherwise follow-up with her primary care physician at the end of next week to see how she is improving if not resolved.    The patient will return for new or worsening symptoms and is stable at the time of discharge.    DISPOSITION:  Patient will be discharged home in stable condition.    FOLLOW UP:  Saulo Fajardo M.D.  [address] [place] [id number]  [phone]            OUTPATIENT MEDICATIONS:  New Prescriptions    No medications on file       FINAL IMPRESSION  1. Dacrocystitis, right          Cait NICHOLSON), am scribing for, and in the presence of, Lopez Connell M.D..    Electronically signed by: Cait Duenas), 4/19/2019    Lopez NICHOLSON M.D. personally performed the services described in this documentation, as scribed by Cait Michael  in my presence, and it is both accurate and complete.    E.    The note accurately reflects work and decisions made by me.  Lopez Connell  4/19/2019  10:03 PM

## 2019-04-20 NOTE — ED TRIAGE NOTES
"Chief Complaint   Patient presents with   • Facial Swelling     Pt reports that she has nose swelling and periorbital edema x 3 days. Was seen by PCP and started on clindamycin. States that she feels like she has MRSA and it is very painful.  BP (!) 161/84   Pulse 61   Temp 36.9 °C (98.5 °F) (Temporal)   Resp 17   Ht 1.6 m (5' 3\")   Wt 74.5 kg (164 lb 3.9 oz)   SpO2 97%   Pt informed of wait times. Educated on triage process.  Asked to return to triage RN for any new or worsening of symptoms. Thanked for patience.        "

## 2019-09-05 ENCOUNTER — HOSPITAL ENCOUNTER (OUTPATIENT)
Dept: RADIOLOGY | Facility: MEDICAL CENTER | Age: 59
End: 2019-09-05
Attending: SPECIALIST
Payer: MEDICAID

## 2019-09-05 DIAGNOSIS — Z13.820 ENCOUNTER FOR SCREENING FOR OSTEOPOROSIS: ICD-10-CM

## 2019-09-05 DIAGNOSIS — Z12.31 ENCOUNTER FOR SCREENING MAMMOGRAM FOR MALIGNANT NEOPLASM OF BREAST: ICD-10-CM

## 2019-09-05 PROCEDURE — 77080 DXA BONE DENSITY AXIAL: CPT

## 2019-09-05 PROCEDURE — 77063 BREAST TOMOSYNTHESIS BI: CPT

## 2019-10-11 ENCOUNTER — TELEPHONE (OUTPATIENT)
Dept: CARDIOLOGY | Facility: MEDICAL CENTER | Age: 59
End: 2019-10-11

## 2019-10-11 NOTE — TELEPHONE ENCOUNTER
"S/w patient about fasting labs, patient stated \" I don't have my appt until October.\" I explained to the patient that we are in October and that Dr. Hi ordered some labs. Patient stated that she \"spaced \" it and didn't realize the appt was coming up and that she doesn't think that she will be completing her labs before her appt on 10/16.    She will call if she needs to r/s.  "

## 2019-10-16 ENCOUNTER — OFFICE VISIT (OUTPATIENT)
Dept: CARDIOLOGY | Facility: MEDICAL CENTER | Age: 59
End: 2019-10-16
Payer: MEDICAID

## 2019-10-16 VITALS
BODY MASS INDEX: 28.7 KG/M2 | SYSTOLIC BLOOD PRESSURE: 144 MMHG | DIASTOLIC BLOOD PRESSURE: 80 MMHG | HEIGHT: 63 IN | HEART RATE: 66 BPM | WEIGHT: 162 LBS | OXYGEN SATURATION: 96 %

## 2019-10-16 DIAGNOSIS — I10 HTN (HYPERTENSION), MALIGNANT: ICD-10-CM

## 2019-10-16 DIAGNOSIS — Z72.0 TOBACCO ABUSE: ICD-10-CM

## 2019-10-16 DIAGNOSIS — Z79.899 HIGH RISK MEDICATION USE: ICD-10-CM

## 2019-10-16 DIAGNOSIS — E78.2 MIXED HYPERLIPIDEMIA: ICD-10-CM

## 2019-10-16 PROCEDURE — 99406 BEHAV CHNG SMOKING 3-10 MIN: CPT | Performed by: INTERNAL MEDICINE

## 2019-10-16 PROCEDURE — 99214 OFFICE O/P EST MOD 30 MIN: CPT | Mod: 25 | Performed by: INTERNAL MEDICINE

## 2019-10-16 RX ORDER — IRBESARTAN 300 MG/1
300 TABLET ORAL DAILY
Qty: 30 TAB | Refills: 11 | Status: SHIPPED | OUTPATIENT
Start: 2019-10-16 | End: 2020-06-04 | Stop reason: SDUPTHER

## 2019-10-16 ASSESSMENT — ENCOUNTER SYMPTOMS
ORTHOPNEA: 0
NAUSEA: 0
BRUISES/BLEEDS EASILY: 0
CHILLS: 0
COUGH: 0
HALLUCINATIONS: 0
HEADACHES: 0
BLURRED VISION: 0
SPEECH CHANGE: 0
WEIGHT LOSS: 0
BLOOD IN STOOL: 0
DIZZINESS: 0
PND: 0
ABDOMINAL PAIN: 0
LOSS OF CONSCIOUSNESS: 0
SHORTNESS OF BREATH: 0
VOMITING: 0
FEVER: 0
EYE PAIN: 0
FALLS: 0
PALPITATIONS: 0
CLAUDICATION: 0
DOUBLE VISION: 0
DEPRESSION: 0
EYE DISCHARGE: 0
SENSORY CHANGE: 0
MYALGIAS: 0

## 2019-10-16 NOTE — PROGRESS NOTES
Chief Complaint   Patient presents with   • Hypertension     6month F/V        Subjective:   Kelly Oakley is a 58 y.o. female who presents today cardiac care for hypertension and hyper lipidemia. She is tolerating pravastatin well. Her LDL has significantly decreased and normalized with pravastatin. Patient feels well overall. No chest pain or shortness of breath.      Blood pressure is NOT well controlled.    Still smokes cigarettes.    Past Medical History:   Diagnosis Date   • Adrenal nodule 2012 2015 / nonfunctional / benign   • Anemia    • Anxiety     Panic   • Arrhythmia    • Arthritis     Morning stiffness   • Atherosclerosis of arteries 2013    seen on abdominal CT   • Briseno's  esophagus    • Chest pain at rest 9/11/2015   • Cigarette smoker one half pack a day or less 5/28/2015   • Depression    • Fatigue 9/11/2015   • GERD (gastroesophageal reflux disease)    • Headache(784.0)     sinus headache   • Heart murmur     Dr. Krause-Stress trend   • History of HPV infection    • HLD (hyperlipidemia) 9/11/2015   • HTN (hypertension) 9/11/2015   • IBD (inflammatory bowel disease)     Dr. Cunningham   • MRSA carrier 2008    nose cellulitis   • Nocturnal hypoxemia 10/15/2015   • OSTEOPOROSIS     Osteopenia   • S/P appendectomy    • S/P cholecystectomy    • S/P hysterectomy with oophorectomy     endometriosis   • Ulcer     Barretts esophogitis   • Urinary tract infection, site not specified      Past Surgical History:   Procedure Laterality Date   • BREAST BIOPSY  1980's    benign left breast 35 years ago   • ABDOMINAL HYSTERECTOMY TOTAL      mennorrogia   • APPENDECTOMY     • CHOLECYSTECTOMY     • COLON RESECTION      Polyp removal   • ENDOMETRIAL ABLATION     • PRIMARY C SECTION     • TONSILLECTOMY     • TUBAL COAGULATION LAPAROSCOPIC BILATERAL     • US-NEEDLE CORE BX-BREAST PANEL       Family History   Problem Relation Age of Onset   • Cancer Mother         Breast/Liver   • Diabetes Mother    • Hypertension  Mother    • Hyperlipidemia Mother    • Heart Disease Mother    • Lung Disease Father         Emphysema   • Psychiatric Illness Father         Paranoid schitzophenia   • Psychiatric Illness Sister         Paronoid Schitzo-disorder, Bipolar   • Arthritis Sister         RA, Fibromyalgia   • Cancer Maternal Aunt         Breast   • Cancer Paternal Aunt         Bone   • Arthritis Paternal Aunt    • Genetic Disorder Paternal Aunt         SLE   • Heart Disease Maternal Grandmother    • Hypertension Maternal Grandmother    • Hyperlipidemia Maternal Grandmother    • Genetic Disorder Maternal Grandmother         Brights disease   • Stroke Maternal Grandmother    • Heart Disease Maternal Grandfather    • Hypertension Maternal Grandfather    • Hyperlipidemia Maternal Grandfather    • Stroke Paternal Grandmother    • Hyperlipidemia Paternal Grandmother    • Hypertension Paternal Grandmother    • Heart Disease Paternal Grandfather    • Hypertension Paternal Grandfather    • Hyperlipidemia Paternal Grandfather      Social History     Socioeconomic History   • Marital status:      Spouse name: Not on file   • Number of children: Not on file   • Years of education: Not on file   • Highest education level: Not on file   Occupational History   • Not on file   Social Needs   • Financial resource strain: Not on file   • Food insecurity:     Worry: Not on file     Inability: Not on file   • Transportation needs:     Medical: Not on file     Non-medical: Not on file   Tobacco Use   • Smoking status: Current Every Day Smoker     Packs/day: 0.25     Years: 40.00     Pack years: 10.00     Types: Cigarettes   • Smokeless tobacco: Never Used   Substance and Sexual Activity   • Alcohol use: Yes     Alcohol/week: 0.6 oz     Types: 1 Standard drinks or equivalent per week     Comment: rarely   • Drug use: No   • Sexual activity: Never     Comment:    Lifestyle   • Physical activity:     Days per week: Not on file     Minutes per  session: Not on file   • Stress: Not on file   Relationships   • Social connections:     Talks on phone: Not on file     Gets together: Not on file     Attends Taoist service: Not on file     Active member of club or organization: Not on file     Attends meetings of clubs or organizations: Not on file     Relationship status: Not on file   • Intimate partner violence:     Fear of current or ex partner: Not on file     Emotionally abused: Not on file     Physically abused: Not on file     Forced sexual activity: Not on file   Other Topics Concern   • Not on file   Social History Narrative   • Not on file     Allergies   Allergen Reactions   • Macrodantin [Nitrofurantoin Macrocrystal] Anaphylaxis   • Pcn [Penicillins] Anaphylaxis   • Doxycycline Itching   • Bactrim Vomiting   • Biaxin [Clarithromycin] Vomiting   • Norvasc [Amlodipine]      Leg swelling   • Omnicef Itching     Outpatient Encounter Medications as of 10/16/2019   Medication Sig Dispense Refill   • Estradiol (IMVEXXY MAINTENANCE PACK) 10 MCG INSERT IMVEXXY MAINTENANCE PACK 10 MCG INST     • irbesartan (AVAPRO) 300 MG Tab Take 1 Tab by mouth every day. 30 Tab 11   • Cefixime (SUPRAX) 400 MG Cap Take  by mouth every day. X 5 days     • pravastatin (PRAVACHOL) 20 MG Tab Take 1 Tab by mouth every day. 90 Tab 3   • hydroCHLOROthiazide (HYDRODIURIL) 25 MG Tab Take 1 Tab by mouth 2 Times a Day. 60 Tab 11   • busPIRone (BUSPAR) 7.5 MG tablet Take 7.5 mg by mouth 2 times a day.     • omeprazole (PRILOSEC) 40 MG delayed-release capsule TAKE ONE CAPSULE BY MOUTH DAILY (GENERIC PRILOSEC) 90 Cap 4   • aspirin EC (ECOTRIN) 81 MG Tablet Delayed Response Take 81 mg by mouth every day.     • triamcinolone acetonide (KENALOG) 0.1 % Cream Apply 1 Application to affected area(s) 2 times a day.     • [DISCONTINUED] losartan (COZAAR) 100 MG Tab Take 1 Tab by mouth every day. 30 Tab 11     No facility-administered encounter medications on file as of 10/16/2019.      Review  "of Systems   Constitutional: Negative for chills, fever, malaise/fatigue and weight loss.   HENT: Negative for ear discharge, ear pain, hearing loss and nosebleeds.    Eyes: Negative for blurred vision, double vision, pain and discharge.   Respiratory: Negative for cough and shortness of breath.    Cardiovascular: Negative for chest pain, palpitations, orthopnea, claudication, leg swelling and PND.   Gastrointestinal: Negative for abdominal pain, blood in stool, melena, nausea and vomiting.   Genitourinary: Negative for dysuria and hematuria.   Musculoskeletal: Negative for falls, joint pain and myalgias.   Skin: Negative for itching and rash.   Neurological: Negative for dizziness, sensory change, speech change, loss of consciousness and headaches.   Endo/Heme/Allergies: Negative for environmental allergies. Does not bruise/bleed easily.   Psychiatric/Behavioral: Negative for depression, hallucinations and suicidal ideas.        Objective:   /80 (BP Location: Left arm, Patient Position: Sitting, BP Cuff Size: Adult)   Pulse 66   Ht 1.6 m (5' 3\")   Wt 73.5 kg (162 lb)   SpO2 96%   BMI 28.70 kg/m²     Physical Exam   Constitutional: She is oriented to person, place, and time. No distress.   HENT:   Head: Normocephalic and atraumatic.   Right Ear: External ear normal.   Left Ear: External ear normal.   Eyes: Right eye exhibits no discharge. Left eye exhibits no discharge.   Neck: No JVD present. No thyromegaly present.   Cardiovascular: Normal rate, regular rhythm, normal heart sounds and intact distal pulses. Exam reveals no gallop and no friction rub.   No murmur heard.  Pulmonary/Chest: Breath sounds normal. No respiratory distress.   Abdominal: Bowel sounds are normal. She exhibits no distension. There is no tenderness.   Musculoskeletal: She exhibits no edema or tenderness.   Neurological: She is alert and oriented to person, place, and time. No cranial nerve deficit.   Skin: Skin is warm and dry. She " is not diaphoretic.   Psychiatric: She has a normal mood and affect. Her behavior is normal.   Nursing note and vitals reviewed.      Assessment:     1. HTN (hypertension), malignant     2. Mixed hyperlipidemia  LIPID PANEL   3. Tobacco abuse     4. High risk medication use  Comp Metabolic Panel       Medical Decision Making:  Today's Assessment / Status / Plan:   I spent 5 minutes talking to patient about the danger of smoking. I advised patient and counseled patient on smoking cessation. Patient has promised to achieve goal of zero cigarettes per day.    Patient prefers to keep medications the same without change. Except for her concern about Losartan recall.  Will stop Losartan due to concern about recall.    Will start Irbesartan 300 mg once a day.  Continue other current cardiac medications at current dose as above. Refills were prescribed today and patient was instructed with plan of care.

## 2020-02-05 ENCOUNTER — TELEPHONE (OUTPATIENT)
Dept: CARDIOLOGY | Facility: MEDICAL CENTER | Age: 60
End: 2020-02-05

## 2020-03-17 DIAGNOSIS — R06.83 SNORING: ICD-10-CM

## 2020-03-17 DIAGNOSIS — I70.90 ATHEROSCLEROSIS OF ARTERIES: ICD-10-CM

## 2020-03-18 RX ORDER — PRAVASTATIN SODIUM 20 MG
20 TABLET ORAL DAILY
Qty: 90 TAB | Refills: 2 | Status: SHIPPED | OUTPATIENT
Start: 2020-03-18 | End: 2020-06-04 | Stop reason: SDUPTHER

## 2020-03-20 ENCOUNTER — TELEPHONE (OUTPATIENT)
Dept: CARDIOLOGY | Facility: MEDICAL CENTER | Age: 60
End: 2020-03-20

## 2020-03-20 ENCOUNTER — TELEPHONE (OUTPATIENT)
Dept: HEALTH INFORMATION MANAGEMENT | Facility: OTHER | Age: 60
End: 2020-03-20

## 2020-03-20 DIAGNOSIS — M79.89 LEG SWELLING: ICD-10-CM

## 2020-03-20 DIAGNOSIS — I10 HTN (HYPERTENSION), MALIGNANT: ICD-10-CM

## 2020-03-20 RX ORDER — HYDROCHLOROTHIAZIDE 25 MG/1
25 TABLET ORAL 2 TIMES DAILY
Qty: 180 TAB | Refills: 3 | Status: SHIPPED | OUTPATIENT
Start: 2020-03-20 | End: 2020-06-04 | Stop reason: SDUPTHER

## 2020-03-20 NOTE — TELEPHONE ENCOUNTER
TT    Good morning Xena    Pt would like med refills for hydrochlorothiazide and irbesartan sent [facility] on [address]. Also she wants to take Tessalon Perles for her cough and she wants to know if it will effect her BP meds. She would like a call back at: [phone].    Thank you so much,    James

## 2020-03-20 NOTE — TELEPHONE ENCOUNTER
1. Caller Name: pt                      Call Back Number: [phone] (home)     [facility] PCP or Specialty Provider: No        2.  Does patient have any active symptoms of respiratory illness (fever OR cough OR shortness of breath)? Yes, the patient reports the following respiratory symptoms: cough and post nasal drip. Reports chronic sinus issues. .     3.  Does patient have any comoribidities? None     4.  In the last 30 days, has the patient traveled outside of the country OR in a high risk area within the  OR have any known contact with someone who has or is suspected to have COVID-19?  No.    5. Disposition: Advised to perform self care, monitor for worsening symptoms and to call back in 3 days if no improvement    Note routed to PCP: Pt will follow-up with her PCP. Pt will reschedule cardiology appt because of respiratory symptoms.

## 2020-03-20 NOTE — TELEPHONE ENCOUNTER
LVM with pt at [phone] notifying that no interactions documented in [facility] between Tessalon Perles and her current cardiac meds. Also advised that HCTZ refill would be sent, but current refills should still be available for irbesartan. 30 day supply with 11 refills was sent in October 2019.

## 2020-05-13 DIAGNOSIS — Z79.899 HIGH RISK MEDICATION USE: ICD-10-CM

## 2020-06-04 ENCOUNTER — TELEMEDICINE (OUTPATIENT)
Dept: CARDIOLOGY | Facility: MEDICAL CENTER | Age: 60
End: 2020-06-04
Payer: MEDICAID

## 2020-06-04 VITALS
TEMPERATURE: 99.6 F | WEIGHT: 150 LBS | SYSTOLIC BLOOD PRESSURE: 138 MMHG | HEART RATE: 61 BPM | BODY MASS INDEX: 26.58 KG/M2 | DIASTOLIC BLOOD PRESSURE: 72 MMHG | HEIGHT: 63 IN

## 2020-06-04 DIAGNOSIS — Z79.899 HIGH RISK MEDICATION USE: ICD-10-CM

## 2020-06-04 DIAGNOSIS — M79.89 LEG SWELLING: ICD-10-CM

## 2020-06-04 DIAGNOSIS — E78.2 MIXED HYPERLIPIDEMIA: ICD-10-CM

## 2020-06-04 DIAGNOSIS — Z72.0 TOBACCO ABUSE: ICD-10-CM

## 2020-06-04 DIAGNOSIS — I10 HTN (HYPERTENSION), MALIGNANT: ICD-10-CM

## 2020-06-04 DIAGNOSIS — I70.90 ATHEROSCLEROSIS OF ARTERIES: ICD-10-CM

## 2020-06-04 DIAGNOSIS — R06.83 SNORING: ICD-10-CM

## 2020-06-04 PROCEDURE — 99214 OFFICE O/P EST MOD 30 MIN: CPT | Mod: 25,CR | Performed by: INTERNAL MEDICINE

## 2020-06-04 PROCEDURE — 99406 BEHAV CHNG SMOKING 3-10 MIN: CPT | Mod: CR | Performed by: INTERNAL MEDICINE

## 2020-06-04 RX ORDER — HYDROCHLOROTHIAZIDE 25 MG/1
25 TABLET ORAL 2 TIMES DAILY
Qty: 180 TAB | Refills: 3 | Status: ON HOLD
Start: 2020-06-04 | End: 2020-09-27

## 2020-06-04 RX ORDER — IRBESARTAN 300 MG/1
300 TABLET ORAL DAILY
Qty: 90 TAB | Refills: 3 | Status: SHIPPED
Start: 2020-06-04 | End: 2020-11-18

## 2020-06-04 RX ORDER — PRAVASTATIN SODIUM 20 MG
20 TABLET ORAL DAILY
Qty: 90 TAB | Refills: 3 | Status: SHIPPED
Start: 2020-06-04 | End: 2020-10-06

## 2020-06-04 ASSESSMENT — ENCOUNTER SYMPTOMS
COUGH: 0
VOMITING: 0
CHILLS: 0
FALLS: 0
HEADACHES: 0
ORTHOPNEA: 0
BLOOD IN STOOL: 0
PND: 0
CLAUDICATION: 0
MYALGIAS: 0
SHORTNESS OF BREATH: 0
DOUBLE VISION: 0
SENSORY CHANGE: 0
LOSS OF CONSCIOUSNESS: 0
SPEECH CHANGE: 0
FEVER: 0
EYE DISCHARGE: 0
DIZZINESS: 0
DEPRESSION: 0
BLURRED VISION: 0
ABDOMINAL PAIN: 0
WEIGHT LOSS: 0
BRUISES/BLEEDS EASILY: 0
PALPITATIONS: 0
EYE PAIN: 0
NAUSEA: 0
HALLUCINATIONS: 0

## 2020-06-04 NOTE — PROGRESS NOTES
Chief Complaint   Patient presents with   • Hypertension     Of note, this visit was done through telemedicine with video on demand through epic system.  This visit complies with Medicare guidelines during this current COVID-19 pandemic.    This encounter was conducted via Zoom.  Verbal consent was obtained. Patient's identity was verified.    Subjective:   Kelly Oakley is a 59 y.o. female who presents today cardiac care for hypertension and hyper lipidemia. She is tolerating pravastatin well. Her LDL has significantly decreased and normalized with pravastatin. Patient feels well overall. No chest pain or shortness of breath.     Still smokes.    I have independently interpreted and reviewed blood tests results with patient in clinic which shows normal LDL level 85, triglycerides 73, renal and liver function.      Past Medical History:   Diagnosis Date   • Adrenal nodule 2012 2015 / nonfunctional / benign   • Anemia    • Anxiety     Panic   • Arrhythmia    • Arthritis     Morning stiffness   • Atherosclerosis of arteries 2013    seen on abdominal CT   • Briseno's  esophagus    • Chest pain at rest 9/11/2015   • Cigarette smoker one half pack a day or less 5/28/2015   • Depression    • Fatigue 9/11/2015   • GERD (gastroesophageal reflux disease)    • Headache(784.0)     sinus headache   • Heart murmur     Dr. Krause-Stress trend   • History of HPV infection    • HLD (hyperlipidemia) 9/11/2015   • HTN (hypertension) 9/11/2015   • IBD (inflammatory bowel disease)     Dr. Cunningham   • MRSA carrier 2008    nose cellulitis   • Nocturnal hypoxemia 10/15/2015   • OSTEOPOROSIS     Osteopenia   • S/P appendectomy    • S/P cholecystectomy    • S/P hysterectomy with oophorectomy     endometriosis   • Ulcer     Barretts esophogitis   • Urinary tract infection, site not specified      Past Surgical History:   Procedure Laterality Date   • BREAST BIOPSY  1980's    benign left breast 35 years ago   • ABDOMINAL HYSTERECTOMY TOTAL       mennorrogia   • APPENDECTOMY     • CHOLECYSTECTOMY     • COLON RESECTION      Polyp removal   • ENDOMETRIAL ABLATION     • PRIMARY C SECTION     • TONSILLECTOMY     • TUBAL COAGULATION LAPAROSCOPIC BILATERAL     • US-NEEDLE CORE BX-BREAST PANEL       Family History   Problem Relation Age of Onset   • Cancer Mother         Breast/Liver   • Diabetes Mother    • Hypertension Mother    • Hyperlipidemia Mother    • Heart Disease Mother    • Lung Disease Father         Emphysema   • Psychiatric Illness Father         Paranoid schitzophenia   • Psychiatric Illness Sister         Paronoid Schitzo-disorder, Bipolar   • Arthritis Sister         RA, Fibromyalgia   • Cancer Maternal Aunt         Breast   • Cancer Paternal Aunt         Bone   • Arthritis Paternal Aunt    • Genetic Disorder Paternal Aunt         SLE   • Heart Disease Maternal Grandmother    • Hypertension Maternal Grandmother    • Hyperlipidemia Maternal Grandmother    • Genetic Disorder Maternal Grandmother         Brights disease   • Stroke Maternal Grandmother    • Heart Disease Maternal Grandfather    • Hypertension Maternal Grandfather    • Hyperlipidemia Maternal Grandfather    • Stroke Paternal Grandmother    • Hyperlipidemia Paternal Grandmother    • Hypertension Paternal Grandmother    • Heart Disease Paternal Grandfather    • Hypertension Paternal Grandfather    • Hyperlipidemia Paternal Grandfather      Social History     Socioeconomic History   • Marital status:      Spouse name: Not on file   • Number of children: Not on file   • Years of education: Not on file   • Highest education level: Not on file   Occupational History   • Not on file   Social Needs   • Financial resource strain: Not on file   • Food insecurity     Worry: Not on file     Inability: Not on file   • Transportation needs     Medical: Not on file     Non-medical: Not on file   Tobacco Use   • Smoking status: Current Every Day Smoker     Packs/day: 0.25     Years: 40.00      Pack years: 10.00     Types: Cigarettes   • Smokeless tobacco: Never Used   Substance and Sexual Activity   • Alcohol use: Yes     Alcohol/week: 0.6 oz     Types: 1 Standard drinks or equivalent per week     Comment: rarely   • Drug use: No   • Sexual activity: Never     Comment:    Lifestyle   • Physical activity     Days per week: Not on file     Minutes per session: Not on file   • Stress: Not on file   Relationships   • Social connections     Talks on phone: Not on file     Gets together: Not on file     Attends [employer] service: Not on file     Active member of club or organization: Not on file     Attends meetings of clubs or organizations: Not on file     Relationship status: Not on file   • Intimate partner violence     Fear of current or ex partner: Not on file     Emotionally abused: Not on file     Physically abused: Not on file     Forced sexual activity: Not on file   Other Topics Concern   • Not on file   Social History Narrative   • Not on file     Allergies   Allergen Reactions   • Macrodantin [Nitrofurantoin Macrocrystal] Anaphylaxis   • Pcn [Penicillins] Anaphylaxis   • Doxycycline Itching   • Bactrim Vomiting   • Biaxin [Clarithromycin] Vomiting   • Norvasc [Amlodipine]      Leg swelling   • Omnicef Itching     Outpatient Encounter Medications as of 6/4/2020   Medication Sig Dispense Refill   • Esomeprazole Magnesium (NEXIUM PO) Take  by mouth.     • Oxymetazoline HCl (NASAL SPRAY NA) Spray  in nose.     • hydroCHLOROthiazide (HYDRODIURIL) 25 MG Tab Take 1 Tab by mouth 2 Times a Day. 180 Tab 3   • pravastatin (PRAVACHOL) 20 MG Tab Take 1 Tab by mouth every day. 90 Tab 3   • irbesartan (AVAPRO) 300 MG Tab Take 1 Tab by mouth every day. 90 Tab 3   • Estradiol (IMVEXXY MAINTENANCE PACK) 10 MCG INSERT IMVEXXY MAINTENANCE PACK 10 MCG INST     • Cefixime (SUPRAX) 400 MG Cap Take  by mouth every day. X 5 days     • busPIRone (BUSPAR) 7.5 MG tablet Take 7.5 mg by mouth 2 times a day.     •  "triamcinolone acetonide (KENALOG) 0.1 % Cream Apply 1 Application to affected area(s) 2 times a day.     • [DISCONTINUED] hydroCHLOROthiazide (HYDRODIURIL) 25 MG Tab Take 1 Tab by mouth 2 Times a Day. 180 Tab 3   • [DISCONTINUED] pravastatin (PRAVACHOL) 20 MG Tab Take 1 Tab by mouth every day. 90 Tab 2   • [DISCONTINUED] irbesartan (AVAPRO) 300 MG Tab Take 1 Tab by mouth every day. 30 Tab 11   • omeprazole (PRILOSEC) 40 MG delayed-release capsule TAKE ONE CAPSULE BY MOUTH DAILY (GENERIC PRILOSEC) (Patient not taking: Reported on 6/4/2020) 90 Cap 4   • aspirin EC (ECOTRIN) 81 MG Tablet Delayed Response Take 81 mg by mouth every day.       No facility-administered encounter medications on file as of 6/4/2020.      Review of Systems   Constitutional: Negative for chills, fever, malaise/fatigue and weight loss.   HENT: Negative for ear discharge, ear pain, hearing loss and nosebleeds.    Eyes: Negative for blurred vision, double vision, pain and discharge.   Respiratory: Negative for cough and shortness of breath.    Cardiovascular: Negative for chest pain, palpitations, orthopnea, claudication, leg swelling and PND.   Gastrointestinal: Negative for abdominal pain, blood in stool, melena, nausea and vomiting.   Genitourinary: Negative for dysuria and hematuria.   Musculoskeletal: Negative for falls, joint pain and myalgias.   Skin: Negative for itching and rash.   Neurological: Negative for dizziness, sensory change, speech change, loss of consciousness and headaches.   Endo/Heme/Allergies: Negative for environmental allergies. Does not bruise/bleed easily.   Psychiatric/Behavioral: Negative for depression, hallucinations and suicidal ideas.        Objective:   /72 (BP Location: Right arm, Patient Position: Sitting, BP Cuff Size: Adult)   Pulse 61   Temp 37.6 °C (99.6 °F)   Ht 1.6 m (5' 3\")   Wt 68 kg (150 lb)   BMI 26.57 kg/m²     Physical Exam  Constitutional:   Well appearing, no acute distress  Heart: no " pitting edema in BLE.  Lungs: no breathing distress, not tachypneic  Abdomen: no distention  Neurology: no signs of focal deficits.  Mentation is alert.    Assessment:     1. HTN (hypertension), malignant  hydroCHLOROthiazide (HYDRODIURIL) 25 MG Tab   2. Mixed hyperlipidemia     3. Tobacco abuse     4. High risk medication use     5. Leg swelling  hydroCHLOROthiazide (HYDRODIURIL) 25 MG Tab   6. Atherosclerosis of arteries  pravastatin (PRAVACHOL) 20 MG Tab   7. Snoring  pravastatin (PRAVACHOL) 20 MG Tab       Medical Decision Making:  Today's Assessment / Status / Plan:   Today, based on physical examination findings, patient is euvolemic. No JVD, lungs are clear to auscultation, no pitting edema in bilateral lower extremities, no ascites.    Dry weight is 150 lbs.    I spent 5 minutes talking to patient about the danger of smoking. I advised patient and counseled patient on smoking cessation. Patient has promised to achieve goal of zero cigarettes per day.    Continue current medications at current dose as above. Refills were prescribed today and patient was instructed with plan of care.

## 2020-06-15 ENCOUNTER — TELEPHONE (OUTPATIENT)
Dept: CARDIOLOGY | Facility: MEDICAL CENTER | Age: 60
End: 2020-06-15

## 2020-06-15 NOTE — TELEPHONE ENCOUNTER
Attempted call to pt x2, and both times heard her briefly before the call was disconnected. Tried a 3rd time and LVM with pt at [phone] notifying that typically ibuprofen isn't advised for pt's with cardiac conditions, but that if she had to take a dose or two in moderation that would be ok, but that she should not take it long term and should take the smallest dose possible. Also advised ice and contacting PCP for additional recommendations.

## 2020-06-15 NOTE — TELEPHONE ENCOUNTER
TT/chrissy    Pt calling to report injuring herself at work today, now has a swollen right knee. Pt is asking if she can take Ibuprofen or some other OTC med (that is safe because of her b/p), to help with pain.  Took 2 tylenol at 1pm which has not helped at all.    Please call Kelly

## 2020-09-18 ENCOUNTER — HOSPITAL ENCOUNTER (OUTPATIENT)
Dept: RADIOLOGY | Facility: MEDICAL CENTER | Age: 60
End: 2020-09-18
Attending: FAMILY MEDICINE
Payer: MEDICAID

## 2020-09-18 DIAGNOSIS — R10.9 LEFT FLANK PAIN: ICD-10-CM

## 2020-09-18 PROCEDURE — 74176 CT ABD & PELVIS W/O CONTRAST: CPT

## 2020-09-24 ENCOUNTER — HOSPITAL ENCOUNTER (INPATIENT)
Facility: MEDICAL CENTER | Age: 60
LOS: 3 days | DRG: 641 | End: 2020-09-27
Attending: EMERGENCY MEDICINE | Admitting: HOSPITALIST
Payer: MEDICAID

## 2020-09-24 ENCOUNTER — APPOINTMENT (OUTPATIENT)
Dept: RADIOLOGY | Facility: MEDICAL CENTER | Age: 60
DRG: 641 | End: 2020-09-24
Attending: HOSPITALIST
Payer: MEDICAID

## 2020-09-24 DIAGNOSIS — R73.9 HYPERGLYCEMIA: ICD-10-CM

## 2020-09-24 DIAGNOSIS — R11.2 NAUSEA VOMITING AND DIARRHEA: ICD-10-CM

## 2020-09-24 DIAGNOSIS — R10.9 ABDOMINAL PAIN, UNSPECIFIED ABDOMINAL LOCATION: ICD-10-CM

## 2020-09-24 DIAGNOSIS — E27.8 ADRENAL MASS (HCC): ICD-10-CM

## 2020-09-24 DIAGNOSIS — E87.1 HYPONATREMIA: ICD-10-CM

## 2020-09-24 DIAGNOSIS — E87.6 HYPOKALEMIA: ICD-10-CM

## 2020-09-24 DIAGNOSIS — R79.89 ELEVATED LFTS: ICD-10-CM

## 2020-09-24 DIAGNOSIS — R19.7 NAUSEA VOMITING AND DIARRHEA: ICD-10-CM

## 2020-09-24 PROBLEM — R74.8 ELEVATED LIVER ENZYMES: Status: ACTIVE | Noted: 2020-09-24

## 2020-09-24 LAB
ALBUMIN SERPL BCP-MCNC: 4.7 G/DL (ref 3.2–4.9)
ALBUMIN/GLOB SERPL: 1.6 G/DL
ALP SERPL-CCNC: 78 U/L (ref 30–99)
ALT SERPL-CCNC: 115 U/L (ref 2–50)
ANION GAP SERPL CALC-SCNC: 13 MMOL/L (ref 7–16)
ANION GAP SERPL CALC-SCNC: 13 MMOL/L (ref 7–16)
ANION GAP SERPL CALC-SCNC: 17 MMOL/L (ref 7–16)
APPEARANCE UR: CLEAR
AST SERPL-CCNC: 125 U/L (ref 12–45)
BACTERIA #/AREA URNS HPF: NEGATIVE /HPF
BASOPHILS # BLD AUTO: 0.3 % (ref 0–1.8)
BASOPHILS # BLD: 0.02 K/UL (ref 0–0.12)
BILIRUB SERPL-MCNC: 0.8 MG/DL (ref 0.1–1.5)
BILIRUB UR QL STRIP.AUTO: NEGATIVE
BLOOD CULTURE HOLD CXBCH: NORMAL
BUN SERPL-MCNC: 6 MG/DL (ref 8–22)
BUN SERPL-MCNC: 6 MG/DL (ref 8–22)
BUN SERPL-MCNC: 7 MG/DL (ref 8–22)
CALCIUM SERPL-MCNC: 8.7 MG/DL (ref 8.5–10.5)
CALCIUM SERPL-MCNC: 8.9 MG/DL (ref 8.5–10.5)
CALCIUM SERPL-MCNC: 9.2 MG/DL (ref 8.5–10.5)
CHLORIDE SERPL-SCNC: 75 MMOL/L (ref 96–112)
CHLORIDE SERPL-SCNC: 83 MMOL/L (ref 96–112)
CHLORIDE SERPL-SCNC: 85 MMOL/L (ref 96–112)
CO2 SERPL-SCNC: 20 MMOL/L (ref 20–33)
CO2 SERPL-SCNC: 21 MMOL/L (ref 20–33)
CO2 SERPL-SCNC: 24 MMOL/L (ref 20–33)
COLOR UR: YELLOW
COVID ORDER STATUS COVID19: NORMAL
CREAT SERPL-MCNC: 0.5 MG/DL (ref 0.5–1.4)
CREAT SERPL-MCNC: 0.58 MG/DL (ref 0.5–1.4)
CREAT SERPL-MCNC: 0.74 MG/DL (ref 0.5–1.4)
EKG IMPRESSION: NORMAL
EOSINOPHIL # BLD AUTO: 0.1 K/UL (ref 0–0.51)
EOSINOPHIL NFR BLD: 1.6 % (ref 0–6.9)
EPI CELLS #/AREA URNS HPF: NEGATIVE /HPF
ERYTHROCYTE [DISTWIDTH] IN BLOOD BY AUTOMATED COUNT: 38.2 FL (ref 35.9–50)
GLOBULIN SER CALC-MCNC: 3 G/DL (ref 1.9–3.5)
GLUCOSE SERPL-MCNC: 115 MG/DL (ref 65–99)
GLUCOSE SERPL-MCNC: 142 MG/DL (ref 65–99)
GLUCOSE SERPL-MCNC: 92 MG/DL (ref 65–99)
GLUCOSE UR STRIP.AUTO-MCNC: NEGATIVE MG/DL
HCT VFR BLD AUTO: 39 % (ref 37–47)
HGB BLD-MCNC: 14.6 G/DL (ref 12–16)
HYALINE CASTS #/AREA URNS LPF: ABNORMAL /LPF
IMM GRANULOCYTES # BLD AUTO: 0.06 K/UL (ref 0–0.11)
IMM GRANULOCYTES NFR BLD AUTO: 1 % (ref 0–0.9)
KETONES UR STRIP.AUTO-MCNC: NEGATIVE MG/DL
LEUKOCYTE ESTERASE UR QL STRIP.AUTO: ABNORMAL
LIPASE SERPL-CCNC: 22 U/L (ref 11–82)
LYMPHOCYTES # BLD AUTO: 0.6 K/UL (ref 1–4.8)
LYMPHOCYTES NFR BLD: 9.5 % (ref 22–41)
MAGNESIUM SERPL-MCNC: 1.7 MG/DL (ref 1.5–2.5)
MCH RBC QN AUTO: 31.7 PG (ref 27–33)
MCHC RBC AUTO-ENTMCNC: 37.4 G/DL (ref 33.6–35)
MCV RBC AUTO: 84.8 FL (ref 81.4–97.8)
MICRO URNS: ABNORMAL
MONOCYTES # BLD AUTO: 0.67 K/UL (ref 0–0.85)
MONOCYTES NFR BLD AUTO: 10.6 % (ref 0–13.4)
NEUTROPHILS # BLD AUTO: 4.86 K/UL (ref 2–7.15)
NEUTROPHILS NFR BLD: 77 % (ref 44–72)
NITRITE UR QL STRIP.AUTO: NEGATIVE
NRBC # BLD AUTO: 0 K/UL
NRBC BLD-RTO: 0 /100 WBC
OSMOLALITY UR: 101 MOSM/KG H2O (ref 300–900)
PH UR STRIP.AUTO: 7 [PH] (ref 5–8)
PLATELET # BLD AUTO: 199 K/UL (ref 164–446)
PMV BLD AUTO: 9.9 FL (ref 9–12.9)
POTASSIUM SERPL-SCNC: 3.4 MMOL/L (ref 3.6–5.5)
POTASSIUM SERPL-SCNC: 3.4 MMOL/L (ref 3.6–5.5)
POTASSIUM SERPL-SCNC: 3.9 MMOL/L (ref 3.6–5.5)
PROT SERPL-MCNC: 7.7 G/DL (ref 6–8.2)
PROT UR QL STRIP: NEGATIVE MG/DL
RBC # BLD AUTO: 4.6 M/UL (ref 4.2–5.4)
RBC # URNS HPF: ABNORMAL /HPF
RBC UR QL AUTO: NEGATIVE
SARS-COV-2 RNA RESP QL NAA+PROBE: NOTDETECTED
SODIUM SERPL-SCNC: 112 MMOL/L (ref 135–145)
SODIUM SERPL-SCNC: 117 MMOL/L (ref 135–145)
SODIUM SERPL-SCNC: 122 MMOL/L (ref 135–145)
SODIUM UR-SCNC: <20 MMOL/L
SP GR UR STRIP.AUTO: 1.01
SPECIMEN SOURCE: NORMAL
UROBILINOGEN UR STRIP.AUTO-MCNC: 0.2 MG/DL
WBC # BLD AUTO: 6.3 K/UL (ref 4.8–10.8)
WBC #/AREA URNS HPF: ABNORMAL /HPF

## 2020-09-24 PROCEDURE — 83690 ASSAY OF LIPASE: CPT

## 2020-09-24 PROCEDURE — 80048 BASIC METABOLIC PNL TOTAL CA: CPT

## 2020-09-24 PROCEDURE — A9270 NON-COVERED ITEM OR SERVICE: HCPCS | Performed by: EMERGENCY MEDICINE

## 2020-09-24 PROCEDURE — 700111 HCHG RX REV CODE 636 W/ 250 OVERRIDE (IP): Performed by: HOSPITALIST

## 2020-09-24 PROCEDURE — 83935 ASSAY OF URINE OSMOLALITY: CPT

## 2020-09-24 PROCEDURE — 80053 COMPREHEN METABOLIC PANEL: CPT

## 2020-09-24 PROCEDURE — 93010 ELECTROCARDIOGRAM REPORT: CPT | Performed by: INTERNAL MEDICINE

## 2020-09-24 PROCEDURE — 700102 HCHG RX REV CODE 250 W/ 637 OVERRIDE(OP): Performed by: EMERGENCY MEDICINE

## 2020-09-24 PROCEDURE — 76705 ECHO EXAM OF ABDOMEN: CPT

## 2020-09-24 PROCEDURE — 99285 EMERGENCY DEPT VISIT HI MDM: CPT

## 2020-09-24 PROCEDURE — 700105 HCHG RX REV CODE 258: Performed by: EMERGENCY MEDICINE

## 2020-09-24 PROCEDURE — 81003 URINALYSIS AUTO W/O SCOPE: CPT

## 2020-09-24 PROCEDURE — 99223 1ST HOSP IP/OBS HIGH 75: CPT | Performed by: HOSPITALIST

## 2020-09-24 PROCEDURE — 81001 URINALYSIS AUTO W/SCOPE: CPT

## 2020-09-24 PROCEDURE — A9270 NON-COVERED ITEM OR SERVICE: HCPCS | Performed by: HOSPITALIST

## 2020-09-24 PROCEDURE — 700102 HCHG RX REV CODE 250 W/ 637 OVERRIDE(OP): Performed by: HOSPITALIST

## 2020-09-24 PROCEDURE — 700111 HCHG RX REV CODE 636 W/ 250 OVERRIDE (IP): Performed by: EMERGENCY MEDICINE

## 2020-09-24 PROCEDURE — 85025 COMPLETE CBC W/AUTO DIFF WBC: CPT

## 2020-09-24 PROCEDURE — 96374 THER/PROPH/DIAG INJ IV PUSH: CPT

## 2020-09-24 PROCEDURE — 700105 HCHG RX REV CODE 258: Performed by: HOSPITALIST

## 2020-09-24 PROCEDURE — 36415 COLL VENOUS BLD VENIPUNCTURE: CPT

## 2020-09-24 PROCEDURE — 84300 ASSAY OF URINE SODIUM: CPT

## 2020-09-24 PROCEDURE — U0003 INFECTIOUS AGENT DETECTION BY NUCLEIC ACID (DNA OR RNA); SEVERE ACUTE RESPIRATORY SYNDROME CORONAVIRUS 2 (SARS-COV-2) (CORONAVIRUS DISEASE [COVID-19]), AMPLIFIED PROBE TECHNIQUE, MAKING USE OF HIGH THROUGHPUT TECHNOLOGIES AS DESCRIBED BY CMS-2020-01-R: HCPCS

## 2020-09-24 PROCEDURE — 93005 ELECTROCARDIOGRAM TRACING: CPT | Performed by: HOSPITALIST

## 2020-09-24 PROCEDURE — C9803 HOPD COVID-19 SPEC COLLECT: HCPCS | Performed by: INTERNAL MEDICINE

## 2020-09-24 PROCEDURE — 83735 ASSAY OF MAGNESIUM: CPT

## 2020-09-24 PROCEDURE — 770020 HCHG ROOM/CARE - TELE (206)

## 2020-09-24 RX ORDER — ONDANSETRON 2 MG/ML
4 INJECTION INTRAMUSCULAR; INTRAVENOUS ONCE
Status: COMPLETED | OUTPATIENT
Start: 2020-09-24 | End: 2020-09-24

## 2020-09-24 RX ORDER — MAGNESIUM SULFATE 1 G/100ML
1 INJECTION INTRAVENOUS ONCE
Status: COMPLETED | OUTPATIENT
Start: 2020-09-24 | End: 2020-09-24

## 2020-09-24 RX ORDER — CLONIDINE HYDROCHLORIDE 0.1 MG/1
0.1 TABLET ORAL EVERY 6 HOURS PRN
Status: DISCONTINUED | OUTPATIENT
Start: 2020-09-24 | End: 2020-09-27 | Stop reason: HOSPADM

## 2020-09-24 RX ORDER — POLYETHYLENE GLYCOL 3350 17 G/17G
1 POWDER, FOR SOLUTION ORAL
Status: DISCONTINUED | OUTPATIENT
Start: 2020-09-24 | End: 2020-09-24

## 2020-09-24 RX ORDER — BUSPIRONE HYDROCHLORIDE 5 MG/1
7.5 TABLET ORAL 2 TIMES DAILY
Status: DISCONTINUED | OUTPATIENT
Start: 2020-09-24 | End: 2020-09-26

## 2020-09-24 RX ORDER — ONDANSETRON 2 MG/ML
4 INJECTION INTRAMUSCULAR; INTRAVENOUS EVERY 4 HOURS PRN
Status: DISCONTINUED | OUTPATIENT
Start: 2020-09-24 | End: 2020-09-27 | Stop reason: HOSPADM

## 2020-09-24 RX ORDER — PROCHLORPERAZINE EDISYLATE 5 MG/ML
5-10 INJECTION INTRAMUSCULAR; INTRAVENOUS EVERY 4 HOURS PRN
Status: DISCONTINUED | OUTPATIENT
Start: 2020-09-24 | End: 2020-09-27 | Stop reason: HOSPADM

## 2020-09-24 RX ORDER — AMOXICILLIN 250 MG
2 CAPSULE ORAL 2 TIMES DAILY
Status: DISCONTINUED | OUTPATIENT
Start: 2020-09-24 | End: 2020-09-24

## 2020-09-24 RX ORDER — IRBESARTAN 150 MG/1
300 TABLET ORAL DAILY
Status: DISCONTINUED | OUTPATIENT
Start: 2020-09-24 | End: 2020-09-27 | Stop reason: HOSPADM

## 2020-09-24 RX ORDER — FAMOTIDINE 20 MG/1
40 TABLET, FILM COATED ORAL DAILY
Status: DISCONTINUED | OUTPATIENT
Start: 2020-09-24 | End: 2020-09-27 | Stop reason: HOSPADM

## 2020-09-24 RX ORDER — POTASSIUM CHLORIDE 20 MEQ/1
40 TABLET, EXTENDED RELEASE ORAL ONCE
Status: COMPLETED | OUTPATIENT
Start: 2020-09-24 | End: 2020-09-24

## 2020-09-24 RX ORDER — ONDANSETRON 4 MG/1
4 TABLET, ORALLY DISINTEGRATING ORAL EVERY 4 HOURS PRN
Status: DISCONTINUED | OUTPATIENT
Start: 2020-09-24 | End: 2020-09-27 | Stop reason: HOSPADM

## 2020-09-24 RX ORDER — FLUTICASONE PROPIONATE 50 MCG
2 SPRAY, SUSPENSION (ML) NASAL DAILY
Status: DISCONTINUED | OUTPATIENT
Start: 2020-09-25 | End: 2020-09-27 | Stop reason: HOSPADM

## 2020-09-24 RX ORDER — ENALAPRILAT 1.25 MG/ML
1.25 INJECTION INTRAVENOUS EVERY 6 HOURS PRN
Status: DISCONTINUED | OUTPATIENT
Start: 2020-09-24 | End: 2020-09-27 | Stop reason: HOSPADM

## 2020-09-24 RX ORDER — SODIUM CHLORIDE 9 MG/ML
1000 INJECTION, SOLUTION INTRAVENOUS ONCE
Status: COMPLETED | OUTPATIENT
Start: 2020-09-24 | End: 2020-09-24

## 2020-09-24 RX ORDER — PROMETHAZINE HYDROCHLORIDE 25 MG/1
12.5-25 SUPPOSITORY RECTAL EVERY 4 HOURS PRN
Status: DISCONTINUED | OUTPATIENT
Start: 2020-09-24 | End: 2020-09-27 | Stop reason: HOSPADM

## 2020-09-24 RX ORDER — PROMETHAZINE HYDROCHLORIDE 25 MG/1
12.5-25 TABLET ORAL EVERY 4 HOURS PRN
Status: DISCONTINUED | OUTPATIENT
Start: 2020-09-24 | End: 2020-09-27 | Stop reason: HOSPADM

## 2020-09-24 RX ORDER — SODIUM CHLORIDE 9 MG/ML
INJECTION, SOLUTION INTRAVENOUS CONTINUOUS
Status: DISCONTINUED | OUTPATIENT
Start: 2020-09-24 | End: 2020-09-24

## 2020-09-24 RX ORDER — BISACODYL 10 MG
10 SUPPOSITORY, RECTAL RECTAL
Status: DISCONTINUED | OUTPATIENT
Start: 2020-09-24 | End: 2020-09-24

## 2020-09-24 RX ADMIN — BUSPIRONE HYDROCHLORIDE 7.5 MG: 5 TABLET ORAL at 11:56

## 2020-09-24 RX ADMIN — SODIUM CHLORIDE: 9 INJECTION, SOLUTION INTRAVENOUS at 11:00

## 2020-09-24 RX ADMIN — CLONIDINE HYDROCHLORIDE 0.1 MG: 0.1 TABLET ORAL at 11:55

## 2020-09-24 RX ADMIN — SODIUM CHLORIDE 1000 ML: 9 INJECTION, SOLUTION INTRAVENOUS at 07:48

## 2020-09-24 RX ADMIN — PROMETHAZINE HYDROCHLORIDE 25 MG: 25 TABLET ORAL at 10:36

## 2020-09-24 RX ADMIN — POTASSIUM CHLORIDE 40 MEQ: 1500 TABLET, EXTENDED RELEASE ORAL at 10:36

## 2020-09-24 RX ADMIN — ONDANSETRON 4 MG: 2 INJECTION INTRAMUSCULAR; INTRAVENOUS at 12:04

## 2020-09-24 RX ADMIN — ONDANSETRON 4 MG: 2 INJECTION INTRAMUSCULAR; INTRAVENOUS at 17:37

## 2020-09-24 RX ADMIN — BUSPIRONE HYDROCHLORIDE 7.5 MG: 5 TABLET ORAL at 17:38

## 2020-09-24 RX ADMIN — LIDOCAINE HYDROCHLORIDE 30 ML: 20 SOLUTION OROPHARYNGEAL at 08:15

## 2020-09-24 RX ADMIN — IRBESARTAN 300 MG: 150 TABLET ORAL at 11:57

## 2020-09-24 RX ADMIN — POTASSIUM CHLORIDE 40 MEQ: 1500 TABLET, EXTENDED RELEASE ORAL at 14:39

## 2020-09-24 RX ADMIN — FAMOTIDINE 40 MG: 20 TABLET, FILM COATED ORAL at 10:39

## 2020-09-24 RX ADMIN — MAGNESIUM SULFATE 1 G: 1 INJECTION INTRAVENOUS at 15:31

## 2020-09-24 RX ADMIN — ONDANSETRON 4 MG: 2 INJECTION INTRAMUSCULAR; INTRAVENOUS at 07:42

## 2020-09-24 ASSESSMENT — ENCOUNTER SYMPTOMS
BACK PAIN: 0
BLOOD IN STOOL: 0
SINUS PAIN: 0
DIARRHEA: 0
BLURRED VISION: 0
COUGH: 0
CLAUDICATION: 0
BRUISES/BLEEDS EASILY: 0
SORE THROAT: 0
HALLUCINATIONS: 0
DEPRESSION: 0
HEARTBURN: 0
NAUSEA: 1
FEVER: 0
WHEEZING: 0
ABDOMINAL PAIN: 1
SPUTUM PRODUCTION: 0
HEADACHES: 0
DOUBLE VISION: 0
DIZZINESS: 0
PND: 0
VOMITING: 1
FLANK PAIN: 0
TINGLING: 0
SHORTNESS OF BREATH: 0
EYE PAIN: 0
PHOTOPHOBIA: 0
DIAPHORESIS: 0
CHILLS: 0
MYALGIAS: 0
WEAKNESS: 0
ORTHOPNEA: 0
NECK PAIN: 0
STRIDOR: 0
POLYDIPSIA: 0
TREMORS: 0
HEMOPTYSIS: 0
CONSTIPATION: 0
FALLS: 0
PALPITATIONS: 0

## 2020-09-24 ASSESSMENT — COGNITIVE AND FUNCTIONAL STATUS - GENERAL
SUGGESTED CMS G CODE MODIFIER MOBILITY: CH
SUGGESTED CMS G CODE MODIFIER DAILY ACTIVITY: CH
DAILY ACTIVITIY SCORE: 24
MOBILITY SCORE: 24

## 2020-09-24 ASSESSMENT — LIFESTYLE VARIABLES
HAVE PEOPLE ANNOYED YOU BY CRITICIZING YOUR DRINKING: NO
HOW MANY TIMES IN THE PAST YEAR HAVE YOU HAD 5 OR MORE DRINKS IN A DAY: 0
TOTAL SCORE: 0
TOTAL SCORE: 0
AVERAGE NUMBER OF DAYS PER WEEK YOU HAVE A DRINK CONTAINING ALCOHOL: 0
EVER HAD A DRINK FIRST THING IN THE MORNING TO STEADY YOUR NERVES TO GET RID OF A HANGOVER: NO
HAVE YOU EVER FELT YOU SHOULD CUT DOWN ON YOUR DRINKING: NO
EVER FELT BAD OR GUILTY ABOUT YOUR DRINKING: NO
SUBSTANCE_ABUSE: 0
ALCOHOL_USE: NO
TOTAL SCORE: 0
ON A TYPICAL DAY WHEN YOU DRINK ALCOHOL HOW MANY DRINKS DO YOU HAVE: 0
DOES PATIENT WANT TO STOP DRINKING: CANNOT ASSESS
CONSUMPTION TOTAL: NEGATIVE

## 2020-09-24 ASSESSMENT — PATIENT HEALTH QUESTIONNAIRE - PHQ9
2. FEELING DOWN, DEPRESSED, IRRITABLE, OR HOPELESS: NOT AT ALL
SUM OF ALL RESPONSES TO PHQ9 QUESTIONS 1 AND 2: 0
1. LITTLE INTEREST OR PLEASURE IN DOING THINGS: NOT AT ALL

## 2020-09-24 ASSESSMENT — FIBROSIS 4 INDEX: FIB4 SCORE: 3.46

## 2020-09-24 NOTE — ASSESSMENT & PLAN NOTE
Hypovolemic hyponatremia  IV fluid hydration with normal saline  Monitor BMP and assess response  Check serum and urine osmolarity  Check urine osmolarity  Daily correction by more than 4 to 6 mEq/L should be avoided to minimize the risk of central demyelinating lesions with neurologic dysfunction

## 2020-09-24 NOTE — ED NOTES
tech from Lab called with critical result of Na 112 at 0854. Critical lab result read back to tech.   Dr. Odom notified of critical lab result at 0854.  Critical lab result read back by Dr. Odom.

## 2020-09-24 NOTE — ASSESSMENT & PLAN NOTE
Uncontrolled  Hold home hydrochlorothiazide since she is hyponatremic  IV as needed medications have been ordered  It is likely that the patient has been hypertensive since she is not able to tolerate her oral medications

## 2020-09-24 NOTE — ED PROVIDER NOTES
ED Provider Note    Scribed for Eh Odom M.D. by Leann Barrientos. 9/24/2020  7:40 AM    Primary care provider: Tonya Toth M.D.  Means of arrival: Walk-in  History obtained from: Patient  History limited by: None    CHIEF COMPLAINT  Chief Complaint   Patient presents with   • N/V   • Diarrhea       HPI  Kelly Oakley is a 59 y.o. female with GERD who presents to the Emergency Department with constant upper abdominal pain onset yesterday. Patient states she was told she has Diverticulitis, and was started on antibiotics, however her CT scan showed diverticulosis. Patient endorses associated diarrhea, vomiting and nausea, but denies any fever. No alleviating factors attempted. No exacerbating factors noted. She notes she is regularly followed by GI. Patient has a past medical history of Adrenal mass.   Patient is also complaining of chronic left lower back pain which radiates to her flank. Per patient, she has been told in the past that she may have pulled a muscle. She reports taking muscle relaxer's with little alleviation. She notes that her back pain is more mild then normal. The patient does not drink alcohol.    REVIEW OF SYSTEMS  Pertinent positives include back pain, upper abdominal pain, diarrhea, vomiting, and nasuea.   Pertinent negatives include no fever.    All other systems reviewed and negative. See HPI for further details.       PAST MEDICAL HISTORY   has a past medical history of Adrenal nodule (2012), Anemia, Anxiety, Arrhythmia, Arthritis, Atherosclerosis of arteries (2013), Briseno's  esophagus, Chest pain at rest (9/11/2015), Cigarette smoker one half pack a day or less (5/28/2015), Depression, Fatigue (9/11/2015), GERD (gastroesophageal reflux disease), Headache(784.0), Heart murmur, History of HPV infection, HLD (hyperlipidemia) (9/11/2015), HTN (hypertension) (9/11/2015), IBD (inflammatory bowel disease), MRSA carrier (2008), Nocturnal hypoxemia (10/15/2015), OSTEOPOROSIS, S/P  appendectomy, S/P cholecystectomy, S/P hysterectomy with oophorectomy, Ulcer, and Urinary tract infection, site not specified.    SURGICAL HISTORY   has a past surgical history that includes abdominal hysterectomy total; endometrial ablation; cholecystectomy; appendectomy; tonsillectomy; primary c section; tubal coagulation laparoscopic bilateral; colon resection; breast biopsy (1980's); and us-needle core bx-breast panel.    SOCIAL HISTORY  Social History     Tobacco Use   • Smoking status: Current Every Day Smoker     Packs/day: 0.25     Years: 40.00     Pack years: 10.00     Types: Cigarettes   • Smokeless tobacco: Never Used   Substance Use Topics   • Alcohol use: Yes     Alcohol/week: 0.6 oz     Types: 1 Standard drinks or equivalent per week     Comment: rarely   • Drug use: No      Social History     Substance and Sexual Activity   Drug Use No       FAMILY HISTORY  Family History   Problem Relation Age of Onset   • Cancer Mother         Breast/Liver   • Diabetes Mother    • Hypertension Mother    • Hyperlipidemia Mother    • Heart Disease Mother    • Lung Disease Father         Emphysema   • Psychiatric Illness Father         Paranoid schitzophenia   • Psychiatric Illness Sister         Paronoid Schitzo-disorder, Bipolar   • Arthritis Sister         RA, Fibromyalgia   • Cancer Maternal Aunt         Breast   • Cancer Paternal Aunt         Bone   • Arthritis Paternal Aunt    • Genetic Disorder Paternal Aunt         SLE   • Heart Disease Maternal Grandmother    • Hypertension Maternal Grandmother    • Hyperlipidemia Maternal Grandmother    • Genetic Disorder Maternal Grandmother         Brights disease   • Stroke Maternal Grandmother    • Heart Disease Maternal Grandfather    • Hypertension Maternal Grandfather    • Hyperlipidemia Maternal Grandfather    • Stroke Paternal Grandmother    • Hyperlipidemia Paternal Grandmother    • Hypertension Paternal Grandmother    • Heart Disease Paternal Grandfather    •  "Hypertension Paternal Grandfather    • Hyperlipidemia Paternal Grandfather        CURRENT MEDICATIONS  Home Medications    **Home medications have not yet been reviewed for this encounter**         ALLERGIES  Allergies   Allergen Reactions   • Macrodantin [Nitrofurantoin Macrocrystal] Anaphylaxis   • Pcn [Penicillins] Anaphylaxis   • Doxycycline Itching   • Bactrim Vomiting   • Biaxin [Clarithromycin] Vomiting   • Norvasc [Amlodipine]      Leg swelling   • Omnicef Itching       PHYSICAL EXAM  VITAL SIGNS: /94   Pulse 75   Temp 36 °C (96.8 °F) (Temporal)   Resp 17   Ht 1.613 m (5' 3.5\")   Wt 70.3 kg (155 lb)   SpO2 95%   BMI 27.03 kg/m²     Nursing note and vitals reviewed.  Constitutional: Well-developed and well-nourished. No distress.   HENT: Head is normocephalic and atraumatic. Oropharynx is clear and moist without exudate or erythema.   Eyes: Pupils are equal, round, and reactive to light. Conjunctiva are normal.   Cardiovascular: Normal rate and regular rhythm. No murmur heard. Normal radial pulses.  Pulmonary/Chest: Breath sounds normal. No wheezes or rales.   Abdominal: Soft. No distention. Mild tenderness to the left posterior flank and epigastrium.  Musculoskeletal: Extremities exhibit normal range of motion without edema or tenderness.   Neurological: Awake, alert and oriented to person, place, and time. No focal deficits noted.  Skin: Skin is warm and dry. No rash.   Psychiatric: Normal mood and affect. Appropriate for clinical situation.    DIAGNOSTIC STUDIES / PROCEDURES    LABS  Results for orders placed or performed during the hospital encounter of 09/24/20   CBC WITH DIFFERENTIAL   Result Value Ref Range    WBC 6.3 4.8 - 10.8 K/uL    RBC 4.60 4.20 - 5.40 M/uL    Hemoglobin 14.6 12.0 - 16.0 g/dL    Hematocrit 39.0 37.0 - 47.0 %    MCV 84.8 81.4 - 97.8 fL    MCH 31.7 27.0 - 33.0 pg    MCHC 37.4 (H) 33.6 - 35.0 g/dL    RDW 38.2 35.9 - 50.0 fL    Platelet Count 199 164 - 446 K/uL    MPV 9.9 " 9.0 - 12.9 fL    Neutrophils-Polys 77.00 (H) 44.00 - 72.00 %    Lymphocytes 9.50 (L) 22.00 - 41.00 %    Monocytes 10.60 0.00 - 13.40 %    Eosinophils 1.60 0.00 - 6.90 %    Basophils 0.30 0.00 - 1.80 %    Immature Granulocytes 1.00 (H) 0.00 - 0.90 %    Nucleated RBC 0.00 /100 WBC    Neutrophils (Absolute) 4.86 2.00 - 7.15 K/uL    Lymphs (Absolute) 0.60 (L) 1.00 - 4.80 K/uL    Monos (Absolute) 0.67 0.00 - 0.85 K/uL    Eos (Absolute) 0.10 0.00 - 0.51 K/uL    Baso (Absolute) 0.02 0.00 - 0.12 K/uL    Immature Granulocytes (abs) 0.06 0.00 - 0.11 K/uL    NRBC (Absolute) 0.00 K/uL   COMP METABOLIC PANEL   Result Value Ref Range    Sodium 112 (LL) 135 - 145 mmol/L    Potassium 3.4 (L) 3.6 - 5.5 mmol/L    Chloride 75 (L) 96 - 112 mmol/L    Co2 20 20 - 33 mmol/L    Anion Gap 17.0 (H) 7.0 - 16.0    Glucose 142 (H) 65 - 99 mg/dL    Bun 7 (L) 8 - 22 mg/dL    Creatinine 0.50 0.50 - 1.40 mg/dL    Calcium 9.2 8.5 - 10.5 mg/dL    AST(SGOT) 125 (H) 12 - 45 U/L    ALT(SGPT) 115 (H) 2 - 50 U/L    Alkaline Phosphatase 78 30 - 99 U/L    Total Bilirubin 0.8 0.1 - 1.5 mg/dL    Albumin 4.7 3.2 - 4.9 g/dL    Total Protein 7.7 6.0 - 8.2 g/dL    Globulin 3.0 1.9 - 3.5 g/dL    A-G Ratio 1.6 g/dL   LIPASE   Result Value Ref Range    Lipase 22 11 - 82 U/L   URINALYSIS,CULTURE IF INDICATED    Specimen: Urine   Result Value Ref Range    Color Yellow     Character Clear     Specific Gravity 1.007 <1.035    Ph 7.0 5.0 - 8.0    Glucose Negative Negative mg/dL    Ketones Negative Negative mg/dL    Protein Negative Negative mg/dL    Bilirubin Negative Negative    Urobilinogen, Urine 0.2 Negative    Nitrite Negative Negative    Leukocyte Esterase Trace (A) Negative    Occult Blood Negative Negative    Micro Urine Req Microscopic    Blood Culture,Hold   Result Value Ref Range    Blood Culture Hold Collected    ESTIMATED GFR   Result Value Ref Range    GFR If African American >60 >60 mL/min/1.73 m 2    GFR If Non African American >60 >60 mL/min/1.73 m 2    Magnesium   Result Value Ref Range    Magnesium 1.7 1.5 - 2.5 mg/dL   URINE MICROSCOPIC (W/UA)   Result Value Ref Range    WBC 0-2 /hpf    RBC 2-5 (A) /hpf    Bacteria Negative None /hpf    Epithelial Cells Negative /hpf    Hyaline Cast 0-2 /lpf     All labs reviewed by me.    COURSE & MEDICAL DECISION MAKING  Nursing notes, VS, PMSFHx reviewed in chart.     Review of past medical records shows the patient had a CT that showed diverticulosis, she was told she had diverticulitis and started on Zithromax.    7:40 AM - Patient seen and examined at bedside. Patient will be treated with GI cocktail and Zofran 4 mg.  Ordered Blood Culture, Urinalysis, Culture, Lipase, CMP, and CBC with differential to evaluate her symptoms. The differential diagnoses include but are not limited to: viral syndrome, electrolyte abnormality, or medication reaction.    8:40 AM - Per nursing staff, the patient continues to have abdominal patient despite GI cocktail.    8:59 AM - Patient has extremely low Sodium, but has normal mental status. I suspect this is a chronic problem, will treat with normal saline. Laura hospitalist.  Will require gradual replacement.    9:10 AM I discussed the patient's case and the above findings with Dr. Leiva (hospitalist) who accepted the patient for hospitalization.     9:12 AM - Patient was seen at bedside. I updated her on all diagnostic results as detailed above. I advised her of all return precautions. Patient verbalizes understanding and agreement to this plan of care.     HYDRATION: Based on the patient's presentation of extremely low Sodium the patient was given IV fluids. IV Hydration was used because oral hydration was not adequate alone. Upon recheck following hydration, the patient was improved.    DISPOSITION:  [facility]    FINAL IMPRESSION  1. Abdominal pain, unspecified abdominal location    2. Hyponatremia    3. Adrenal mass (HCC)    4.  Hypokalemia    5. Hyperglycemia    6. Elevated LFTs    7. Nausea vomiting and diarrhea          ILeann (Scribe), am scribing for, and in the presence of, Eh Odom M.D..    Electronically signed by: Leann Barrientos (Scribe), 9/24/2020    Eh NICHOLSON M.D. personally performed the services described in this documentation, as scribed by Leann Barrientos in my presence, and it is both accurate and complete. C.    The note accurately reflects work and decisions made by me.  Eh Odom M.D.  9/24/2020  11:32 AM

## 2020-09-24 NOTE — CARE PLAN
Problem: Safety  Goal: Will remain free from injury  Outcome: PROGRESSING AS EXPECTED     Problem: Venous Thromboembolism (VTW)/Deep Vein Thrombosis (DVT) Prevention:  Goal: Patient will participate in Venous Thrombosis (VTE)/Deep Vein Thrombosis (DVT)Prevention Measures  Outcome: PROGRESSING AS EXPECTED     Problem: Fluid Volume:  Goal: Will maintain balanced intake and output  Outcome: PROGRESSING AS EXPECTED

## 2020-09-24 NOTE — ASSESSMENT & PLAN NOTE
Check for C. difficile since she is received antibiotics  Stool cultures ordered  CT scan did not find evidence of diverticulitis

## 2020-09-24 NOTE — PROGRESS NOTES
Report received from  ED RN Germaine; assumed pt care. Pt assessment complete. Pt A&Ox4. Reviewed plan of care with pt. Tele box on and rhythm verified. Chart and labs reviewed. Bed in lowest position, and 2 side rails up. Pt educated on call light; call light with in reach.

## 2020-09-24 NOTE — ED TRIAGE NOTES
Last week patient was having flank and lower back pain.  Patient had a CT scan and was diagnosed with diverticulitis.  Flagyl and ciprofloxacin were started.  Patient started having n/v/d since waking up yesterday.  Patient vomiting in triage. Patient thinks she may be allergic to the medications.  Speaking in full sentences.  No respiratory distress

## 2020-09-24 NOTE — PROGRESS NOTES
[facility]ER ADMISSION ACCEPTANCE NOTE:    - I spoke and discussed the case with the ER physician, Dr. Odom.  - This is a 59 y.o. female with history of adrenal adenoma, who presented to the ED with abdominal pain, along with nausea, vomiting, and diarrhea.  She had a CT on 9/18 which showed diverticulosis, but was told that she had diverticulitis and started on antibiotics. VSS.  Initial work-up showed sodium of 112, and potassium 3.4, with mild elevated transaminases.  Per the ERP, she is mentating well with no signs of encephalopathy/confusion.  - Patient now being admitted to the hospital for further management.   - Hospitalist, Dr. LASHAWN Irizarry will be admitting the patient.  - will place inpatient admission order to telemetry. Patient will not require isolation.   - Please call admitting physician for full admission orders, and cross-coverage issues on patient's arrival to the unit.

## 2020-09-24 NOTE — H&P
Hospital Medicine History & Physical Note    Date of Service  9/24/2020    Primary Care Physician  Tonya Toth M.D.    Consultants  None    Code Status  Full Code    Chief Complaint  Chief Complaint   Patient presents with   • N/V   • Diarrhea       History of Presenting Illness  59 y.o. female who presented 9/24/2020 with past medical history of adrenal nodule, diverticulosis, GERD, hypertension who comes in the emergency room with complaints of nausea vomiting diarrhea for the past 3 days.  Patient was having lower abdominal pain and back pain in which she visited a her primary care doctor and was prescribed Flagyl and ciprofloxacin for diverticulitis.  Patient states that she started developing diarrhea with mucus, nonbloody and profuse.  She states that her lower abdomen pain has resolved and now complains of epigastric pain, nonradiating, feels a cramp.  Patient states that whenever she would intake food she had increased amount of pain.  Patient with past been told that she has had a adrenal nodule but repeated CT scans found no growth.  She is been worked up in the past for adrenal hyperplasia and pheochromocytoma that have been negative.  Patient arrived to the hospital hypertensive and bradycardic.    Review of Systems  Review of Systems   Constitutional: Negative for chills, diaphoresis, fever and malaise/fatigue.   HENT: Negative for congestion, ear discharge, ear pain, hearing loss, nosebleeds, sinus pain, sore throat and tinnitus.    Eyes: Negative for blurred vision, double vision, photophobia and pain.   Respiratory: Negative for cough, hemoptysis, sputum production, shortness of breath, wheezing and stridor.    Cardiovascular: Negative for chest pain, palpitations, orthopnea, claudication, leg swelling and PND.   Gastrointestinal: Positive for abdominal pain, nausea and vomiting. Negative for blood in stool, constipation, diarrhea, heartburn and melena.   Genitourinary: Negative for dysuria,  flank pain, frequency, hematuria and urgency.   Musculoskeletal: Negative for back pain, falls, joint pain, myalgias and neck pain.   Skin: Negative for itching and rash.   Neurological: Negative for dizziness, tingling, tremors, weakness and headaches.   Endo/Heme/Allergies: Negative for environmental allergies and polydipsia. Does not bruise/bleed easily.   Psychiatric/Behavioral: Negative for depression, hallucinations, substance abuse and suicidal ideas.       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    Surgical History   has a past surgical history that includes abdominal hysterectomy total; endometrial ablation; cholecystectomy; appendectomy; tonsillectomy; primary c section; tubal coagulation laparoscopic bilateral; colon resection; breast biopsy (1980's); and us-needle core bx-breast panel.     Family History  family history includes Arthritis in her paternal aunt and sister; Cancer in her maternal aunt, mother, and paternal aunt; Diabetes in her mother; Genetic Disorder in her maternal grandmother and paternal aunt; Heart Disease in her maternal grandfather, maternal grandmother, mother, and paternal grandfather; Hyperlipidemia in her maternal grandfather, maternal grandmother, mother, paternal grandfather, and paternal grandmother; Hypertension in her maternal  grandfather, maternal grandmother, mother, paternal grandfather, and paternal grandmother; Lung Disease in her father; Psychiatric Illness in her father and sister; Stroke in her maternal grandmother and paternal grandmother.     Social History   reports that she has been smoking cigarettes. She has a 10.00 pack-year smoking history. She has never used smokeless tobacco. She reports current alcohol use of about 0.6 oz of alcohol per week. She reports that she does not use drugs.    Allergies  Allergies   Allergen Reactions   • Macrodantin [Nitrofurantoin Macrocrystal] Anaphylaxis   • Pcn [Penicillins] Anaphylaxis   • Doxycycline Itching   • Bactrim Vomiting   • Biaxin [Clarithromycin] Vomiting   • Norvasc [Amlodipine]      Leg swelling   • Omnicef Itching       Medications  Prior to Admission Medications   Prescriptions Last Dose Informant Patient Reported? Taking?   Cefixime (SUPRAX) 400 MG Cap   Yes No   Sig: Take  by mouth every day. X 5 days   Esomeprazole Magnesium (NEXIUM PO)   Yes No   Sig: Take  by mouth.   Estradiol (IMVEXXY MAINTENANCE PACK) 10 MCG INSERT   Yes No   Sig: IMVEXXY MAINTENANCE PACK 10 MCG INST   Oxymetazoline HCl (NASAL SPRAY NA)   Yes No   Sig: Spray  in nose.   aspirin EC (ECOTRIN) 81 MG Tablet Delayed Response  Patient Yes No   Sig: Take 81 mg by mouth every day.   busPIRone (BUSPAR) 7.5 MG tablet   Yes No   Sig: Take 7.5 mg by mouth 2 times a day.   hydroCHLOROthiazide (HYDRODIURIL) 25 MG Tab   No No   Sig: Take 1 Tab by mouth 2 Times a Day.   irbesartan (AVAPRO) 300 MG Tab   No No   Sig: Take 1 Tab by mouth every day.   omeprazole (PRILOSEC) 40 MG delayed-release capsule  Patient No No   Sig: TAKE ONE CAPSULE BY MOUTH DAILY (GENERIC PRILOSEC)   Patient not taking: Reported on 6/4/2020   pravastatin (PRAVACHOL) 20 MG Tab   No No   Sig: Take 1 Tab by mouth every day.   triamcinolone acetonide (KENALOG) 0.1 % Cream  Patient Yes No   Sig: Apply 1 Application to affected area(s) 2 times a  day.      Facility-Administered Medications: None       Physical Exam  Temp:  [36 °C (96.8 °F)] 36 °C (96.8 °F)  Pulse:  [58-75] 58  Resp:  [17-19] 17  BP: (132-178)/(80-94) 175/80  SpO2:  [95 %-99 %] 98 %    Physical Exam  Vitals signs and nursing note reviewed.   Constitutional:       General: She is not in acute distress.     Appearance: Normal appearance. She is not ill-appearing, toxic-appearing or diaphoretic.   HENT:      Head: Normocephalic and atraumatic.      Nose: No congestion or rhinorrhea.      Mouth/Throat:      Pharynx: No oropharyngeal exudate or posterior oropharyngeal erythema.   Eyes:      General: No scleral icterus.  Neck:      Musculoskeletal: No neck rigidity or muscular tenderness.      Vascular: No carotid bruit.   Cardiovascular:      Rate and Rhythm: Normal rate and regular rhythm.      Pulses: Normal pulses.      Heart sounds: Normal heart sounds. No murmur. No friction rub. No gallop.    Pulmonary:      Effort: Pulmonary effort is normal. No respiratory distress.      Breath sounds: Normal breath sounds. No stridor. No wheezing or rhonchi.   Abdominal:      General: Abdomen is flat. There is no distension.      Palpations: There is no mass.      Tenderness: There is no abdominal tenderness. There is no left CVA tenderness, guarding or rebound.      Hernia: No hernia is present.   Musculoskeletal: Normal range of motion.         General: No swelling.      Right lower leg: No edema.      Left lower leg: No edema.   Lymphadenopathy:      Cervical: No cervical adenopathy.   Skin:     General: Skin is warm and dry.      Capillary Refill: Capillary refill takes more than 3 seconds.      Coloration: Skin is not jaundiced or pale.      Findings: No bruising or erythema.   Neurological:      Mental Status: She is alert.         Laboratory:  Recent Labs     09/24/20  0735   WBC 6.3   RBC 4.60   HEMOGLOBIN 14.6   HEMATOCRIT 39.0   MCV 84.8   MCH 31.7   MCHC 37.4*   RDW 38.2   PLATELETCT 199   MPV  9.9     Recent Labs     09/24/20  0735   SODIUM 112*   POTASSIUM 3.4*   CHLORIDE 75*   CO2 20   GLUCOSE 142*   BUN 7*   CREATININE 0.50   CALCIUM 9.2     Recent Labs     09/24/20  0735   ALTSGPT 115*   ASTSGOT 125*   ALKPHOSPHAT 78   TBILIRUBIN 0.8   LIPASE 22   GLUCOSE 142*         No results for input(s): NTPROBNP in the last 72 hours.      No results for input(s): TROPONINT in the last 72 hours.    Imaging:  US-RUQ    (Results Pending)         Assessment/Plan:  I anticipate this patient will require at least two midnights for appropriate medical management, necessitating inpatient admission.    * Hyponatremia  Assessment & Plan  Hypovolemic hyponatremia  IV fluid hydration with normal saline  Monitor BMP and assess response  Check serum and urine osmolarity  Check urine osmolarity  Daily correction by more than 4 to 6 mEq/L should be avoided to minimize the risk of central demyelinating lesions with neurologic dysfunction       HTN (hypertension)- (present on admission)  Assessment & Plan  Uncontrolled  Hold home hydrochlorothiazide since she is hyponatremic  IV as needed medications have been ordered  It is likely that the patient has been hypertensive since she is not able to tolerate her oral medications    Briseno's esophagus- (present on admission)  Assessment & Plan  Change omeprazole to Pepcid since she has a increased risk of C. difficile    Elevated liver enzymes  Assessment & Plan  Check right upper quadrant ultrasound  CT scan from 5 days ago did not reveal any abnormalities    Diarrhea of presumed infectious origin  Assessment & Plan  Check for C. difficile since she is received antibiotics  Stool cultures ordered  CT scan did not find evidence of diverticulitis    Adrenal adenoma- (present on admission)  Assessment & Plan  2015 and found to be nonfunctional, negative aldosterone renin ratio, negative metanephrines

## 2020-09-24 NOTE — ED NOTES
Pt ambulatory to br, steady gait.  Urine obtained and sent.    Pt transported to T701, all belongings accounted for.

## 2020-09-24 NOTE — PROGRESS NOTES
2 RN Skin Check    2 RN skin check complete.   Devices in place: PIV, telemetry monitoring.  Skin assessed under devices: yes.  Confirmed pressure ulcers found on: n/a.  New potential pressure ulcers noted on n/a. Wound consult placed No.  The following interventions in place Pillows.    Skin is CDI. Bilateral lower extremity present with slight pvd

## 2020-09-25 LAB
ALBUMIN SERPL BCP-MCNC: 3.7 G/DL (ref 3.2–4.9)
ALBUMIN SERPL BCP-MCNC: 3.9 G/DL (ref 3.2–4.9)
ALBUMIN/GLOB SERPL: 1.6 G/DL
ALP SERPL-CCNC: 62 U/L (ref 30–99)
ALP SERPL-CCNC: 62 U/L (ref 30–99)
ALT SERPL-CCNC: 86 U/L (ref 2–50)
ALT SERPL-CCNC: 90 U/L (ref 2–50)
ANION GAP SERPL CALC-SCNC: 12 MMOL/L (ref 7–16)
ANION GAP SERPL CALC-SCNC: 14 MMOL/L (ref 7–16)
APPEARANCE UR: CLEAR
AST SERPL-CCNC: 75 U/L (ref 12–45)
AST SERPL-CCNC: 76 U/L (ref 12–45)
BASOPHILS # BLD AUTO: 0.4 % (ref 0–1.8)
BASOPHILS # BLD: 0.02 K/UL (ref 0–0.12)
BILIRUB CONJ SERPL-MCNC: <0.2 MG/DL (ref 0.1–0.5)
BILIRUB INDIRECT SERPL-MCNC: ABNORMAL MG/DL (ref 0–1)
BILIRUB SERPL-MCNC: 0.3 MG/DL (ref 0.1–1.5)
BILIRUB SERPL-MCNC: 0.4 MG/DL (ref 0.1–1.5)
BILIRUB UR QL STRIP.AUTO: NEGATIVE
BUN SERPL-MCNC: 13 MG/DL (ref 8–22)
BUN SERPL-MCNC: 14 MG/DL (ref 8–22)
BUN SERPL-MCNC: 7 MG/DL (ref 8–22)
BUN SERPL-MCNC: 8 MG/DL (ref 8–22)
BUN SERPL-MCNC: 8 MG/DL (ref 8–22)
BUN SERPL-MCNC: 9 MG/DL (ref 8–22)
CALCIUM SERPL-MCNC: 8.2 MG/DL (ref 8.5–10.5)
CALCIUM SERPL-MCNC: 8.4 MG/DL (ref 8.5–10.5)
CALCIUM SERPL-MCNC: 8.4 MG/DL (ref 8.5–10.5)
CALCIUM SERPL-MCNC: 8.6 MG/DL (ref 8.5–10.5)
CHLORIDE SERPL-SCNC: 89 MMOL/L (ref 96–112)
CHLORIDE SERPL-SCNC: 89 MMOL/L (ref 96–112)
CHLORIDE SERPL-SCNC: 90 MMOL/L (ref 96–112)
CHLORIDE SERPL-SCNC: 90 MMOL/L (ref 96–112)
CHLORIDE SERPL-SCNC: 96 MMOL/L (ref 96–112)
CHLORIDE SERPL-SCNC: 98 MMOL/L (ref 96–112)
CO2 SERPL-SCNC: 18 MMOL/L (ref 20–33)
CO2 SERPL-SCNC: 20 MMOL/L (ref 20–33)
CO2 SERPL-SCNC: 21 MMOL/L (ref 20–33)
CO2 SERPL-SCNC: 23 MMOL/L (ref 20–33)
COLOR UR: YELLOW
CREAT SERPL-MCNC: 0.69 MG/DL (ref 0.5–1.4)
CREAT SERPL-MCNC: 0.71 MG/DL (ref 0.5–1.4)
CREAT SERPL-MCNC: 0.72 MG/DL (ref 0.5–1.4)
CREAT SERPL-MCNC: 0.76 MG/DL (ref 0.5–1.4)
CREAT SERPL-MCNC: 0.78 MG/DL (ref 0.5–1.4)
CREAT SERPL-MCNC: 0.84 MG/DL (ref 0.5–1.4)
EOSINOPHIL # BLD AUTO: 0 K/UL (ref 0–0.51)
EOSINOPHIL NFR BLD: 0 % (ref 0–6.9)
ERYTHROCYTE [DISTWIDTH] IN BLOOD BY AUTOMATED COUNT: 39.8 FL (ref 35.9–50)
EST. AVERAGE GLUCOSE BLD GHB EST-MCNC: 131 MG/DL
GLOBULIN SER CALC-MCNC: 2.3 G/DL (ref 1.9–3.5)
GLUCOSE SERPL-MCNC: 118 MG/DL (ref 65–99)
GLUCOSE SERPL-MCNC: 120 MG/DL (ref 65–99)
GLUCOSE SERPL-MCNC: 144 MG/DL (ref 65–99)
GLUCOSE SERPL-MCNC: 85 MG/DL (ref 65–99)
GLUCOSE SERPL-MCNC: 90 MG/DL (ref 65–99)
GLUCOSE SERPL-MCNC: 99 MG/DL (ref 65–99)
GLUCOSE UR STRIP.AUTO-MCNC: NEGATIVE MG/DL
HBA1C MFR BLD: 6.2 % (ref 0–5.6)
HCT VFR BLD AUTO: 34.8 % (ref 37–47)
HGB BLD-MCNC: 12.8 G/DL (ref 12–16)
IMM GRANULOCYTES # BLD AUTO: 0.02 K/UL (ref 0–0.11)
IMM GRANULOCYTES NFR BLD AUTO: 0.4 % (ref 0–0.9)
KETONES UR STRIP.AUTO-MCNC: NEGATIVE MG/DL
LEUKOCYTE ESTERASE UR QL STRIP.AUTO: NEGATIVE
LYMPHOCYTES # BLD AUTO: 1.1 K/UL (ref 1–4.8)
LYMPHOCYTES NFR BLD: 23.8 % (ref 22–41)
MCH RBC QN AUTO: 31.6 PG (ref 27–33)
MCHC RBC AUTO-ENTMCNC: 36.8 G/DL (ref 33.6–35)
MCV RBC AUTO: 85.9 FL (ref 81.4–97.8)
MICRO URNS: NORMAL
MONOCYTES # BLD AUTO: 0.87 K/UL (ref 0–0.85)
MONOCYTES NFR BLD AUTO: 18.8 % (ref 0–13.4)
NEUTROPHILS # BLD AUTO: 2.61 K/UL (ref 2–7.15)
NEUTROPHILS NFR BLD: 56.6 % (ref 44–72)
NITRITE UR QL STRIP.AUTO: NEGATIVE
NRBC # BLD AUTO: 0 K/UL
NRBC BLD-RTO: 0 /100 WBC
OSMOLALITY SERPL: 255 MOSM/KG H2O (ref 278–298)
PH UR STRIP.AUTO: 7 [PH] (ref 5–8)
PLATELET # BLD AUTO: 171 K/UL (ref 164–446)
PMV BLD AUTO: 10 FL (ref 9–12.9)
POTASSIUM SERPL-SCNC: 3.4 MMOL/L (ref 3.6–5.5)
POTASSIUM SERPL-SCNC: 3.4 MMOL/L (ref 3.6–5.5)
POTASSIUM SERPL-SCNC: 3.6 MMOL/L (ref 3.6–5.5)
POTASSIUM SERPL-SCNC: 3.9 MMOL/L (ref 3.6–5.5)
POTASSIUM SERPL-SCNC: 4.5 MMOL/L (ref 3.6–5.5)
POTASSIUM SERPL-SCNC: 4.5 MMOL/L (ref 3.6–5.5)
PROT SERPL-MCNC: 6 G/DL (ref 6–8.2)
PROT SERPL-MCNC: 6.3 G/DL (ref 6–8.2)
PROT UR QL STRIP: NEGATIVE MG/DL
RBC # BLD AUTO: 4.05 M/UL (ref 4.2–5.4)
RBC UR QL AUTO: NEGATIVE
SODIUM SERPL-SCNC: 121 MMOL/L (ref 135–145)
SODIUM SERPL-SCNC: 122 MMOL/L (ref 135–145)
SODIUM SERPL-SCNC: 123 MMOL/L (ref 135–145)
SODIUM SERPL-SCNC: 126 MMOL/L (ref 135–145)
SODIUM SERPL-SCNC: 126 MMOL/L (ref 135–145)
SODIUM SERPL-SCNC: 130 MMOL/L (ref 135–145)
SP GR UR STRIP.AUTO: 1
UROBILINOGEN UR STRIP.AUTO-MCNC: 0.2 MG/DL
WBC # BLD AUTO: 4.6 K/UL (ref 4.8–10.8)

## 2020-09-25 PROCEDURE — 83930 ASSAY OF BLOOD OSMOLALITY: CPT

## 2020-09-25 PROCEDURE — 700102 HCHG RX REV CODE 250 W/ 637 OVERRIDE(OP): Performed by: HOSPITALIST

## 2020-09-25 PROCEDURE — A9270 NON-COVERED ITEM OR SERVICE: HCPCS | Performed by: HOSPITALIST

## 2020-09-25 PROCEDURE — A9270 NON-COVERED ITEM OR SERVICE: HCPCS | Performed by: INTERNAL MEDICINE

## 2020-09-25 PROCEDURE — 700105 HCHG RX REV CODE 258: Performed by: NURSE PRACTITIONER

## 2020-09-25 PROCEDURE — 80048 BASIC METABOLIC PNL TOTAL CA: CPT | Mod: 91

## 2020-09-25 PROCEDURE — 770020 HCHG ROOM/CARE - TELE (206)

## 2020-09-25 PROCEDURE — A9270 NON-COVERED ITEM OR SERVICE: HCPCS | Performed by: NURSE PRACTITIONER

## 2020-09-25 PROCEDURE — 700102 HCHG RX REV CODE 250 W/ 637 OVERRIDE(OP): Performed by: NURSE PRACTITIONER

## 2020-09-25 PROCEDURE — 700101 HCHG RX REV CODE 250: Performed by: INTERNAL MEDICINE

## 2020-09-25 PROCEDURE — 83036 HEMOGLOBIN GLYCOSYLATED A1C: CPT

## 2020-09-25 PROCEDURE — 99232 SBSQ HOSP IP/OBS MODERATE 35: CPT | Performed by: INTERNAL MEDICINE

## 2020-09-25 PROCEDURE — 700105 HCHG RX REV CODE 258: Performed by: INTERNAL MEDICINE

## 2020-09-25 PROCEDURE — 700102 HCHG RX REV CODE 250 W/ 637 OVERRIDE(OP): Performed by: INTERNAL MEDICINE

## 2020-09-25 PROCEDURE — 36415 COLL VENOUS BLD VENIPUNCTURE: CPT

## 2020-09-25 PROCEDURE — 85025 COMPLETE CBC W/AUTO DIFF WBC: CPT

## 2020-09-25 PROCEDURE — 80076 HEPATIC FUNCTION PANEL: CPT

## 2020-09-25 PROCEDURE — 80053 COMPREHEN METABOLIC PANEL: CPT

## 2020-09-25 RX ORDER — SODIUM CHLORIDE AND POTASSIUM CHLORIDE 150; 450 MG/100ML; MG/100ML
INJECTION, SOLUTION INTRAVENOUS CONTINUOUS
Status: DISCONTINUED | OUTPATIENT
Start: 2020-09-25 | End: 2020-09-27 | Stop reason: HOSPADM

## 2020-09-25 RX ORDER — DEXTROSE MONOHYDRATE 50 MG/ML
250 INJECTION, SOLUTION INTRAVENOUS CONTINUOUS
Status: DISCONTINUED | OUTPATIENT
Start: 2020-09-25 | End: 2020-09-25

## 2020-09-25 RX ORDER — CETIRIZINE HYDROCHLORIDE 10 MG/1
10 TABLET ORAL EVERY EVENING
COMMUNITY
End: 2021-11-10 | Stop reason: SDUPTHER

## 2020-09-25 RX ORDER — MONTELUKAST SODIUM 10 MG/1
10 TABLET ORAL
COMMUNITY
Start: 2020-08-07 | End: 2020-09-29

## 2020-09-25 RX ORDER — DEXTROSE MONOHYDRATE 50 MG/ML
INJECTION, SOLUTION INTRAVENOUS CONTINUOUS
Status: DISCONTINUED | OUTPATIENT
Start: 2020-09-25 | End: 2020-09-25

## 2020-09-25 RX ORDER — ACETAMINOPHEN 325 MG/1
650 TABLET ORAL EVERY 4 HOURS PRN
Status: DISCONTINUED | OUTPATIENT
Start: 2020-09-25 | End: 2020-09-27 | Stop reason: HOSPADM

## 2020-09-25 RX ORDER — POTASSIUM CHLORIDE 20 MEQ/1
40 TABLET, EXTENDED RELEASE ORAL 2 TIMES DAILY
Status: COMPLETED | OUTPATIENT
Start: 2020-09-25 | End: 2020-09-26

## 2020-09-25 RX ORDER — BUSPIRONE HYDROCHLORIDE 10 MG/1
15 TABLET ORAL 2 TIMES DAILY
COMMUNITY
End: 2020-09-29

## 2020-09-25 RX ADMIN — FLUTICASONE PROPIONATE 100 MCG: 50 SPRAY, METERED NASAL at 19:38

## 2020-09-25 RX ADMIN — DEXTROSE MONOHYDRATE 250 ML: 50 INJECTION, SOLUTION INTRAVENOUS at 02:33

## 2020-09-25 RX ADMIN — ACETAMINOPHEN 650 MG: 325 TABLET, FILM COATED ORAL at 02:53

## 2020-09-25 RX ADMIN — POTASSIUM CHLORIDE 40 MEQ: 1500 TABLET, EXTENDED RELEASE ORAL at 17:14

## 2020-09-25 RX ADMIN — POTASSIUM CHLORIDE AND SODIUM CHLORIDE: 450; 150 INJECTION, SOLUTION INTRAVENOUS at 13:02

## 2020-09-25 RX ADMIN — FAMOTIDINE 40 MG: 20 TABLET, FILM COATED ORAL at 05:33

## 2020-09-25 RX ADMIN — DEXTROSE MONOHYDRATE: 50 INJECTION, SOLUTION INTRAVENOUS at 09:55

## 2020-09-25 RX ADMIN — POTASSIUM CHLORIDE 40 MEQ: 1500 TABLET, EXTENDED RELEASE ORAL at 08:57

## 2020-09-25 RX ADMIN — IRBESARTAN 300 MG: 150 TABLET ORAL at 05:33

## 2020-09-25 RX ADMIN — BUSPIRONE HYDROCHLORIDE 7.5 MG: 5 TABLET ORAL at 17:14

## 2020-09-25 RX ADMIN — BUSPIRONE HYDROCHLORIDE 7.5 MG: 5 TABLET ORAL at 05:33

## 2020-09-25 ASSESSMENT — ENCOUNTER SYMPTOMS
FEVER: 0
HEADACHES: 0
FOCAL WEAKNESS: 0
DIARRHEA: 0
SENSORY CHANGE: 0
LOSS OF CONSCIOUSNESS: 0
TREMORS: 0
WEAKNESS: 0
VOMITING: 0
CONSTIPATION: 0
SPEECH CHANGE: 0
SEIZURES: 0
DIZZINESS: 0
NAUSEA: 0
CHILLS: 0
BLOOD IN STOOL: 0
TINGLING: 0
ABDOMINAL PAIN: 0

## 2020-09-25 ASSESSMENT — FIBROSIS 4 INDEX: FIB4 SCORE: 2.76

## 2020-09-25 NOTE — CARE PLAN
Problem: Communication  Goal: The ability to communicate needs accurately and effectively will improve  Outcome: PROGRESSING AS EXPECTED  Note: Pt educated on treatment plan and care, pt demonstrates understanding, Pt A&O x4.      Problem: Safety  Goal: Will remain free from injury  Outcome: PROGRESSING AS EXPECTED

## 2020-09-25 NOTE — PROGRESS NOTES
HOSPITAL MEDICINE PROGRESS NOTE    Date of service  9/25/2020    Chief Complaint  N/V and Diarrhea      Hospital Course:     58 yo woman p/w hyponatremia due to GI loss.           Interval History Updates:  No event overnight.  Afebrile.      Consultants/Specialty  None    Review of Systems   Review of Systems   Constitutional: Negative for chills and fever.   Gastrointestinal: Negative for abdominal pain, blood in stool, constipation, diarrhea, melena, nausea and vomiting.   Neurological: Negative for dizziness, tingling, tremors, sensory change, speech change, focal weakness, seizures, loss of consciousness, weakness and headaches.        Medications:  • acetaminophen  650 mg Q4HRS PRN   • potassium chloride SA  40 mEq BID   • 0.45% NaCl with KCl 20 mEq   Continuous   • enalaprilat  1.25 mg Q6HRS PRN   • cloNIDine  0.1 mg Q6HRS PRN   • ondansetron  4 mg Q4HRS PRN   • ondansetron  4 mg Q4HRS PRN   • promethazine  12.5-25 mg Q4HRS PRN   • promethazine  12.5-25 mg Q4HRS PRN   • prochlorperazine  5-10 mg Q4HRS PRN   • Pharmacy  1 Each PHARMACY TO DOSE   • busPIRone  7.5 mg BID   • irbesartan  300 mg DAILY   • famotidine  40 mg DAILY   • fluticasone  2 Spray DAILY       Physical Exam   Vitals:    09/24/20 1900 09/24/20 2306 09/25/20 0302 09/25/20 0701   BP: 135/67 108/56 122/63 102/63   Pulse: 72 67 60 92   Resp: 18 18 16 18   Temp: 36.6 °C (97.8 °F) 37.1 °C (98.8 °F) 36.8 °C (98.3 °F) 36.4 °C (97.6 °F)   TempSrc: Temporal Temporal Temporal Temporal   SpO2: 97% 96% 97% 98%   Weight: 71 kg (156 lb 8.4 oz)      Height:           Physical Exam   Constitutional: She is oriented to person, place, and time and well-developed, well-nourished, and in no distress. No distress.   HENT:   Head: Normocephalic and atraumatic.   Eyes: Pupils are equal, round, and reactive to light. Conjunctivae are normal. No scleral icterus.   Neck: Normal range of motion. Neck supple.   Cardiovascular: Normal rate, regular rhythm and intact distal  pulses. Exam reveals no gallop and no friction rub.   No murmur heard.  Pulmonary/Chest: Effort normal and breath sounds normal. No respiratory distress. She has no wheezes. She has no rales. She exhibits no tenderness.   Abdominal: Soft. Bowel sounds are normal. She exhibits no distension and no mass. There is no abdominal tenderness. There is no rebound and no guarding.   Musculoskeletal: Normal range of motion.         General: No tenderness or edema.   Neurological: She is alert and oriented to person, place, and time.   Skin: Skin is warm and dry. She is not diaphoretic.   Psychiatric: Mood and affect normal.   Vitals reviewed.         Laboratory   Recent Labs     09/24/20  0735 09/25/20  0018   WBC 6.3 4.6*   RBC 4.60 4.05*   HEMOGLOBIN 14.6 12.8   HEMATOCRIT 39.0 34.8*   PLATELETCT 199 171     Recent Labs     09/25/20  0018 09/25/20  0603 09/25/20  1010   SODIUM 123* 121* 122*   POTASSIUM 3.6 3.4* 3.4*   CHLORIDE 90* 89* 90*   CO2 21 20 20   GLUCOSE 99 118* 144*   BUN 8 8 7*   CREATININE 0.69 0.72 0.71      Recent Labs     09/24/20  0735  09/25/20  0018 09/25/20  0603 09/25/20  1010   ALTSGPT 115*  --  86*  --  90*   ASTSGOT 125*  --  75*  --  76*   ALKPHOSPHAT 78  --  62  --  62   TBILIRUBIN 0.8  --  0.3  --  0.4   DBILIRUBIN  --   --   --   --  <0.2   LIPASE 22  --   --   --   --    GLUCOSE 142*   < > 99 118* 144*    < > = values in this interval not displayed.                Microbiology  Results     Procedure Component Value Units Date/Time    URINALYSIS [[id number]] Collected: 09/24/20 1208    Order Status: Completed Specimen: Urine, Clean Catch Updated: 09/25/20 7031     Color Yellow     Character Clear     Specific Gravity 1.004     Ph 7.0     Glucose Negative mg/dL      Ketones Negative mg/dL      Protein Negative mg/dL      Bilirubin Negative     Urobilinogen, Urine 0.2     Nitrite Negative     Leukocyte Esterase Negative     Occult Blood Negative     Micro Urine Req see below     Comment: Microscopic  examination not performed when specimen is clear  and chemically negative for protein, blood, leukocyte esterase  and nitrite.         Narrative:      Special Contact [id number] CASSY MONROE    SARS-CoV-2, PCR (In-House) [[id number]] Collected: 09/24/20 1240    Order Status: Completed Updated: 09/24/20 2321     SARS-CoV-2 Source NP Swab     SARS-CoV-2 by PCR NotDetected     Comment: [facility] providers: PLEASE REFER TO DE-ESCALATION AND RETESTING PROTOCOL  on [email or web address]  **The TaqPath COVID-19 SARS-CoV-2 test has been made available for use under  the Emergency Use Authorization (EUA) only.         Narrative:      Special Contact [id number] CHAYA MCKEON  Is patient being admitted?->Yes  Does this patient meet criteria for Rush/Cepheid per [facility]  Inpatient Workflow? (See workflow link below)->No  Expected turn around time?->Routine (In-House PCR up to 24  hours)  Is this the patients First SARS CoV-2 test?->Yes  Is this patient employed in healthcare?->No  Is the patient symptomatic as defined by the CDC?->No  Is the patient hospitalized?->Yes  Is the patient in the ICU?->No  Is the patient a resident in a congregate care setting?->No  Is the patient pregnant?->No    COVID/SARS CoV-2 PCR [[id number]] Collected: 09/24/20 1240    Order Status: Completed Specimen: Respirate from Nasopharyngeal Updated: 09/24/20 1249     COVID Order Status Received     Comment: The order for SARS CoV-2 testing has been received by the  Laboratory. This result is neither positive nor negative.  Final results of testing will report in 24-48 hours, separately.         Narrative:      Special Contact [id number] CHAYA MCKEON  Is patient being admitted?->Yes  Does this patient meet criteria for Rush/Cepheid per [facility]  Inpatient Workflow? (See workflow link below)->No  Expected turn around time?->Routine (In-House PCR up to 24  hours)  Is this the patients First SARS CoV-2 test?->Yes  Is this patient  employed in healthcare?->No  Is the patient symptomatic as defined by the CDC?->No  Is the patient hospitalized?->Yes  Is the patient in the ICU?->No  Is the patient a resident in a congregate care setting?->No  Is the patient pregnant?->No    URINALYSIS,CULTURE IF INDICATED [[id number]]  (Abnormal) Collected: 09/24/20 0945    Order Status: Completed Specimen: Urine Updated: 09/24/20 1018     Color Yellow     Character Clear     Specific Gravity 1.007     Ph 7.0     Glucose Negative mg/dL      Ketones Negative mg/dL      Protein Negative mg/dL      Bilirubin Negative     Urobilinogen, Urine 0.2     Nitrite Negative     Leukocyte Esterase Trace     Occult Blood Negative     Micro Urine Req Microscopic    CULTURE STOOL [[id number]]     Order Status: Sent Specimen: Stool     C Diff by PCR rflx Toxin [[id number]]     Order Status: Sent Specimen: Stool     Routine (COVID/SARS COV-2 In-House PCR up to 24 hours) [[id number]]     Order Status: Completed Specimen: Respirate from Nasopharyngeal     Blood Culture,Hold [[id number]] Collected: 09/24/20 0735    Order Status: Completed Updated: 09/24/20 0838     Blood Culture Hold Collected             Labs reviewed by me and noted above.    Radiology  US-RUQ  Narrative: 9/24/2020 10:55 AM    HISTORY/REASON FOR EXAM:  Pain  Abdominal pain    TECHNIQUE/EXAM DESCRIPTION AND NUMBER OF VIEWS:  Real-time sonography of the liver and biliary tree.    COMPARISON: Ultrasound 5/9/2012. CT 9/18/2020    FINDINGS:  The liver is normal in contour. There is no evidence of solid mass lesion. The liver measures 14.10 cm.    The gallbladder is surgically absent.  The common duct measures 3.20 mm.    The visualized pancreas is unremarkable.  The visualized aorta is normal in caliber. Atherosclerotic calcification is seen.    Intrahepatic IVC is patent.    The portal vein is patent with hepatopetal flow. The MPV measures 0.82 cm.    The right kidney measures 10.54 cm. There is no evidence of  hydronephrosis.    There is no ascites.  Impression: No biliary ductal dilatation.    Status post cholecystectomy.    Atherosclerotic plaque.      Results for orders placed or performed during the hospital encounter of 08/14/15   ECHOCARDIOGRAM COMP W/O CONT   Result Value Ref Range    Eject.Frac. MOD BP 65.74     Eject.Frac. MOD 4C 66.55     Eject.Frac. MOD 2C 72.57     Left Ventrical Ejection Fraction 60           Assessment and Plan    Principal Problem:    Hyponatremia POA: Unknown  Active Problems:    Briseno's esophagus POA: Yes      Overview: Dr. Cunningham    HTN (hypertension) POA: Yes    Adrenal adenoma POA: Yes      Overview: 2015 / nonfunctional / benign    Diarrhea of presumed infectious origin POA: Unknown    Elevated liver enzymes POA: Unknown  Resolved Problems:    * No resolved hospital problems. *      Continue on D5W, check BMP, if stable, then start 1/2 NS to slowly rise serum sodium.  Monitor BMP q4hr.  Given that this is acute hyponatremia, does not have to strictly rise serum </= 6 mEq every 24 hours.  Pending C dif stool study, but no diarrhea after admission  Check LFT today to trend liver enzymes  Repleat serum potassium      DVT prophylaxis: SCD    CODE STATUS:  FULL CODE    Disposition: Anticipate home in 2 days.    Case was discussed with Primary RN in addition to individual(s) mentioned above.    I have performed a physical exam and reviewed and updated ROS as of today, 9/25/2020.  In review of yesterday's note dated, 9/24/2020, there are no changes except as documented above.      Kei Giraldo M.D.

## 2020-09-25 NOTE — PROGRESS NOTES
Assumed care this am from night shift RN. Patient resting in bed with no signs of distress or labored breathing. Patient AxO 4, O2 @ RA, VSS. Call bell and personal items within reach, bed locked in low position. Bed exit alarm on. Hourly rounding in place. Tele box in place, monitor room notified.

## 2020-09-25 NOTE — DISCHARGE PLANNING
Care Transition Team Assessment    Fernando SHEARER, spouse, [phone]    RN CM spoke with patient at bedside to complete transition assessment.  Patient verified information on face sheet as accurate.  Patient lives in a 2 story home w/ her spouse and adult son.  She uses the [facility] pharmacy in [place] to get her medications.  She denies any needs on discharge.  States that her spouse will be able to come get her on discharge.    DC Plan:  Home w/ no needs    Information Source  Orientation : Oriented x 4  Information Given By: Patient  Who is responsible for making decisions for patient? : Patient    Readmission Evaluation  Is this a readmission?: Yes - unplanned readmission  Why do you think you were readmitted?: severe stomach pain and diarrhea    Elopement Risk  Legal Hold: No  Ambulatory or Self Mobile in Wheelchair: Yes  Disoriented: No  Psychiatric Symptoms: None  History of Wandering: No  Elopement this Admit: No  Vocalizing Wanting to Leave: No  Displays Behaviors, Body Language Wanting to Leave: No-Not at Risk for Elopement  Elopement Risk: Not at Risk for Elopement    Interdisciplinary Discharge Planning  Primary Care Physician: Tonya Toth MD  Lives with - Patient's Self Care Capacity: Spouse, Adult Children  Patient or legal guardian wants to designate a caregiver: No  Support Systems: Spouse / Significant Other, Children  Housing / Facility: 2 Story House  Do You Take your Prescribed Medications Regularly: Yes  Able to Return to Previous ADL's: Yes  Mobility Issues: No  Prior Services: Home-Independent  Patient Prefers to be Discharged to:: Home  Assistance Needed: No  Durable Medical Equipment: Not Applicable    Discharge Preparedness  What is your plan after discharge?: Home with help  What are your discharge supports?: Spouse  Prior Functional Level: Independent with Activities of Daily Living, Independent with Medication Management, Ambulatory, Drives Self  Difficulity with ADLs:  None  Difficulity with IADLs: None    Functional Assesment  Prior Functional Level: Independent with Activities of Daily Living, Independent with Medication Management, Ambulatory, Drives Self    Finances  Financial Barriers to Discharge: No  Prescription Coverage: Yes    Vision / Hearing Impairment  Vision Impairment : Yes  Right Eye Vision: Impaired, Wears Glasses  Left Eye Vision: Impaired, Wears Glasses  Hearing Impairment : No         Advance Directive  Advance Directive?: None  Advance Directive offered?: AD Booklet refused    Domestic Abuse  Have you ever been the victim of abuse or violence?: No  Physical Abuse or Sexual Abuse: No  Verbal Abuse or Emotional Abuse: No  Possible Abuse/Neglect Reported to:: Not Applicable    Psychological Assessment  History of Substance Abuse: None  History of Psychiatric Problems: No  Non-compliant with Treatment: No  Newly Diagnosed Illness: No    Discharge Risks or Barriers  Discharge risks or barriers?: No    Anticipated Discharge Information  Discharge Disposition: Discharged to home/self care (01)  Discharge Address: [address] [place] [address] [place] [address]  Discharge Contact Phone Number: [phone]

## 2020-09-26 LAB
ANION GAP SERPL CALC-SCNC: 10 MMOL/L (ref 7–16)
ANION GAP SERPL CALC-SCNC: 10 MMOL/L (ref 7–16)
ANION GAP SERPL CALC-SCNC: 12 MMOL/L (ref 7–16)
ANION GAP SERPL CALC-SCNC: 12 MMOL/L (ref 7–16)
ANION GAP SERPL CALC-SCNC: 13 MMOL/L (ref 7–16)
BUN SERPL-MCNC: 10 MG/DL (ref 8–22)
BUN SERPL-MCNC: 11 MG/DL (ref 8–22)
BUN SERPL-MCNC: 12 MG/DL (ref 8–22)
BUN SERPL-MCNC: 12 MG/DL (ref 8–22)
BUN SERPL-MCNC: 9 MG/DL (ref 8–22)
C DIFF DNA SPEC QL NAA+PROBE: NEGATIVE
C DIFF TOX GENS STL QL NAA+PROBE: NEGATIVE
CALCIUM SERPL-MCNC: 8.4 MG/DL (ref 8.5–10.5)
CALCIUM SERPL-MCNC: 8.6 MG/DL (ref 8.5–10.5)
CALCIUM SERPL-MCNC: 8.7 MG/DL (ref 8.5–10.5)
CHLORIDE SERPL-SCNC: 100 MMOL/L (ref 96–112)
CHLORIDE SERPL-SCNC: 101 MMOL/L (ref 96–112)
CHLORIDE SERPL-SCNC: 96 MMOL/L (ref 96–112)
CHLORIDE SERPL-SCNC: 99 MMOL/L (ref 96–112)
CHLORIDE SERPL-SCNC: 99 MMOL/L (ref 96–112)
CO2 SERPL-SCNC: 18 MMOL/L (ref 20–33)
CO2 SERPL-SCNC: 19 MMOL/L (ref 20–33)
CO2 SERPL-SCNC: 20 MMOL/L (ref 20–33)
CREAT SERPL-MCNC: 0.68 MG/DL (ref 0.5–1.4)
CREAT SERPL-MCNC: 0.68 MG/DL (ref 0.5–1.4)
CREAT SERPL-MCNC: 0.71 MG/DL (ref 0.5–1.4)
CREAT SERPL-MCNC: 0.75 MG/DL (ref 0.5–1.4)
CREAT SERPL-MCNC: 0.99 MG/DL (ref 0.5–1.4)
GLUCOSE SERPL-MCNC: 100 MG/DL (ref 65–99)
GLUCOSE SERPL-MCNC: 147 MG/DL (ref 65–99)
GLUCOSE SERPL-MCNC: 85 MG/DL (ref 65–99)
GLUCOSE SERPL-MCNC: 89 MG/DL (ref 65–99)
GLUCOSE SERPL-MCNC: 94 MG/DL (ref 65–99)
POTASSIUM SERPL-SCNC: 4.5 MMOL/L (ref 3.6–5.5)
POTASSIUM SERPL-SCNC: 4.6 MMOL/L (ref 3.6–5.5)
POTASSIUM SERPL-SCNC: 4.6 MMOL/L (ref 3.6–5.5)
POTASSIUM SERPL-SCNC: 4.8 MMOL/L (ref 3.6–5.5)
POTASSIUM SERPL-SCNC: 4.9 MMOL/L (ref 3.6–5.5)
SODIUM SERPL-SCNC: 128 MMOL/L (ref 135–145)
SODIUM SERPL-SCNC: 128 MMOL/L (ref 135–145)
SODIUM SERPL-SCNC: 129 MMOL/L (ref 135–145)
SODIUM SERPL-SCNC: 130 MMOL/L (ref 135–145)
SODIUM SERPL-SCNC: 132 MMOL/L (ref 135–145)
WBC STL QL MICRO: NORMAL

## 2020-09-26 PROCEDURE — 87045 FECES CULTURE AEROBIC BACT: CPT

## 2020-09-26 PROCEDURE — 700102 HCHG RX REV CODE 250 W/ 637 OVERRIDE(OP): Performed by: INTERNAL MEDICINE

## 2020-09-26 PROCEDURE — 99232 SBSQ HOSP IP/OBS MODERATE 35: CPT | Performed by: INTERNAL MEDICINE

## 2020-09-26 PROCEDURE — 700101 HCHG RX REV CODE 250: Performed by: INTERNAL MEDICINE

## 2020-09-26 PROCEDURE — A9270 NON-COVERED ITEM OR SERVICE: HCPCS | Performed by: HOSPITALIST

## 2020-09-26 PROCEDURE — 87798 DETECT AGENT NOS DNA AMP: CPT

## 2020-09-26 PROCEDURE — A9270 NON-COVERED ITEM OR SERVICE: HCPCS | Performed by: INTERNAL MEDICINE

## 2020-09-26 PROCEDURE — 87046 STOOL CULTR AEROBIC BACT EA: CPT

## 2020-09-26 PROCEDURE — 87493 C DIFF AMPLIFIED PROBE: CPT

## 2020-09-26 PROCEDURE — 87899 AGENT NOS ASSAY W/OPTIC: CPT

## 2020-09-26 PROCEDURE — 89055 LEUKOCYTE ASSESSMENT FECAL: CPT

## 2020-09-26 PROCEDURE — 36415 COLL VENOUS BLD VENIPUNCTURE: CPT

## 2020-09-26 PROCEDURE — 700102 HCHG RX REV CODE 250 W/ 637 OVERRIDE(OP): Performed by: HOSPITALIST

## 2020-09-26 PROCEDURE — 700111 HCHG RX REV CODE 636 W/ 250 OVERRIDE (IP): Performed by: HOSPITALIST

## 2020-09-26 PROCEDURE — 770020 HCHG ROOM/CARE - TELE (206)

## 2020-09-26 PROCEDURE — 80048 BASIC METABOLIC PNL TOTAL CA: CPT | Mod: 91

## 2020-09-26 RX ORDER — PRAVASTATIN SODIUM 20 MG
20 TABLET ORAL DAILY
Status: DISCONTINUED | OUTPATIENT
Start: 2020-09-26 | End: 2020-09-27 | Stop reason: HOSPADM

## 2020-09-26 RX ORDER — BUSPIRONE HYDROCHLORIDE 10 MG/1
15 TABLET ORAL 2 TIMES DAILY
Status: DISCONTINUED | OUTPATIENT
Start: 2020-09-26 | End: 2020-09-27 | Stop reason: HOSPADM

## 2020-09-26 RX ORDER — CETIRIZINE HYDROCHLORIDE 10 MG/1
10 TABLET ORAL
Status: DISCONTINUED | OUTPATIENT
Start: 2020-09-26 | End: 2020-09-27 | Stop reason: HOSPADM

## 2020-09-26 RX ORDER — BUSPIRONE HYDROCHLORIDE 10 MG/1
7.5 TABLET ORAL EVERY EVENING
Status: DISCONTINUED | OUTPATIENT
Start: 2020-09-26 | End: 2020-09-27 | Stop reason: HOSPADM

## 2020-09-26 RX ADMIN — IRBESARTAN 300 MG: 150 TABLET ORAL at 04:49

## 2020-09-26 RX ADMIN — POTASSIUM CHLORIDE 40 MEQ: 1500 TABLET, EXTENDED RELEASE ORAL at 17:12

## 2020-09-26 RX ADMIN — BUSPIRONE HYDROCHLORIDE 7.5 MG: 5 TABLET ORAL at 17:11

## 2020-09-26 RX ADMIN — POTASSIUM CHLORIDE AND SODIUM CHLORIDE: 450; 150 INJECTION, SOLUTION INTRAVENOUS at 04:49

## 2020-09-26 RX ADMIN — POTASSIUM CHLORIDE 40 MEQ: 1500 TABLET, EXTENDED RELEASE ORAL at 04:49

## 2020-09-26 RX ADMIN — ONDANSETRON 4 MG: 2 INJECTION INTRAMUSCULAR; INTRAVENOUS at 04:49

## 2020-09-26 RX ADMIN — FLUTICASONE PROPIONATE 100 MCG: 50 SPRAY, METERED NASAL at 04:49

## 2020-09-26 RX ADMIN — BUSPIRONE HYDROCHLORIDE 7.5 MG: 5 TABLET ORAL at 04:50

## 2020-09-26 RX ADMIN — POTASSIUM CHLORIDE AND SODIUM CHLORIDE: 450; 150 INJECTION, SOLUTION INTRAVENOUS at 20:56

## 2020-09-26 RX ADMIN — BUSPIRONE HYDROCHLORIDE 15 MG: 10 TABLET ORAL at 20:56

## 2020-09-26 RX ADMIN — FAMOTIDINE 40 MG: 20 TABLET, FILM COATED ORAL at 04:49

## 2020-09-26 RX ADMIN — PRAVASTATIN SODIUM 20 MG: 20 TABLET ORAL at 20:56

## 2020-09-26 RX ADMIN — CETIRIZINE HYDROCHLORIDE 10 MG: 10 TABLET, FILM COATED ORAL at 20:56

## 2020-09-26 ASSESSMENT — ENCOUNTER SYMPTOMS
LOSS OF CONSCIOUSNESS: 0
DIARRHEA: 0
SEIZURES: 0
TINGLING: 0
CHILLS: 0
CONSTIPATION: 0
BLOOD IN STOOL: 0
SPEECH CHANGE: 0
ABDOMINAL PAIN: 0
FEVER: 0
WEAKNESS: 0
VOMITING: 0
NAUSEA: 0
DIZZINESS: 0
SENSORY CHANGE: 0
TREMORS: 0
FOCAL WEAKNESS: 0
HEADACHES: 0

## 2020-09-26 NOTE — PROGRESS NOTES
Received report and assumed care of patient. Patient is A & O x 4 and awake in bed. Patient is in no signs of distress, VSS, unlabored breathing on RA and denies pain at this time. Patient was updated on the plan of care for the day. Call light within reach, bed in low position, 2 side rails up. All fall precautions in place. Tele box is on and cardiac rhythm is being monitored. Will continue to monitor

## 2020-09-26 NOTE — CARE PLAN
Problem: Venous Thromboembolism (VTW)/Deep Vein Thrombosis (DVT) Prevention:  Goal: Patient will participate in Venous Thrombosis (VTE)/Deep Vein Thrombosis (DVT)Prevention Measures  Outcome: PROGRESSING AS EXPECTED  Note: Pt is low risk for DVT's. She is ambulates often and denies calf pain. Pt is not showing any signs of calf swelling or erythema.      Problem: Bowel/Gastric:  Goal: Normal bowel function is maintained or improved  Outcome: PROGRESSING AS EXPECTED  Note: Pt is no longer having bouts of diarrhea. Pt has not gone to the restroom since being on the Tele 7 floor. We are awaiting a stool sample but she has been unable to provide one yet. Pt was educated on proper bowel function.

## 2020-09-26 NOTE — PROGRESS NOTES
Assumed care of patient. Pt is A+O x4. No chest pain or SOB. No active bleeding noted. Informed of safety and call system. rhythm is SR. Bed in low and locked position. Call light and personal belongings within reach. All needs met at this time.

## 2020-09-26 NOTE — PROGRESS NOTES
HOSPITAL MEDICINE PROGRESS NOTE    Date of service  9/26/2020    Chief Complaint  N/V and Diarrhea      Hospital Course:     58 yo woman p/w hyponatremia due to GI loss.          Interval History Updates:  No event overnight.  Afebrile.     She reports soft stool this morning.  C dif returned NEG.    Consultants/Specialty  None    Review of Systems   Review of Systems   Constitutional: Negative for chills and fever.   Gastrointestinal: Negative for abdominal pain, blood in stool, constipation, diarrhea, melena, nausea and vomiting.   Neurological: Negative for dizziness, tingling, tremors, sensory change, speech change, focal weakness, seizures, loss of consciousness, weakness and headaches.        Medications:  • cetirizine  10 mg QDAY PRN   • acetaminophen  650 mg Q4HRS PRN   • potassium chloride SA  40 mEq BID   • 0.45% NaCl with KCl 20 mEq   Continuous   • enalaprilat  1.25 mg Q6HRS PRN   • cloNIDine  0.1 mg Q6HRS PRN   • ondansetron  4 mg Q4HRS PRN   • ondansetron  4 mg Q4HRS PRN   • promethazine  12.5-25 mg Q4HRS PRN   • promethazine  12.5-25 mg Q4HRS PRN   • prochlorperazine  5-10 mg Q4HRS PRN   • busPIRone  7.5 mg BID   • irbesartan  300 mg DAILY   • famotidine  40 mg DAILY   • fluticasone  2 Spray DAILY       Physical Exam   Vitals:    09/25/20 1903 09/25/20 2301 09/26/20 0335 09/26/20 0802   BP: 136/69 131/70 132/66 144/80   Pulse: 64 60 (!) 55 (!) 53   Resp: 18 16 16 18   Temp: 36.2 °C (97.2 °F) 36.4 °C (97.6 °F) 36.6 °C (97.8 °F) 36.4 °C (97.6 °F)   TempSrc: Temporal Temporal Temporal Temporal   SpO2: 97% 97% 98% 97%   Weight: 72.5 kg (159 lb 13.3 oz)      Height:           Physical Exam   Constitutional: She is oriented to person, place, and time and well-developed, well-nourished, and in no distress. No distress.   HENT:   Head: Normocephalic and atraumatic.   Eyes: Pupils are equal, round, and reactive to light. Conjunctivae are normal. No scleral icterus.   Neck: Normal range of motion. Neck supple.    Cardiovascular: Normal rate, regular rhythm and intact distal pulses. Exam reveals no gallop and no friction rub.   No murmur heard.  Pulmonary/Chest: Effort normal and breath sounds normal. No respiratory distress. She has no wheezes. She has no rales. She exhibits no tenderness.   Abdominal: Soft. Bowel sounds are normal. She exhibits no distension and no mass. There is no abdominal tenderness. There is no rebound and no guarding.   Musculoskeletal: Normal range of motion.         General: No tenderness or edema.   Neurological: She is alert and oriented to person, place, and time.   Skin: Skin is warm and dry. She is not diaphoretic.   Psychiatric: Mood and affect normal.   Vitals reviewed.         Laboratory   Recent Labs     09/24/20  0735 09/25/20  0018   WBC 6.3 4.6*   RBC 4.60 4.05*   HEMOGLOBIN 14.6 12.8   HEMATOCRIT 39.0 34.8*   PLATELETCT 199 171     Recent Labs     09/25/20 2225 09/26/20  0315 09/26/20  0700   SODIUM 130* 129* 128*   POTASSIUM 4.5 4.9 4.8   CHLORIDE 98 99 99   CO2 20 20 19*   GLUCOSE 120* 89 94   BUN 14 12 10   CREATININE 0.78 0.71 0.68      Recent Labs     09/24/20  0735  09/25/20  0018  09/25/20  1010  09/25/20 2225 09/26/20  0315 09/26/20  0700   ALTSGPT 115*  --  86*  --  90*  --   --   --   --    ASTSGOT 125*  --  75*  --  76*  --   --   --   --    ALKPHOSPHAT 78  --  62  --  62  --   --   --   --    TBILIRUBIN 0.8  --  0.3  --  0.4  --   --   --   --    DBILIRUBIN  --   --   --   --  <0.2  --   --   --   --    LIPASE 22  --   --   --   --   --   --   --   --    GLUCOSE 142*   < > 99   < > 144*   < > 120* 89 94    < > = values in this interval not displayed.                Microbiology  Results     Procedure Component Value Units Date/Time    C Diff by PCR rflx Toxin [[id number]] Collected: 09/26/20 0638    Order Status: Completed Specimen: Stool Updated: 09/26/20 0952     C Diff by PCR Negative     Comment: C. difficile NOT detected by PCR.  Treatment not indicated per  guidelines.  Repeat testing not indicated within 7 days.          027-NAP1-BI Presumptive Negative     Comment: Presumptive 027/NAP1/BI target DNA sequences are NOT DETECTED.       Narrative:      Special Contact Isolation  Does this patient have risk factors for C-diff?->Yes    CULTURE STOOL [[id number]] Collected: 09/26/20 0638    Order Status: Completed Specimen: Stool Updated: 09/26/20 0644    Narrative:      Special Contact Isolation  Does this patient have risk factors for C-diff?->Yes    URINALYSIS [[id number]] Collected: 09/24/20 1208    Order Status: Completed Specimen: Urine, Clean Catch Updated: 09/25/20 0444     Color Yellow     Character Clear     Specific Gravity 1.004     Ph 7.0     Glucose Negative mg/dL      Ketones Negative mg/dL      Protein Negative mg/dL      Bilirubin Negative     Urobilinogen, Urine 0.2     Nitrite Negative     Leukocyte Esterase Negative     Occult Blood Negative     Micro Urine Req see below     Comment: Microscopic examination not performed when specimen is clear  and chemically negative for protein, blood, leukocyte esterase  and nitrite.         Narrative:      Special Contact [id number] CASSY MONROE    SARS-CoV-2, PCR (In-House) [[id number]] Collected: 09/24/20 1240    Order Status: Completed Updated: 09/24/20 2321     SARS-CoV-2 Source NP Swab     SARS-CoV-2 by PCR NotDetected     Comment: [facility] providers: PLEASE REFER TO DE-ESCALATION AND RETESTING PROTOCOL  on [email or web address]  **The TaqPath COVID-19 SARS-CoV-2 test has been made available for use under  the Emergency Use Authorization (EUA) only.         Narrative:      Special Contact [id number] AIDALEONARDO LIVEOLAS S  Is patient being admitted?->Yes  Does this patient meet criteria for Rush/Cepheid per [facility]  Inpatient Workflow? (See workflow link below)->No  Expected turn around time?->Routine (In-House PCR up to 24  hours)  Is this the patients First SARS CoV-2 test?->Yes  Is this patient employed in  healthcare?->No  Is the patient symptomatic as defined by the CDC?->No  Is the patient hospitalized?->Yes  Is the patient in the ICU?->No  Is the patient a resident in a congregate care setting?->No  Is the patient pregnant?->No    COVID/SARS CoV-2 PCR [[id number]] Collected: 09/24/20 1240    Order Status: Completed Specimen: Respirate from Nasopharyngeal Updated: 09/24/20 1249     COVID Order Status Received     Comment: The order for SARS CoV-2 testing has been received by the  Laboratory. This result is neither positive nor negative.  Final results of testing will report in 24-48 hours, separately.         Narrative:      Special Contact [id number] CHAYA MCKEON  Is patient being admitted?->Yes  Does this patient meet criteria for Rush/Cepheid per [facility]  Inpatient Workflow? (See workflow link below)->No  Expected turn around time?->Routine (In-House PCR up to 24  hours)  Is this the patients First SARS CoV-2 test?->Yes  Is this patient employed in healthcare?->No  Is the patient symptomatic as defined by the CDC?->No  Is the patient hospitalized?->Yes  Is the patient in the ICU?->No  Is the patient a resident in a congregate care setting?->No  Is the patient pregnant?->No    URINALYSIS,CULTURE IF INDICATED [[id number]]  (Abnormal) Collected: 09/24/20 0945    Order Status: Completed Specimen: Urine Updated: 09/24/20 1018     Color Yellow     Character Clear     Specific Gravity 1.007     Ph 7.0     Glucose Negative mg/dL      Ketones Negative mg/dL      Protein Negative mg/dL      Bilirubin Negative     Urobilinogen, Urine 0.2     Nitrite Negative     Leukocyte Esterase Trace     Occult Blood Negative     Micro Urine Req Microscopic    Routine (COVID/SARS COV-2 In-House PCR up to 24 hours) [[id number]]     Order Status: Completed Specimen: Respirate from Nasopharyngeal     Blood Culture,Hold [[id number]] Collected: 09/24/20 0735    Order Status: Completed Updated: 09/24/20 0838     Blood Culture Hold  Collected             Labs reviewed by me and noted above.    Radiology  US-RUQ  Narrative: 9/24/2020 10:55 AM    HISTORY/REASON FOR EXAM:  Pain  Abdominal pain    TECHNIQUE/EXAM DESCRIPTION AND NUMBER OF VIEWS:  Real-time sonography of the liver and biliary tree.    COMPARISON: Ultrasound 5/9/2012. CT 9/18/2020    FINDINGS:  The liver is normal in contour. There is no evidence of solid mass lesion. The liver measures 14.10 cm.    The gallbladder is surgically absent.  The common duct measures 3.20 mm.    The visualized pancreas is unremarkable.  The visualized aorta is normal in caliber. Atherosclerotic calcification is seen.    Intrahepatic IVC is patent.    The portal vein is patent with hepatopetal flow. The MPV measures 0.82 cm.    The right kidney measures 10.54 cm. There is no evidence of hydronephrosis.    There is no ascites.  Impression: No biliary ductal dilatation.    Status post cholecystectomy.    Atherosclerotic plaque.      Results for orders placed or performed during the hospital encounter of 08/14/15   ECHOCARDIOGRAM COMP W/O CONT   Result Value Ref Range    Eject.Frac. MOD BP 65.74     Eject.Frac. MOD 4C 66.55     Eject.Frac. MOD 2C 72.57     Left Ventrical Ejection Fraction 60           Assessment and Plan    Principal Problem:    Hyponatremia POA: Unknown  Active Problems:    Briseno's esophagus POA: Yes      Overview: Dr. Cunningham    HTN (hypertension) POA: Yes    Adrenal adenoma POA: Yes      Overview: 2015 / nonfunctional / benign    Diarrhea of presumed infectious origin POA: Unknown    Elevated liver enzymes POA: Unknown  Resolved Problems:    * No resolved hospital problems. *      Cont 1/2 NS but increase rate to 83 ml/hr.  Monitor BMP q4hr.  Given that this is acute hyponatremia, does not have to strictly rise serum </= 6 mEq every 24 hours.  Pending C dif stool study NEG.  Will not start antibiotic right now given that she is recovering from her diverticulitis.  If acute abdominal pain,  then consider CT and abx.    Repleat serum potassium      DVT prophylaxis: SCD    CODE STATUS:  FULL CODE    Disposition: Anticipate home in 1 days.    Case was discussed with Primary RN in addition to individual(s) mentioned above.    I have performed a physical exam and reviewed and updated ROS as of today, 9/26/2020.  In review of yesterday's note dated, 9/25/2020, there are no changes except as documented above.      Kei Giraldo M.D.

## 2020-09-27 VITALS
BODY MASS INDEX: 27.29 KG/M2 | OXYGEN SATURATION: 96 % | RESPIRATION RATE: 2 BRPM | SYSTOLIC BLOOD PRESSURE: 176 MMHG | DIASTOLIC BLOOD PRESSURE: 95 MMHG | TEMPERATURE: 96.6 F | WEIGHT: 159.83 LBS | HEIGHT: 64 IN | HEART RATE: 60 BPM

## 2020-09-27 PROBLEM — R19.7 DIARRHEA OF PRESUMED INFECTIOUS ORIGIN: Status: RESOLVED | Noted: 2020-09-24 | Resolved: 2020-09-27

## 2020-09-27 PROBLEM — R74.8 ELEVATED LIVER ENZYMES: Status: RESOLVED | Noted: 2020-09-24 | Resolved: 2020-09-27

## 2020-09-27 PROBLEM — E87.1 HYPONATREMIA: Status: RESOLVED | Noted: 2020-09-24 | Resolved: 2020-09-27

## 2020-09-27 LAB
ANION GAP SERPL CALC-SCNC: 11 MMOL/L (ref 7–16)
ANION GAP SERPL CALC-SCNC: 13 MMOL/L (ref 7–16)
BUN SERPL-MCNC: 10 MG/DL (ref 8–22)
BUN SERPL-MCNC: 9 MG/DL (ref 8–22)
CALCIUM SERPL-MCNC: 8.5 MG/DL (ref 8.5–10.5)
CALCIUM SERPL-MCNC: 8.6 MG/DL (ref 8.5–10.5)
CHLORIDE SERPL-SCNC: 97 MMOL/L (ref 96–112)
CHLORIDE SERPL-SCNC: 98 MMOL/L (ref 96–112)
CO2 SERPL-SCNC: 18 MMOL/L (ref 20–33)
CO2 SERPL-SCNC: 21 MMOL/L (ref 20–33)
CREAT SERPL-MCNC: 0.65 MG/DL (ref 0.5–1.4)
CREAT SERPL-MCNC: 0.7 MG/DL (ref 0.5–1.4)
E COLI SXT1+2 STL IA: NORMAL
GLUCOSE SERPL-MCNC: 113 MG/DL (ref 65–99)
GLUCOSE SERPL-MCNC: 93 MG/DL (ref 65–99)
POTASSIUM SERPL-SCNC: 4.8 MMOL/L (ref 3.6–5.5)
POTASSIUM SERPL-SCNC: 5 MMOL/L (ref 3.6–5.5)
SIGNIFICANT IND 70042: NORMAL
SITE SITE: NORMAL
SODIUM SERPL-SCNC: 128 MMOL/L (ref 135–145)
SODIUM SERPL-SCNC: 130 MMOL/L (ref 135–145)
SOURCE SOURCE: NORMAL

## 2020-09-27 PROCEDURE — 80048 BASIC METABOLIC PNL TOTAL CA: CPT

## 2020-09-27 PROCEDURE — A9270 NON-COVERED ITEM OR SERVICE: HCPCS | Performed by: HOSPITALIST

## 2020-09-27 PROCEDURE — 99239 HOSP IP/OBS DSCHRG MGMT >30: CPT | Performed by: INTERNAL MEDICINE

## 2020-09-27 PROCEDURE — 36415 COLL VENOUS BLD VENIPUNCTURE: CPT

## 2020-09-27 PROCEDURE — 700102 HCHG RX REV CODE 250 W/ 637 OVERRIDE(OP): Performed by: HOSPITALIST

## 2020-09-27 RX ORDER — HYDRALAZINE HYDROCHLORIDE 25 MG/1
25 TABLET, FILM COATED ORAL 3 TIMES DAILY PRN
Qty: 45 TAB | Refills: 0 | Status: ON HOLD
Start: 2020-09-27 | End: 2020-10-01

## 2020-09-27 RX ORDER — CARVEDILOL 3.12 MG/1
3.12 TABLET ORAL 2 TIMES DAILY WITH MEALS
Qty: 60 TAB | Refills: 0 | Status: SHIPPED
Start: 2020-09-27 | End: 2020-10-06

## 2020-09-27 RX ADMIN — BUSPIRONE HYDROCHLORIDE 15 MG: 10 TABLET ORAL at 08:33

## 2020-09-27 RX ADMIN — CLONIDINE HYDROCHLORIDE 0.1 MG: 0.1 TABLET ORAL at 07:31

## 2020-09-27 RX ADMIN — IRBESARTAN 300 MG: 150 TABLET ORAL at 05:18

## 2020-09-27 RX ADMIN — FAMOTIDINE 40 MG: 20 TABLET, FILM COATED ORAL at 05:18

## 2020-09-27 RX ADMIN — FLUTICASONE PROPIONATE 100 MCG: 50 SPRAY, METERED NASAL at 05:18

## 2020-09-27 NOTE — PROGRESS NOTES
Patient escorted via wheelchair to car to go home with . IV and tele box removed, all belongings gathered and sent home with patient. Patient a&ox4, educated on medications and where to pick them up. Verbalized understanding and denied any questions or additional needs at this time.

## 2020-09-27 NOTE — DISCHARGE SUMMARY
HOSPITAL MEDICINE DISCHARGE SUMMARY    DATE OF ADMISSION:  9/24/2020    DATE OF DISCHARGE:  9/27/2020    CHIEF COMPLAINT ON ADMISSION  Chief Complaint   Patient presents with   • N/V   • Diarrhea       CODE STATUS  Full Code    Allergies   Allergen Reactions   • Macrodantin [Nitrofurantoin Macrocrystal] Anaphylaxis   • Pcn [Penicillins] Anaphylaxis   • Doxycycline Itching   • Bactrim Vomiting   • Biaxin [Clarithromycin] Vomiting   • Norvasc [Amlodipine]      Leg swelling   • Omnicef Itching       DISCHARGE PROBLEM LIST  Principal Problem (Resolved):    Hyponatremia POA: Yes  Active Problems:    Briseno's esophagus POA: Yes      Overview: Dr. Cunningham    HTN (hypertension) POA: Yes    Adrenal adenoma POA: Yes      Overview: 2015 / nonfunctional / benign  Resolved Problems:    Diarrhea of presumed infectious origin POA: Unknown    Elevated liver enzymes POA: Unknown      MEDICATIONS ON DISCHARGE     Medication List      START taking these medications      Instructions   carvedilol 3.125 MG Tabs  Commonly known as: COREG   Take 1 Tab by mouth 2 times a day, with meals for 30 days.  Dose: 3.125 mg     hydrALAZINE 25 MG Tabs  Commonly known as: APRESOLINE   Take 1 Tab by mouth 3 times a day as needed (for systolic blood pressure greater than 170) for up to 15 days.  Dose: 25 mg        CONTINUE taking these medications      Instructions   aspirin EC 81 MG Tbec  Commonly known as: ECOTRIN   Take 81 mg by mouth every day.  Dose: 81 mg     * busPIRone 7.5 MG tablet  Commonly known as: BUSPAR   Take 7.5 mg by mouth 2 times a day.  Dose: 7.5 mg     * busPIRone 10 MG Tabs tablet  Commonly known as: BUSPAR   Take 15 mg by mouth 2 times a day. Pt states that she takes 15 mg morning and night, and an additional 7.5 at night  Dose: 15 mg     cetirizine 10 MG Tabs  Commonly known as: ZYRTEC   Take 10 mg by mouth every evening.  Dose: 10 mg     Imvexxy Maintenance Pack 10 MCG Inst  Generic drug: Estradiol   two times a week.      irbesartan 300 MG Tabs  Commonly known as: AVAPRO   Take 1 Tab by mouth every day.  Dose: 300 mg     montelukast 10 MG Tabs  Commonly known as: SINGULAIR   Take 10 mg by mouth.  Dose: 10 mg     NEXIUM PO   Take 20 mg by mouth 2 Times a Day.  Dose: 20 mg     pravastatin 20 MG Tabs  Commonly known as: PRAVACHOL   Take 1 Tab by mouth every day.  Dose: 20 mg     triamcinolone acetonide 0.1 % Crea  Commonly known as: KENALOG   Apply 1 Application to affected area(s) as needed.  Dose: 1 Application         * This list has 2 medication(s) that are the same as other medications prescribed for you. Read the directions carefully, and ask your doctor or other care provider to review them with you.            STOP taking these medications    hydroCHLOROthiazide 25 MG Tabs  Commonly known as: HYDRODIURIL            CONSULTATIONS  None    HPI & HOSPITAL COURSE  60 yo woman p/w hyponatremia due to diarrhea.  Sodium on presentation was 112.  She was admitted to telemetry and IVF started to slowly correct her serum sodium.  C dif colitis was ruled out.  She reported a recent bout of diverticulitis, but that appears to have resolved before this admission.  She was asymptomatic, showing no neurological deficit despite such low serum sodium level.  With IVF, her sodium is corrected to 130 on discharge.  She is encouraged to keep hydrated with electrolyte neutral solutions at home, chicken soup, etc.       HCTZ was held on discharge.  For blood pressure control, I use Coreg and hydralazine PRN.  She did not tolerate dihydropyridine CCB in the past, so this class of medication can not be used.  Once her sodium is completely corrected in the outpatient setting, she will resume her HCTZ.      Her AST and ALT were slightly elevated on presentation.  This should be monitored in the outpatient setting.  A RUQ US was negative for sign of liver injury.      Given her acute illness, she is excused from work until next Wednesday,  9/30/20.    Therefore, she is discharged in good and stable condition to home with close outpatient follow-up.    The patient met 2-midnight criteria for an inpatient stay at the time of discharge.    SPECIFIC OUTPATIENT FOLLOW-UP RECOMMENDATIONS  Follow up with PCP for blood pressure medication adjustment, monitor LFTs    FOLLOW UP  Tonya Toth M.D.  [address]    [place] [id number]  [phone]    Schedule an appointment as soon as possible for a visit in 2 weeks        DIET  Resume home diet previous to admission    ACTIVITY  Resume previous level of activities.    PROCEDURES  No surgery found  No surgery found    PERTINENT RESULTS  Recent Labs     09/26/20 2003 09/26/20  2315 09/27/20  0249   SODIUM 128* 128* 130*   POTASSIUM 4.6 4.8 5.0   CHLORIDE 96 97 98   CO2 19* 18* 21   GLUCOSE 147* 113* 93   BUN 12 10 9   CREATININE 0.75 0.65 0.70   CALCIUM 8.7 8.5 8.6       Total time of the discharge process exceeds 36 minutes.      Kei Giraldo M.D.

## 2020-09-27 NOTE — DISCHARGE INSTRUCTIONS
Dr. Giraldo's discharge instructions:    Don't start the hydrochlorothiazide until a month from now.  I have replaced this with Coreg twice a day for blood pressure control.  The hydralazine is to be used as needed if your systolic blood pressure (the top number) is greater than 170 when you check with your meter.      DIET: Resume home diet previous to admission.  Consider Gatorade for fluid replacement due to its electrolyte balance.  High fiber diet would be beneficial for your diverticulosis.    ACTIVITY: Resume previous level of activities.    DIAGNOSIS: Hyponatremia due to gastrointestinal loss from diarrhea    Return to ER if fever greater than 38 degree Celsius or 100.4 degree Fahrenheit, worsening pain, chest pain at rest or associated with exertion, worsening shortness of breath, bloody coughs, bloody bowel movement, severe unrelenting abdominal pain, focal weakness.    Discharge Instructions    Discharged to home by car with friend. Discharged via wheelchair, hospital escort: Yes.  Special equipment needed: Not Applicable    Be sure to schedule a follow-up appointment with your primary care doctor or any specialists as instructed.     Discharge Plan:   Influenza Vaccine Indication: Patient Refuses    I understand that a diet low in cholesterol, fat, and sodium is recommended for good health. Unless I have been given specific instructions below for another diet, I accept this instruction as my diet prescription.   Other diet: Regular    Special Instructions: None    · Is patient discharged on Warfarin / Coumadin?   No     Depression / Suicide Risk    As you are discharged from this [facility] facility, it is important to learn how to keep safe from harming yourself.    Recognize the warning signs:  · Abrupt changes in personality, positive or negative- including increase in energy   · Giving away possessions  · Change in eating patterns- significant weight changes-  positive or negative  · Change in  sleeping patterns- unable to sleep or sleeping all the time   · Unwillingness or inability to communicate  · Depression  · Unusual sadness, discouragement and loneliness  · Talk of wanting to die  · Neglect of personal appearance   · Rebelliousness- reckless behavior  · Withdrawal from people/activities they love  · Confusion- inability to concentrate     If you or a loved one observes any of these behaviors or has concerns about self-harm, here's what you can do:  · Talk about it- your feelings and reasons for harming yourself  · Remove any means that you might use to hurt yourself (examples: pills, rope, extension cords, firearm)  · Get professional help from the community (Mental Health, Substance Abuse, psychological counseling)  · Do not be alone:Call your Safe Contact- someone whom you trust who will be there for you.  · Call your local CRISIS HOTLINE 987-9815 or 299-081-0689  · Call your local Children's Mobile Crisis Response Team [place] (440) 410-1232 or www.Mozy  · Call the toll free National Suicide Prevention Hotlines   · National Suicide Prevention Lifeline 551-864-TKAO (8844)  · National Hope Line Network 800-SUICIDE (427-8923)      Diverticulitis    Diverticulitis is when small pockets in your large intestine (colon) get infected or swollen. This causes stomach pain and watery poop (diarrhea).  These pouches are called diverticula. They form in people who have a condition called diverticulosis.  Follow these instructions at home:  Medicines  · Take over-the-counter and prescription medicines only as told by your doctor. These include:  ? Antibiotics.  ? Pain medicines.  ? Fiber pills.  ? Probiotics.  ? Stool softeners.  · Do not drive or use heavy machinery while taking prescription pain medicine.  · If you were prescribed an antibiotic, take it as told. Do not stop taking it even if you feel better.  General instructions    · Follow a diet as told by your doctor.  · When you feel  better, your doctor may tell you to change your diet. You may need to eat a lot of fiber. Fiber makes it easier to poop (have bowel movements). Healthy foods with fiber include:  ? Berries.  ? Beans.  ? Lentils.  ? Green vegetables.  · Exercise 3 or more times a week. Aim for 30 minutes each time. Exercise enough to sweat and make your heart beat faster.  · Keep all follow-up visits as told. This is important. You may need to have an exam of the large intestine. This is called a colonoscopy.  Contact a doctor if:  · Your pain does not get better.  · You have a hard time eating or drinking.  · You are not pooping like normal.  Get help right away if:  · Your pain gets worse.  · Your problems do not get better.  · Your problems get worse very fast.  · You have a fever.  · You throw up (vomit) more than one time.  · You have poop that is:  ? Bloody.  ? Black.  ? Tarry.  Summary  · Diverticulitis is when small pockets in your large intestine (colon) get infected or swollen.  · Take medicines only as told by your doctor.  · Follow a diet as told by your doctor.  This information is not intended to replace advice given to you by your health care provider. Make sure you discuss any questions you have with your health care provider.  Document Released: 06/05/2009 Document Revised: 11/30/2018 Document Reviewed: 01/04/2018  Elsevier Patient Education © 2020 [employer]        Hyponatremia  Hyponatremia is when the amount of salt (sodium) in your blood is too low. When salt levels are low, your body may take in extra water. This can cause swelling throughout the body. The swelling often affects the brain.  What are the causes?  This condition may be caused by:  · Certain medical problems or conditions.  · Vomiting a lot.  · Having watery poop (diarrhea) often.  · Certain medicines or illegal drugs.  · Not having enough water in the body (dehydration).  · Drinking too much water.  · Eating a diet that is low in salt.  · Large  burns on your body.  · Too much sweating.  What increases the risk?  You are more likely to get this condition if you:  · Have long-term (chronic) kidney disease.  · Have heart failure.  · Have a medical condition that causes you to have watery poop often.  · Do very hard exercises.  · Take medicines that affect the amount of salt is in your blood.  What are the signs or symptoms?  Symptoms of this condition include:  · Headache.  · Feeling like you may vomit (nausea).  · Vomiting.  · Being very tired (lethargic).  · Muscle weakness and cramps.  · Not wanting to eat as much as normal (loss of appetite).  · Feeling weak or light-headed.  Severe symptoms of this condition include:  · Confusion.  · Feeling restless (agitation).  · Having a fast heart rate.  · Passing out (fainting).  · Seizures.  · Coma.  How is this treated?  Treatment for this condition depends on the cause. Treatment may include:  · Getting fluids through an IV tube that is put into one of your veins.  · Taking medicines to fix the salt levels in your blood. If medicines are causing the problem, your medicines will need to be changed.  · Limiting how much water or fluid you take in.  · Monitoring in the hospital to watch your symptoms.  Follow these instructions at home:    · Take over-the-counter and prescription medicines only as told by your doctor. Many medicines can make this condition worse. Talk with your doctor about any medicines that you are taking.  · Eat and drink exactly as you are told by your doctor.  ? Eat only the foods you are told to eat.  ? Limit how much fluid you take.  · Do not drink alcohol.  · Keep all follow-up visits as told by your doctor. This is important.  Contact a doctor if:  · You feel more like you may vomit.  · You feel more tired.  · Your headache gets worse.  · You feel more confused.  · You feel weaker.  · Your symptoms go away and then they come back.  · You have trouble following the diet instructions.  Get  help right away if:  · You have a seizure.  · You pass out.  · You keep having watery poop.  · You keep vomiting.  Summary  · Hyponatremia is when the amount of salt in your blood is too low.  · When salt levels are low, you can have swelling throughout the body. The swelling mostly affects the brain.  · Treatment depends on the cause. Treatment may include getting IV fluids, medicines, or not drinking as much fluid.  This information is not intended to replace advice given to you by your health care provider. Make sure you discuss any questions you have with your health care provider.  Document Released: 08/29/2012 Document Revised: 03/05/2020 Document Reviewed: 11/21/2019  Elsevier Patient Education © 2020 Elsevier Inc.          Kei Giraldo M.D.

## 2020-09-27 NOTE — PROGRESS NOTES
Received report and assumed care of patient. Patient is A & O x 4 and awale in bed. Patient is in no signs of distress, VSS, unlabored breathing on RA and denies pain at this time. Patient was updated on the plan of care for the day. Call light within reach, bed in low position, 2 side rails up. All fall precautions in place. Tele box is on and cardiac rhythm is being monitored. Will continue to monitor

## 2020-09-27 NOTE — PROGRESS NOTES
Assumed care of patient. Pt is A+O x4. No chest pain or SOB. No active bleeding noted. Informed of safety and call system. rhythm is SR. Bed in low and locked position. Call light and personal belongings within reach. Pt notified of today's plan of care. All needs met at this time.

## 2020-09-27 NOTE — CARE PLAN
Problem: Pain Management  Goal: Pain level will decrease to patient's comfort goal  Outcome: PROGRESSING AS EXPECTED  Note: Pt is calm and relaxed. Pt denies pain but states that her legs are feeling tight. Educated the patient on the importance of ambulation and range of motion exercises.      Problem: Psychosocial Needs:  Goal: Level of anxiety will decrease  Outcome: PROGRESSING AS EXPECTED  Note: Pt was educated on anxiety reduction strategies. Pt is feeling better now that her medications are in oder. Pt is now receiving her home does of anti anxiety meds.

## 2020-09-29 ENCOUNTER — APPOINTMENT (OUTPATIENT)
Dept: RADIOLOGY | Facility: MEDICAL CENTER | Age: 60
End: 2020-09-29
Attending: EMERGENCY MEDICINE
Payer: MEDICAID

## 2020-09-29 ENCOUNTER — HOSPITAL ENCOUNTER (OUTPATIENT)
Facility: MEDICAL CENTER | Age: 60
End: 2020-10-01
Attending: EMERGENCY MEDICINE | Admitting: INTERNAL MEDICINE
Payer: MEDICAID

## 2020-09-29 DIAGNOSIS — E87.1 HYPONATREMIA: ICD-10-CM

## 2020-09-29 DIAGNOSIS — I10 HYPERTENSION, UNSPECIFIED TYPE: ICD-10-CM

## 2020-09-29 DIAGNOSIS — R07.9 ACUTE CHEST PAIN: ICD-10-CM

## 2020-09-29 LAB
ALBUMIN SERPL BCP-MCNC: 4.8 G/DL (ref 3.2–4.9)
ALBUMIN/GLOB SERPL: 1.6 G/DL
ALP SERPL-CCNC: 75 U/L (ref 30–99)
ALT SERPL-CCNC: 57 U/L (ref 2–50)
ANION GAP SERPL CALC-SCNC: 15 MMOL/L (ref 7–16)
AST SERPL-CCNC: 36 U/L (ref 12–45)
BACTERIA STL CULT: NORMAL
BASOPHILS # BLD AUTO: 0.9 % (ref 0–1.8)
BASOPHILS # BLD: 0.06 K/UL (ref 0–0.12)
BILIRUB SERPL-MCNC: 0.7 MG/DL (ref 0.1–1.5)
BUN SERPL-MCNC: 9 MG/DL (ref 8–22)
CALCIUM SERPL-MCNC: 9.7 MG/DL (ref 8.5–10.5)
CHLORIDE SERPL-SCNC: 93 MMOL/L (ref 96–112)
CO2 SERPL-SCNC: 21 MMOL/L (ref 20–33)
CREAT SERPL-MCNC: 0.69 MG/DL (ref 0.5–1.4)
CREAT UR-MCNC: 113.29 MG/DL
E COLI SXT1+2 STL IA: NORMAL
EKG IMPRESSION: NORMAL
EOSINOPHIL # BLD AUTO: 0.06 K/UL (ref 0–0.51)
EOSINOPHIL NFR BLD: 0.9 % (ref 0–6.9)
ERYTHROCYTE [DISTWIDTH] IN BLOOD BY AUTOMATED COUNT: 43 FL (ref 35.9–50)
GLOBULIN SER CALC-MCNC: 3 G/DL (ref 1.9–3.5)
GLUCOSE SERPL-MCNC: 116 MG/DL (ref 65–99)
HCT VFR BLD AUTO: 41.3 % (ref 37–47)
HGB BLD-MCNC: 14.7 G/DL (ref 12–16)
LIPASE SERPL-CCNC: 29 U/L (ref 11–82)
LYMPHOCYTES # BLD AUTO: 1.1 K/UL (ref 1–4.8)
LYMPHOCYTES NFR BLD: 16.7 % (ref 22–41)
MANUAL DIFF BLD: NORMAL
MCH RBC QN AUTO: 31.7 PG (ref 27–33)
MCHC RBC AUTO-ENTMCNC: 35.6 G/DL (ref 33.6–35)
MCV RBC AUTO: 89.2 FL (ref 81.4–97.8)
MONOCYTES # BLD AUTO: 0.63 K/UL (ref 0–0.85)
MONOCYTES NFR BLD AUTO: 9.6 % (ref 0–13.4)
MORPHOLOGY BLD-IMP: NORMAL
NEUTROPHILS # BLD AUTO: 4.75 K/UL (ref 2–7.15)
NEUTROPHILS NFR BLD: 71.9 % (ref 44–72)
NRBC # BLD AUTO: 0 K/UL
NRBC BLD-RTO: 0 /100 WBC
OSMOLALITY UR: 412 MOSM/KG H2O (ref 300–900)
PLATELET # BLD AUTO: 227 K/UL (ref 164–446)
PLATELET BLD QL SMEAR: NORMAL
PMV BLD AUTO: 10.4 FL (ref 9–12.9)
POTASSIUM SERPL-SCNC: 4.4 MMOL/L (ref 3.6–5.5)
PROT SERPL-MCNC: 7.8 G/DL (ref 6–8.2)
RBC # BLD AUTO: 4.63 M/UL (ref 4.2–5.4)
RBC BLD AUTO: NORMAL
SIGNIFICANT IND 70042: NORMAL
SITE SITE: NORMAL
SODIUM SERPL-SCNC: 129 MMOL/L (ref 135–145)
SODIUM UR-SCNC: 63 MMOL/L
SOURCE SOURCE: NORMAL
TROPONIN T SERPL-MCNC: 20 NG/L (ref 6–19)
TROPONIN T SERPL-MCNC: 21 NG/L (ref 6–19)
WBC # BLD AUTO: 6.6 K/UL (ref 4.8–10.8)

## 2020-09-29 PROCEDURE — 700101 HCHG RX REV CODE 250: Performed by: EMERGENCY MEDICINE

## 2020-09-29 PROCEDURE — 83935 ASSAY OF URINE OSMOLALITY: CPT

## 2020-09-29 PROCEDURE — A9270 NON-COVERED ITEM OR SERVICE: HCPCS | Performed by: INTERNAL MEDICINE

## 2020-09-29 PROCEDURE — 80053 COMPREHEN METABOLIC PANEL: CPT

## 2020-09-29 PROCEDURE — G0378 HOSPITAL OBSERVATION PER HR: HCPCS

## 2020-09-29 PROCEDURE — 84484 ASSAY OF TROPONIN QUANT: CPT

## 2020-09-29 PROCEDURE — 99244 OFF/OP CNSLTJ NEW/EST MOD 40: CPT | Performed by: INTERNAL MEDICINE

## 2020-09-29 PROCEDURE — 96374 THER/PROPH/DIAG INJ IV PUSH: CPT

## 2020-09-29 PROCEDURE — A9270 NON-COVERED ITEM OR SERVICE: HCPCS | Performed by: NURSE PRACTITIONER

## 2020-09-29 PROCEDURE — 700102 HCHG RX REV CODE 250 W/ 637 OVERRIDE(OP): Performed by: NURSE PRACTITIONER

## 2020-09-29 PROCEDURE — 85027 COMPLETE CBC AUTOMATED: CPT

## 2020-09-29 PROCEDURE — 84300 ASSAY OF URINE SODIUM: CPT

## 2020-09-29 PROCEDURE — 85007 BL SMEAR W/DIFF WBC COUNT: CPT

## 2020-09-29 PROCEDURE — 700102 HCHG RX REV CODE 250 W/ 637 OVERRIDE(OP): Performed by: INTERNAL MEDICINE

## 2020-09-29 PROCEDURE — 99285 EMERGENCY DEPT VISIT HI MDM: CPT

## 2020-09-29 PROCEDURE — 93005 ELECTROCARDIOGRAM TRACING: CPT | Performed by: EMERGENCY MEDICINE

## 2020-09-29 PROCEDURE — 93005 ELECTROCARDIOGRAM TRACING: CPT

## 2020-09-29 PROCEDURE — 99220 PR INITIAL OBSERVATION CARE,LEVL III: CPT | Performed by: INTERNAL MEDICINE

## 2020-09-29 PROCEDURE — 700102 HCHG RX REV CODE 250 W/ 637 OVERRIDE(OP): Performed by: EMERGENCY MEDICINE

## 2020-09-29 PROCEDURE — 83690 ASSAY OF LIPASE: CPT

## 2020-09-29 PROCEDURE — 71045 X-RAY EXAM CHEST 1 VIEW: CPT

## 2020-09-29 PROCEDURE — 82570 ASSAY OF URINE CREATININE: CPT

## 2020-09-29 PROCEDURE — A9270 NON-COVERED ITEM OR SERVICE: HCPCS | Performed by: EMERGENCY MEDICINE

## 2020-09-29 RX ORDER — POLYETHYLENE GLYCOL 3350 17 G/17G
1 POWDER, FOR SOLUTION ORAL
Status: DISCONTINUED | OUTPATIENT
Start: 2020-09-29 | End: 2020-10-01 | Stop reason: HOSPADM

## 2020-09-29 RX ORDER — FLUTICASONE PROPIONATE 50 MCG
2 SPRAY, SUSPENSION (ML) NASAL EVERY EVENING
Status: DISCONTINUED | OUTPATIENT
Start: 2020-09-30 | End: 2020-10-01 | Stop reason: HOSPADM

## 2020-09-29 RX ORDER — BISACODYL 10 MG
10 SUPPOSITORY, RECTAL RECTAL
Status: DISCONTINUED | OUTPATIENT
Start: 2020-09-29 | End: 2020-10-01 | Stop reason: HOSPADM

## 2020-09-29 RX ORDER — ACETAMINOPHEN 325 MG/1
650 TABLET ORAL EVERY 6 HOURS PRN
Status: DISCONTINUED | OUTPATIENT
Start: 2020-09-29 | End: 2020-10-01 | Stop reason: HOSPADM

## 2020-09-29 RX ORDER — BUSPIRONE HYDROCHLORIDE 15 MG/1
15 TABLET ORAL 2 TIMES DAILY
Status: SHIPPED | COMMUNITY
End: 2022-03-11 | Stop reason: SDUPTHER

## 2020-09-29 RX ORDER — CARVEDILOL 3.12 MG/1
3.12 TABLET ORAL 2 TIMES DAILY WITH MEALS
Status: DISCONTINUED | OUTPATIENT
Start: 2020-09-29 | End: 2020-09-29

## 2020-09-29 RX ORDER — ENALAPRILAT 1.25 MG/ML
1.25 INJECTION INTRAVENOUS EVERY 6 HOURS PRN
Status: DISCONTINUED | OUTPATIENT
Start: 2020-09-29 | End: 2020-10-01 | Stop reason: HOSPADM

## 2020-09-29 RX ORDER — SPIRONOLACTONE AND HYDROCHLOROTHIAZIDE 25; 25 MG/1; MG/1
1 TABLET ORAL
Status: DISCONTINUED | OUTPATIENT
Start: 2020-09-29 | End: 2020-09-30

## 2020-09-29 RX ORDER — AMOXICILLIN 250 MG
2 CAPSULE ORAL 2 TIMES DAILY
Status: DISCONTINUED | OUTPATIENT
Start: 2020-09-29 | End: 2020-10-01 | Stop reason: HOSPADM

## 2020-09-29 RX ORDER — HYDRALAZINE HYDROCHLORIDE 25 MG/1
25 TABLET, FILM COATED ORAL 3 TIMES DAILY
Status: DISCONTINUED | OUTPATIENT
Start: 2020-09-29 | End: 2020-09-30

## 2020-09-29 RX ORDER — FLUTICASONE PROPIONATE 50 MCG
2 SPRAY, SUSPENSION (ML) NASAL
Status: SHIPPED | COMMUNITY
End: 2022-03-11 | Stop reason: SDUPTHER

## 2020-09-29 RX ORDER — HYDRALAZINE HYDROCHLORIDE 25 MG/1
25 TABLET, FILM COATED ORAL 3 TIMES DAILY PRN
Status: DISCONTINUED | OUTPATIENT
Start: 2020-09-29 | End: 2020-09-29

## 2020-09-29 RX ORDER — CETIRIZINE HYDROCHLORIDE 10 MG/1
10 TABLET ORAL EVERY EVENING
Status: DISCONTINUED | OUTPATIENT
Start: 2020-09-30 | End: 2020-10-01 | Stop reason: HOSPADM

## 2020-09-29 RX ORDER — ATORVASTATIN CALCIUM 40 MG/1
40 TABLET, FILM COATED ORAL EVERY EVENING
Status: DISCONTINUED | OUTPATIENT
Start: 2020-09-29 | End: 2020-10-01 | Stop reason: HOSPADM

## 2020-09-29 RX ORDER — OMEPRAZOLE 20 MG/1
20 CAPSULE, DELAYED RELEASE ORAL DAILY
Status: DISCONTINUED | OUTPATIENT
Start: 2020-09-30 | End: 2020-09-30

## 2020-09-29 RX ORDER — IRBESARTAN 150 MG/1
300 TABLET ORAL DAILY
Status: DISCONTINUED | OUTPATIENT
Start: 2020-09-30 | End: 2020-10-01 | Stop reason: HOSPADM

## 2020-09-29 RX ORDER — HYDROCHLOROTHIAZIDE 50 MG/1
50 TABLET ORAL ONCE
COMMUNITY
End: 2020-10-06

## 2020-09-29 RX ORDER — LABETALOL HYDROCHLORIDE 5 MG/ML
10 INJECTION, SOLUTION INTRAVENOUS ONCE
Status: COMPLETED | OUTPATIENT
Start: 2020-09-29 | End: 2020-09-29

## 2020-09-29 RX ADMIN — NITROGLYCERIN 1 INCH: 20 OINTMENT TOPICAL at 11:50

## 2020-09-29 RX ADMIN — SPIRONOLACTONE AND HYDROCHLOROTHIAZIDE 1 TABLET: 25; 25 TABLET ORAL at 17:31

## 2020-09-29 RX ADMIN — HYDRALAZINE HYDROCHLORIDE 25 MG: 25 TABLET, FILM COATED ORAL at 17:31

## 2020-09-29 RX ADMIN — CETIRIZINE HYDROCHLORIDE 10 MG: 10 TABLET, FILM COATED ORAL at 23:41

## 2020-09-29 RX ADMIN — BUSPIRONE HYDROCHLORIDE 15 MG: 10 TABLET ORAL at 17:31

## 2020-09-29 RX ADMIN — ACETAMINOPHEN 650 MG: 325 TABLET, FILM COATED ORAL at 20:09

## 2020-09-29 RX ADMIN — LABETALOL HYDROCHLORIDE 10 MG: 5 INJECTION, SOLUTION INTRAVENOUS at 13:46

## 2020-09-29 RX ADMIN — FLUTICASONE PROPIONATE 100 MCG: 50 SPRAY, METERED NASAL at 23:41

## 2020-09-29 RX ADMIN — ATORVASTATIN CALCIUM 40 MG: 40 TABLET, FILM COATED ORAL at 22:47

## 2020-09-29 SDOH — SOCIAL STABILITY: SOCIAL INSECURITY
WITHIN THE LAST YEAR, HAVE TO BEEN RAPED OR FORCED TO HAVE ANY KIND OF SEXUAL ACTIVITY BY YOUR PARTNER OR EX-PARTNER?: PATIENT DECLINED

## 2020-09-29 SDOH — SOCIAL STABILITY: SOCIAL NETWORK: ARE YOU MARRIED, WIDOWED, DIVORCED, SEPARATED, NEVER MARRIED, OR LIVING WITH A PARTNER?: PATIENT DECLINED

## 2020-09-29 SDOH — HEALTH STABILITY: MENTAL HEALTH
STRESS IS WHEN SOMEONE FEELS TENSE, NERVOUS, ANXIOUS, OR CAN'T SLEEP AT NIGHT BECAUSE THEIR MIND IS TROUBLED. HOW STRESSED ARE YOU?: VERY MUCH

## 2020-09-29 SDOH — ECONOMIC STABILITY: TRANSPORTATION INSECURITY
IN THE PAST 12 MONTHS, HAS LACK OF TRANSPORTATION KEPT YOU FROM MEETINGS, WORK, OR FROM GETTING THINGS NEEDED FOR DAILY LIVING?: NO

## 2020-09-29 SDOH — SOCIAL STABILITY: SOCIAL NETWORK: HOW OFTEN DO YOU GET TOGETHER WITH FRIENDS OR RELATIVES?: PATIENT DECLINED

## 2020-09-29 SDOH — SOCIAL STABILITY: SOCIAL NETWORK: HOW OFTEN DO YOU ATTENT MEETINGS OF THE CLUB OR ORGANIZATION YOU BELONG TO?: PATIENT DECLINED

## 2020-09-29 SDOH — SOCIAL STABILITY: SOCIAL INSECURITY
WITHIN THE LAST YEAR, HAVE YOU BEEN KICKED, HIT, SLAPPED, OR OTHERWISE PHYSICALLY HURT BY YOUR PARTNER OR EX-PARTNER?: PATIENT DECLINED

## 2020-09-29 SDOH — SOCIAL STABILITY: SOCIAL NETWORK: HOW OFTEN DO YOU ATTEND CHURCH OR RELIGIOUS SERVICES?: PATIENT DECLINED

## 2020-09-29 SDOH — ECONOMIC STABILITY: FOOD INSECURITY: WITHIN THE PAST 12 MONTHS, YOU WORRIED THAT YOUR FOOD WOULD RUN OUT BEFORE YOU GOT MONEY TO BUY MORE.: NEVER TRUE

## 2020-09-29 SDOH — ECONOMIC STABILITY: FOOD INSECURITY: WITHIN THE PAST 12 MONTHS, THE FOOD YOU BOUGHT JUST DIDN'T LAST AND YOU DIDN'T HAVE MONEY TO GET MORE.: NEVER TRUE

## 2020-09-29 SDOH — ECONOMIC STABILITY: INCOME INSECURITY: HOW HARD IS IT FOR YOU TO PAY FOR THE VERY BASICS LIKE FOOD, HOUSING, MEDICAL CARE, AND HEATING?: SOMEWHAT HARD

## 2020-09-29 SDOH — SOCIAL STABILITY: SOCIAL NETWORK: IN A TYPICAL WEEK, HOW MANY TIMES DO YOU TALK ON THE PHONE WITH FAMILY, FRIENDS, OR NEIGHBORS?: PATIENT DECLINED

## 2020-09-29 SDOH — HEALTH STABILITY: PHYSICAL HEALTH
ON AVERAGE, HOW MANY DAYS PER WEEK DO YOU ENGAGE IN MODERATE TO STRENUOUS EXERCISE (LIKE A BRISK WALK)?: PATIENT DECLINED

## 2020-09-29 SDOH — SOCIAL STABILITY: SOCIAL INSECURITY: WITHIN THE LAST YEAR, HAVE YOU BEEN AFRAID OF YOUR PARTNER OR EX-PARTNER?: PATIENT DECLINED

## 2020-09-29 SDOH — HEALTH STABILITY: PHYSICAL HEALTH: ON AVERAGE, HOW MANY MINUTES DO YOU ENGAGE IN EXERCISE AT THIS LEVEL?: PATIENT DECLINED

## 2020-09-29 SDOH — SOCIAL STABILITY: SOCIAL NETWORK
DO YOU BELONG TO ANY CLUBS OR ORGANIZATIONS SUCH AS CHURCH GROUPS UNIONS, FRATERNAL OR ATHLETIC GROUPS, OR SCHOOL GROUPS?: PATIENT DECLINED

## 2020-09-29 SDOH — SOCIAL STABILITY: SOCIAL INSECURITY
WITHIN THE LAST YEAR, HAVE YOU BEEN HUMILIATED OR EMOTIONALLY ABUSED IN OTHER WAYS BY YOUR PARTNER OR EX-PARTNER?: PATIENT DECLINED

## 2020-09-29 SDOH — ECONOMIC STABILITY: TRANSPORTATION INSECURITY
IN THE PAST 12 MONTHS, HAS THE LACK OF TRANSPORTATION KEPT YOU FROM MEDICAL APPOINTMENTS OR FROM GETTING MEDICATIONS?: NO

## 2020-09-29 ASSESSMENT — ENCOUNTER SYMPTOMS
DIZZINESS: 1
FEVER: 0
VOMITING: 0
HEADACHES: 1
CHILLS: 0
SORE THROAT: 0
PALPITATIONS: 1
BLURRED VISION: 0
CONSTIPATION: 0
NAUSEA: 0
SHORTNESS OF BREATH: 0
DEPRESSION: 0
DIARRHEA: 0
TINGLING: 1
ABDOMINAL PAIN: 0
FALLS: 0
WEAKNESS: 0
COUGH: 0
NERVOUS/ANXIOUS: 0

## 2020-09-29 ASSESSMENT — LIFESTYLE VARIABLES
TOTAL SCORE: 0
ALCOHOL_USE: NO
EVER FELT BAD OR GUILTY ABOUT YOUR DRINKING: NO
AVERAGE NUMBER OF DAYS PER WEEK YOU HAVE A DRINK CONTAINING ALCOHOL: 0
TOTAL SCORE: 0
ON A TYPICAL DAY WHEN YOU DRINK ALCOHOL HOW MANY DRINKS DO YOU HAVE: 0
HAVE YOU EVER FELT YOU SHOULD CUT DOWN ON YOUR DRINKING: NO
EVER HAD A DRINK FIRST THING IN THE MORNING TO STEADY YOUR NERVES TO GET RID OF A HANGOVER: NO
TOTAL SCORE: 0
CONSUMPTION TOTAL: NEGATIVE
EVER FELT BAD OR GUILTY ABOUT YOUR DRINKING: NO
TOTAL SCORE: 0
EVER HAD A DRINK FIRST THING IN THE MORNING TO STEADY YOUR NERVES TO GET RID OF A HANGOVER: NO
HAVE PEOPLE ANNOYED YOU BY CRITICIZING YOUR DRINKING: NO
DO YOU DRINK ALCOHOL: NO
HAVE YOU EVER FELT YOU SHOULD CUT DOWN ON YOUR DRINKING: NO
HAVE PEOPLE ANNOYED YOU BY CRITICIZING YOUR DRINKING: NO
CONSUMPTION TOTAL: INCOMPLETE
HOW MANY TIMES IN THE PAST YEAR HAVE YOU HAD 5 OR MORE DRINKS IN A DAY: 0

## 2020-09-29 ASSESSMENT — FIBROSIS 4 INDEX
FIB4 SCORE: 1.24
FIB4 SCORE: 2.76

## 2020-09-29 ASSESSMENT — PATIENT HEALTH QUESTIONNAIRE - PHQ9
1. LITTLE INTEREST OR PLEASURE IN DOING THINGS: NOT AT ALL
2. FEELING DOWN, DEPRESSED, IRRITABLE, OR HOPELESS: NOT AT ALL
SUM OF ALL RESPONSES TO PHQ9 QUESTIONS 1 AND 2: 0

## 2020-09-29 ASSESSMENT — PAIN DESCRIPTION - DESCRIPTORS: DESCRIPTORS: ACHING

## 2020-09-29 NOTE — PROGRESS NOTES
Pt to T216, a&o x4, denies chest pain/pressure at this time.  Oriented to room and updated with plan of care. Monitor tech notified of pt arrival.  Call light in reach and encourage to call for assistance.

## 2020-09-29 NOTE — ASSESSMENT & PLAN NOTE
Chest pain is atypical and is reproducible on palpation.  Patient does have difficulty quantifying/qualifying chest pain  Chest pain has resolved  Points peaked at 20.  Monitor on telemetry  Echocardiogram with normal ejection fraction  Cardiology consult

## 2020-09-29 NOTE — H&P
Hospital Medicine History & Physical Note    Date of Service  9/29/2020    Primary Care Physician  Tonya Toth M.D.    Consultants  Cardiology    Code Status  Full Code    Chief Complaint  Chief Complaint   Patient presents with   • Chest Pain     mid-sternal radiating to L side   • Shortness of Breath       History of Presenting Illness  Ms. Kelly Oakley is a 59 y.o. female who presented on 9/29/2020 with chest pain and headache.  She was recently admitted for nausea, vomiting, diarrhea and was C. difficile negative.  When she was discharged on 9/27 her hydrochlorothiazide was discontinued for hyponatremia she was started on carvedilol.  She states she has had uncontrolled blood pressure since.  Patient reports epigastric chest pain rating to her left shoulder and back which has been intermittent since last night.  Chest pain resolved and given a nitro patch in the emergency room.  Patient also reports palpitations and dizziness/lightheadedness when sitting up.  She reports nausea, vomiting, diarrhea have resolved since previous admission and she has had good p.o. intake prior to this admission.  Patient also endorses a headache this morning which he attributes to uncontrolled blood pressure.  Also reports shortness, numbness of legs which was attributed to electrolyte abnormalities when she was admitted earlier last week.  He was hypertensive and bradycardic on presentation.  Chest x-ray demonstrated no acute cardiopulmonary disease.  Troponins were mildly elevated at 21.  EKG showed no specific ST changes.  AST and ALT were slightly elevated on presentation.  A recent right upper quadrant ultrasound showed a normal liver.      Review of Systems  Review of Systems   Constitutional: Negative for chills and fever.   HENT: Negative for congestion and sore throat.    Eyes: Negative for blurred vision.   Respiratory: Negative for cough and shortness of breath.    Cardiovascular: Positive for chest pain and  palpitations. Negative for leg swelling.   Gastrointestinal: Negative for abdominal pain, constipation, diarrhea, nausea and vomiting.   Genitourinary: Negative for dysuria, frequency and urgency.   Musculoskeletal: Negative for falls.   Skin: Negative for rash.   Neurological: Positive for dizziness, tingling (Numbness of lower extremities.) and headaches. Negative for weakness.   Psychiatric/Behavioral: Negative for depression. The patient is not nervous/anxious.    All other systems reviewed and are negative.      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    Surgical History   has a past surgical history that includes abdominal hysterectomy total; endometrial ablation; cholecystectomy; appendectomy; tonsillectomy; primary c section; tubal coagulation laparoscopic bilateral; colon resection; breast biopsy (1980's); and us-needle core bx-breast panel.     Family History  family history includes Arthritis in her paternal aunt and sister; Cancer in her maternal aunt, mother, and paternal aunt; Diabetes in her mother; Genetic Disorder in her maternal grandmother and paternal aunt; Heart Disease in her maternal grandfather, maternal grandmother, mother, and paternal grandfather; Hyperlipidemia in her maternal grandfather, maternal grandmother, mother, paternal grandfather, and  paternal grandmother; Hypertension in her maternal grandfather, maternal grandmother, mother, paternal grandfather, and paternal grandmother; Lung Disease in her father; Psychiatric Illness in her father and sister; Stroke in her maternal grandmother and paternal grandmother.     Social History   reports that she has been smoking cigarettes. She has a 20.00 pack-year smoking history. She has never used smokeless tobacco. She reports current alcohol use of about 0.6 oz of alcohol per week. She reports that she does not use drugs.    Allergies  Allergies   Allergen Reactions   • Macrodantin [Nitrofurantoin Macrocrystal] Anaphylaxis   • Pcn [Penicillins] Anaphylaxis   • Doxycycline Itching   • Amlodipine      Leg swelling   • Bactrim Vomiting   • Clarithromycin Vomiting   • Omnicef Itching       Medications  Prior to Admission Medications   Prescriptions Last Dose Informant Patient Reported? Taking?   busPIRone (BUSPAR) 15 MG tablet 9/29/2020 at 0705  Yes Yes   Sig: Take 15 mg by mouth 2 times a day. Morning and at evening   busPIRone (BUSPAR) 7.5 MG tablet 9/28/2020 at 1800  Yes No   Sig: Take 7.5 mg by mouth every evening. With additional 15 mg every evening for a total of 22.5 mg   carvedilol (COREG) 3.125 MG Tab 9/29/2020 at 0903  No No   Sig: Take 1 Tab by mouth 2 times a day, with meals for 30 days.   cetirizine (ZYRTEC) 10 MG Tab 9/28/2020 at 1800  Yes No   Sig: Take 10 mg by mouth every evening.   esomeprazole (NEXIUM) 20 MG capsule 9/29/2020 at 0705  Yes Yes   Sig: Take 20 mg by mouth 2 Times a Day.   fluticasone (FLONASE) 50 MCG/ACT nasal spray 9/28/2020 at 1800  Yes Yes   Sig: Spray 2 Sprays in nose every evening.   hydrALAZINE (APRESOLINE) 25 MG Tab NEW RX at NEW RX  No No   Sig: Take 1 Tab by mouth 3 times a day as needed (for systolic blood pressure greater than 170) for up to 15 days.   hydroCHLOROthiazide (HYDRODIURIL) 50 MG Tab 9/28/2020 at hs  Yes Yes   Sig: Take 50 mg by mouth Once.   irbesartan  (AVAPRO) 300 MG Tab 9/29/2020 at 0705  No No   Sig: Take 1 Tab by mouth every day.   pravastatin (PRAVACHOL) 20 MG Tab 9/28/2020 at 2200  No No   Sig: Take 1 Tab by mouth every day.      Facility-Administered Medications: None       Physical Exam  Temp:  [36.2 °C (97.2 °F)-36.9 °C (98.5 °F)] 36.9 °C (98.5 °F)  Pulse:  [50-62] 50  Resp:  [12-20] 17  BP: (144-210)/() 169/74  SpO2:  [96 %-99 %] 98 %    Physical Exam  Vitals signs and nursing note reviewed.   Constitutional:       General: She is not in acute distress.     Appearance: Normal appearance.   HENT:      Head: Normocephalic and atraumatic.      Nose: Nose normal.      Mouth/Throat:      Mouth: Mucous membranes are dry.      Pharynx: Oropharynx is clear. No oropharyngeal exudate or posterior oropharyngeal erythema.   Eyes:      Extraocular Movements: Extraocular movements intact.      Conjunctiva/sclera: Conjunctivae normal.   Neck:      Musculoskeletal: Normal range of motion and neck supple.   Cardiovascular:      Rate and Rhythm: Regular rhythm. Bradycardia present.      Pulses: Normal pulses.      Heart sounds: Normal heart sounds. No murmur.   Pulmonary:      Effort: Pulmonary effort is normal. No respiratory distress.      Breath sounds: Normal breath sounds. No stridor. No wheezing or rales.   Chest:      Chest wall: Tenderness present.   Abdominal:      General: Abdomen is flat. Bowel sounds are normal. There is no distension.      Palpations: Abdomen is soft. There is no mass.      Tenderness: There is no abdominal tenderness.   Musculoskeletal:         General: No swelling.   Skin:     General: Skin is warm.      Capillary Refill: Capillary refill takes less than 2 seconds.   Neurological:      General: No focal deficit present.      Mental Status: She is alert and oriented to person, place, and time. Mental status is at baseline.      Cranial Nerves: No cranial nerve deficit.      Motor: No weakness.   Psychiatric:         Mood and Affect:  Mood normal.         Behavior: Behavior normal.         Laboratory:  Recent Labs     09/29/20  1107   WBC 6.6   RBC 4.63   HEMOGLOBIN 14.7   HEMATOCRIT 41.3   MCV 89.2   MCH 31.7   MCHC 35.6*   RDW 43.0   PLATELETCT 227   MPV 10.4     Recent Labs     09/26/20  2315 09/27/20  0249 09/29/20  1107   SODIUM 128* 130* 129*   POTASSIUM 4.8 5.0 4.4   CHLORIDE 97 98 93*   CO2 18* 21 21   GLUCOSE 113* 93 116*   BUN 10 9 9   CREATININE 0.65 0.70 0.69   CALCIUM 8.5 8.6 9.7     Recent Labs     09/26/20  2315 09/27/20  0249 09/29/20  1107   ALTSGPT  --   --  57*   ASTSGOT  --   --  36   ALKPHOSPHAT  --   --  75   TBILIRUBIN  --   --  0.7   LIPASE  --   --  29   GLUCOSE 113* 93 116*         No results for input(s): NTPROBNP in the last 72 hours.      Recent Labs     09/29/20  1107   TROPONINT 21*       Imaging:  DX-CHEST-PORTABLE (1 VIEW)   Final Result      No acute cardiopulmonary process is seen.      Atherosclerotic plaque.            Assessment/Plan:  I anticipate this patient is appropriate for observation status at this time.    Hyponatremia  Assessment & Plan  Likely from recent nausea, vomiting, diarrhea along with previously being on hydrochlorothiazide.  Check BNP, serum, urine osmoles.    Chest pain  Assessment & Plan  Chest pain is atypical and is reproducible on palpation.  Patient does have difficulty quantifying/qualifying chest pain  Trend troponins.  Monitor on telemetry  Echocardiogram  Cardiology consult    HTN (hypertension)- (present on admission)  Assessment & Plan  Restart outpatient blood pressure medicines with holding parameters, irbesartan, hydralazine scheduled  Hold hydrochlorothiazide for hyponatremia.  Hold carvedilol for bradycardia  Cardiology consult    GERD (gastroesophageal reflux disease)- (present on admission)  Assessment & Plan  Continue omeprazole     HLD (hyperlipidemia)- (present on admission)  Assessment & Plan  Switch from outpatient pravastatin to atorvastatin.

## 2020-09-29 NOTE — PROGRESS NOTES
Triage officer note    ERP requested admission Dr. Huan Felix Adin 59 y.o. female who presented to the hospital with complaint of chest pain.  Her initial troponins 21.  She has chronic hyponatremia and her current sodium level is 129.  Her last nuclear medicine stress test was in 2017.  She was discharged from the hospital 2 days ago and she was admitted for nausea vomiting and diarrhea.      I requested Dr. Todd to evaluate this patient for admission in CDU.

## 2020-09-29 NOTE — ED TRIAGE NOTES
"Kelly Elviraanam Oakley  Chief Complaint   Patient presents with   • Chest Pain     mid-sternal radiating to L side   • Shortness of Breath     Pt ambulated to triage without difficulty. Pt arrives for sternal chest pain radiating to L shoulder and L chest x1 day. Pt erports dry cough and exertional SOB. Pt admitted from 9/24-9/27 for hyponatremia. Pt reports elevated BP this morning. Recently changed her home medication after d/c. Pt A&Ox4, RR even and unlabored.     Patient informed of triage process and to inform staff of any changes/worsening symptoms. Pt verbalized understanding. Pt denies concerns. Pt back to waiting room.     BP (!) 201/101   Pulse 61   Temp 36.2 °C (97.2 °F) (Temporal)   Resp 20   Ht 1.6 m (5' 3\")   SpO2 99%     "

## 2020-09-29 NOTE — ED NOTES
Med rec complete per pt and historical- DC'd from [facility] on 9/27. Pt staryted taking coreg on 9/28 in AM. Per pt, was told to DC HCTZ upon DC, however, pt's doctor advised pt to take 50 mg last night. Pt is unsure if she is to continue medication daily. Also, per pt's request would like nexium substituted with prilosec if medication is non-formulary.

## 2020-09-29 NOTE — ASSESSMENT & PLAN NOTE
Restart outpatient blood pressure medicines with holding parameters, irbesartan, hydralazine scheduled  Hold hydrochlorothiazide for hyponatremia.  Hold carvedilol for bradycardia  Cardiology consult

## 2020-09-29 NOTE — ASSESSMENT & PLAN NOTE
Likely from recent nausea, vomiting, diarrhea along with previously being on hydrochlorothiazide.  Check BNP, serum, urine osmoles.  Patient has hypotonic hyponatremia.  We will check thyroid function test.

## 2020-09-29 NOTE — ED NOTES
Pt laying in bed. no signs of acute distress. respirations even and unlabored. call light within reach. Pt updated on plan of care. No questions or concerns at this time. CDU called for pt admission.

## 2020-09-30 ENCOUNTER — APPOINTMENT (OUTPATIENT)
Dept: CARDIOLOGY | Facility: MEDICAL CENTER | Age: 60
End: 2020-09-30
Attending: INTERNAL MEDICINE
Payer: MEDICAID

## 2020-09-30 LAB
ANION GAP SERPL CALC-SCNC: 15 MMOL/L (ref 7–16)
BASOPHILS # BLD AUTO: 0.8 % (ref 0–1.8)
BASOPHILS # BLD: 0.06 K/UL (ref 0–0.12)
BUN SERPL-MCNC: 13 MG/DL (ref 8–22)
CALCIUM SERPL-MCNC: 8.8 MG/DL (ref 8.5–10.5)
CHLORIDE SERPL-SCNC: 93 MMOL/L (ref 96–112)
CO2 SERPL-SCNC: 20 MMOL/L (ref 20–33)
CREAT SERPL-MCNC: 0.91 MG/DL (ref 0.5–1.4)
EOSINOPHIL # BLD AUTO: 0.11 K/UL (ref 0–0.51)
EOSINOPHIL NFR BLD: 1.5 % (ref 0–6.9)
ERYTHROCYTE [DISTWIDTH] IN BLOOD BY AUTOMATED COUNT: 43.4 FL (ref 35.9–50)
GLUCOSE SERPL-MCNC: 98 MG/DL (ref 65–99)
HCT VFR BLD AUTO: 41.4 % (ref 37–47)
HGB BLD-MCNC: 14.6 G/DL (ref 12–16)
IMM GRANULOCYTES # BLD AUTO: 0.05 K/UL (ref 0–0.11)
IMM GRANULOCYTES NFR BLD AUTO: 0.7 % (ref 0–0.9)
LV EJECT FRACT  99904: 70
LV EJECT FRACT MOD 2C 99903: 69.6
LV EJECT FRACT MOD 4C 99902: 65.21
LV EJECT FRACT MOD BP 99901: 65.84
LYMPHOCYTES # BLD AUTO: 2.18 K/UL (ref 1–4.8)
LYMPHOCYTES NFR BLD: 28.8 % (ref 22–41)
MAGNESIUM SERPL-MCNC: 1.9 MG/DL (ref 1.5–2.5)
MCH RBC QN AUTO: 31.6 PG (ref 27–33)
MCHC RBC AUTO-ENTMCNC: 35.3 G/DL (ref 33.6–35)
MCV RBC AUTO: 89.6 FL (ref 81.4–97.8)
MONOCYTES # BLD AUTO: 0.87 K/UL (ref 0–0.85)
MONOCYTES NFR BLD AUTO: 11.5 % (ref 0–13.4)
NEUTROPHILS # BLD AUTO: 4.3 K/UL (ref 2–7.15)
NEUTROPHILS NFR BLD: 56.7 % (ref 44–72)
NRBC # BLD AUTO: 0 K/UL
NRBC BLD-RTO: 0 /100 WBC
NT-PROBNP SERPL IA-MCNC: 352 PG/ML (ref 0–125)
OSMOLALITY SERPL: 272 MOSM/KG H2O (ref 278–298)
PHOSPHATE SERPL-MCNC: 5.6 MG/DL (ref 2.5–4.5)
PLATELET # BLD AUTO: 224 K/UL (ref 164–446)
PMV BLD AUTO: 10 FL (ref 9–12.9)
POTASSIUM SERPL-SCNC: 4.1 MMOL/L (ref 3.6–5.5)
RBC # BLD AUTO: 4.62 M/UL (ref 4.2–5.4)
SODIUM SERPL-SCNC: 128 MMOL/L (ref 135–145)
TROPONIN T SERPL-MCNC: 20 NG/L (ref 6–19)
WBC # BLD AUTO: 7.6 K/UL (ref 4.8–10.8)

## 2020-09-30 PROCEDURE — A9270 NON-COVERED ITEM OR SERVICE: HCPCS | Performed by: PHYSICIAN ASSISTANT

## 2020-09-30 PROCEDURE — 700102 HCHG RX REV CODE 250 W/ 637 OVERRIDE(OP): Performed by: INTERNAL MEDICINE

## 2020-09-30 PROCEDURE — 96372 THER/PROPH/DIAG INJ SC/IM: CPT

## 2020-09-30 PROCEDURE — 83880 ASSAY OF NATRIURETIC PEPTIDE: CPT

## 2020-09-30 PROCEDURE — 99213 OFFICE O/P EST LOW 20 MIN: CPT | Performed by: INTERNAL MEDICINE

## 2020-09-30 PROCEDURE — 93306 TTE W/DOPPLER COMPLETE: CPT | Mod: 26 | Performed by: INTERNAL MEDICINE

## 2020-09-30 PROCEDURE — A9270 NON-COVERED ITEM OR SERVICE: HCPCS | Performed by: INTERNAL MEDICINE

## 2020-09-30 PROCEDURE — 83735 ASSAY OF MAGNESIUM: CPT

## 2020-09-30 PROCEDURE — 99226 PR SUBSEQUENT OBSERVATION CARE,LEVEL III: CPT | Performed by: INTERNAL MEDICINE

## 2020-09-30 PROCEDURE — 700102 HCHG RX REV CODE 250 W/ 637 OVERRIDE(OP): Performed by: NURSE PRACTITIONER

## 2020-09-30 PROCEDURE — 700102 HCHG RX REV CODE 250 W/ 637 OVERRIDE(OP): Performed by: PHYSICIAN ASSISTANT

## 2020-09-30 PROCEDURE — 84100 ASSAY OF PHOSPHORUS: CPT

## 2020-09-30 PROCEDURE — 93306 TTE W/DOPPLER COMPLETE: CPT

## 2020-09-30 PROCEDURE — 83930 ASSAY OF BLOOD OSMOLALITY: CPT

## 2020-09-30 PROCEDURE — 80048 BASIC METABOLIC PNL TOTAL CA: CPT

## 2020-09-30 PROCEDURE — A9270 NON-COVERED ITEM OR SERVICE: HCPCS | Performed by: NURSE PRACTITIONER

## 2020-09-30 PROCEDURE — 700111 HCHG RX REV CODE 636 W/ 250 OVERRIDE (IP): Performed by: INTERNAL MEDICINE

## 2020-09-30 PROCEDURE — 85025 COMPLETE CBC W/AUTO DIFF WBC: CPT

## 2020-09-30 PROCEDURE — G0378 HOSPITAL OBSERVATION PER HR: HCPCS

## 2020-09-30 PROCEDURE — 84484 ASSAY OF TROPONIN QUANT: CPT

## 2020-09-30 RX ORDER — HYDRALAZINE HYDROCHLORIDE 25 MG/1
25 TABLET, FILM COATED ORAL ONCE
Status: COMPLETED | OUTPATIENT
Start: 2020-09-30 | End: 2020-09-30

## 2020-09-30 RX ORDER — HYDROXYZINE HYDROCHLORIDE 10 MG/1
10 TABLET, FILM COATED ORAL 3 TIMES DAILY PRN
Status: DISCONTINUED | OUTPATIENT
Start: 2020-09-30 | End: 2020-10-01 | Stop reason: HOSPADM

## 2020-09-30 RX ORDER — ONDANSETRON 4 MG/1
4 TABLET, ORALLY DISINTEGRATING ORAL EVERY 4 HOURS PRN
Status: DISCONTINUED | OUTPATIENT
Start: 2020-09-30 | End: 2020-10-01 | Stop reason: HOSPADM

## 2020-09-30 RX ORDER — OMEPRAZOLE 20 MG/1
20 CAPSULE, DELAYED RELEASE ORAL 2 TIMES DAILY
Status: DISCONTINUED | OUTPATIENT
Start: 2020-09-30 | End: 2020-10-01 | Stop reason: HOSPADM

## 2020-09-30 RX ORDER — HYDRALAZINE HYDROCHLORIDE 25 MG/1
50 TABLET, FILM COATED ORAL 3 TIMES DAILY
Status: DISCONTINUED | OUTPATIENT
Start: 2020-09-30 | End: 2020-10-01 | Stop reason: HOSPADM

## 2020-09-30 RX ADMIN — HYDRALAZINE HYDROCHLORIDE 25 MG: 25 TABLET, FILM COATED ORAL at 05:17

## 2020-09-30 RX ADMIN — ACETAMINOPHEN 650 MG: 325 TABLET, FILM COATED ORAL at 03:31

## 2020-09-30 RX ADMIN — Medication 400 MG: at 09:30

## 2020-09-30 RX ADMIN — IRBESARTAN 300 MG: 150 TABLET ORAL at 05:18

## 2020-09-30 RX ADMIN — ONDANSETRON 4 MG: 4 TABLET, ORALLY DISINTEGRATING ORAL at 10:23

## 2020-09-30 RX ADMIN — BUSPIRONE HYDROCHLORIDE 15 MG: 10 TABLET ORAL at 18:44

## 2020-09-30 RX ADMIN — CETIRIZINE HYDROCHLORIDE 10 MG: 10 TABLET, FILM COATED ORAL at 22:06

## 2020-09-30 RX ADMIN — HYDRALAZINE HYDROCHLORIDE 25 MG: 25 TABLET, FILM COATED ORAL at 09:30

## 2020-09-30 RX ADMIN — BUSPIRONE HYDROCHLORIDE 15 MG: 10 TABLET ORAL at 05:17

## 2020-09-30 RX ADMIN — SPIRONOLACTONE AND HYDROCHLOROTHIAZIDE 1 TABLET: 25; 25 TABLET ORAL at 05:17

## 2020-09-30 RX ADMIN — OMEPRAZOLE 20 MG: 20 CAPSULE, DELAYED RELEASE ORAL at 05:17

## 2020-09-30 RX ADMIN — ENOXAPARIN SODIUM 40 MG: 40 INJECTION SUBCUTANEOUS at 05:17

## 2020-09-30 RX ADMIN — HYDRALAZINE HYDROCHLORIDE 50 MG: 25 TABLET, FILM COATED ORAL at 13:09

## 2020-09-30 RX ADMIN — FLUTICASONE PROPIONATE 100 MCG: 50 SPRAY, METERED NASAL at 18:43

## 2020-09-30 RX ADMIN — OMEPRAZOLE 20 MG: 20 CAPSULE, DELAYED RELEASE ORAL at 22:06

## 2020-09-30 RX ADMIN — ATORVASTATIN CALCIUM 40 MG: 40 TABLET, FILM COATED ORAL at 18:44

## 2020-09-30 RX ADMIN — HYDROXYZINE HYDROCHLORIDE 10 MG: 10 TABLET, FILM COATED ORAL at 18:44

## 2020-09-30 RX ADMIN — HYDRALAZINE HYDROCHLORIDE 50 MG: 25 TABLET, FILM COATED ORAL at 18:44

## 2020-09-30 ASSESSMENT — ENCOUNTER SYMPTOMS
DIARRHEA: 0
LIGHT-HEADEDNESS: 0
PALPITATIONS: 0
SORE THROAT: 0
FEVER: 0
BRUISES/BLEEDS EASILY: 0
DIAPHORESIS: 0
BLURRED VISION: 0
CONSTIPATION: 0
FALLS: 0
COUGH: 0
DIZZINESS: 0
ABDOMINAL PAIN: 0
ABDOMINAL DISTENTION: 0
HEADACHES: 1
NAUSEA: 0
DEPRESSION: 0
NERVOUS/ANXIOUS: 0
VOMITING: 0
SHORTNESS OF BREATH: 0
BLOOD IN STOOL: 0
PALPITATIONS: 1
CHILLS: 0
CHEST TIGHTNESS: 0
WEAKNESS: 0

## 2020-09-30 ASSESSMENT — FIBROSIS 4 INDEX: FIB4 SCORE: 1.24

## 2020-09-30 NOTE — CARE PLAN
Problem: Psychosocial Needs:  Goal: Level of anxiety will decrease  Outcome: PROGRESSING SLOWER THAN EXPECTED    Pt states anxiety concerning her hospital admission. Emotional support given, discussed this w/ hospitalist this am.      Problem: Knowledge Deficit  Goal: Knowledge of disease process/condition, treatment plan, diagnostic tests, and medications will improve  Outcome: PROGRESSING AS EXPECTED    Discussed plan of care for today w/ pt this am, she is A+O, she verbalizes understanding of her plan of care, questions answered. Emotional support given. Reinforcing education as necessary. Discussed pt's care with Hospitalist this am, orders received.

## 2020-09-30 NOTE — CONSULTS
"CARDIAC CONSULTATION    Requesting Provider: Dr. Viktor Todd  Reason for consultation: Hypertension    Impressions:  #. Hypertensive urgency  #. Hyponatremia - chronic  #. Polydipsia  #. Tobacco abuse  #. Palpitations    Recommendations:  Can DC coreg - tere, ineffective at low dose  Add Aldactazide, use hydralazine for acute hypertension>160  Monitor electrolytes  Depending on the progress, can retry amlodipine (prior LE edema)  Continue irvesartan  Limit water to 1/2 gallon daily    If BP fails to control- can do secondary HTN workup. Given smoking, I have a heightened suspicion for renal artery stenosis    Patient requesting consultation with Dr. Hi- I will arrange for his follow up tomorrow.     Will follow    History: Kelly Oakley is a 59 y.o. female with a past medical history of hypertension and tobacco use who I have been consulted regarding hypertension. She was recently hospitalized for GI illness with dehydration. Severe hyponatremia was noted. HCTZ was stopped in favor of coreg. She was home 24-48 hours and noted headache and HTN with  systolic prompting her to return. Evaluation has been unrevealing aside from elevated BP. Na noted to have returned to baseline of ~130. She also reports chronic polydipsia- consuming approximately one gallon of water daily. She reports long standing excellent control of BP with HCTZ and irbesartan with BP of 135/70 prior to the recent GI illness.     ROS:   All other systems reviewed and negative except as per the HPI    PE:  BP (!) 169/74   Pulse (!) 50   Temp 36.9 °C (98.5 °F) (Temporal)   Resp 17   Ht 1.613 m (5' 3.5\")   Wt 70.1 kg (154 lb 8.7 oz)   SpO2 98%   BMI 26.95 kg/m²   GEN: Well appearing  HEENT: Symmetric face. Anicteric sclerae. Moist mucus membranes  NECK: No JVD. No lymphadenopathy  CARDIAC: Normal PMI, regular, normal S1, S2.   VASCULATURE: Normal carotid amplitude without bruit. Peripheral pulses normal  RESP: Clear to auscultation " bilaterally  ABD: Soft, non-tender, non-distended  EXT: No edema, no clubbing or cyanosis  SKIN: Warm and dry  NEURO: No gross deficits   PSYCH: Appropriate affect, participates in conversation      Past Medical History:   Diagnosis Date   • Adrenal nodule 2012 2015 / nonfunctional / benign   • Anemia    • Anxiety     Panic   • Arrhythmia    • Arthritis     Morning stiffness   • Atherosclerosis of arteries 2013    seen on abdominal CT   • Briseno's  esophagus    • Chest pain at rest 9/11/2015   • Cigarette smoker one half pack a day or less 5/28/2015   • Depression    • Fatigue 9/11/2015   • GERD (gastroesophageal reflux disease)    • Headache(784.0)     sinus headache   • Heart murmur     Dr. Krause-Stress trend   • History of HPV infection    • HLD (hyperlipidemia) 9/11/2015   • HTN (hypertension) 9/11/2015   • IBD (inflammatory bowel disease)     Dr. Cunningham   • MRSA carrier 2008    nose cellulitis   • Nocturnal hypoxemia 10/15/2015   • OSTEOPOROSIS     Osteopenia   • S/P appendectomy    • S/P cholecystectomy    • S/P hysterectomy with oophorectomy     endometriosis   • Ulcer     Barretts esophogitis   • Urinary tract infection, site not specified      Past Surgical History:   Procedure Laterality Date   • BREAST BIOPSY  1980's    benign left breast 35 years ago   • ABDOMINAL HYSTERECTOMY TOTAL      mennorrogia   • APPENDECTOMY     • CHOLECYSTECTOMY     • COLON RESECTION      Polyp removal   • ENDOMETRIAL ABLATION     • PRIMARY C SECTION     • TONSILLECTOMY     • TUBAL COAGULATION LAPAROSCOPIC BILATERAL     • US-NEEDLE CORE BX-BREAST PANEL       Allergies   Allergen Reactions   • Macrodantin [Nitrofurantoin Macrocrystal] Anaphylaxis   • Pcn [Penicillins] Anaphylaxis   • Doxycycline Itching   • Amlodipine      Leg swelling   • Bactrim Vomiting   • Clarithromycin Vomiting   • Omnicef Itching         Current Facility-Administered Medications:   •  [START ON 9/30/2020] irbesartan (AVAPRO) tablet 300 mg, 300 mg,  Oral, DAILY, Viktor Todd D.O.  •  busPIRone (BUSPAR) tablet 15 mg, 15 mg, Oral, BID, Viktor Todd D.O.  •  atorvastatin (LIPITOR) tablet 40 mg, 40 mg, Oral, Q EVENING, Viktor Todd D.O.  •  hydrALAZINE (APRESOLINE) tablet 25 mg, 25 mg, Oral, TID, Viktor Todd D.O.  •  senna-docusate (PERICOLACE or SENOKOT S) 8.6-50 MG per tablet 2 Tab, 2 Tab, Oral, BID **AND** polyethylene glycol/lytes (MIRALAX) PACKET 1 Packet, 1 Packet, Oral, QDAY PRN **AND** magnesium hydroxide (MILK OF MAGNESIA) suspension 30 mL, 30 mL, Oral, QDAY PRN **AND** bisacodyl (DULCOLAX) suppository 10 mg, 10 mg, Rectal, QDAY PRN, Viktor Todd D.O.  •  [START ON 9/30/2020] enoxaparin (LOVENOX) inj 40 mg, 40 mg, Subcutaneous, DAILY, Viktor Todd D.O.  •  acetaminophen (TYLENOL) tablet 650 mg, 650 mg, Oral, Q6HRS PRN, Viktor Todd D.O.  •  enalaprilat (VASOTEC) injection 1.25 mg, 1.25 mg, Intravenous, Q6HRS PRN, GEORGE BricenoO.  •  spironolactone/hctz (ALDACTAZIDE) 25-25 MG per tablet 1 Tab, 1 Tab, Oral, Q DAY, Dwayne Kent M.D.  Medications Prior to Admission   Medication Sig Dispense Refill Last Dose   • hydroCHLOROthiazide (HYDRODIURIL) 50 MG Tab Take 50 mg by mouth Once.   9/28/2020 at hs   • busPIRone (BUSPAR) 15 MG tablet Take 15 mg by mouth 2 times a day. Morning and at evening   9/29/2020 at 0705   • esomeprazole (NEXIUM) 20 MG capsule Take 20 mg by mouth 2 Times a Day.   9/29/2020 at 0705   • fluticasone (FLONASE) 50 MCG/ACT nasal spray Spray 2 Sprays in nose every evening.   9/28/2020 at 1800   • carvedilol (COREG) 3.125 MG Tab Take 1 Tab by mouth 2 times a day, with meals for 30 days. 60 Tab 0 9/29/2020 at 0903   • hydrALAZINE (APRESOLINE) 25 MG Tab Take 1 Tab by mouth 3 times a day as needed (for systolic blood pressure greater than 170) for up to 15 days. 45 Tab 0 NEW RX at [facility]   • cetirizine (ZYRTEC) 10 MG Tab Take 10 mg by mouth every evening.   9/28/2020 at 1800   • pravastatin (PRAVACHOL) 20 MG Tab  Take 1 Tab by mouth every day. 90 Tab 3 9/28/2020 at 2200   • irbesartan (AVAPRO) 300 MG Tab Take 1 Tab by mouth every day. 90 Tab 3 9/29/2020 at 0705   • busPIRone (BUSPAR) 7.5 MG tablet Take 7.5 mg by mouth every evening. With additional 15 mg every evening for a total of 22.5 mg   9/28/2020 at 1800      Social History     Socioeconomic History   • Marital status:      Spouse name: Not on file   • Number of children: Not on file   • Years of education: Not on file   • Highest education level: Not on file   Occupational History   • Not on file   Social Needs   • Financial resource strain: Not on file   • Food insecurity     Worry: Not on file     Inability: Not on file   • Transportation needs     Medical: Not on file     Non-medical: Not on file   Tobacco Use   • Smoking status: Current Every Day Smoker     Packs/day: 0.50     Years: 40.00     Pack years: 20.00     Types: Cigarettes   • Smokeless tobacco: Never Used   Substance and Sexual Activity   • Alcohol use: Yes     Alcohol/week: 0.6 oz     Types: 1 Standard drinks or equivalent per week     Comment: rarely   • Drug use: No   • Sexual activity: Never     Comment:    Lifestyle   • Physical activity     Days per week: Not on file     Minutes per session: Not on file   • Stress: Not on file   Relationships   • Social connections     Talks on phone: Not on file     Gets together: Not on file     Attends [employer] service: Not on file     Active member of club or organization: Not on file     Attends meetings of clubs or organizations: Not on file     Relationship status: Not on file   • Intimate partner violence     Fear of current or ex partner: Not on file     Emotionally abused: Not on file     Physically abused: Not on file     Forced sexual activity: Not on file   Other Topics Concern   • Not on file   Social History Narrative   • Not on file       Family History   Problem Relation Age of Onset   • Cancer Mother         Breast/Liver   •  Diabetes Mother    • Hypertension Mother    • Hyperlipidemia Mother    • Heart Disease Mother    • Lung Disease Father         Emphysema   • Psychiatric Illness Father         Paranoid schitzophenia   • Psychiatric Illness Sister         Paronoid Schitzo-disorder, Bipolar   • Arthritis Sister         RA, Fibromyalgia   • Cancer Maternal Aunt         Breast   • Cancer Paternal Aunt         Bone   • Arthritis Paternal Aunt    • Genetic Disorder Paternal Aunt         SLE   • Heart Disease Maternal Grandmother    • Hypertension Maternal Grandmother    • Hyperlipidemia Maternal Grandmother    • Genetic Disorder Maternal Grandmother         Brights disease   • Stroke Maternal Grandmother    • Heart Disease Maternal Grandfather    • Hypertension Maternal Grandfather    • Hyperlipidemia Maternal Grandfather    • Stroke Paternal Grandmother    • Hyperlipidemia Paternal Grandmother    • Hypertension Paternal Grandmother    • Heart Disease Paternal Grandfather    • Hypertension Paternal Grandfather    • Hyperlipidemia Paternal Grandfather           Studies  Lab Results   Component Value Date/Time    CHOLSTRLTOT 141 12/15/2017 07:07 AM    LDL 67 12/15/2017 07:07 AM    HDL 47 12/15/2017 07:07 AM    TRIGLYCERIDE 136 12/15/2017 07:07 AM       Lab Results   Component Value Date/Time    SODIUM 129 (L) 09/29/2020 11:07 AM    POTASSIUM 4.4 09/29/2020 11:07 AM    CHLORIDE 93 (L) 09/29/2020 11:07 AM    CO2 21 09/29/2020 11:07 AM    GLUCOSE 116 (H) 09/29/2020 11:07 AM    BUN 9 09/29/2020 11:07 AM    CREATININE 0.69 09/29/2020 11:07 AM     Lab Results   Component Value Date/Time    ALKPHOSPHAT 75 09/29/2020 11:07 AM    ASTSGOT 36 09/29/2020 11:07 AM    ALTSGPT 57 (H) 09/29/2020 11:07 AM    TBILIRUBIN 0.7 09/29/2020 11:07 AM      Lab Results   Component Value Date/Time    BNPBTYPENAT 22 05/05/2017 12:48 PM       For this encounter I directly reviewed medical records, ECG. I agree with the interpretation in the EHR    Case discussed with  Dr. Todd

## 2020-09-30 NOTE — CARE PLAN
Problem: Communication  Goal: The ability to communicate needs accurately and effectively will improve  Outcome: PROGRESSING AS EXPECTED   Talked with patient about POC and pain management   Problem: Pain Management  Goal: Pain level will decrease to patient's comfort goal  Outcome: PROGRESSING AS EXPECTED   Prn pain medication available

## 2020-09-30 NOTE — PROGRESS NOTES
[facility] Medicine Daily Progress Note    Date of Service  9/30/2020    Chief Complaint  59 y.o. female admitted 9/29/2020 with chest pain, headache    Hospital Course    Ms. Kelly Oakley is a 59 y.o. female who presented on 9/29/2020 with chest pain and headache.  She was recently admitted for nausea, vomiting, diarrhea and was C. difficile negative.  When she was discharged on 9/27 her hydrochlorothiazide was discontinued for hyponatremia she was started on carvedilol.  She states she has had uncontrolled blood pressure since.  Patient reports epigastric chest pain rating to her left shoulder and back which has been intermittent since last night.  Chest pain resolved and given a nitro patch in the emergency room.  Patient also reports palpitations and dizziness/lightheadedness when sitting up.  She reports nausea, vomiting, diarrhea have resolved since previous admission and she has had good p.o. intake prior to this admission.  Patient also endorses a headache this morning which he attributes to uncontrolled blood pressure.  Also reports shortness, numbness of legs which was attributed to electrolyte abnormalities when she was admitted earlier last week.  He was hypertensive and bradycardic on presentation.  Chest x-ray demonstrated no acute cardiopulmonary disease.  Troponins were mildly elevated at 21.    .  EKG showed no specific ST changes.    Echocardiogram showed ejection fraction 70 to 75%, normal right heart pressures, no valve abnormalities.  AST and ALT were slightly elevated on presentation.  A recent right upper quadrant ultrasound showed a normal liver.      Interval Problem Update  Patient was seen and examined at bedside.  I have personally reviewed vitals, labs, and imaging.    9/30.  Afebrile.  Blood pressure is better controlled.  Still episodes of bradycardia.  On room air.  Denies fevers, chills, chest pains, shortness of breath.  Headache is improved.  She does feel little gassy and  nauseous reporting that they gave all of her morning meds together on empty stomach.  Continue titrating medications with cardiology on board.    Consultants/Specialty  Cardiology    Code Status  Full Code    Disposition  Continue to monitor on telemetry.    Review of Systems  Review of Systems   Constitutional: Negative for chills and fever.   HENT: Negative for congestion and sore throat.    Eyes: Negative for blurred vision.   Respiratory: Negative for cough and shortness of breath.    Cardiovascular: Positive for palpitations. Negative for chest pain (Resolved) and leg swelling.   Gastrointestinal: Negative for abdominal pain, constipation, diarrhea, nausea and vomiting.   Genitourinary: Negative for dysuria, frequency and urgency.   Musculoskeletal: Negative for falls.   Skin: Negative for rash.   Neurological: Positive for headaches. Negative for dizziness and weakness.   Psychiatric/Behavioral: Negative for depression. The patient is not nervous/anxious.    All other systems reviewed and are negative.       Physical Exam  Temp:  [36.2 °C (97.2 °F)-36.9 °C (98.5 °F)] 36.4 °C (97.6 °F)  Pulse:  [50-73] 73  Resp:  [12-20] 17  BP: (136-210)/() 151/77  SpO2:  [96 %-99 %] 99 %    Physical Exam  Vitals signs and nursing note reviewed.   Constitutional:       General: She is not in acute distress.     Appearance: Normal appearance.   HENT:      Head: Normocephalic and atraumatic.      Nose: Nose normal.      Mouth/Throat:      Mouth: Mucous membranes are moist.      Pharynx: Oropharynx is clear. No oropharyngeal exudate or posterior oropharyngeal erythema.   Eyes:      Extraocular Movements: Extraocular movements intact.      Conjunctiva/sclera: Conjunctivae normal.   Neck:      Musculoskeletal: Normal range of motion and neck supple.   Cardiovascular:      Rate and Rhythm: Regular rhythm. Bradycardia present.      Pulses: Normal pulses.      Heart sounds: Normal heart sounds. No murmur.   Pulmonary:       Effort: Pulmonary effort is normal. No respiratory distress.      Breath sounds: Normal breath sounds. No stridor. No wheezing or rales.   Abdominal:      General: Abdomen is flat. Bowel sounds are normal. There is no distension.      Palpations: Abdomen is soft. There is no mass.      Tenderness: There is no abdominal tenderness.   Musculoskeletal:      Right lower leg: Edema present.      Left lower leg: Edema present.   Skin:     General: Skin is warm.      Capillary Refill: Capillary refill takes less than 2 seconds.   Neurological:      General: No focal deficit present.      Mental Status: She is alert and oriented to person, place, and time. Mental status is at baseline.      Cranial Nerves: No cranial nerve deficit.   Psychiatric:         Mood and Affect: Mood normal.         Behavior: Behavior normal.         Fluids  No intake or output data in the 24 hours ending 09/30/20 0754    Laboratory  Recent Labs     09/29/20  1107   WBC 6.6   RBC 4.63   HEMOGLOBIN 14.7   HEMATOCRIT 41.3   MCV 89.2   MCH 31.7   MCHC 35.6*   RDW 43.0   PLATELETCT 227   MPV 10.4     Recent Labs     09/29/20  1107 09/30/20  0240   SODIUM 129* 128*   POTASSIUM 4.4 4.1   CHLORIDE 93* 93*   CO2 21 20   GLUCOSE 116* 98   BUN 9 13   CREATININE 0.69 0.91   CALCIUM 9.7 8.8                   Imaging  EC-ECHOCARDIOGRAM COMPLETE W/O CONT   Final Result      DX-CHEST-PORTABLE (1 VIEW)   Final Result      No acute cardiopulmonary process is seen.      Atherosclerotic plaque.           Assessment/Plan  Hyponatremia  Assessment & Plan  Likely from recent nausea, vomiting, diarrhea along with previously being on hydrochlorothiazide.  Check BNP, serum, urine osmoles.  Patient has hypotonic hyponatremia.  We will check thyroid function test.    Chest pain  Assessment & Plan  Chest pain is atypical and is reproducible on palpation.  Patient does have difficulty quantifying/qualifying chest pain  Chest pain has resolved  Points peaked at 20.  Monitor on  telemetry  Echocardiogram with normal ejection fraction  Cardiology consult    HTN (hypertension)- (present on admission)  Assessment & Plan  Restart outpatient blood pressure medicines with holding parameters, irbesartan, hydralazine scheduled  Hold hydrochlorothiazide for hyponatremia.  Hold carvedilol for bradycardia  Cardiology consult    GERD (gastroesophageal reflux disease)- (present on admission)  Assessment & Plan  Continue omeprazole     HLD (hyperlipidemia)- (present on admission)  Assessment & Plan  Switch from outpatient pravastatin to atorvastatin.       VTE prophylaxis: Enoxaparin

## 2020-09-30 NOTE — PROGRESS NOTES
2 RN Skin Check    2 RN skin check complete.   Devices in place: right PIV intact.  Skin assessed under devices: N\A.  Confirmed pressure ulcers found on: none.  New potential pressure ulcers noted on none. Wound consult placed N/A.  The following interventions in place Pillows and Lotion.    Left arm has a bruise from a lab stick otherwise skin is intact

## 2020-09-30 NOTE — PROGRESS NOTES
Cardiology Follow Up Progress Note    Date of Service  9/30/2020    Attending Physician  Viktor Todd D.O.    Chief Complaint   Hypertension    HPI  Kelly Oakley is a 59 y.o. female admitted 9/29/2020 with hypertensive urgency. She was recently hospitalization for GI illness, hyponatremia and which time her home BP meds were changed (HCTZ stopped in favor of carvedilol), she continued on irbesartan.     Interim Events  Denies any complaints this morning other than a headache, she is anxious about her blood pressures being so high. No chest pain, dyspnea, orthopnea.     Review of Systems  Review of Systems   Constitutional: Negative for chills, diaphoresis and fever.   Respiratory: Negative for cough, chest tightness and shortness of breath.    Cardiovascular: Negative for chest pain, palpitations and leg swelling.   Gastrointestinal: Negative for abdominal distention and blood in stool.   Neurological: Positive for headaches. Negative for dizziness and light-headedness.   Hematological: Does not bruise/bleed easily.       Vital signs in last 24 hours  Temp:  [36.2 °C (97.2 °F)-36.9 °C (98.5 °F)] 36.4 °C (97.6 °F)  Pulse:  [50-73] 73  Resp:  [12-20] 17  BP: (136-210)/() 151/77  SpO2:  [96 %-99 %] 99 %    Physical Exam  Physical Exam  Vitals signs and nursing note reviewed.   Constitutional:       General: She is not in acute distress.     Appearance: Normal appearance.   HENT:      Head: Normocephalic and atraumatic.      Mouth/Throat:      Mouth: Mucous membranes are moist.   Eyes:      General: No scleral icterus.  Neck:      Comments: No JVD  Cardiovascular:      Rate and Rhythm: Normal rate and regular rhythm.      Pulses: Normal pulses.      Heart sounds: No murmur.   Pulmonary:      Effort: Pulmonary effort is normal. No respiratory distress.      Breath sounds: Normal breath sounds. No rales.   Abdominal:      General: There is no distension.   Musculoskeletal:      Right lower leg: No edema.       Left lower leg: No edema.   Skin:     General: Skin is warm and dry.      Capillary Refill: Capillary refill takes less than 2 seconds.   Neurological:      General: No focal deficit present.      Mental Status: She is alert and oriented to person, place, and time.   Psychiatric:         Judgment: Judgment normal.         Lab Review  Lab Results   Component Value Date/Time    WBC 6.6 09/29/2020 11:07 AM    RBC 4.63 09/29/2020 11:07 AM    HEMOGLOBIN 14.7 09/29/2020 11:07 AM    HEMATOCRIT 41.3 09/29/2020 11:07 AM    MCV 89.2 09/29/2020 11:07 AM    MCH 31.7 09/29/2020 11:07 AM    MCHC 35.6 (H) 09/29/2020 11:07 AM    MPV 10.4 09/29/2020 11:07 AM      Lab Results   Component Value Date/Time    SODIUM 128 (L) 09/30/2020 02:40 AM    POTASSIUM 4.1 09/30/2020 02:40 AM    CHLORIDE 93 (L) 09/30/2020 02:40 AM    CO2 20 09/30/2020 02:40 AM    GLUCOSE 98 09/30/2020 02:40 AM    BUN 13 09/30/2020 02:40 AM    CREATININE 0.91 09/30/2020 02:40 AM      Lab Results   Component Value Date/Time    ASTSGOT 36 09/29/2020 11:07 AM    ALTSGPT 57 (H) 09/29/2020 11:07 AM     Lab Results   Component Value Date/Time    CHOLSTRLTOT 141 12/15/2017 07:07 AM    LDL 67 12/15/2017 07:07 AM    HDL 47 12/15/2017 07:07 AM    TRIGLYCERIDE 136 12/15/2017 07:07 AM    TROPONINT 20 (H) 09/30/2020 02:40 AM       Recent Labs     09/30/20  0240   NTPROBNP 352*       Cardiac Imaging and Procedures Review  EKG 9/29/20: NSR with PVCs    Echocardiogram:  9/30/20  CONCLUSIONS  Normal left ventricular systolic function.  Left ventricular ejection fraction is visually estimated to be 70-75%.  Right heart pressures are normal.  No valve abnormalities.        Assessment/Plan     Hypertensive Urgency  Hyponatremia, worsening  - Pt euvolemic on exam  - Avoid HCTZ at this point due to hyponatremia, uptitrate Hydralazine, given 50mg this morning, if BPs not <150/90 this afternoon, increase dose to 100mg TID  - BPs still >140/90 tomorrow can add isosorbide dinitrate  -  continue Irbesartan  - had leg swelling on amlodipine    Tobacco Use  - trying to wean down, recommend inpatient tobacco counselling    Will continue to follow.   Staffed with Dr. Hi

## 2020-10-01 VITALS
WEIGHT: 155.42 LBS | HEIGHT: 63 IN | BODY MASS INDEX: 27.54 KG/M2 | TEMPERATURE: 97.7 F | OXYGEN SATURATION: 98 % | SYSTOLIC BLOOD PRESSURE: 102 MMHG | DIASTOLIC BLOOD PRESSURE: 53 MMHG | RESPIRATION RATE: 15 BRPM | HEART RATE: 65 BPM

## 2020-10-01 LAB
ANION GAP SERPL CALC-SCNC: 15 MMOL/L (ref 7–16)
BASOPHILS # BLD AUTO: 0.6 % (ref 0–1.8)
BASOPHILS # BLD: 0.04 K/UL (ref 0–0.12)
BUN SERPL-MCNC: 16 MG/DL (ref 8–22)
CALCIUM SERPL-MCNC: 9.4 MG/DL (ref 8.5–10.5)
CHLORIDE SERPL-SCNC: 93 MMOL/L (ref 96–112)
CO2 SERPL-SCNC: 19 MMOL/L (ref 20–33)
CREAT SERPL-MCNC: 0.8 MG/DL (ref 0.5–1.4)
EOSINOPHIL # BLD AUTO: 0.1 K/UL (ref 0–0.51)
EOSINOPHIL NFR BLD: 1.6 % (ref 0–6.9)
ERYTHROCYTE [DISTWIDTH] IN BLOOD BY AUTOMATED COUNT: 43.4 FL (ref 35.9–50)
GLUCOSE SERPL-MCNC: 102 MG/DL (ref 65–99)
HCT VFR BLD AUTO: 41.1 % (ref 37–47)
HGB BLD-MCNC: 14.5 G/DL (ref 12–16)
IMM GRANULOCYTES # BLD AUTO: 0.06 K/UL (ref 0–0.11)
IMM GRANULOCYTES NFR BLD AUTO: 0.9 % (ref 0–0.9)
LYMPHOCYTES # BLD AUTO: 1.82 K/UL (ref 1–4.8)
LYMPHOCYTES NFR BLD: 28.4 % (ref 22–41)
MAGNESIUM SERPL-MCNC: 1.9 MG/DL (ref 1.5–2.5)
MCH RBC QN AUTO: 31.6 PG (ref 27–33)
MCHC RBC AUTO-ENTMCNC: 35.3 G/DL (ref 33.6–35)
MCV RBC AUTO: 89.5 FL (ref 81.4–97.8)
MONOCYTES # BLD AUTO: 0.93 K/UL (ref 0–0.85)
MONOCYTES NFR BLD AUTO: 14.5 % (ref 0–13.4)
NEUTROPHILS # BLD AUTO: 3.45 K/UL (ref 2–7.15)
NEUTROPHILS NFR BLD: 54 % (ref 44–72)
NRBC # BLD AUTO: 0 K/UL
NRBC BLD-RTO: 0 /100 WBC
PHOSPHATE SERPL-MCNC: 4.4 MG/DL (ref 2.5–4.5)
PLATELET # BLD AUTO: 217 K/UL (ref 164–446)
PMV BLD AUTO: 10 FL (ref 9–12.9)
POTASSIUM SERPL-SCNC: 4.3 MMOL/L (ref 3.6–5.5)
RBC # BLD AUTO: 4.59 M/UL (ref 4.2–5.4)
SODIUM SERPL-SCNC: 127 MMOL/L (ref 135–145)
TSH SERPL DL<=0.005 MIU/L-ACNC: 1.85 UIU/ML (ref 0.38–5.33)
WBC # BLD AUTO: 6.4 K/UL (ref 4.8–10.8)

## 2020-10-01 PROCEDURE — 84100 ASSAY OF PHOSPHORUS: CPT

## 2020-10-01 PROCEDURE — A9270 NON-COVERED ITEM OR SERVICE: HCPCS | Performed by: NURSE PRACTITIONER

## 2020-10-01 PROCEDURE — 84443 ASSAY THYROID STIM HORMONE: CPT

## 2020-10-01 PROCEDURE — G0378 HOSPITAL OBSERVATION PER HR: HCPCS

## 2020-10-01 PROCEDURE — 83735 ASSAY OF MAGNESIUM: CPT

## 2020-10-01 PROCEDURE — 700102 HCHG RX REV CODE 250 W/ 637 OVERRIDE(OP): Performed by: PHYSICIAN ASSISTANT

## 2020-10-01 PROCEDURE — 99217 PR OBSERVATION CARE DISCHARGE: CPT | Performed by: INTERNAL MEDICINE

## 2020-10-01 PROCEDURE — A9270 NON-COVERED ITEM OR SERVICE: HCPCS | Performed by: INTERNAL MEDICINE

## 2020-10-01 PROCEDURE — A9270 NON-COVERED ITEM OR SERVICE: HCPCS | Performed by: PHYSICIAN ASSISTANT

## 2020-10-01 PROCEDURE — 700102 HCHG RX REV CODE 250 W/ 637 OVERRIDE(OP): Performed by: INTERNAL MEDICINE

## 2020-10-01 PROCEDURE — 700102 HCHG RX REV CODE 250 W/ 637 OVERRIDE(OP): Performed by: NURSE PRACTITIONER

## 2020-10-01 PROCEDURE — 99213 OFFICE O/P EST LOW 20 MIN: CPT | Performed by: INTERNAL MEDICINE

## 2020-10-01 PROCEDURE — 80048 BASIC METABOLIC PNL TOTAL CA: CPT

## 2020-10-01 PROCEDURE — 85025 COMPLETE CBC W/AUTO DIFF WBC: CPT

## 2020-10-01 RX ORDER — NICOTINE POLACRILEX 4 MG/1
4 GUM, CHEWING ORAL
Qty: 30 EACH | Refills: 0 | Status: SHIPPED
Start: 2020-10-01 | End: 2020-10-10

## 2020-10-01 RX ORDER — SODIUM CHLORIDE 1 G/1
2 TABLET ORAL
Status: DISCONTINUED | OUTPATIENT
Start: 2020-10-01 | End: 2020-10-01 | Stop reason: HOSPADM

## 2020-10-01 RX ORDER — NICOTINE 21 MG/24HR
1 PATCH, TRANSDERMAL 24 HOURS TRANSDERMAL EVERY 24 HOURS
Qty: 30 PATCH | Refills: 1 | Status: SHIPPED
Start: 2020-10-01 | End: 2020-10-10

## 2020-10-01 RX ORDER — SODIUM CHLORIDE 1 G/1
2 TABLET ORAL
Qty: 15 TAB | Refills: 0 | Status: SHIPPED
Start: 2020-10-01 | End: 2020-10-06

## 2020-10-01 RX ORDER — HYDRALAZINE HYDROCHLORIDE 50 MG/1
50 TABLET, FILM COATED ORAL 3 TIMES DAILY
Qty: 90 TAB | Refills: 0 | Status: SHIPPED
Start: 2020-10-01 | End: 2020-10-06

## 2020-10-01 RX ADMIN — HYDRALAZINE HYDROCHLORIDE 50 MG: 25 TABLET, FILM COATED ORAL at 05:41

## 2020-10-01 RX ADMIN — Medication 2 G: at 11:13

## 2020-10-01 RX ADMIN — OMEPRAZOLE 20 MG: 20 CAPSULE, DELAYED RELEASE ORAL at 05:41

## 2020-10-01 RX ADMIN — IRBESARTAN 300 MG: 150 TABLET ORAL at 05:41

## 2020-10-01 RX ADMIN — BUSPIRONE HYDROCHLORIDE 15 MG: 10 TABLET ORAL at 05:41

## 2020-10-01 ASSESSMENT — ENCOUNTER SYMPTOMS
CHEST TIGHTNESS: 0
APNEA: 0
CHOKING: 0
WHEEZING: 0
STRIDOR: 0
COUGH: 0
SHORTNESS OF BREATH: 0

## 2020-10-01 NOTE — PROGRESS NOTES
Pt ride arrive. Pt transported to [facility] via wheelchair with hospital escort.

## 2020-10-01 NOTE — PROGRESS NOTES
Assessment completed. Pt A&Ox 4. Respirations are even and unlabored on room air. Pt denies pain at this time. Monitors applied, VS stable, call light and belongings within reach. POC updated (BP control). Pt educated on room and call light, pt verbalized understanding. Communication board updated. Needs met.

## 2020-10-01 NOTE — DISCHARGE SUMMARY
Discharge Summary    CHIEF COMPLAINT ON ADMISSION  Chief Complaint   Patient presents with   • Chest Pain     mid-sternal radiating to L side   • Shortness of Breath       Reason for Admission  Chest Pain     Admission Date  9/29/2020    CODE STATUS  Full Code    HPI & HOSPITAL COURSE  Ms. Kelly Oakley is a 59 y.o. female who presented on 9/29/2020 with chest pain and headache.  She was recently admitted for nausea, vomiting, diarrhea and was C. difficile negative.  When she was discharged on 9/27 her hydrochlorothiazide was discontinued for hyponatremia she was started on carvedilol.  She states she has had uncontrolled blood pressure since.  Patient reports epigastric chest pain rating to her left shoulder and back which has been intermittent since last night.  Chest pain resolved and given a nitro patch in the emergency room.  Patient also reports palpitations and dizziness/lightheadedness when sitting up.  She reports nausea, vomiting, diarrhea have resolved since previous admission and she has had good p.o. intake prior to this admission.  Patient also endorses a headache this morning which he attributes to uncontrolled blood pressure.  Also reports shortness, numbness of legs which was attributed to electrolyte abnormalities when she was admitted earlier last week.  He was hypertensive and bradycardic on presentation.  Chest x-ray demonstrated no acute cardiopulmonary disease.  Troponins were mildly elevated at 21.    .  EKG showed no specific ST changes.    Echocardiogram showed ejection fraction 70 to 75%, normal right heart pressures, no valve abnormalities.  AST and ALT were slightly elevated on presentation.  A recent right upper quadrant ultrasound showed a normal liver.      During her hospital stay, patient had an episode of bradycardia which resolved.  Headache also improved during her stay.  Patient reports having anxiety and that is her baseline.  Cardiology is on board which adjusted her  blood pressure medication, and patient was able to tolerate.  Patient highly recommended to follow-up with PCP along with cardiology for follow-up.  Discussed with patient smoking cessation.  Patient willing to use alternatives such as nicotine patch and gum.  Patient also still have some mild hyponatremia due to history of nausea vomiting.  Patient has not had any episodes of nausea vomiting while admitted.  Patient will be prescribed salt tabs for approximately 5 days.  All questions and concerns answered prior to being discharged.  Patient discharged home.        Therefore, she is discharged in good and stable condition to home with close outpatient follow-up.    The patient met 2-midnight criteria for an inpatient stay at the time of discharge.    Discharge Date  10/01/20      FOLLOW UP ITEMS POST DISCHARGE  Please call [phone] to schedule PCP appointment for patient.    Required specialty appointments include:       Discharge Instructions per Rehan Estes A.P.R.NAndres  -Follow-up with primary care  -Follow-up with cardiology for better monitoring of blood pressure  -Start taking nicotine patch along with gum to help with smoking cessation  -Resume most of home medication, see changes in dosage for antihypertensive medication    DIET: Cardiac    ACTIVITY: As tolerated    DIAGNOSIS: Chest pain    Return to ER if symptoms persist, severe chest pain, palpitations, shortness of breath, numbness, tingling, weakness, and high fevers.    DISCHARGE DIAGNOSES  Active Problems:    Hyponatremia POA: Unknown    HLD (hyperlipidemia) POA: Yes    GERD (gastroesophageal reflux disease) POA: Yes  Resolved Problems:    Chest pain POA: Unknown    HTN (hypertension) POA: Yes      FOLLOW UP  No future appointments.  Tonya Toth M.D.  [address]    [facility] [address]  [phone]    Schedule an appointment as soon as possible for a visit in 1 week      Dwayne Kent M.D.  [address]  [facility]  [address]  [facility] [address]  [phone]    Schedule an appointment as soon as possible for a visit in 1 week        MEDICATIONS ON DISCHARGE     Medication List      START taking these medications      Instructions   Nicorette 4 MG gum  Generic drug: nicotine polacrilex   Take 4 mg by mouth 1 time daily as needed for up to 30 days.  Dose: 4 mg     nicotine 14 MG/24HR Pt24  Commonly known as: NICODERM   Apply 1 Patch to skin as directed every 24 hours for 30 days.  Dose: 1 Patch     sodium chloride 1 GM Tabs  Commonly known as: SALT   Take 2 Tabs by mouth 3 times a day, with meals for 5 days.  Dose: 2 g        CHANGE how you take these medications      Instructions   busPIRone 15 MG tablet  What changed: Another medication with the same name was removed. Continue taking this medication, and follow the directions you see here.  Commonly known as: BUSPAR   Take 15 mg by mouth 2 times a day. Morning and at evening  Dose: 15 mg     hydrALAZINE 50 MG Tabs  What changed:   · medication strength  · how much to take  · when to take this  · reasons to take this  Commonly known as: APRESOLINE   Take 1 Tab by mouth 3 times a day for 30 days.  Dose: 50 mg        CONTINUE taking these medications      Instructions   carvedilol 3.125 MG Tabs  Commonly known as: COREG   Take 1 Tab by mouth 2 times a day, with meals for 30 days.  Dose: 3.125 mg     cetirizine 10 MG Tabs  Commonly known as: ZYRTEC   Take 10 mg by mouth every evening.  Dose: 10 mg     fluticasone 50 MCG/ACT nasal spray  Commonly known as: FLONASE   Spray 2 Sprays in nose every evening.  Dose: 2 Spray     hydroCHLOROthiazide 50 MG Tabs  Commonly known as: HYDRODIURIL   Take 50 mg by mouth Once.  Dose: 50 mg     irbesartan 300 MG Tabs  Commonly known as: AVAPRO   Take 1 Tab by mouth every day.  Dose: 300 mg     NexIUM 20 MG capsule  Generic drug: esomeprazole   Take 20 mg by mouth 2 Times a Day.  Dose: 20 mg     pravastatin 20 MG Tabs  Commonly known as: PRAVACHOL   Take  1 Tab by mouth every day.  Dose: 20 mg            Allergies  Allergies   Allergen Reactions   • Macrodantin [Nitrofurantoin Macrocrystal] Anaphylaxis   • Pcn [Penicillins] Anaphylaxis   • Doxycycline Itching   • Amlodipine      Leg swelling   • Bactrim Vomiting   • Clarithromycin Vomiting   • Omnicef Itching       DIET  Orders Placed This Encounter   Procedures   • Diet Order Cardiac     Standing Status:   Standing     Number of Occurrences:   1     Order Specific Question:   Diet:     Answer:   Cardiac [6]       ACTIVITY  As tolerated.  Weight bearing as tolerated    CONSULTATIONS  Cardiology    PROCEDURES  NONE    LABORATORY  Lab Results   Component Value Date    SODIUM 127 (L) 10/01/2020    POTASSIUM 4.3 10/01/2020    CHLORIDE 93 (L) 10/01/2020    CO2 19 (L) 10/01/2020    GLUCOSE 102 (H) 10/01/2020    BUN 16 10/01/2020    CREATININE 0.80 10/01/2020        Lab Results   Component Value Date    WBC 6.4 10/01/2020    HEMOGLOBIN 14.5 10/01/2020    HEMATOCRIT 41.1 10/01/2020    PLATELETCT 217 10/01/2020        Total time of the discharge process exceeds 36 minutes.  ================================================================================================Please note that this dictation was created using voice recognition software. I have made every reasonable attempt to correct obvious errors, but there may be errors of grammar and possibly content that I did not discover before finalizing the note.    Electronically signed by:  AHMET Estes, MSN, APRN, FNP-C  Hospitalist Services  [facility]  [phone]  [email or web address]  10/01/20    3504

## 2020-10-01 NOTE — PROGRESS NOTES
Tele: SB 58 to SR 92.  .16 / .08 / .44     Atarax po prn ordered for pt. Med administered. Will pass care to NOC RN @ [facility]

## 2020-10-01 NOTE — PROGRESS NOTES
Discharge instructions, medications and follow-up reviewed with pt, pt verbalized understanding and denies questions. Discharge paperwork given to pt. Pt prescriptions sent to pt pharmacy of choice. PIV removed, TeleBox removed, armband removed. Pt awaiting transport.

## 2020-10-01 NOTE — PROGRESS NOTES
Cardiology Follow Up Progress Note    Date of Service  9/30/2020    Attending Physician  Viktor Todd D.O.        Admitted with chest discomfort, headache, found to have hypertensive urgency      Cardiology consultation for hypertensive urgency, recent hospitalization for GI illness with dehydration and hyponatremia (-discharged 9/27/2020)  hydrochlorothiazide was stopped in favor of Coreg, patient returned to the hospital within 24/48 hours after noticing  systolic blood pressure 230 at home      Past medical history significant for tobacco abuse, hypertension, prior lower extremity edema with amlodipine        Interim Events  BP well controlled on current medical therapy  Denies chest discomfort  Discussed close monitoring of BP at home  Labs reviewed  Na 127  Normal TSH      Review of Systems  Review of Systems   Respiratory: Negative for apnea, cough, choking, chest tightness, shortness of breath, wheezing and stridor.        Vital signs in last 24 hours  Temp:  [36.4 °C (97.6 °F)-36.8 °C (98.3 °F)] 36.5 °C (97.7 °F)  Pulse:  [54-73] 67  Resp:  [15-18] 16  BP: (125-157)/(60-77) 125/60  SpO2:  [97 %-99 %] 98 %    Physical Exam  Physical Exam  Cardiovascular:      Rate and Rhythm: Normal rate and regular rhythm.   Pulmonary:      Effort: Pulmonary effort is normal.   Abdominal:      General: Abdomen is flat.   Skin:     General: Skin is warm.   Neurological:      General: No focal deficit present.      Mental Status: She is alert.   Psychiatric:         Mood and Affect: Mood normal.         Lab Review  Lab Results   Component Value Date/Time    WBC 7.6 09/30/2020 08:35 AM    RBC 4.62 09/30/2020 08:35 AM    HEMOGLOBIN 14.6 09/30/2020 08:35 AM    HEMATOCRIT 41.4 09/30/2020 08:35 AM    MCV 89.6 09/30/2020 08:35 AM    MCH 31.6 09/30/2020 08:35 AM    MCHC 35.3 (H) 09/30/2020 08:35 AM    MPV 10.0 09/30/2020 08:35 AM      Lab Results   Component Value Date/Time    SODIUM 128 (L) 09/30/2020 02:40 AM    POTASSIUM 4.1  09/30/2020 02:40 AM    CHLORIDE 93 (L) 09/30/2020 02:40 AM    CO2 20 09/30/2020 02:40 AM    GLUCOSE 98 09/30/2020 02:40 AM    BUN 13 09/30/2020 02:40 AM    CREATININE 0.91 09/30/2020 02:40 AM      Lab Results   Component Value Date/Time    ASTSGOT 36 09/29/2020 11:07 AM    ALTSGPT 57 (H) 09/29/2020 11:07 AM     Lab Results   Component Value Date/Time    CHOLSTRLTOT 141 12/15/2017 07:07 AM    LDL 67 12/15/2017 07:07 AM    HDL 47 12/15/2017 07:07 AM    TRIGLYCERIDE 136 12/15/2017 07:07 AM    TROPONINT 20 (H) 09/30/2020 02:40 AM       Recent Labs     09/30/20  0240   NTPROBNP 352*       Cardiac Imaging and Procedures Review  EKG: Sinus rhythm, PVCs, no ischemia    Echocardiogram:    9/30/2020  LVEF 70 to 75%  Normal LV systolic function  No valve abnormalities    Cardiac Catheterization: Not applicable    Imaging  Chest X-Ray: No acute cardiopulmonary process    Stress Test: Not applicable    Assessment/Plan    Hypertensive urgency  -Coreg stopped secondary to bradycardia  -LE edema with Amlodipine  -on Hydralazine, irbesartan with good BP control    Chest discomfort  -reproducible  -denies pain this am  -Well preserved EF    Hyponatremia  -Hypotonic hyponatremia  -Hydrochlorothiazide stopped  -Discussed water intake discretion  -repeat BMP in one week    GERD  -Omeprazole    Hyperlipidemia  -Atorvastatin    NO further cardiac work up  Cardiology will sign off        Please contact me with any questions.    DEVORAH Sage.   Cardiologist, [facility]  [phone]

## 2020-10-06 ENCOUNTER — APPOINTMENT (OUTPATIENT)
Dept: RADIOLOGY | Facility: MEDICAL CENTER | Age: 60
End: 2020-10-06
Attending: EMERGENCY MEDICINE
Payer: MEDICAID

## 2020-10-06 ENCOUNTER — HOSPITAL ENCOUNTER (EMERGENCY)
Facility: MEDICAL CENTER | Age: 60
End: 2020-10-06
Attending: EMERGENCY MEDICINE
Payer: MEDICAID

## 2020-10-06 VITALS
WEIGHT: 160.94 LBS | HEIGHT: 63 IN | SYSTOLIC BLOOD PRESSURE: 171 MMHG | BODY MASS INDEX: 28.52 KG/M2 | DIASTOLIC BLOOD PRESSURE: 78 MMHG | RESPIRATION RATE: 17 BRPM | TEMPERATURE: 97.1 F | OXYGEN SATURATION: 97 % | HEART RATE: 59 BPM

## 2020-10-06 DIAGNOSIS — I16.0 HYPERTENSIVE URGENCY: ICD-10-CM

## 2020-10-06 DIAGNOSIS — R51.9 ACUTE NONINTRACTABLE HEADACHE, UNSPECIFIED HEADACHE TYPE: ICD-10-CM

## 2020-10-06 DIAGNOSIS — F41.9 ANXIETY: ICD-10-CM

## 2020-10-06 LAB
ALBUMIN SERPL BCP-MCNC: 4.6 G/DL (ref 3.2–4.9)
ALBUMIN/GLOB SERPL: 1.7 G/DL
ALP SERPL-CCNC: 70 U/L (ref 30–99)
ALT SERPL-CCNC: 31 U/L (ref 2–50)
ANION GAP SERPL CALC-SCNC: 14 MMOL/L (ref 7–16)
AST SERPL-CCNC: 21 U/L (ref 12–45)
BASOPHILS # BLD AUTO: 0.6 % (ref 0–1.8)
BASOPHILS # BLD: 0.04 K/UL (ref 0–0.12)
BILIRUB SERPL-MCNC: 0.5 MG/DL (ref 0.1–1.5)
BUN SERPL-MCNC: 10 MG/DL (ref 8–22)
CALCIUM SERPL-MCNC: 8.9 MG/DL (ref 8.5–10.5)
CHLORIDE SERPL-SCNC: 97 MMOL/L (ref 96–112)
CO2 SERPL-SCNC: 19 MMOL/L (ref 20–33)
CREAT SERPL-MCNC: 0.55 MG/DL (ref 0.5–1.4)
EKG IMPRESSION: NORMAL
EOSINOPHIL # BLD AUTO: 0.05 K/UL (ref 0–0.51)
EOSINOPHIL NFR BLD: 0.8 % (ref 0–6.9)
ERYTHROCYTE [DISTWIDTH] IN BLOOD BY AUTOMATED COUNT: 43.9 FL (ref 35.9–50)
GLOBULIN SER CALC-MCNC: 2.7 G/DL (ref 1.9–3.5)
GLUCOSE SERPL-MCNC: 112 MG/DL (ref 65–99)
HCT VFR BLD AUTO: 36.4 % (ref 37–47)
HGB BLD-MCNC: 12.7 G/DL (ref 12–16)
IMM GRANULOCYTES # BLD AUTO: 0.05 K/UL (ref 0–0.11)
IMM GRANULOCYTES NFR BLD AUTO: 0.8 % (ref 0–0.9)
LYMPHOCYTES # BLD AUTO: 0.89 K/UL (ref 1–4.8)
LYMPHOCYTES NFR BLD: 13.4 % (ref 22–41)
MCH RBC QN AUTO: 31.4 PG (ref 27–33)
MCHC RBC AUTO-ENTMCNC: 34.9 G/DL (ref 33.6–35)
MCV RBC AUTO: 90.1 FL (ref 81.4–97.8)
MONOCYTES # BLD AUTO: 0.54 K/UL (ref 0–0.85)
MONOCYTES NFR BLD AUTO: 8.1 % (ref 0–13.4)
NEUTROPHILS # BLD AUTO: 5.08 K/UL (ref 2–7.15)
NEUTROPHILS NFR BLD: 76.3 % (ref 44–72)
NRBC # BLD AUTO: 0 K/UL
NRBC BLD-RTO: 0 /100 WBC
PLATELET # BLD AUTO: 229 K/UL (ref 164–446)
PMV BLD AUTO: 9.7 FL (ref 9–12.9)
POTASSIUM SERPL-SCNC: 3.7 MMOL/L (ref 3.6–5.5)
PROT SERPL-MCNC: 7.3 G/DL (ref 6–8.2)
RBC # BLD AUTO: 4.04 M/UL (ref 4.2–5.4)
SODIUM SERPL-SCNC: 130 MMOL/L (ref 135–145)
TROPONIN T SERPL-MCNC: 19 NG/L (ref 6–19)
WBC # BLD AUTO: 6.7 K/UL (ref 4.8–10.8)

## 2020-10-06 PROCEDURE — 93005 ELECTROCARDIOGRAM TRACING: CPT

## 2020-10-06 PROCEDURE — 96375 TX/PRO/DX INJ NEW DRUG ADDON: CPT

## 2020-10-06 PROCEDURE — 99284 EMERGENCY DEPT VISIT MOD MDM: CPT

## 2020-10-06 PROCEDURE — 700111 HCHG RX REV CODE 636 W/ 250 OVERRIDE (IP): Performed by: EMERGENCY MEDICINE

## 2020-10-06 PROCEDURE — A9270 NON-COVERED ITEM OR SERVICE: HCPCS | Performed by: EMERGENCY MEDICINE

## 2020-10-06 PROCEDURE — 80053 COMPREHEN METABOLIC PANEL: CPT

## 2020-10-06 PROCEDURE — 700102 HCHG RX REV CODE 250 W/ 637 OVERRIDE(OP): Performed by: EMERGENCY MEDICINE

## 2020-10-06 PROCEDURE — 700101 HCHG RX REV CODE 250: Performed by: EMERGENCY MEDICINE

## 2020-10-06 PROCEDURE — 84484 ASSAY OF TROPONIN QUANT: CPT

## 2020-10-06 PROCEDURE — C9803 HOPD COVID-19 SPEC COLLECT: HCPCS | Performed by: HOSPITALIST

## 2020-10-06 PROCEDURE — 71045 X-RAY EXAM CHEST 1 VIEW: CPT

## 2020-10-06 PROCEDURE — 85025 COMPLETE CBC W/AUTO DIFF WBC: CPT

## 2020-10-06 PROCEDURE — 93005 ELECTROCARDIOGRAM TRACING: CPT | Performed by: EMERGENCY MEDICINE

## 2020-10-06 PROCEDURE — 96374 THER/PROPH/DIAG INJ IV PUSH: CPT

## 2020-10-06 RX ORDER — LORAZEPAM 2 MG/ML
1 INJECTION INTRAMUSCULAR ONCE
Status: COMPLETED | OUTPATIENT
Start: 2020-10-06 | End: 2020-10-06

## 2020-10-06 RX ORDER — HYDRALAZINE HYDROCHLORIDE 25 MG/1
50 TABLET, FILM COATED ORAL 3 TIMES DAILY
Status: DISCONTINUED | OUTPATIENT
Start: 2020-10-06 | End: 2020-10-06 | Stop reason: HOSPADM

## 2020-10-06 RX ORDER — BUSPIRONE HYDROCHLORIDE 7.5 MG/1
7.5 TABLET ORAL EVERY EVENING
COMMUNITY
End: 2020-11-18

## 2020-10-06 RX ORDER — LABETALOL HYDROCHLORIDE 5 MG/ML
10 INJECTION, SOLUTION INTRAVENOUS EVERY 4 HOURS PRN
Status: DISCONTINUED | OUTPATIENT
Start: 2020-10-06 | End: 2020-10-06 | Stop reason: HOSPADM

## 2020-10-06 RX ORDER — SODIUM CHLORIDE 1 G/1
2 TABLET ORAL 3 TIMES DAILY
COMMUNITY
Start: 2020-10-02 | End: 2020-10-10

## 2020-10-06 RX ORDER — HYDROCHLOROTHIAZIDE 50 MG/1
50 TABLET ORAL DAILY
COMMUNITY
End: 2020-10-10

## 2020-10-06 RX ORDER — BUSPIRONE HYDROCHLORIDE 5 MG/1
7.5 TABLET ORAL
COMMUNITY
End: 2020-10-06

## 2020-10-06 RX ORDER — HYDRALAZINE HYDROCHLORIDE 50 MG/1
50 TABLET, FILM COATED ORAL 3 TIMES DAILY
COMMUNITY
Start: 2020-10-01 | End: 2020-10-10

## 2020-10-06 RX ORDER — ENALAPRILAT 1.25 MG/ML
1.25 INJECTION INTRAVENOUS EVERY 6 HOURS PRN
Status: DISCONTINUED | OUTPATIENT
Start: 2020-10-06 | End: 2020-10-06 | Stop reason: HOSPADM

## 2020-10-06 RX ORDER — NICOTINE 21 MG/24HR
1 PATCH, TRANSDERMAL 24 HOURS TRANSDERMAL EVERY 24 HOURS
Status: DISCONTINUED | OUTPATIENT
Start: 2020-10-06 | End: 2020-10-06 | Stop reason: HOSPADM

## 2020-10-06 RX ORDER — LABETALOL HYDROCHLORIDE 5 MG/ML
20 INJECTION, SOLUTION INTRAVENOUS ONCE
Status: COMPLETED | OUTPATIENT
Start: 2020-10-06 | End: 2020-10-06

## 2020-10-06 RX ORDER — CLONIDINE HYDROCHLORIDE 0.1 MG/1
0.1 TABLET ORAL EVERY 6 HOURS PRN
Status: DISCONTINUED | OUTPATIENT
Start: 2020-10-06 | End: 2020-10-06 | Stop reason: HOSPADM

## 2020-10-06 RX ORDER — BISACODYL 10 MG
10 SUPPOSITORY, RECTAL RECTAL
Status: DISCONTINUED | OUTPATIENT
Start: 2020-10-06 | End: 2020-10-06 | Stop reason: HOSPADM

## 2020-10-06 RX ORDER — PRAVASTATIN SODIUM 20 MG
20 TABLET ORAL
Status: SHIPPED | COMMUNITY
End: 2022-03-11 | Stop reason: SDUPTHER

## 2020-10-06 RX ORDER — POLYETHYLENE GLYCOL 3350 17 G/17G
1 POWDER, FOR SOLUTION ORAL
Status: DISCONTINUED | OUTPATIENT
Start: 2020-10-06 | End: 2020-10-06 | Stop reason: HOSPADM

## 2020-10-06 RX ORDER — PRAVASTATIN SODIUM 20 MG
20 TABLET ORAL
Status: DISCONTINUED | OUTPATIENT
Start: 2020-10-06 | End: 2020-10-06 | Stop reason: HOSPADM

## 2020-10-06 RX ORDER — ACETAMINOPHEN 325 MG/1
650 TABLET ORAL ONCE
Status: COMPLETED | OUTPATIENT
Start: 2020-10-06 | End: 2020-10-06

## 2020-10-06 RX ORDER — BUSPIRONE HYDROCHLORIDE 5 MG/1
7.5 TABLET ORAL NIGHTLY
Status: DISCONTINUED | OUTPATIENT
Start: 2020-10-06 | End: 2020-10-06 | Stop reason: HOSPADM

## 2020-10-06 RX ORDER — IRBESARTAN 150 MG/1
300 TABLET ORAL DAILY
Status: DISCONTINUED | OUTPATIENT
Start: 2020-10-07 | End: 2020-10-06 | Stop reason: HOSPADM

## 2020-10-06 RX ORDER — AMOXICILLIN 250 MG
2 CAPSULE ORAL 2 TIMES DAILY
Status: DISCONTINUED | OUTPATIENT
Start: 2020-10-06 | End: 2020-10-06 | Stop reason: HOSPADM

## 2020-10-06 RX ORDER — OMEPRAZOLE 20 MG/1
40 CAPSULE, DELAYED RELEASE ORAL 2 TIMES DAILY
Status: DISCONTINUED | OUTPATIENT
Start: 2020-10-06 | End: 2020-10-06 | Stop reason: HOSPADM

## 2020-10-06 RX ADMIN — ACETAMINOPHEN 650 MG: 325 TABLET, FILM COATED ORAL at 14:34

## 2020-10-06 RX ADMIN — LABETALOL HYDROCHLORIDE 20 MG: 5 INJECTION, SOLUTION INTRAVENOUS at 14:34

## 2020-10-06 RX ADMIN — LORAZEPAM 1 MG: 2 INJECTION INTRAMUSCULAR; INTRAVENOUS at 14:53

## 2020-10-06 ASSESSMENT — FIBROSIS 4 INDEX: FIB4 SCORE: 1.3

## 2020-10-06 ASSESSMENT — PAIN DESCRIPTION - PAIN TYPE: TYPE: ACUTE PAIN

## 2020-10-06 NOTE — ED NOTES
Patient reassessed and states her headache and anxiety have improved. Blood pressure has also shown improvement.

## 2020-10-06 NOTE — ED TRIAGE NOTES
Patient to Ed with complaints of elevated blood pressure and numbness and tingling around mouth. She also reports feeling like tongue is swollen. She states this started this am. She has hx of recent electrolyte abnormalities and was taking sodium replacement at home but ran out.     No evidence of oral swelling. LS clear. No arm drift, or facial droop. Pt reports severe anxiety. She reports dizziness, shortness of breath and sweating. Ekg requested    Mask in place. Denies Covid symptoms.       Pt educated on ED process and asked to wait in lobby. Patient educated on importance of alerting staff to new or worsening symptoms or concerns.

## 2020-10-06 NOTE — ED NOTES
Med rec updated and complete  Allergies reviewed  Pt had some RX bottles at bedside, went over all RX bottles and returned RX bottles back to pt at bedside.  Pt reports that she thinks she stopped her HCTZ 50MG on 9/27/2020 and started taking HYDRALAZINE 50MG on 10/1/2020.  Pt also reports that the pharmacy did not give her enough of her SODIUM CHLORIDE 1GM and ran out yesterday (10/5/2020), per pt has not started NICORETTE 4MG gum or her NICOTINE 14MG/ patch.  Called David @ [phone], pt was on CIPRO 500MG 1 tablet BID and FLAGYL 500MG 1 tablet TID on 9/20/2020 for 7 day course, pt reports that she did finish course of antibiotics.

## 2020-10-06 NOTE — DISCHARGE INSTRUCTIONS
Please see your doctor in follow-up to discuss your blood pressure medication regimen    Please use electrolyte-containing beverages to hydrate as your sodium remains low at 130

## 2020-10-06 NOTE — ED PROVIDER NOTES
ED Provider Note    Scribed for Ziggy Latif M.D. by Tomer Cordero. 10/6/2020  2:07 PM    Primary care provider: Tonya Toth M.D.  Means of arrival: Walk-in  History obtained from: Patient  History limited by: None    CHIEF COMPLAINT  Chief Complaint   Patient presents with   • Blood Pressure Problem   • Anxiety   • Tingling   • Dizziness       HPI  Kelly Oakley is a 59 y.o. female with a recent history of electrolyte abnormalities and anxiety who presents to the Emergency Department for elevated blood pressure and numbness and tingling around the mouth onset this morning. Patient adds that her tongue feels swollen and she is experiencing a mild headache at this time. Patient notes that she was taking sodium replacement at home, but ran out. Patient states that she did not experience anything unusual prior to symptom onset. Patient adds that she was seen at [facility] about 2 weeks ago for electrolyte abnormalities and had her medications changed. Patient states that after she was discharged, she began experiencing episodes of emesis. Patient adds that she takes multiple medications including Nexium, Buspar, Apresoline, and others. Patient is currently afebrile.   PPE Note: I personally donned full PPE for all patient encounters during this visit, including being clean-shaven with an N95 respirator mask and eyewear.     Scribe remained outside the patient's room and did not have any contact with the patient for the duration of patient encounter.     REVIEW OF SYSTEMS  Pertinent negatives include no fevers. As above, all other systems reviewed and are negative.   See HPI for further details.     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    SURGICAL HISTORY   has a past surgical history that includes abdominal hysterectomy total; endometrial ablation; cholecystectomy; appendectomy; tonsillectomy; primary c section; tubal coagulation laparoscopic bilateral; colon resection; breast biopsy (1980's); and us-needle core bx-breast panel.    SOCIAL HISTORY  Social History     Tobacco Use   • Smoking status: Current Every Day Smoker     Packs/day: 0.50     Years: 40.00     Pack years: 20.00     Types: Cigarettes   • Smokeless tobacco: Never Used   Substance Use Topics   • Alcohol use: Yes     Alcohol/week: 0.6 oz     Types: 1 Standard drinks or equivalent per week     Comment: rarely   • Drug use: No      Social History     Substance and Sexual Activity   Drug Use No       FAMILY HISTORY  Family History   Problem Relation Age of Onset   • Cancer Mother         Breast/Liver   • Diabetes Mother    • Hypertension Mother    • Hyperlipidemia Mother    • Heart Disease Mother    • Lung Disease Father         Emphysema   • Psychiatric Illness Father         Paranoid schitzophenia   • Psychiatric Illness Sister         Paronoid Schitzo-disorder, Bipolar   • Arthritis Sister         RA, Fibromyalgia   • Cancer Maternal Aunt         Breast   • Cancer Paternal Aunt         Bone   • Arthritis Paternal Aunt    • Genetic Disorder Paternal Aunt         SLE   • Heart Disease Maternal Grandmother    • Hypertension Maternal Grandmother    • Hyperlipidemia Maternal Grandmother    • Genetic Disorder Maternal Grandmother         Brights disease   • Stroke Maternal Grandmother    • Heart Disease  "Maternal Grandfather    • Hypertension Maternal Grandfather    • Hyperlipidemia Maternal Grandfather    • Stroke Paternal Grandmother    • Hyperlipidemia Paternal Grandmother    • Hypertension Paternal Grandmother    • Heart Disease Paternal Grandfather    • Hypertension Paternal Grandfather    • Hyperlipidemia Paternal Grandfather        CURRENT MEDICATIONS  Home Medications     Reviewed by Radha Storey (Pharmacy Tech) on 10/06/20 at 1440  Med List Status: Complete   Medication Last Dose Status   busPIRone (BUSPAR) 15 MG tablet 10/6/2020 Active   busPIRone (BUSPAR) 7.5 MG tablet 10/5/2020 Active   cetirizine (ZYRTEC) 10 MG Tab 10/5/2020 Active   esomeprazole (NEXIUM) 20 MG capsule 10/6/2020 Active   fluticasone (FLONASE) 50 MCG/ACT nasal spray 10/5/2020 Active   hydrALAZINE (APRESOLINE) 50 MG Tab 10/6/2020 Active   hydroCHLOROthiazide (HYDRODIURIL) 50 MG Tab 9/27/2020 Active   irbesartan (AVAPRO) 300 MG Tab 10/6/2020 Active   NICORETTE 4 MG gum NEW RX Active   nicotine (NICODERM) 14 MG/24HR PATCH 24 HR NEW RX Active   Oxymetazoline HCl (NASAL SPRAY NA) 10/6/2020 Active   pravastatin (PRAVACHOL) 20 MG Tab 10/5/2020 Active   sodium chloride (SALT) 1 GM Tab 10/5/2020 Active                ALLERGIES  Allergies   Allergen Reactions   • Macrodantin [Nitrofurantoin Macrocrystal] Anaphylaxis   • Pcn [Penicillins] Anaphylaxis   • Doxycycline Itching   • Amlodipine      Leg swelling   • Bactrim Vomiting   • Clarithromycin Vomiting   • Omnicef Itching       PHYSICAL EXAM  VITAL SIGNS: BP (!) 195/105 Comment: taken x2  Pulse 82   Temp 36 °C (96.8 °F) (Temporal)   Resp 14   Ht 1.6 m (5' 3\")   Wt 73 kg (160 lb 15 oz)   SpO2 99%   BMI 28.51 kg/m²   Constitutional: Well developed, Well nourished, No acute distress, Non-toxic appearance.   HENT: No tongue swelling present. Normocephalic, Atraumatic, Bilateral external ears normal, Oropharynx is clear mucous membranes are moist. No oral exudates or nasal discharge. "   Eyes: Pupils are equal round and reactive, EOMI, Conjunctiva normal, No discharge.   Neck: Normal range of motion, No tenderness, Supple, No stridor. No meningismus.  Lymphatic: No lymphadenopathy noted.   Cardiovascular: Regular rate and rhythm without murmur rub or gallop.  Thorax & Lungs: Clear breath sounds bilaterally without wheezes, rhonchi or rales. There is no chest wall tenderness.   Abdomen: Soft non-tender non-distended. There is no rebound or guarding. No organomegaly is appreciated. Bowel sounds are normal.  Skin: Normal without rash.   Back: No CVA or spinal tenderness.   Extremities: Intact distal pulses, No edema, No tenderness, No cyanosis, No clubbing. Capillary refill is less than 2 seconds.  Musculoskeletal: Good range of motion in all major joints. No tenderness to palpation or major deformities noted.   Neurologic: Alert & oriented x 3, Normal motor function, Normal sensory function, No focal deficits noted. Reflexes are normal.  Psychiatric: Affect normal, Judgment normal, Mood normal. There is no suicidal ideation or patient reported hallucinations.       DIAGNOSTIC STUDIES / PROCEDURES    LABS  Labs Reviewed   CBC WITH DIFFERENTIAL - Abnormal; Notable for the following components:       Result Value    RBC 4.04 (*)     Hematocrit 36.4 (*)     Neutrophils-Polys 76.30 (*)     Lymphocytes 13.40 (*)     Lymphs (Absolute) 0.89 (*)     All other components within normal limits   COMP METABOLIC PANEL - Abnormal; Notable for the following components:    Sodium 130 (*)     Co2 19 (*)     Glucose 112 (*)     All other components within normal limits   TROPONIN   COVID/SARS COV-2   ESTIMATED GFR   TROPONIN      All labs reviewed by me.    EKG Interpretation:  Results for orders placed or performed during the hospital encounter of 10/06/20   EKG   Result Value Ref Range    Report       [facility] Emergency Dept.    Test Date:  2020-10-06  Pt Name:    KIRK BARBA                    Department: ER  MRN:        [id number]                      Room:  Gender:     Female                       Technician: [id number]  :        1960                   Requested By:ER TRIAGE PROTOCOL  Order #:    [id number]                    Reading MD:    Measurements  Intervals                                Axis  Rate:       83                           P:          60  NJ:         160                          QRS:        71  QRSD:       70                           T:          62  QT:         400  QTc:        470    Interpretive Statements  SINUS RHYTHM  VENTRICULAR PREMATURE COMPLEX  BORDERLINE T ABNORMALITIES, ANT-LAT LEADS  Compared to ECG 2020 10:36:12  T-wave abnormality now present       RADIOLOGY  DX-CHEST-PORTABLE (1 VIEW)   Final Result      No evidence of acute cardiopulmonary process.        The radiologist's interpretation of all radiological studies have been reviewed by me.    COURSE & MEDICAL DECISION MAKING  Nursing notes, VS, PMSFHx reviewed in chart.    2:07 PM Patient seen and examined at bedside. Ordered for labs and imaging to evaluate. Patient was treated with Labetalol injection 20 mg and Tylenol 650 mg for her symptoms.      EKG shows no evidence of ischemic change or dysrhythmia.    Chest x-ray shows no acute cardiopulmonary process such as cardiomegaly, airspace disease, volume overload or mass    2:46 PM Per Nurse, patient is experiencing anxiety. Patient is to be treated with Ativan injection 1 mg.  This helped her calm down.  She thinks she might have brought her blood pressure up secondary to anxiety    3:36 PM Patient was reevaluated at bedside. Discussed lab and radiology results with the patient. Patient informed me that her headache is gone at this time.  Her sodium has improved and is now 130.      Patient was given instructions on how to use her blood pressure cuff. Patient was informed of the plan of discharge. Patient verbalized her understanding and agreement with the  plan of discharge.    Patient has had high blood pressure while in the emergency department, felt likely secondary to medical condition. Counseled patient to monitor blood pressure at home and follow up with primary care physician.     The patient will return for new or worsening symptoms and is stable at the time of discharge.  I do not think there is an indication to bring her in the hospital.  Her blood pressures improved and she is feeling better and comfortable with going home and taking her hydralazine when she gets home    DISPOSITION:  Patient will be discharged home in stable condition.    FINAL IMPRESSION  1. Hypertensive urgency    2. Acute nonintractable headache, unspecified headache type    3. Anxiety          Tomer NICHOLSON (Scribe), am scribing for, and in the presence of, Ziggy Latif M.D..    Electronically signed by: Tomer Cordero ([place]), 10/6/2020    Ziggy NICHOLSON M.D. personally performed the services described in this documentation, as scribed by Tomer Cordero in my presence, and it is both accurate and complete.  C    The note accurately reflects work and decisions made by me.  Ziggy Latif M.D.  10/6/2020  4:00 PM

## 2020-10-06 NOTE — ED NOTES
Discharge teaching and paperwork provided regarding HTN, Anxiety, and HA and all questions/concerns answered. VSS, Neuro assessment stable and PIV removed. Given information regarding current medication Rx. Patient discharged to the care of herself to be driven home by her  and ambulated out of the ED with a steady gait.

## 2020-10-06 NOTE — ED NOTES
ERP at bedside for update of POC and discharge instructions. Patient no longer needs to be admitted.

## 2020-10-08 ENCOUNTER — TELEPHONE (OUTPATIENT)
Dept: CARDIOLOGY | Facility: MEDICAL CENTER | Age: 60
End: 2020-10-08

## 2020-10-08 ENCOUNTER — APPOINTMENT (OUTPATIENT)
Dept: RADIOLOGY | Facility: MEDICAL CENTER | Age: 60
End: 2020-10-08
Attending: EMERGENCY MEDICINE
Payer: MEDICAID

## 2020-10-08 ENCOUNTER — HOSPITAL ENCOUNTER (EMERGENCY)
Facility: MEDICAL CENTER | Age: 60
End: 2020-10-08
Attending: EMERGENCY MEDICINE
Payer: MEDICAID

## 2020-10-08 VITALS
TEMPERATURE: 98.6 F | RESPIRATION RATE: 14 BRPM | HEART RATE: 69 BPM | WEIGHT: 160 LBS | BODY MASS INDEX: 28.35 KG/M2 | SYSTOLIC BLOOD PRESSURE: 169 MMHG | HEIGHT: 63 IN | OXYGEN SATURATION: 96 % | DIASTOLIC BLOOD PRESSURE: 77 MMHG

## 2020-10-08 DIAGNOSIS — F41.9 ANXIETY: ICD-10-CM

## 2020-10-08 DIAGNOSIS — I10 HYPERTENSION, UNSPECIFIED TYPE: ICD-10-CM

## 2020-10-08 DIAGNOSIS — R20.2 PARESTHESIA: ICD-10-CM

## 2020-10-08 LAB
ALBUMIN SERPL BCP-MCNC: 4.3 G/DL (ref 3.2–4.9)
ALBUMIN/GLOB SERPL: 1.5 G/DL
ALP SERPL-CCNC: 68 U/L (ref 30–99)
ALT SERPL-CCNC: 22 U/L (ref 2–50)
ANION GAP SERPL CALC-SCNC: 14 MMOL/L (ref 7–16)
AST SERPL-CCNC: 16 U/L (ref 12–45)
BASOPHILS # BLD AUTO: 0.8 % (ref 0–1.8)
BASOPHILS # BLD: 0.05 K/UL (ref 0–0.12)
BILIRUB SERPL-MCNC: 0.5 MG/DL (ref 0.1–1.5)
BUN SERPL-MCNC: 4 MG/DL (ref 8–22)
CALCIUM SERPL-MCNC: 8.9 MG/DL (ref 8.5–10.5)
CHLORIDE SERPL-SCNC: 102 MMOL/L (ref 96–112)
CO2 SERPL-SCNC: 19 MMOL/L (ref 20–33)
CREAT SERPL-MCNC: 0.6 MG/DL (ref 0.5–1.4)
EKG IMPRESSION: NORMAL
EOSINOPHIL # BLD AUTO: 0.07 K/UL (ref 0–0.51)
EOSINOPHIL NFR BLD: 1.1 % (ref 0–6.9)
ERYTHROCYTE [DISTWIDTH] IN BLOOD BY AUTOMATED COUNT: 44.1 FL (ref 35.9–50)
GLOBULIN SER CALC-MCNC: 2.8 G/DL (ref 1.9–3.5)
GLUCOSE SERPL-MCNC: 105 MG/DL (ref 65–99)
HCT VFR BLD AUTO: 37.4 % (ref 37–47)
HGB BLD-MCNC: 13.1 G/DL (ref 12–16)
IMM GRANULOCYTES # BLD AUTO: 0.03 K/UL (ref 0–0.11)
IMM GRANULOCYTES NFR BLD AUTO: 0.5 % (ref 0–0.9)
LYMPHOCYTES # BLD AUTO: 1.44 K/UL (ref 1–4.8)
LYMPHOCYTES NFR BLD: 23.5 % (ref 22–41)
MCH RBC QN AUTO: 31.7 PG (ref 27–33)
MCHC RBC AUTO-ENTMCNC: 35 G/DL (ref 33.6–35)
MCV RBC AUTO: 90.6 FL (ref 81.4–97.8)
MONOCYTES # BLD AUTO: 0.82 K/UL (ref 0–0.85)
MONOCYTES NFR BLD AUTO: 13.4 % (ref 0–13.4)
NEUTROPHILS # BLD AUTO: 3.73 K/UL (ref 2–7.15)
NEUTROPHILS NFR BLD: 60.7 % (ref 44–72)
NRBC # BLD AUTO: 0 K/UL
NRBC BLD-RTO: 0 /100 WBC
PLATELET # BLD AUTO: 243 K/UL (ref 164–446)
PMV BLD AUTO: 9.7 FL (ref 9–12.9)
POTASSIUM SERPL-SCNC: 3.9 MMOL/L (ref 3.6–5.5)
PROT SERPL-MCNC: 7.1 G/DL (ref 6–8.2)
RBC # BLD AUTO: 4.13 M/UL (ref 4.2–5.4)
SODIUM SERPL-SCNC: 135 MMOL/L (ref 135–145)
TROPONIN T SERPL-MCNC: 19 NG/L (ref 6–19)
WBC # BLD AUTO: 6.1 K/UL (ref 4.8–10.8)

## 2020-10-08 PROCEDURE — 99284 EMERGENCY DEPT VISIT MOD MDM: CPT

## 2020-10-08 PROCEDURE — 93005 ELECTROCARDIOGRAM TRACING: CPT

## 2020-10-08 PROCEDURE — 84484 ASSAY OF TROPONIN QUANT: CPT

## 2020-10-08 PROCEDURE — 93005 ELECTROCARDIOGRAM TRACING: CPT | Performed by: EMERGENCY MEDICINE

## 2020-10-08 PROCEDURE — 71045 X-RAY EXAM CHEST 1 VIEW: CPT

## 2020-10-08 PROCEDURE — 700102 HCHG RX REV CODE 250 W/ 637 OVERRIDE(OP): Performed by: EMERGENCY MEDICINE

## 2020-10-08 PROCEDURE — 85025 COMPLETE CBC W/AUTO DIFF WBC: CPT

## 2020-10-08 PROCEDURE — 80053 COMPREHEN METABOLIC PANEL: CPT

## 2020-10-08 PROCEDURE — A9270 NON-COVERED ITEM OR SERVICE: HCPCS | Performed by: EMERGENCY MEDICINE

## 2020-10-08 RX ORDER — HYDRALAZINE HYDROCHLORIDE 25 MG/1
50 TABLET, FILM COATED ORAL ONCE
Status: COMPLETED | OUTPATIENT
Start: 2020-10-08 | End: 2020-10-08

## 2020-10-08 RX ADMIN — HYDRALAZINE HYDROCHLORIDE 50 MG: 25 TABLET, FILM COATED ORAL at 22:09

## 2020-10-08 ASSESSMENT — PAIN DESCRIPTION - DESCRIPTORS: DESCRIPTORS: ACHING

## 2020-10-08 ASSESSMENT — FIBROSIS 4 INDEX: FIB4 SCORE: 0.97

## 2020-10-08 NOTE — TELEPHONE ENCOUNTER
TT    Hello, patient has been having a severe headache, a little nausea, diarrhea & Dry and stuffy nose. She is wondering if this has to do with the new medication she just started  hydrALAZINE (APRESOLINE) 50 MG. She will like a call back at [phone]        Thank you!

## 2020-10-09 ENCOUNTER — TELEPHONE (OUTPATIENT)
Dept: CARDIOLOGY | Facility: MEDICAL CENTER | Age: 60
End: 2020-10-09

## 2020-10-09 NOTE — ED PROVIDER NOTES
ED Provider Note    Chief Complaint:   Anxiety, hypertension, paresthesias    HPI:  Kelly Oakley is a 59 y.o. female who presents with concerns for anxiety, as well as some tingling to her legs.  Additionally she is worried because her blood pressure seems to fluctuate frequently.  She states it has been fluctuating more than usual after she started hydralazine a few weeks ago.  She reports that she has a lot of anxiety, which was present at baseline but due to the COVID-19 crisis she has lost her job due to her anxiety.  She does have a therapist that she sees once every 2 weeks, but is unable to afford much more behavioral health help because she is on Medicaid and there are very few specialist who will take that insurance.  With regard to her blood pressure, she is working with her primary care physician to adjust her medications.  Her blood pressure became high today which caused her to become anxious and which is what prompted her come to the emergency department.  Her only other symptom is some intermittent tingling of her legs, though she thinks this may be a side effect of her medications as well.  She denies any headaches currently, she has no chest pain, no back pain.  She denies any urinary symptoms.  She has no abnormal rashes or lesions.  She is also concerned because she does have a history of electrolyte imbalance, and is worried that her sodium or potassium abnormal.  She is unable to identify any significant alleviating factors.  She states that her primary care physician did prescribe Prozac for her, but she is not taking this medication because she read that it may have side effects that involve increased or decreased blood pressure.    Review of Systems:  See HPI for pertinent positives and negatives. All other systems negative.    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    Social History:  Social History     Tobacco Use   • Smoking status: Current Every Day Smoker     Packs/day: 0.50     Years: 40.00     Pack years: 20.00     Types: Cigarettes   • Smokeless tobacco: Never Used   Substance and Sexual Activity   • Alcohol use: Yes     Alcohol/week: 0.6 oz     Types: 1 Standard drinks or equivalent per week     Comment: rarely   • Drug use: No   • Sexual activity: Not Currently     Comment:        Surgical History:   has a past surgical history that includes abdominal hysterectomy total; endometrial ablation; cholecystectomy; appendectomy; tonsillectomy; primary c section; tubal coagulation laparoscopic bilateral; colon resection; breast biopsy (1980's); and us-needle core bx-breast panel.    Current Medications:  Home Medications    **Home medications have not yet been reviewed for this encounter**         Allergies:  Allergies   Allergen Reactions   • Macrodantin [Nitrofurantoin Macrocrystal] Anaphylaxis   • Pcn [Penicillins] Anaphylaxis   • Doxycycline Itching   • Amlodipine      Leg swelling   • Bactrim Vomiting   • Clarithromycin Vomiting   • Omnicef Itching       Physical Exam:  Vital Signs: BP (!) 169/77   Pulse 69   Temp 37 °C (98.6 °F) (Temporal)   Resp 14   Ht 1.6 m (5' 3\")   Wt 72.6 kg (160 lb)   SpO2 96%   BMI 28.34 kg/m²   Constitutional: Alert, no acute distress  HENT: Normocephalic  Eyes: Pupils equal and reactive, normal " conjunctiva  Neck: Supple, normal range of motion, no stridor  Cardiovascular: Extremities are warm and well perfused, no murmur appreciated, normal cardiac auscultation  Pulmonary: No respiratory distress, normal work of breathing, no accessory muscule usage, breath sounds clear and equal bilaterally  Abdomen: Soft, non-distended, non-tender to palpation, no peritoneal signs  Skin: Warm, dry, no rashes or lesions  Musculoskeletal: Normal range of motion in all extremities, no swelling or deformity noted  Neurologic: Alert, oriented, normal speech, normal motor function  Psychiatric: Normal and appropriate mood and affect, very mildly pressured speech    Medical records reviewed for continuity of care.  Patient was seen in this emergency department 10/6/2020 out of concern for elevated blood pressure, as well as numbness and tingling around the mouth.  She was treated with labetalol 20 mg IV.  She was also treated with Ativan for anxiety.  Headache improved.  She was discharged home in stable condition.    EKG: EKG performed at 7:01 PM.  Rate 72, normal sinus rhythm, no ST elevation or depression, no T wave inversions.    Labs:  Labs Reviewed   CBC WITH DIFFERENTIAL - Abnormal; Notable for the following components:       Result Value    RBC 4.13 (*)     All other components within normal limits   COMP METABOLIC PANEL - Abnormal; Notable for the following components:    Co2 19 (*)     Glucose 105 (*)     Bun 4 (*)     All other components within normal limits   TROPONIN   ESTIMATED GFR       Radiology:  DX-CHEST-PORTABLE (1 VIEW)   Final Result      No acute cardiopulmonary abnormality. No interval change.           ED Medications Administered:  Medications   hydrALAZINE (APRESOLINE) tablet 50 mg (50 mg Oral Given 10/8/20 2209)       Differential diagnosis:  Hypertension, medication side effect, anxiety reaction, lecture light abnormality    MDM:  Patient presents with chief complaint of high blood pressure, as well  as anxiety and concern for electrolyte disturbance.  With regard to her hypertension, her blood pressure improved to systolic in the 160s without any further intervention.  While she was here, she was due for her dose of hydralazine, with this was administered in the emergency department.  She is not having any vision changes, she has no chest pain, no shortness of breath, no EKG changes.  No evidence of hypertensive emergency nor hypertensive urgency.  She does seem very anxious about her blood pressure.    Laboratory evaluation her electrolytes are within normal limits.  Sodium and potassium are within normal limits.  High-sensitivity troponin is negative, she has no chest pain, no evidence of cardiac dysfunction.  She has a normal white blood count and normal hemoglobin.  No evidence of symptomatic anemia.  Chest x-ray is negative for acute process.    At this time, I do not believe she requires any further emergent diagnostics nor treatment.  She remains concerned about her blood pressure as well as her anxiety.  She is counseled to try her Prozac, and to continue to work with her primary care physician to see if any medication adjustments are necessary for her blood pressure.  Life skills was able to provide outpatient resources for therapist and psychiatrist who would see the patient with her Medicaid insurance.  She is counseled to continue to work with her therapist.  Plan at this time is for discharge home.  She will call her primary care physician in the morning to discuss her emergency department visit today. Return precautions were discussed with the patient, and provided in written form with the patient's discharge instructions.     Personal protective equipment including N95 surgical respirator, goggles, and gloves were used during this encounter.       Disposition:  Discharge home in stable condition    Final Impression:  1. Hypertension, unspecified type    2. Paresthesia    3. Anxiety         Electronically signed by: Emily Brown MD, 10/8/2020 11:26 PM

## 2020-10-09 NOTE — ED TRIAGE NOTES
Pt walked into triage with a steady gait.  C/o sharp neck pain that started at rest last night that radiated to the chest.  Pt also c/o diarrhea that started this morning, pt has hx of diverticulitis, was recently placed on Flagyl and Cipro but was unable to tolerate causing a hospital admit.    Pt & staff masked and in appropriate PPE during encounter.  Pt denies fever/travel or being in contact with anyone testing positive for Covid.  Explained pt the triage process, made pt aware to tell the RN/staff of any changes/concerns, pt verbalized understanding of process and instructions given.  Pt to ER lobby.

## 2020-10-09 NOTE — ED NOTES
,D/C home with written and verbal instructions re: Rx, activity, f/u.  Verbalizes understanding.  Ambulated out with steady gait. Educated on blood pressure medications and to follow up with primary care doctor. ERP back to bedside.

## 2020-10-09 NOTE — DISCHARGE PLANNING
Medical Social Work     SW received a call from the RN requesting SW at bedside. SW met with the pt at bedside and the pt advised SW that she has anxiety really bad. SW actively listened to the pt and provided mental health resources. The pt stated she will review the resources and she will continue to see her psychologist she is currently seeing.     Plan: SW will remain available for pt support.

## 2020-10-10 ENCOUNTER — TELEPHONE (OUTPATIENT)
Dept: CARDIOLOGY | Facility: MEDICAL CENTER | Age: 60
End: 2020-10-10

## 2020-10-10 ENCOUNTER — HOSPITAL ENCOUNTER (INPATIENT)
Facility: MEDICAL CENTER | Age: 60
LOS: 5 days | DRG: 641 | End: 2020-10-15
Attending: EMERGENCY MEDICINE | Admitting: FAMILY MEDICINE
Payer: MEDICAID

## 2020-10-10 ENCOUNTER — APPOINTMENT (OUTPATIENT)
Dept: RADIOLOGY | Facility: MEDICAL CENTER | Age: 60
DRG: 641 | End: 2020-10-10
Attending: EMERGENCY MEDICINE
Payer: MEDICAID

## 2020-10-10 DIAGNOSIS — I10 HYPERTENSION, UNSPECIFIED TYPE: ICD-10-CM

## 2020-10-10 DIAGNOSIS — E87.1 HYPONATREMIA: ICD-10-CM

## 2020-10-10 DIAGNOSIS — R07.9 ACUTE CHEST PAIN: ICD-10-CM

## 2020-10-10 LAB
ALBUMIN SERPL BCP-MCNC: 4.6 G/DL (ref 3.2–4.9)
ALBUMIN/GLOB SERPL: 1.8 G/DL
ALP SERPL-CCNC: 74 U/L (ref 30–99)
ALT SERPL-CCNC: 25 U/L (ref 2–50)
ANION GAP SERPL CALC-SCNC: 16 MMOL/L (ref 7–16)
AST SERPL-CCNC: 23 U/L (ref 12–45)
BASOPHILS # BLD AUTO: 0.3 % (ref 0–1.8)
BASOPHILS # BLD: 0.02 K/UL (ref 0–0.12)
BILIRUB SERPL-MCNC: 0.7 MG/DL (ref 0.1–1.5)
BUN SERPL-MCNC: 4 MG/DL (ref 8–22)
CALCIUM SERPL-MCNC: 9.2 MG/DL (ref 8.5–10.5)
CHLORIDE SERPL-SCNC: 85 MMOL/L (ref 96–112)
CHLORIDE UR-SCNC: 95 MMOL/L
CO2 SERPL-SCNC: 20 MMOL/L (ref 20–33)
COVID ORDER STATUS COVID19: NORMAL
CREAT SERPL-MCNC: 0.65 MG/DL (ref 0.5–1.4)
CREAT UR-MCNC: 51.82 MG/DL
EKG IMPRESSION: NORMAL
EOSINOPHIL # BLD AUTO: 0.05 K/UL (ref 0–0.51)
EOSINOPHIL NFR BLD: 0.8 % (ref 0–6.9)
ERYTHROCYTE [DISTWIDTH] IN BLOOD BY AUTOMATED COUNT: 41.8 FL (ref 35.9–50)
GLOBULIN SER CALC-MCNC: 2.5 G/DL (ref 1.9–3.5)
GLUCOSE SERPL-MCNC: 117 MG/DL (ref 65–99)
HCT VFR BLD AUTO: 36.5 % (ref 37–47)
HGB BLD-MCNC: 13 G/DL (ref 12–16)
IMM GRANULOCYTES # BLD AUTO: 0.06 K/UL (ref 0–0.11)
IMM GRANULOCYTES NFR BLD AUTO: 0.9 % (ref 0–0.9)
LYMPHOCYTES # BLD AUTO: 0.88 K/UL (ref 1–4.8)
LYMPHOCYTES NFR BLD: 13.7 % (ref 22–41)
MCH RBC QN AUTO: 31.6 PG (ref 27–33)
MCHC RBC AUTO-ENTMCNC: 35.6 G/DL (ref 33.6–35)
MCV RBC AUTO: 88.6 FL (ref 81.4–97.8)
MONOCYTES # BLD AUTO: 0.68 K/UL (ref 0–0.85)
MONOCYTES NFR BLD AUTO: 10.6 % (ref 0–13.4)
NEUTROPHILS # BLD AUTO: 4.75 K/UL (ref 2–7.15)
NEUTROPHILS NFR BLD: 73.7 % (ref 44–72)
NRBC # BLD AUTO: 0 K/UL
NRBC BLD-RTO: 0 /100 WBC
OSMOLALITY SERPL: 257 MOSM/KG H2O (ref 278–298)
OSMOLALITY UR: 311 MOSM/KG H2O (ref 300–900)
PLATELET # BLD AUTO: 251 K/UL (ref 164–446)
PMV BLD AUTO: 9.4 FL (ref 9–12.9)
POTASSIUM SERPL-SCNC: 3.8 MMOL/L (ref 3.6–5.5)
POTASSIUM UR-SCNC: 34 MMOL/L
PROT SERPL-MCNC: 7.1 G/DL (ref 6–8.2)
RBC # BLD AUTO: 4.12 M/UL (ref 4.2–5.4)
SARS-COV-2 RNA RESP QL NAA+PROBE: NOTDETECTED
SODIUM SERPL-SCNC: 121 MMOL/L (ref 135–145)
SODIUM SERPL-SCNC: 126 MMOL/L (ref 135–145)
SODIUM UR-SCNC: 97 MMOL/L
SPECIMEN SOURCE: NORMAL
TROPONIN T SERPL-MCNC: 22 NG/L (ref 6–19)
TROPONIN T SERPL-MCNC: 25 NG/L (ref 6–19)
TROPONIN T SERPL-MCNC: 28 NG/L (ref 6–19)
WBC # BLD AUTO: 6.4 K/UL (ref 4.8–10.8)

## 2020-10-10 PROCEDURE — 700111 HCHG RX REV CODE 636 W/ 250 OVERRIDE (IP): Performed by: STUDENT IN AN ORGANIZED HEALTH CARE EDUCATION/TRAINING PROGRAM

## 2020-10-10 PROCEDURE — 85025 COMPLETE CBC W/AUTO DIFF WBC: CPT

## 2020-10-10 PROCEDURE — U0003 INFECTIOUS AGENT DETECTION BY NUCLEIC ACID (DNA OR RNA); SEVERE ACUTE RESPIRATORY SYNDROME CORONAVIRUS 2 (SARS-COV-2) (CORONAVIRUS DISEASE [COVID-19]), AMPLIFIED PROBE TECHNIQUE, MAKING USE OF HIGH THROUGHPUT TECHNOLOGIES AS DESCRIBED BY CMS-2020-01-R: HCPCS

## 2020-10-10 PROCEDURE — C9803 HOPD COVID-19 SPEC COLLECT: HCPCS | Performed by: EMERGENCY MEDICINE

## 2020-10-10 PROCEDURE — 84484 ASSAY OF TROPONIN QUANT: CPT | Mod: 91

## 2020-10-10 PROCEDURE — 700105 HCHG RX REV CODE 258: Performed by: STUDENT IN AN ORGANIZED HEALTH CARE EDUCATION/TRAINING PROGRAM

## 2020-10-10 PROCEDURE — 700102 HCHG RX REV CODE 250 W/ 637 OVERRIDE(OP): Performed by: STUDENT IN AN ORGANIZED HEALTH CARE EDUCATION/TRAINING PROGRAM

## 2020-10-10 PROCEDURE — 82570 ASSAY OF URINE CREATININE: CPT

## 2020-10-10 PROCEDURE — 96375 TX/PRO/DX INJ NEW DRUG ADDON: CPT

## 2020-10-10 PROCEDURE — 82436 ASSAY OF URINE CHLORIDE: CPT

## 2020-10-10 PROCEDURE — 80053 COMPREHEN METABOLIC PANEL: CPT

## 2020-10-10 PROCEDURE — 700105 HCHG RX REV CODE 258: Performed by: EMERGENCY MEDICINE

## 2020-10-10 PROCEDURE — 93005 ELECTROCARDIOGRAM TRACING: CPT | Performed by: EMERGENCY MEDICINE

## 2020-10-10 PROCEDURE — 84295 ASSAY OF SERUM SODIUM: CPT

## 2020-10-10 PROCEDURE — 99285 EMERGENCY DEPT VISIT HI MDM: CPT

## 2020-10-10 PROCEDURE — 93005 ELECTROCARDIOGRAM TRACING: CPT

## 2020-10-10 PROCEDURE — 36415 COLL VENOUS BLD VENIPUNCTURE: CPT

## 2020-10-10 PROCEDURE — 83930 ASSAY OF BLOOD OSMOLALITY: CPT

## 2020-10-10 PROCEDURE — 83935 ASSAY OF URINE OSMOLALITY: CPT

## 2020-10-10 PROCEDURE — A9270 NON-COVERED ITEM OR SERVICE: HCPCS | Performed by: STUDENT IN AN ORGANIZED HEALTH CARE EDUCATION/TRAINING PROGRAM

## 2020-10-10 PROCEDURE — 84300 ASSAY OF URINE SODIUM: CPT

## 2020-10-10 PROCEDURE — 83835 ASSAY OF METANEPHRINES: CPT

## 2020-10-10 PROCEDURE — 71045 X-RAY EXAM CHEST 1 VIEW: CPT

## 2020-10-10 PROCEDURE — 700101 HCHG RX REV CODE 250: Performed by: STUDENT IN AN ORGANIZED HEALTH CARE EDUCATION/TRAINING PROGRAM

## 2020-10-10 PROCEDURE — 770020 HCHG ROOM/CARE - TELE (206)

## 2020-10-10 PROCEDURE — 96374 THER/PROPH/DIAG INJ IV PUSH: CPT

## 2020-10-10 PROCEDURE — 700111 HCHG RX REV CODE 636 W/ 250 OVERRIDE (IP): Performed by: EMERGENCY MEDICINE

## 2020-10-10 PROCEDURE — 84133 ASSAY OF URINE POTASSIUM: CPT

## 2020-10-10 RX ORDER — HYDROXYZINE HYDROCHLORIDE 25 MG/1
25 TABLET, FILM COATED ORAL 2 TIMES DAILY PRN
COMMUNITY
Start: 2020-10-09 | End: 2021-12-01 | Stop reason: SDUPTHER

## 2020-10-10 RX ORDER — AMOXICILLIN 250 MG
2 CAPSULE ORAL 2 TIMES DAILY
Status: DISCONTINUED | OUTPATIENT
Start: 2020-10-10 | End: 2020-10-15 | Stop reason: HOSPADM

## 2020-10-10 RX ORDER — POLYETHYLENE GLYCOL 3350 17 G/17G
1 POWDER, FOR SOLUTION ORAL
Status: DISCONTINUED | OUTPATIENT
Start: 2020-10-10 | End: 2020-10-15 | Stop reason: HOSPADM

## 2020-10-10 RX ORDER — OMEPRAZOLE 20 MG/1
20 CAPSULE, DELAYED RELEASE ORAL EVERY EVENING
Status: DISCONTINUED | OUTPATIENT
Start: 2020-10-10 | End: 2020-10-13

## 2020-10-10 RX ORDER — ASPIRIN 325 MG
325 TABLET ORAL DAILY
Status: DISCONTINUED | OUTPATIENT
Start: 2020-10-10 | End: 2020-10-10

## 2020-10-10 RX ORDER — PROMETHAZINE HYDROCHLORIDE 25 MG/1
12.5-25 TABLET ORAL EVERY 4 HOURS PRN
Status: DISCONTINUED | OUTPATIENT
Start: 2020-10-10 | End: 2020-10-15 | Stop reason: HOSPADM

## 2020-10-10 RX ORDER — FLUOXETINE 10 MG/1
10 CAPSULE ORAL DAILY
Status: DISCONTINUED | OUTPATIENT
Start: 2020-10-11 | End: 2020-10-15 | Stop reason: HOSPADM

## 2020-10-10 RX ORDER — LORAZEPAM 2 MG/ML
1 INJECTION INTRAMUSCULAR ONCE
Status: COMPLETED | OUTPATIENT
Start: 2020-10-10 | End: 2020-10-10

## 2020-10-10 RX ORDER — ONDANSETRON 2 MG/ML
4 INJECTION INTRAMUSCULAR; INTRAVENOUS ONCE
Status: COMPLETED | OUTPATIENT
Start: 2020-10-10 | End: 2020-10-10

## 2020-10-10 RX ORDER — HYDROCHLOROTHIAZIDE 25 MG/1
25 TABLET ORAL DAILY
Status: DISCONTINUED | OUTPATIENT
Start: 2020-10-11 | End: 2020-10-10

## 2020-10-10 RX ORDER — HYDROCHLOROTHIAZIDE 25 MG/1
25 TABLET ORAL DAILY
Status: ON HOLD | COMMUNITY
Start: 2020-10-09 | End: 2020-10-15

## 2020-10-10 RX ORDER — ONDANSETRON 4 MG/1
4 TABLET, ORALLY DISINTEGRATING ORAL EVERY 4 HOURS PRN
Status: DISCONTINUED | OUTPATIENT
Start: 2020-10-10 | End: 2020-10-15 | Stop reason: HOSPADM

## 2020-10-10 RX ORDER — ONDANSETRON 2 MG/ML
4 INJECTION INTRAMUSCULAR; INTRAVENOUS EVERY 4 HOURS PRN
Status: DISCONTINUED | OUTPATIENT
Start: 2020-10-10 | End: 2020-10-15 | Stop reason: HOSPADM

## 2020-10-10 RX ORDER — ASPIRIN 81 MG/1
324 TABLET, CHEWABLE ORAL DAILY
Status: DISCONTINUED | OUTPATIENT
Start: 2020-10-10 | End: 2020-10-10

## 2020-10-10 RX ORDER — CETIRIZINE HYDROCHLORIDE 10 MG/1
10 TABLET ORAL EVERY EVENING
Status: DISCONTINUED | OUTPATIENT
Start: 2020-10-10 | End: 2020-10-15 | Stop reason: HOSPADM

## 2020-10-10 RX ORDER — BISACODYL 10 MG
10 SUPPOSITORY, RECTAL RECTAL
Status: DISCONTINUED | OUTPATIENT
Start: 2020-10-10 | End: 2020-10-15 | Stop reason: HOSPADM

## 2020-10-10 RX ORDER — HYDRALAZINE HYDROCHLORIDE 25 MG/1
25 TABLET, FILM COATED ORAL 3 TIMES DAILY
Status: ON HOLD | COMMUNITY
End: 2020-10-15

## 2020-10-10 RX ORDER — FLUOXETINE 10 MG/1
10 CAPSULE ORAL DAILY
COMMUNITY
End: 2022-03-11 | Stop reason: SDUPTHER

## 2020-10-10 RX ORDER — HYDROXYZINE HYDROCHLORIDE 25 MG/1
25 TABLET, FILM COATED ORAL 2 TIMES DAILY PRN
Status: DISCONTINUED | OUTPATIENT
Start: 2020-10-10 | End: 2020-10-15 | Stop reason: HOSPADM

## 2020-10-10 RX ORDER — BUSPIRONE HYDROCHLORIDE 10 MG/1
7.5 TABLET ORAL EVERY EVENING
Status: DISCONTINUED | OUTPATIENT
Start: 2020-10-10 | End: 2020-10-10

## 2020-10-10 RX ORDER — SODIUM CHLORIDE 9 MG/ML
INJECTION, SOLUTION INTRAVENOUS CONTINUOUS
Status: DISCONTINUED | OUTPATIENT
Start: 2020-10-10 | End: 2020-10-11

## 2020-10-10 RX ORDER — PROCHLORPERAZINE EDISYLATE 5 MG/ML
5-10 INJECTION INTRAMUSCULAR; INTRAVENOUS EVERY 4 HOURS PRN
Status: DISCONTINUED | OUTPATIENT
Start: 2020-10-10 | End: 2020-10-15 | Stop reason: HOSPADM

## 2020-10-10 RX ORDER — PROMETHAZINE HYDROCHLORIDE 25 MG/1
12.5-25 SUPPOSITORY RECTAL EVERY 4 HOURS PRN
Status: DISCONTINUED | OUTPATIENT
Start: 2020-10-10 | End: 2020-10-15 | Stop reason: HOSPADM

## 2020-10-10 RX ORDER — SODIUM CHLORIDE 9 MG/ML
1000 INJECTION, SOLUTION INTRAVENOUS CONTINUOUS
Status: ACTIVE | OUTPATIENT
Start: 2020-10-10 | End: 2020-10-10

## 2020-10-10 RX ORDER — LABETALOL HYDROCHLORIDE 5 MG/ML
10 INJECTION, SOLUTION INTRAVENOUS EVERY 4 HOURS PRN
Status: DISCONTINUED | OUTPATIENT
Start: 2020-10-10 | End: 2020-10-12

## 2020-10-10 RX ORDER — IRBESARTAN 150 MG/1
300 TABLET ORAL DAILY
Status: DISCONTINUED | OUTPATIENT
Start: 2020-10-11 | End: 2020-10-11

## 2020-10-10 RX ORDER — PROPRANOLOL HYDROCHLORIDE 20 MG/1
40 TABLET ORAL 2 TIMES DAILY
Status: DISCONTINUED | OUTPATIENT
Start: 2020-10-10 | End: 2020-10-11

## 2020-10-10 RX ORDER — ASPIRIN 300 MG/1
300 SUPPOSITORY RECTAL DAILY
Status: DISCONTINUED | OUTPATIENT
Start: 2020-10-10 | End: 2020-10-10

## 2020-10-10 RX ORDER — PRAVASTATIN SODIUM 20 MG
20 TABLET ORAL
Status: DISCONTINUED | OUTPATIENT
Start: 2020-10-10 | End: 2020-10-15 | Stop reason: HOSPADM

## 2020-10-10 RX ORDER — FLUTICASONE PROPIONATE 50 MCG
2 SPRAY, SUSPENSION (ML) NASAL
Status: DISCONTINUED | OUTPATIENT
Start: 2020-10-10 | End: 2020-10-15 | Stop reason: HOSPADM

## 2020-10-10 RX ORDER — ACETAMINOPHEN 325 MG/1
650 TABLET ORAL EVERY 6 HOURS PRN
Status: DISCONTINUED | OUTPATIENT
Start: 2020-10-10 | End: 2020-10-15 | Stop reason: HOSPADM

## 2020-10-10 RX ADMIN — PROPRANOLOL HYDROCHLORIDE 40 MG: 20 TABLET ORAL at 17:47

## 2020-10-10 RX ADMIN — ONDANSETRON 4 MG: 2 INJECTION INTRAMUSCULAR; INTRAVENOUS at 10:33

## 2020-10-10 RX ADMIN — LABETALOL HYDROCHLORIDE 10 MG: 5 INJECTION, SOLUTION INTRAVENOUS at 14:40

## 2020-10-10 RX ADMIN — SODIUM CHLORIDE 1000 ML: 9 INJECTION, SOLUTION INTRAVENOUS at 11:43

## 2020-10-10 RX ADMIN — CETIRIZINE HYDROCHLORIDE 10 MG: 10 TABLET, FILM COATED ORAL at 17:47

## 2020-10-10 RX ADMIN — ACETAMINOPHEN 650 MG: 325 TABLET, FILM COATED ORAL at 20:03

## 2020-10-10 RX ADMIN — SODIUM CHLORIDE: 9 INJECTION, SOLUTION INTRAVENOUS at 16:02

## 2020-10-10 RX ADMIN — OMEPRAZOLE 20 MG: 20 CAPSULE, DELAYED RELEASE ORAL at 17:46

## 2020-10-10 RX ADMIN — ENOXAPARIN SODIUM 40 MG: 40 INJECTION SUBCUTANEOUS at 16:01

## 2020-10-10 RX ADMIN — BUSPIRONE HYDROCHLORIDE 22.5 MG: 10 TABLET ORAL at 17:48

## 2020-10-10 RX ADMIN — FLUTICASONE PROPIONATE 100 MCG: 50 SPRAY, METERED NASAL at 20:10

## 2020-10-10 RX ADMIN — SODIUM CHLORIDE: 9 INJECTION, SOLUTION INTRAVENOUS at 14:15

## 2020-10-10 RX ADMIN — LORAZEPAM 1 MG: 2 INJECTION INTRAMUSCULAR; INTRAVENOUS at 10:33

## 2020-10-10 RX ADMIN — BUSPIRONE HYDROCHLORIDE 15 MG: 10 TABLET ORAL at 16:01

## 2020-10-10 RX ADMIN — PRAVASTATIN SODIUM 20 MG: 20 TABLET ORAL at 20:10

## 2020-10-10 ASSESSMENT — LIFESTYLE VARIABLES
CONSUMPTION TOTAL: NEGATIVE
TOTAL SCORE: 0
TOTAL SCORE: 0
HAVE PEOPLE ANNOYED YOU BY CRITICIZING YOUR DRINKING: NO
ON A TYPICAL DAY WHEN YOU DRINK ALCOHOL HOW MANY DRINKS DO YOU HAVE: 0
EVER HAD A DRINK FIRST THING IN THE MORNING TO STEADY YOUR NERVES TO GET RID OF A HANGOVER: NO
AVERAGE NUMBER OF DAYS PER WEEK YOU HAVE A DRINK CONTAINING ALCOHOL: 0
HAVE YOU EVER FELT YOU SHOULD CUT DOWN ON YOUR DRINKING: NO
DOES PATIENT WANT TO STOP DRINKING: NO
EVER FELT BAD OR GUILTY ABOUT YOUR DRINKING: NO
TOTAL SCORE: 0
ALCOHOL_USE: NO
HOW MANY TIMES IN THE PAST YEAR HAVE YOU HAD 5 OR MORE DRINKS IN A DAY: 0

## 2020-10-10 ASSESSMENT — COGNITIVE AND FUNCTIONAL STATUS - GENERAL
SUGGESTED CMS G CODE MODIFIER DAILY ACTIVITY: CH
DAILY ACTIVITIY SCORE: 24
MOBILITY SCORE: 24
SUGGESTED CMS G CODE MODIFIER MOBILITY: CH

## 2020-10-10 ASSESSMENT — FIBROSIS 4 INDEX
FIB4 SCORE: 0.83
FIB4 SCORE: 1.08

## 2020-10-10 ASSESSMENT — PAIN DESCRIPTION - PAIN TYPE
TYPE: ACUTE PAIN
TYPE: ACUTE PAIN

## 2020-10-10 NOTE — ED NOTES
Med rec updated and complete. Allergies reviewed and updated. Pt denies  Antibiotic use  In last 14 days.  Pt stated that she had  Recent changes in her mediations. Reduced strengths in Hydralazine and HTCZ. New starts of Hydroxyzine and Fluoxitine.      Home pharmacy [facility]

## 2020-10-10 NOTE — TELEPHONE ENCOUNTER
"Telephone call with the patient at 09 20 7 AM on 10/10/2020.  Was calling the patient back regarding her hypertension.  She states that she is sitting in the emergency department currently and is awaiting evaluation.  She is stating that her blood pressure is elevated, she is having chest pain, and she needs someone to \"get to the bottom of this.\"  She feels she may be having side effects from her newest medication hydralazine.    "

## 2020-10-10 NOTE — PROGRESS NOTES
2 RN skin check complete.   Devices in place none.  Skin assessed under devices na.  Confirmed pressure ulcers found on na.  New potential pressure ulcers noted on na. Wound consult placed na. All skin is intact.   The following interventions in place moisturizers and the patient is able to turn self.

## 2020-10-10 NOTE — ED TRIAGE NOTES
"..  Chief Complaint   Patient presents with   • Chest Pain     x's 1 day. pt is feeling \"shaky\", light headedness, nausea. pt unable to take BP medication      BP (!) 180/90   Pulse 75   Temp 36.2 °C (97.2 °F) (Temporal)   Resp 18   Ht 1.6 m (5' 3\")   Wt 71.1 kg (156 lb 12 oz)   SpO2 98%           .Explained triage process, to waiting room. Asked to inform RN if questions or concerns arise.           "

## 2020-10-10 NOTE — PROGRESS NOTES
Assumed care of pt. Plan of care discussed with pt, labs and chart reviewed. All needs met at this time. Tele box on. Call light within reach, bed locked and in lowest position. All fall precautions and hourly rounding in place. Will continue to monitor.

## 2020-10-10 NOTE — SENIOR ADMIT NOTE
[facility] Senior Admission Note - See H&P for full detail    PATIENT ID:  NAME:  Kelly Oakley  MRN:               [id number]  YOB: 1960    Date of Admission: 10/10/2020     Attending: Dr. Yuen    Resident: Amelia Bradley MD, Mark Giraldo DO    Primary Care Physician:  Saulo Fajardo MD    CC:  Chest pain, High blood pressure    HPI: Kelly Oakley is a 59 y.o. female with PMH HTN, anxiety, adrenal nodule, multiple recent hospitalizations for hyponatremia and HTN who presented with chest pain and concerns of elevated blood pressure as well as numbness and tingling in the arms. Patient reports that she was feeling unwell all night and developed chest pain this morning. She was also worried about her BP being too high, so she came to the ED. She states that she was seen in her PCP's office yesterday and was told to re-start her HCTZ 25mg as well as start taking prozac 10mg. She took both of these medications yesterday, and just HCTZ today. She also states she called her cardiologist's office and was told by the on call doctor to take a dose of hydralazine due to her elevated pressure. She feels that the hydralazine is responsible for some of her symptoms as she feels it increases her anxiety, causes tremors, and headaches. Patient also reports that she drank about 4 16oz bottles of water yesterday in addition to an electrolyte drink.     In the ED, patient's BP initially with SBP >200s. Labs significant for Na 121. Trop 22, normal CBC, CXR and EKG. She received 1 dose of ativan and a bolus of NS and BP improved to 170s/90s.    REVIEW OF SYSTEMS:   Ten systems reviewed and were negative except as noted in the HPI.                PAST MEDICAL HISTORY:  Past Medical History:   Diagnosis Date   • Adrenal nodule 2012 2015 / nonfunctional / benign   • Anemia    • Anxiety     Panic   • Arrhythmia    • Arthritis     Morning stiffness   • Atherosclerosis of arteries 2013    seen on abdominal CT   • Briseno's  esophagus    •  Chest pain at rest 9/11/2015   • Cigarette smoker one half pack a day or less 5/28/2015   • Depression    • Fatigue 9/11/2015   • GERD (gastroesophageal reflux disease)    • Headache(784.0)     sinus headache   • Heart murmur     Dr. Krause-Stress trend   • History of HPV infection    • HLD (hyperlipidemia) 9/11/2015   • HTN (hypertension) 9/11/2015   • IBD (inflammatory bowel disease)     Dr. Cunningham   • MRSA carrier 2008    nose cellulitis   • Nocturnal hypoxemia 10/15/2015   • OSTEOPOROSIS     Osteopenia   • S/P appendectomy    • S/P cholecystectomy    • S/P hysterectomy with oophorectomy     endometriosis   • Ulcer     Barretts esophogitis   • Urinary tract infection, site not specified        PAST SURGICAL HISTORY:  Past Surgical History:   Procedure Laterality Date   • BREAST BIOPSY  1980's    benign left breast 35 years ago   • ABDOMINAL HYSTERECTOMY TOTAL      mennorrogia   • APPENDECTOMY     • CHOLECYSTECTOMY     • COLON RESECTION      Polyp removal   • ENDOMETRIAL ABLATION     • PRIMARY C SECTION     • TONSILLECTOMY     • TUBAL COAGULATION LAPAROSCOPIC BILATERAL     • US-NEEDLE CORE BX-BREAST PANEL         FAMILY HISTORY:  Family History   Problem Relation Age of Onset   • Cancer Mother         Breast/Liver   • Diabetes Mother    • Hypertension Mother    • Hyperlipidemia Mother    • Heart Disease Mother    • Lung Disease Father         Emphysema   • Psychiatric Illness Father         Paranoid schitzophenia   • Psychiatric Illness Sister         Paronoid Schitzo-disorder, Bipolar   • Arthritis Sister         RA, Fibromyalgia   • Cancer Maternal Aunt         Breast   • Cancer Paternal Aunt         Bone   • Arthritis Paternal Aunt    • Genetic Disorder Paternal Aunt         SLE   • Heart Disease Maternal Grandmother    • Hypertension Maternal Grandmother    • Hyperlipidemia Maternal Grandmother    • Genetic Disorder Maternal Grandmother         Brights disease   • Stroke Maternal Grandmother    • Heart Disease  Maternal Grandfather    • Hypertension Maternal Grandfather    • Hyperlipidemia Maternal Grandfather    • Stroke Paternal Grandmother    • Hyperlipidemia Paternal Grandmother    • Hypertension Paternal Grandmother    • Heart Disease Paternal Grandfather    • Hypertension Paternal Grandfather    • Hyperlipidemia Paternal Grandfather        SOCIAL HISTORY:   Smoking Former 1/2 PPD smoker    ALLERGIES:  Allergies   Allergen Reactions   • Macrodantin [Nitrofurantoin Macrocrystal] Anaphylaxis     Prescription > 10 years ago   • Pcn [Penicillins] Anaphylaxis     Prescription > 10 years ago   • Doxycycline Itching   • Amlodipine      Leg swelling   • Bactrim Vomiting   • Clarithromycin Vomiting   • Omnicef Itching       OUTPATIENT MEDICATIONS:  No current facility-administered medications on file prior to encounter.      Current Outpatient Medications on File Prior to Encounter   Medication Sig Dispense Refill   • hydrOXYzine HCl (ATARAX) 25 MG Tab Take 25 mg by mouth 2 times a day as needed for Anxiety.     • hydroCHLOROthiazide (HYDRODIURIL) 25 MG Tab Take 25 mg by mouth every day.     • hydrALAZINE (APRESOLINE) 25 MG Tab Take 25 mg by mouth 3 times a day.     • FLUoxetine (PROZAC) 10 MG Cap Take 10 mg by mouth every day.     • busPIRone (BUSPAR) 7.5 MG tablet Take 7.5 mg by mouth every evening. 7.5 mg  + 15 mg  = 22.5 mg in evening     • pravastatin (PRAVACHOL) 20 MG Tab Take 20 mg by mouth every bedtime.     • busPIRone (BUSPAR) 15 MG tablet Take 15 mg by mouth 2 times a day. 15 mg in AM  15 mg + 7.5 mg =  22.5 mg evening     • esomeprazole (NEXIUM) 20 MG capsule Take 20 mg by mouth every evening.     • fluticasone (FLONASE) 50 MCG/ACT nasal spray Spray 2 Sprays in nose every bedtime.     • cetirizine (ZYRTEC) 10 MG Tab Take 10 mg by mouth every evening.     • irbesartan (AVAPRO) 300 MG Tab Take 1 Tab by mouth every day. 90 Tab 3         PHYSICAL EXAM:  Vitals:    10/10/20 1101 10/10/20 1201 10/10/20 1301 10/10/20 1401    BP: (!) 170/82 (!) 165/78 (!) 186/84 (!) 173/83   Pulse: 67 79 73 77   Resp: 17 20 (!) 25 20   Temp:       TempSrc:       SpO2: 97% 97% 97% 97%   Weight:       Height:       , Temp (24hrs), Av.2 °C (97.2 °F), Min:36.2 °C (97.2 °F), Max:36.2 °C (97.2 °F)  , Pulse Oximetry: 97 %, O2 Delivery Device: None - Room Air    General: Pt resting in NAD, cooperative, anxious appearing  Skin:  Pink, warm and dry.  No rashes  HEENT: NC/AT. EOMI. MMM.   Neck:  Supple without lymphadenopathy or rigidity. No JVD   Lungs:  Symmetrical.  CTAB with no W/R/R.  Good air movement   Cardiovascular:  S1/S2 RRR without M/R/G.  Abdomen:  Abdomen is soft NT/ND. +BS. No masses noted.  Extremities:  Full range of motion. No gross deformities noted. 2+ pulses in all extremities. No C/C/E   CNS:  Muscle tone is normal. No focal deficits      LAB TESTS:   Recent Labs     10/08/20  1920 10/10/20  0957   WBC 6.1 6.4   RBC 4.13* 4.12*   HEMOGLOBIN 13.1 13.0   HEMATOCRIT 37.4 36.5*   MCV 90.6 88.6   MCH 31.7 31.6   RDW 44.1 41.8   PLATELETCT 243 251   MPV 9.7 9.4   NEUTSPOLYS 60.70 73.70*   LYMPHOCYTES 23.50 13.70*   MONOCYTES 13.40 10.60   EOSINOPHILS 1.10 0.80   BASOPHILS 0.80 0.30         Recent Labs     10/08/20  1920 10/10/20  0957   SODIUM 135 121*   POTASSIUM 3.9 3.8   CHLORIDE 102 85*   CO2 19* 20   BUN 4* 4*   CREATININE 0.60 0.65   CALCIUM 8.9 9.2   ALBUMIN 4.3 4.6       CULTURES:   Results     ** No results found for the last 168 hours. **          IMAGES:  DX-CHEST-PORTABLE (1 VIEW)   Final Result      No radiographic evidence of acute cardiopulmonary process.      US-RENAL ARTERY DUPLEX COMP    (Results Pending)         CONSULTS:   None    ASSESSMENT/PLAN: 59 y.o. female with PMH HTN, anxiety, hyponatremia admitted for hyponatremia and hypertensive urgency      #Hyponatremia  Recurrent in last 2 months - 2 prior hospitalization and multiple ED visits this month  Sodium 121 now, 135 on 10/8. 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  Patient appears euvolemic, though got 1L NS in the ED  Plan:  - Repeat Na every 8 hours  - Continue NS at 100mL/hr  - Consider stopping fluids, starting salt tabs  - DC HCTZ  - Further eval with morning cortisol, urine lytes, urine/serum osmoles  - Consider CT head    #Hypertensive Urgency  BP 170s-200s/90s in the ED. Improved slightly with ativan  Likely worsened by anxiety  Patient on irbesartan 300mg at home, HCTZ re-started yesterday and also has hydralazine, but doesn't like the way it makes her feel  In the past, cards mentioned considering secondary HTN workup if BP not well controlled.  Unclear where patient stands as she has not been stabilized on any medication and has had many changes in the last several weeks  Per cards note, con consider a re-trial of amlodipine (caused LE edema in the past)  Was on coreg, but stopped due to bradycardia, so will be careful with beta blockers  Plan:  - Continue ibesartan  - Will trial propranaolol - may aid with anxiety sx, will monitor HR closely  - Will work up for secondary causes - plasma metanephrines given adrenal adenomal, renal artery US    #Elevated troponin  Unclear time of onset of chest pain this morning  Trop 22 at 10am  - Will repeat trop in 6 hrs    #Anxiety  Likely contributing to HTN, but vicious cycle of anxiety regarding BP, then anxiety worsening BP  Started on hydroxyzine and prozac yesterday outpatient  - Continue prozac for now, but may need to dc if hyponatremia not imropvoing  - Continue hydroxyzine PRN  - Considering benzo PRN anxiety as well  - As above, will trial propranolol for BP as this may aid anxiety sx.

## 2020-10-10 NOTE — H&P
"[facility] MEDICINE HISTORY AND PHYSICAL     PATIENT ID:  NAME:  Kelly Oakley  MRN:               [id number]  YOB: 1960    Date of Admission: 10/10/2020     Attending: Ambar Yuen MD    Senior Resident: Amelia Bardley MD    Lon Resident: Antonio Giraldo DO    Primary Care Physician:  Tonya Toth MD    CC: nausea    HPI: Kelly Oakley is a 59 y.o. female who presented to the ED with chief complaint of nausea that has been bothering her since last night. Patient has been seen multiple times in hospital and ED recently for blood pressure control. She was most recently re-started on HCTZ and took a dose last night but on rechecking her blood pressure at home she did not find adequate control and after calling her Cardiologist was instructed to take a dose of hydralazine. She noted that the hydralazine made her nauseated and \"shaky\" with some associated numbness and tingling in her mouth and her legs. She denied any loss of bowel or bladder control. She denied any weakness in lower extremities or falls. She denied any slurred speech or facial droop. She did have some associated chest pain that was dull and resolved spontaneously. She also endorsed drinking around 2L of free H2O last night due to increased thirst. Patient came into the ED after her nausea and shakiness were did not resolve and were not alleviated by any attempted measures.    REVIEW OF SYSTEMS:   Ten systems reviewed and were negative except as noted in the HPI.                PAST MEDICAL HISTORY:  Past Medical History:   Diagnosis Date   • Adrenal nodule 2012 2015 / nonfunctional / benign   • Anemia    • Anxiety     Panic   • Arrhythmia    • Arthritis     Morning stiffness   • Atherosclerosis of arteries 2013    seen on abdominal CT   • Briseno's  esophagus    • Chest pain at rest 9/11/2015   • Cigarette smoker one half pack a day or less 5/28/2015   • Depression    • Fatigue 9/11/2015   • GERD (gastroesophageal reflux disease)  "   • Headache(784.0)     sinus headache   • Heart murmur     Dr. Krause-Stress trend   • History of HPV infection    • HLD (hyperlipidemia) 9/11/2015   • HTN (hypertension) 9/11/2015   • IBD (inflammatory bowel disease)     Dr. Cunningham   • MRSA carrier 2008    nose cellulitis   • Nocturnal hypoxemia 10/15/2015   • OSTEOPOROSIS     Osteopenia   • S/P appendectomy    • S/P cholecystectomy    • S/P hysterectomy with oophorectomy     endometriosis   • Ulcer     Barretts esophogitis   • Urinary tract infection, site not specified        PAST SURGICAL HISTORY:  Past Surgical History:   Procedure Laterality Date   • BREAST BIOPSY  1980's    benign left breast 35 years ago   • ABDOMINAL HYSTERECTOMY TOTAL      mennorrogia   • APPENDECTOMY     • CHOLECYSTECTOMY     • COLON RESECTION      Polyp removal   • ENDOMETRIAL ABLATION     • PRIMARY C SECTION     • TONSILLECTOMY     • TUBAL COAGULATION LAPAROSCOPIC BILATERAL     • US-NEEDLE CORE BX-BREAST PANEL         FAMILY HISTORY:  Family History   Problem Relation Age of Onset   • Cancer Mother         Breast/Liver   • Diabetes Mother    • Hypertension Mother    • Hyperlipidemia Mother    • Heart Disease Mother    • Lung Disease Father         Emphysema   • Psychiatric Illness Father         Paranoid schitzophenia   • Psychiatric Illness Sister         Paronoid Schitzo-disorder, Bipolar   • Arthritis Sister         RA, Fibromyalgia   • Cancer Maternal Aunt         Breast   • Cancer Paternal Aunt         Bone   • Arthritis Paternal Aunt    • Genetic Disorder Paternal Aunt         SLE   • Heart Disease Maternal Grandmother    • Hypertension Maternal Grandmother    • Hyperlipidemia Maternal Grandmother    • Genetic Disorder Maternal Grandmother         Brights disease   • Stroke Maternal Grandmother    • Heart Disease Maternal Grandfather    • Hypertension Maternal Grandfather    • Hyperlipidemia Maternal Grandfather    • Stroke Paternal Grandmother    • Hyperlipidemia Paternal  Grandmother    • Hypertension Paternal Grandmother    • Heart Disease Paternal Grandfather    • Hypertension Paternal Grandfather    • Hyperlipidemia Paternal Grandfather        SOCIAL HISTORY:   Pt lives at home with , son, daughter-in-law, grandson  Smoking 1/2 ppd  Etoh use never  Drug use never    DIET:   Orders Placed This Encounter   Procedures   • Diet Order Cardiac     Standing Status:   Standing     Number of Occurrences:   1     Order Specific Question:   Diet:     Answer:   Cardiac [6]     Order Specific Question:   Miscellaneous modifications:     Answer:   No Decaf, No Caffeine(for test) [11]       ALLERGIES:  Allergies   Allergen Reactions   • Macrodantin [Nitrofurantoin Macrocrystal] Anaphylaxis     Prescription > 10 years ago   • Pcn [Penicillins] Anaphylaxis     Prescription > 10 years ago   • Doxycycline Itching   • Amlodipine      Leg swelling   • Bactrim Vomiting   • Clarithromycin Vomiting   • Omnicef Itching       OUTPATIENT MEDICATIONS:    Current Facility-Administered Medications:   •  busPIRone (BUSPAR) tablet 15 mg, 15 mg, Oral, BID, SALVATORE Woodard.O.  •  busPIRone (BUSPAR) tablet 7.5 mg, 7.5 mg, Oral, Q EVENING, SALVATORE Woodard.O.  •  cetirizine (ZYRTEC) tablet 10 mg, 10 mg, Oral, Q EVENING, SALVATORE Woodard.O.  •  esomeprazole (NEXIUM) capsule 20 mg, 20 mg, Oral, Q EVENING, Antonio Giraldo D.O.  •  FLUoxetine (PROZAC) capsule 10 mg, 10 mg, Oral, DAILY, Antonio Giraldo D.O.  •  fluticasone (FLONASE) nasal spray 100 mcg, 2 Spray, Nasal, QHS, SALVATORE Woodard.O.  •  hydroCHLOROthiazide (HYDRODIURIL) tablet 25 mg, 25 mg, Oral, DAILY, Antonio Giraldo D.O.  •  hydrOXYzine HCl (ATARAX) tablet 25 mg, 25 mg, Oral, BID PRN, SALVATORE Woodard.O.  •  irbesartan (AVAPRO) tablet 300 mg, 300 mg, Oral, DAILY, SALVATORE Woodard.O.  •  pravastatin (PRAVACHOL) tablet 20 mg, 20 mg, Oral, QHS, SALVATORE Woodard.O.  •  aspirin (ASA) tablet 325 mg, 325 mg, Oral, DAILY  **OR** aspirin (ASA) chewable tab 324 mg, 324 mg, Oral, DAILY **OR** aspirin (ASA) suppository 300 mg, 300 mg, Rectal, DAILY, Antonio Giraldo D.O.  •  senna-docusate (PERICOLACE or SENOKOT S) 8.6-50 MG per tablet 2 Tab, 2 Tab, Oral, BID **AND** polyethylene glycol/lytes (MIRALAX) PACKET 1 Packet, 1 Packet, Oral, QDAY PRN **AND** magnesium hydroxide (MILK OF MAGNESIA) suspension 30 mL, 30 mL, Oral, QDAY PRN **AND** bisacodyl (DULCOLAX) suppository 10 mg, 10 mg, Rectal, QDAY PRN, SALVATORE Woodard.CASSIE.  •  NS infusion, , Intravenous, Continuous, SALVATORE Woodard.O.  •  enoxaparin (LOVENOX) inj 40 mg, 40 mg, Subcutaneous, DAILY, SALVATORE Woodard.O.  •  acetaminophen (TYLENOL) tablet 650 mg, 650 mg, Oral, Q6HRS PRN, SALVATORE Woodard.O.  •  labetalol (NORMODYNE/TRANDATE) injection 10 mg, 10 mg, Intravenous, Q4HRS PRN, SALVATORE Woodard.O.  •  ondansetron (ZOFRAN) syringe/vial injection 4 mg, 4 mg, Intravenous, Q4HRS PRN, SALVATORE Woodard.O.  •  ondansetron (ZOFRAN ODT) dispertab 4 mg, 4 mg, Oral, Q4HRS PRN, SALVATORE Woodard.O.  •  promethazine (PHENERGAN) tablet 12.5-25 mg, 12.5-25 mg, Oral, Q4HRS PRN, SALVATORE Woodard.O.  •  promethazine (PHENERGAN) suppository 12.5-25 mg, 12.5-25 mg, Rectal, Q4HRS PRN, SALVAOTRE Woodard.O.  •  prochlorperazine (COMPAZINE) injection 5-10 mg, 5-10 mg, Intravenous, Q4HRS PRN, Antonio Giraldo D.O.    Current Outpatient Medications:   •  hydrOXYzine HCl (ATARAX) 25 MG Tab, Take 25 mg by mouth 2 times a day as needed for Anxiety., Disp: , Rfl:   •  hydroCHLOROthiazide (HYDRODIURIL) 25 MG Tab, Take 25 mg by mouth every day., Disp: , Rfl:   •  hydrALAZINE (APRESOLINE) 25 MG Tab, Take 25 mg by mouth 3 times a day., Disp: , Rfl:   •  FLUoxetine (PROZAC) 10 MG Cap, Take 10 mg by mouth every day., Disp: , Rfl:   •  busPIRone (BUSPAR) 7.5 MG tablet, Take 7.5 mg by mouth every evening. 7.5 mg  + 15 mg  = 22.5 mg in evening, Disp: , Rfl:   •  pravastatin  "(PRAVACHOL) 20 MG Tab, Take 20 mg by mouth every bedtime., Disp: , Rfl:   •  busPIRone (BUSPAR) 15 MG tablet, Take 15 mg by mouth 2 times a day. 15 mg in AM 15 mg + 7.5 mg =  22.5 mg evening, Disp: , Rfl:   •  esomeprazole (NEXIUM) 20 MG capsule, Take 20 mg by mouth every evening., Disp: , Rfl:   •  fluticasone (FLONASE) 50 MCG/ACT nasal spray, Spray 2 Sprays in nose every bedtime., Disp: , Rfl:   •  cetirizine (ZYRTEC) 10 MG Tab, Take 10 mg by mouth every evening., Disp: , Rfl:   •  irbesartan (AVAPRO) 300 MG Tab, Take 1 Tab by mouth every day., Disp: 90 Tab, Rfl: 3    PHYSICAL EXAM:  Vitals:    10/10/20 0847 10/10/20 1101 10/10/20 1201 10/10/20 1301   BP: (!) 180/90 (!) 170/82 (!) 165/78 (!) 186/84   Pulse: 75 67 79 73   Resp: 18 17 20 (!) 25   Temp: 36.2 °C (97.2 °F)      TempSrc: Temporal      SpO2: 98% 97% 97% 97%   Weight: 71.1 kg (156 lb 12 oz)      Height: 1.6 m (5' 3\")      , Temp (24hrs), Av.2 °C (97.2 °F), Min:36.2 °C (97.2 °F), Max:36.2 °C (97.2 °F)  , Pulse Oximetry: 97 %, O2 Delivery Device: None - Room Air    General: Patient appears very anxious, requires frequent redirection during conversation, cooperative   Skin:  Pink, warm and dry.  No rashes  HEENT: NC/AT. PERRL. EOMI. MMM. No nasal discharge. Oropharynx nonerythematous without exudate/plaques  Neck:  Supple without lymphadenopathy or rigidity. No JVD   Lungs:  Symmetrical.  CTAB with no W/R/R.  Good air movement   Cardiovascular:  S1/S2 RRR without M/R/G.  Abdomen:  Abdomen is soft NT/ND. +BS. No masses noted.    Extremities:  Full range of motion. No gross deformities noted. 2+ pulses in all extremities. No C/C/E   Spine:  Straight without vertebral anomalies.  CNS:  Muscle tone is normal. Cranial nerves II-XII grossly intact. 2+ DTRs.       ERCourse:  Vital signs with temp 97F, HR 79, RR 20, /78, O2 97% on room air. CBC unremarkable. CMP with Na 121 (down from 135 on 10/8) and BUN 4, otherwise reassuring. Troponin 22 (up from 19 on " 10/8). ECG showed sinus rhythm with no ST/ T wave changes. CXR without evidence of acute cardiopulmonary process.    LAB TESTS:   Recent Labs     10/08/20  1920 10/10/20  0957   WBC 6.1 6.4   RBC 4.13* 4.12*   HEMOGLOBIN 13.1 13.0   HEMATOCRIT 37.4 36.5*   MCV 90.6 88.6   MCH 31.7 31.6   RDW 44.1 41.8   PLATELETCT 243 251   MPV 9.7 9.4   NEUTSPOLYS 60.70 73.70*   LYMPHOCYTES 23.50 13.70*   MONOCYTES 13.40 10.60   EOSINOPHILS 1.10 0.80   BASOPHILS 0.80 0.30         Recent Labs     10/08/20  1920 10/10/20  0957   SODIUM 135 121*   POTASSIUM 3.9 3.8   CHLORIDE 102 85*   CO2 19* 20   BUN 4* 4*   CREATININE 0.60 0.65   CALCIUM 8.9 9.2   ALBUMIN 4.3 4.6       CULTURES:   Results     ** No results found for the last 168 hours. **          IMAGES:  DX-CHEST-PORTABLE (1 VIEW)   Final Result      No radiographic evidence of acute cardiopulmonary process.      US-RENAL ARTERY DUPLEX COMP    (Results Pending)       CONSULTS:   None    ASSESSMENT/PLAN: 59 y.o. female admitted for hyponatremia.    #Hyponatremia  Patient was discharged on 10/8 with sodium of 135. Today in the ED, she was at 121. Etiology unclear. Patient appears euvolemic on exam. CXR reassuring. Recent TSH WNL. Patient does endorse increased H2O intake yesterday evening. Also recently with HCTZ use. Will check further labs to help clarify picture. Diuretic use vs. Psychogenic polydipsia vs. Glucocorticoid deficiency vs. SSRI  - 1L NS bolus  - BMP Q8h  - Holding HCTZ  - Check urine osmolarity  - Check serum osmolarity  - Check urine electrolytes  - Check cortisol    #HTN  #adrenal nodule  Patient has been seen in hospital or ED four times in the past month with elevated blood pressure. Takes irbesartan at home. Has been on HCTZ in the past, but this has been held given her concerns for low sodium. Was tried on Coreg in the past but became bradycardic. Calcium channel blockers gave patient LE edema. BP in the ED at 165/78. Patient mentioned a history of adrenal  nodule.   - Continue irbesartan  - Propranolol 40 mg PO BID  - Check renal artery US  - Check plasma metanephrines  - Consider Cardiology consult     #Anxiety  Patient with a history of anxiety. Was recently started on Prozac. Took first dose yesterday. Also takes hydroxyzine PRN.   - Continue Prozac  - Continue hydroxyzine  - Ativan PRN    #elevated troponin  Patient with Troponin of 22 in the ED. ECG with sinus rhythm and no ST/ T changes. Patient denies any chest pain. Likely secondary to hypertension.   - Will trend troponin    Core Measures  Lines: PIV  Tubes: none  Fluids: NS  Diet: Regular  Abx: none  DVT prophylaxis: SCDs and Lovenox  PCP: Tonya Toth MD  Code Status: Full Code     Disposition: requiring inpatient status for management of hyponatremia     Antonio Giraldo DO   PGY-1 Family Medicine Resident   [facility]

## 2020-10-11 ENCOUNTER — APPOINTMENT (OUTPATIENT)
Dept: RADIOLOGY | Facility: MEDICAL CENTER | Age: 60
DRG: 641 | End: 2020-10-11
Attending: STUDENT IN AN ORGANIZED HEALTH CARE EDUCATION/TRAINING PROGRAM
Payer: MEDICAID

## 2020-10-11 LAB
ANION GAP SERPL CALC-SCNC: 11 MMOL/L (ref 7–16)
ANION GAP SERPL CALC-SCNC: 12 MMOL/L (ref 7–16)
ANION GAP SERPL CALC-SCNC: 13 MMOL/L (ref 7–16)
BUN SERPL-MCNC: 10 MG/DL (ref 8–22)
BUN SERPL-MCNC: 11 MG/DL (ref 8–22)
BUN SERPL-MCNC: 9 MG/DL (ref 8–22)
CALCIUM SERPL-MCNC: 8.2 MG/DL (ref 8.5–10.5)
CALCIUM SERPL-MCNC: 8.3 MG/DL (ref 8.5–10.5)
CALCIUM SERPL-MCNC: 8.3 MG/DL (ref 8.5–10.5)
CHLORIDE SERPL-SCNC: 92 MMOL/L (ref 96–112)
CHLORIDE SERPL-SCNC: 94 MMOL/L (ref 96–112)
CHLORIDE SERPL-SCNC: 98 MMOL/L (ref 96–112)
CO2 SERPL-SCNC: 18 MMOL/L (ref 20–33)
CO2 SERPL-SCNC: 20 MMOL/L (ref 20–33)
CO2 SERPL-SCNC: 21 MMOL/L (ref 20–33)
CORTIS SERPL-MCNC: 6.5 UG/DL (ref 0–23)
CREAT SERPL-MCNC: 0.69 MG/DL (ref 0.5–1.4)
CREAT SERPL-MCNC: 0.7 MG/DL (ref 0.5–1.4)
CREAT SERPL-MCNC: 0.83 MG/DL (ref 0.5–1.4)
EKG IMPRESSION: NORMAL
EKG IMPRESSION: NORMAL
GLUCOSE SERPL-MCNC: 113 MG/DL (ref 65–99)
GLUCOSE SERPL-MCNC: 85 MG/DL (ref 65–99)
GLUCOSE SERPL-MCNC: 92 MG/DL (ref 65–99)
POTASSIUM SERPL-SCNC: 3.4 MMOL/L (ref 3.6–5.5)
POTASSIUM SERPL-SCNC: 3.6 MMOL/L (ref 3.6–5.5)
POTASSIUM SERPL-SCNC: 4.1 MMOL/L (ref 3.6–5.5)
SODIUM SERPL-SCNC: 124 MMOL/L (ref 135–145)
SODIUM SERPL-SCNC: 124 MMOL/L (ref 135–145)
SODIUM SERPL-SCNC: 131 MMOL/L (ref 135–145)
TROPONIN T SERPL-MCNC: 30 NG/L (ref 6–19)
TROPONIN T SERPL-MCNC: 30 NG/L (ref 6–19)
TROPONIN T SERPL-MCNC: 31 NG/L (ref 6–19)

## 2020-10-11 PROCEDURE — A9270 NON-COVERED ITEM OR SERVICE: HCPCS | Performed by: STUDENT IN AN ORGANIZED HEALTH CARE EDUCATION/TRAINING PROGRAM

## 2020-10-11 PROCEDURE — 93975 VASCULAR STUDY: CPT

## 2020-10-11 PROCEDURE — 93010 ELECTROCARDIOGRAM REPORT: CPT | Performed by: INTERNAL MEDICINE

## 2020-10-11 PROCEDURE — 700105 HCHG RX REV CODE 258: Performed by: STUDENT IN AN ORGANIZED HEALTH CARE EDUCATION/TRAINING PROGRAM

## 2020-10-11 PROCEDURE — 93005 ELECTROCARDIOGRAM TRACING: CPT | Performed by: STUDENT IN AN ORGANIZED HEALTH CARE EDUCATION/TRAINING PROGRAM

## 2020-10-11 PROCEDURE — 700102 HCHG RX REV CODE 250 W/ 637 OVERRIDE(OP): Performed by: STUDENT IN AN ORGANIZED HEALTH CARE EDUCATION/TRAINING PROGRAM

## 2020-10-11 PROCEDURE — 770020 HCHG ROOM/CARE - TELE (206)

## 2020-10-11 PROCEDURE — 93005 ELECTROCARDIOGRAM TRACING: CPT | Performed by: FAMILY MEDICINE

## 2020-10-11 PROCEDURE — 84484 ASSAY OF TROPONIN QUANT: CPT

## 2020-10-11 PROCEDURE — 700111 HCHG RX REV CODE 636 W/ 250 OVERRIDE (IP): Performed by: STUDENT IN AN ORGANIZED HEALTH CARE EDUCATION/TRAINING PROGRAM

## 2020-10-11 PROCEDURE — 36415 COLL VENOUS BLD VENIPUNCTURE: CPT

## 2020-10-11 PROCEDURE — 82533 TOTAL CORTISOL: CPT

## 2020-10-11 PROCEDURE — 80048 BASIC METABOLIC PNL TOTAL CA: CPT

## 2020-10-11 RX ORDER — PRAZOSIN HYDROCHLORIDE 1 MG/1
1 CAPSULE ORAL TWICE DAILY
Status: DISCONTINUED | OUTPATIENT
Start: 2020-10-11 | End: 2020-10-12

## 2020-10-11 RX ORDER — IRBESARTAN 150 MG/1
150 TABLET ORAL 2 TIMES DAILY
Status: DISCONTINUED | OUTPATIENT
Start: 2020-10-11 | End: 2020-10-11

## 2020-10-11 RX ORDER — IRBESARTAN 150 MG/1
150 TABLET ORAL 2 TIMES DAILY
Status: DISCONTINUED | OUTPATIENT
Start: 2020-10-12 | End: 2020-10-12

## 2020-10-11 RX ORDER — POTASSIUM CHLORIDE 20 MEQ/1
40 TABLET, EXTENDED RELEASE ORAL ONCE
Status: COMPLETED | OUTPATIENT
Start: 2020-10-11 | End: 2020-10-11

## 2020-10-11 RX ADMIN — CETIRIZINE HYDROCHLORIDE 10 MG: 10 TABLET, FILM COATED ORAL at 16:32

## 2020-10-11 RX ADMIN — IRBESARTAN 300 MG: 150 TABLET ORAL at 05:05

## 2020-10-11 RX ADMIN — BUSPIRONE HYDROCHLORIDE 22.5 MG: 10 TABLET ORAL at 16:31

## 2020-10-11 RX ADMIN — OMEPRAZOLE 20 MG: 20 CAPSULE, DELAYED RELEASE ORAL at 16:32

## 2020-10-11 RX ADMIN — FLUOXETINE 10 MG: 10 CAPSULE ORAL at 05:05

## 2020-10-11 RX ADMIN — POTASSIUM CHLORIDE 40 MEQ: 20 TABLET, EXTENDED RELEASE ORAL at 03:15

## 2020-10-11 RX ADMIN — BUSPIRONE HYDROCHLORIDE 15 MG: 10 TABLET ORAL at 05:05

## 2020-10-11 RX ADMIN — PROPRANOLOL HYDROCHLORIDE 40 MG: 20 TABLET ORAL at 05:05

## 2020-10-11 RX ADMIN — FLUTICASONE PROPIONATE 100 MCG: 50 SPRAY, METERED NASAL at 20:49

## 2020-10-11 RX ADMIN — ENOXAPARIN SODIUM 40 MG: 40 INJECTION SUBCUTANEOUS at 05:05

## 2020-10-11 RX ADMIN — PRAVASTATIN SODIUM 20 MG: 20 TABLET ORAL at 20:49

## 2020-10-11 RX ADMIN — ACETAMINOPHEN 650 MG: 325 TABLET, FILM COATED ORAL at 03:37

## 2020-10-11 RX ADMIN — SODIUM CHLORIDE: 9 INJECTION, SOLUTION INTRAVENOUS at 09:26

## 2020-10-11 RX ADMIN — PHENYTOIN 1 MG: 125 SUSPENSION ORAL at 16:32

## 2020-10-11 RX ADMIN — DOCUSATE SODIUM 50 MG AND SENNOSIDES 8.6 MG 2 TABLET: 8.6; 5 TABLET, FILM COATED ORAL at 05:05

## 2020-10-11 ASSESSMENT — PAIN DESCRIPTION - PAIN TYPE
TYPE: ACUTE PAIN

## 2020-10-11 NOTE — CARE PLAN
Problem: Communication  Goal: The ability to communicate needs accurately and effectively will improve  Outcome: PROGRESSING AS EXPECTED   Patient educated to utilize call light. Patient and family oriented to hospital room. Patient encouraged to ask questions about plan of care. Patient effectively uses call light and is involved in POC.     Problem: Safety  Goal: Will remain free from falls  Outcome: PROGRESSING AS EXPECTED   Patient's risk for injury and falls assessed. Appropriate safety precautions in place. Patient educated to utilize call light for needs. Patient verbalizes understanding.     Problem: Pain Management  Goal: Pain level will decrease to patient's comfort goal  Outcome: PROGRESSING AS EXPECTED   Patient educated to pain scale system. Patient encouraged to verbalize discomfort. Patient taught about non-pharmacological pain management. Patient is comfortable at this time without statements of discomfort or pain.

## 2020-10-11 NOTE — PROGRESS NOTES
Bedside report received from day RN; assumed pt care. Pt assessment complete. Pt AOx4. Reviewed plan of care with pt. Tele box on and rhythm verified. Chart and labs reviewed. Bed in lowest position, and 2 side rails up. Pt educated on call light; call light with in reach.

## 2020-10-11 NOTE — ED PROVIDER NOTES
"ED Provider Note    CHIEF COMPLAINT  Chief Complaint   Patient presents with   • Chest Pain     x's 1 day. pt is feeling \"shaky\", light headedness, nausea. pt unable to take BP medication        HPI  Kelly Oakley is a 59 y.o. female who presents emergency room today with complaints of chest pain.  Patient states this started over the last 12 to 24 hours.  Pain is described as pressure on her anterior chest wall rating to the left side she has some shortness of breath with this no nausea no vomiting.  Did have some dizziness with this and felt shaky.  She has had history of previous visit blood pressures have been quite elevated not out of control she states that she is just not able to take her medication as she did have nausea and blood pressures were quite elevated at initially in triage of 200/110.  No fever but she has had some chills no changes to bowel or bladder.  She has had no known covid exposures or contacts.  Heart score of 4, history of tobacco use, family history, history of hypertension elevated cholesterol.  No history of IVDA no history or risk factors for collagen vascular disease.  Patient did admit to some paresthesias to hands and face.  Patient appeared to be in severe distress secondary to her multiple symptoms.    REVIEW OF SYSTEMS  See HPI for further details. All other systems are negative.     PAST MEDICAL HISTORY  Past Medical History:   Diagnosis Date   • Nocturnal hypoxemia 10/15/2015   • HTN (hypertension) 9/11/2015   • Chest pain at rest 9/11/2015   • Fatigue 9/11/2015   • HLD (hyperlipidemia) 9/11/2015   • Cigarette smoker one half pack a day or less 5/28/2015   • Atherosclerosis of arteries 2013    seen on abdominal CT   • Adrenal nodule 2012 2015 / nonfunctional / benign   • MRSA carrier 2008    nose cellulitis   • Anemia    • Anxiety     Panic   • Arrhythmia    • Arthritis     Morning stiffness   • Briseno's  esophagus    • Depression    • GERD (gastroesophageal reflux " disease)    • Headache(784.0)     sinus headache   • Heart murmur     Dr. Krause-Stress trend   • History of HPV infection    • IBD (inflammatory bowel disease)     Dr. Cunningham   • OSTEOPOROSIS     Osteopenia   • S/P appendectomy    • S/P cholecystectomy    • S/P hysterectomy with oophorectomy     endometriosis   • Ulcer     Barretts esophogitis   • Urinary tract infection, site not specified        FAMILY HISTORY  [unfilled]    SOCIAL HISTORY  Social History     Socioeconomic History   • Marital status:      Spouse name: Not on file   • Number of children: Not on file   • Years of education: Not on file   • Highest education level: Not on file   Occupational History   • Not on file   Social Needs   • Financial resource strain: Somewhat hard   • Food insecurity     Worry: Never true     Inability: Never true   • Transportation needs     Medical: No     Non-medical: No   Tobacco Use   • Smoking status: Current Every Day Smoker     Packs/day: 0.50     Years: 40.00     Pack years: 20.00     Types: Cigarettes   • Smokeless tobacco: Never Used   Substance and Sexual Activity   • Alcohol use: Yes     Alcohol/week: 0.6 oz     Types: 1 Standard drinks or equivalent per week     Comment: rarely   • Drug use: No   • Sexual activity: Not Currently     Comment:    Lifestyle   • Physical activity     Days per week: Patient refused     Minutes per session: Patient refused   • Stress: Very much   Relationships   • Social connections     Talks on phone: Patient refused     Gets together: Patient refused     Attends Protestant service: Patient refused     Active member of club or organization: Patient refused     Attends meetings of clubs or organizations: Patient refused     Relationship status: Patient refused   • Intimate partner violence     Fear of current or ex partner: Patient refused     Emotionally abused: Patient refused     Physically abused: Patient refused     Forced sexual activity: Patient refused   Other  "Topics Concern   • Not on file   Social History Narrative   • Not on file       SURGICAL HISTORY  Past Surgical History:   Procedure Laterality Date   • BREAST BIOPSY  1980's    benign left breast 35 years ago   • ABDOMINAL HYSTERECTOMY TOTAL      mennorrogia   • APPENDECTOMY     • CHOLECYSTECTOMY     • COLON RESECTION      Polyp removal   • ENDOMETRIAL ABLATION     • PRIMARY C SECTION     • TONSILLECTOMY     • TUBAL COAGULATION LAPAROSCOPIC BILATERAL     • US-NEEDLE CORE BX-BREAST PANEL         CURRENT MEDICATIONS  Home Medications     Reviewed by Radha Javier on 10/10/20 at 1212  Med List Status: Complete   Medication Last Dose Status   busPIRone (BUSPAR) 15 MG tablet 10/10/2020 Active   busPIRone (BUSPAR) 7.5 MG tablet 10/9/2020 Active   cetirizine (ZYRTEC) 10 MG Tab 10/9/2020 Active   esomeprazole (NEXIUM) 20 MG capsule 10/9/2020 Active   FLUoxetine (PROZAC) 10 MG Cap 10/10/2020 Active   fluticasone (FLONASE) 50 MCG/ACT nasal spray 10/9/2020 Active   hydrALAZINE (APRESOLINE) 25 MG Tab 10/10/2020 Active   hydroCHLOROthiazide (HYDRODIURIL) 25 MG Tab 10/10/2020 Active   hydrOXYzine HCl (ATARAX) 25 MG Tab 10/10/2020 Active   irbesartan (AVAPRO) 300 MG Tab 10/10/2020 Active   pravastatin (PRAVACHOL) 20 MG Tab 10/9/2020 Active                ALLERGIES  Allergies   Allergen Reactions   • Macrodantin [Nitrofurantoin Macrocrystal] Anaphylaxis     Prescription > 10 years ago   • Pcn [Penicillins] Anaphylaxis     Prescription > 10 years ago   • Doxycycline Itching   • Amlodipine      Leg swelling   • Bactrim Vomiting   • Clarithromycin Vomiting   • Omnicef Itching       PHYSICAL EXAM  VITAL SIGNS: /83   Pulse 74   Temp 36.2 °C (97.2 °F) (Temporal)   Resp 18   Ht 1.6 m (5' 3\")   Wt 70.6 kg (155 lb 10.3 oz)   SpO2 97%   BMI 27.57 kg/m²       Constitutional: Well developed, Well nourished, severe distress, Non-toxic appearance.   HENT: Normocephalic, Atraumatic, Bilateral external ears normal, Oropharynx " moist, No oral exudates, Nose normal.   Eyes: PERRLA, EOMI, Conjunctiva normal, No discharge.   Neck: Normal range of motion, No tenderness, Supple, No stridor.   Lymphatic: No lymphadenopathy noted.   Cardiovascular: Normal heart rate, Normal rhythm, No murmurs, No rubs, No gallops.   Thorax & Lungs: Normal breath sounds, No respiratory distress, No wheezing, No chest tenderness.   Abdomen: Bowel sounds normal, Soft, No tenderness, No masses, No pulsatile masses.   Skin: Warm, Dry, No erythema, No rash.   Back: No tenderness, No CVA tenderness.    Extremities: Intact distal pulses, No edema, No tenderness, No cyanosis, No clubbing.   Musculoskeletal: Good range of motion in all major joints. No tenderness to palpation or major deformities noted.   Neurologic: Alert & oriented x 3, Normal motor function, Normal sensory function, No focal deficits noted.   Psychiatric: Affect normal, Judgment normal, Mood anxious.     EKG  Normal sinus rhythm 70 bpm, no ST elevation or depression, no widening of the QRS complex, poor R wave progression, ID intervals are normal, no diagnostic Q waves.  This is a twelve-lead EKG there is no previous EKG available to stop for comparison.    RADIOLOGY/PROCEDURES  DX-CHEST-PORTABLE (1 VIEW)   Final Result      No radiographic evidence of acute cardiopulmonary process.      US-RENAL ARTERY DUPLEX COMP    (Results Pending)         COURSE & MEDICAL DECISION MAKING  Pertinent Labs & Imaging studies reviewed. (See chart for details)  Noted consultation from previous cardiology that patient if she has continued hypertension renal artery stenosis is in the differential and this need to be ruled out patient will have ultrasound performed also she will be admitted for her blood pressure control and cardiac work-up she does have a heart score of 4-5 and will need further work-up and her troponin was mildly elevated 22 currently she is feeling improved and will be admitted as above to the hospital  please see orders for further plan and care.  Also noted that her sodium was quite low at 121 and her numbness and shakiness could be result of this.  Replacement was started given sodium for this and she did somewhat improve.  Hyponatremia which is symptomatic she was given replacement and stated will appear to be in severe distress secondary to her multiple findings include elevated troponin, hypertension that is poorly controlled, hyponatremia further work-up necessary she will be admitted to the hospital discussed case with patient.    FINAL IMPRESSION  1.  Acute hyponatremia  2.  Hypertension  3.  Acute chest pain  4.  Paresthesia  5.  Critical care time of 41 minutes; this includes multiple discussions with hospitalist, review of records discussion and treatment plan, interventions as discussed above.  This does not including procedures.  Please see orders for further plan and care.         Electronically signed by: Nasim Garcia D.O., 10/10/2020 6:10 PM

## 2020-10-11 NOTE — PROGRESS NOTES
[facility] [place] [facility] [place]     Attending:   Ambar Yuen MD    Resident:   Antonio Giraldo DO    PATIENT:   Kelly Oakley; [id number]; 1960    ID:   59 y.o. female admitted for hyponatremia, hypertensive urgency, anxiety.     SUBJECTIVE:   Patient BP dropped to low 50's overnight. Patient asymptomatic. Endorsed lower anxiety today. Wants to get BP under control before discharge.     OBJECTIVE:  Vitals:    10/10/20 1941 10/10/20 2335 10/11/20 0321 10/11/20 0747   BP: 138/73 128/71 158/86 154/83   Pulse: 60 (!) 55 (!) 56 (!) 54   Resp: 17 17 17 17   Temp: 37.3 °C (99.2 °F) 36.8 °C (98.3 °F) 36.1 °C (97 °F) 36.8 °C (98.3 °F)   TempSrc: Temporal Temporal Temporal Temporal   SpO2: 96% 98% 97% 98%   Weight:       Height:           Intake/Output Summary (Last 24 hours) at 10/11/2020 1011  Last data filed at 10/10/2020 2100  Gross per 24 hour   Intake 1000 ml   Output 430 ml   Net 570 ml       PHYSICAL EXAM:  General: No acute distress, resting comfortably, conversational, appears much less anxious than yesterday  HEENT: NC, AT, EOMI, MMM  Heart: RRR, no murmur, rub, gallop  Lungs: CTAB, no wheezing, rales, rhonchi  Abdomen: soft, nontender, nondistended, bowel sounds present throughout  EXT:  CALVERT WNL, no lower extremity edema, 2+ PT pulses bilaterally  Skin: Warm, dry, intact  Neuro: Non-focal, A/Ox4      LABS:  Recent Labs     10/08/20  1920 10/10/20  0957   WBC 6.1 6.4   RBC 4.13* 4.12*   HEMOGLOBIN 13.1 13.0   HEMATOCRIT 37.4 36.5*   MCV 90.6 88.6   MCH 31.7 31.6   RDW 44.1 41.8   PLATELETCT 243 251   MPV 9.7 9.4   NEUTSPOLYS 60.70 73.70*   LYMPHOCYTES 23.50 13.70*   MONOCYTES 13.40 10.60   EOSINOPHILS 1.10 0.80   BASOPHILS 0.80 0.30     Recent Labs     10/08/20  1920 10/10/20  0957 10/10/20  1656 10/11/20  0122 10/11/20  0647   SODIUM 135 121* 126* 124* 124*   POTASSIUM 3.9 3.8  --  3.4* 4.1   CHLORIDE 102 85*  --  92* 94*   CO2 19* 20  --  21 18*   BUN 4* 4*  --  10 9   CREATININE 0.60 0.65  --  0.70 0.69    CALCIUM 8.9 9.2  --  8.3* 8.2*   ALBUMIN 4.3 4.6  --   --   --      Estimated GFR/CRCL = Estimated Creatinine Clearance: 82.7 mL/min (by C-G formula based on SCr of 0.69 mg/dL).  Recent Labs     10/10/20  0957 10/11/20  0122 10/11/20  0647   GLUCOSE 117* 85 92     Recent Labs     10/08/20  1920 10/10/20  0957   ASTSGOT 16 23   ALTSGPT 22 25   TBILIRUBIN 0.5 0.7   ALKPHOSPHAT 68 74   GLOBULIN 2.8 2.5             No results for input(s): INR, APTT, FIBRINOGEN in the last 72 hours.    Invalid input(s): DIMER    MICROBIOLOGY:  Blood Culture Hold   Date Value Ref Range Status   09/24/2020 Collected  Final        IMAGING:  DX-CHEST-PORTABLE (1 VIEW)   Final Result      No radiographic evidence of acute cardiopulmonary process.      US-RENAL ARTERY DUPLEX COMP    (Results Pending)       CULTURES:   Results     Procedure Component Value Units Date/Time    SARS-CoV-2, PCR (In-House) [[id number]] Collected: 10/10/20 1858    Order Status: Completed Updated: 10/10/20 2227     SARS-CoV-2 Source Nasal Swab     SARS-CoV-2 by PCR NotDetected     Comment: [facility] providers: PLEASE REFER TO DE-ESCALATION AND RETESTING PROTOCOL  on [email or web address]  **The TaqPath COVID-19 SARS-CoV-2 test has been made available for use under  the Emergency Use Authorization (EUA) only.         Narrative:      Collected By:[id number] JOANN CRAVEN  Is patient being admitted?->Yes  Does this patient meet criteria for Rush/Cepheid per [facility]  Inpatient Workflow? (See workflow link below)->No  Expected turn around time?->Routine (In-House PCR up to 24  hours)  Is this the patients First SARS CoV-2 test?->Yes  Is this patient employed in healthcare?->No  Is the patient symptomatic as defined by the CDC?->No  Is the patient hospitalized?->No  Is the patient a resident in a congregate care setting?->No  Is the patient pregnant?->No    COVID/SARS CoV-2 PCR [[id number]] Collected: 10/10/20 1858    Order Status: Completed Specimen: Respirate from Nasopharyngeal  Updated: 10/10/20 1907     COVID Order Status Received     Comment: The order for SARS CoV-2 testing has been received by the  Laboratory. This result is neither positive nor negative.  Final results of testing will report in 24-48 hours, separately.         Narrative:      Collected By:[id number] JOANN CRAVEN  Is patient being admitted?->Yes  Does this patient meet criteria for Rush/Cepheid per [facility]  Inpatient Workflow? (See workflow link below)->No  Expected turn around time?->Routine (In-House PCR up to 24  hours)  Is this the patients First SARS CoV-2 test?->Yes  Is this patient employed in healthcare?->No  Is the patient symptomatic as defined by the CDC?->No  Is the patient hospitalized?->No  Is the patient a resident in a congregate care setting?->No  Is the patient pregnant?->No          MEDS:  Current Facility-Administered Medications   Medication Last Dose   • cetirizine (ZYRTEC) tablet 10 mg 10 mg at 10/10/20 1747   • FLUoxetine (PROZAC) capsule 10 mg 10 mg at 10/11/20 0505   • fluticasone (FLONASE) nasal spray 100 mcg 100 mcg at 10/10/20 2010   • hydrOXYzine HCl (ATARAX) tablet 25 mg     • irbesartan (AVAPRO) tablet 300 mg 300 mg at 10/11/20 0505   • pravastatin (PRAVACHOL) tablet 20 mg 20 mg at 10/10/20 2010   • senna-docusate (PERICOLACE or SENOKOT S) 8.6-50 MG per tablet 2 Tab 2 Tab at 10/11/20 0505    And   • polyethylene glycol/lytes (MIRALAX) PACKET 1 Packet      And   • magnesium hydroxide (MILK OF MAGNESIA) suspension 30 mL      And   • bisacodyl (DULCOLAX) suppository 10 mg     • NS infusion     • enoxaparin (LOVENOX) inj 40 mg 40 mg at 10/11/20 0505   • acetaminophen (TYLENOL) tablet 650 mg 650 mg at 10/11/20 0337   • labetalol (NORMODYNE/TRANDATE) injection 10 mg 10 mg at 10/10/20 1440   • ondansetron (ZOFRAN) syringe/vial injection 4 mg     • ondansetron (ZOFRAN ODT) dispertab 4 mg     • promethazine (PHENERGAN) tablet 12.5-25 mg     • promethazine (PHENERGAN) suppository 12.5-25 mg     •  prochlorperazine (COMPAZINE) injection 5-10 mg     • busPIRone (BUSPAR) tablet 15 mg 15 mg at 10/11/20 0505    And   • busPIRone (BUSPAR) tablet 22.5 mg 22.5 mg at 10/10/20 1748   • omeprazole (PRILOSEC) capsule 20 mg 20 mg at 10/10/20 1746   • propranolol (INDERAL) tablet 40 mg 40 mg at 10/11/20 0505       PROBLEM LIST:  No problems updated.    ASSESSMENT/PLAN: 59 y.o. female admitted for hyponatremia.    #hyponatremia  Patient appears euvolemic. Plasma osmolarity indicates hypotonic picture. Urinary osm >100. SIADH vs. HCTZ use vs. SSRI. Na this morning 124.   - Increase NS to 100cc/ hr  - 1.5 L fluid restriction  - Q8 BMP check  - Goal Na 130-135    #HTN  Patient on irbesartan 300 mg daily. Started propranolol 40 mg BID yesterday. Heart rate dropped into low 50's. ECG today shows sinus bradycardia. Patient has tried CCB in past but got LE edema. HCTZ was discontinued due to concern for hyponatremia. Patient did not tolerate hydralazine. Renal US did not show any significant renal artery stenosis. Patient follows with Cardiology outpatient. Discussed with Cardiology, appreciate recommendations.  - Discontinue propranolol  - Adjust irbesartan to 150 mg BID  - Start prazosin 1 mg BID  - F/u plasma metanephrines    #elevated troponin  Elevated to 30. Patient denies any chest pain. ECG shows no ST/ T changes. Likely secondary to hypertension.  - Continue to trend troponin    #Anxiety  Patient endorsed improved anxiety today.   - Continue Buspar  - Continue hydroxyzine  - Continue Prozac    Core Measures  Lines: PIV  Tubes: none  Fluids: NS 100cc/ hr  Diet: Regular, 1.5 L fluid restriction  Abx: none  DVT prophylaxis: Lovenox  PCP: Tonya Toth MD  Code Status: Full Code    Disposition: requiring IVF for hyponatremia    Antonio Giraldo DO   PGY-1 Family Medicine Resident   [facility]

## 2020-10-12 LAB
ANION GAP SERPL CALC-SCNC: 11 MMOL/L (ref 7–16)
BUN SERPL-MCNC: 14 MG/DL (ref 8–22)
CALCIUM SERPL-MCNC: 8.5 MG/DL (ref 8.5–10.5)
CHLORIDE SERPL-SCNC: 104 MMOL/L (ref 96–112)
CO2 SERPL-SCNC: 19 MMOL/L (ref 20–33)
CREAT SERPL-MCNC: 0.7 MG/DL (ref 0.5–1.4)
EKG IMPRESSION: NORMAL
GLUCOSE SERPL-MCNC: 96 MG/DL (ref 65–99)
POTASSIUM SERPL-SCNC: 3.7 MMOL/L (ref 3.6–5.5)
SODIUM SERPL-SCNC: 134 MMOL/L (ref 135–145)
TROPONIN T SERPL-MCNC: 24 NG/L (ref 6–19)

## 2020-10-12 PROCEDURE — 700111 HCHG RX REV CODE 636 W/ 250 OVERRIDE (IP): Performed by: STUDENT IN AN ORGANIZED HEALTH CARE EDUCATION/TRAINING PROGRAM

## 2020-10-12 PROCEDURE — 84484 ASSAY OF TROPONIN QUANT: CPT

## 2020-10-12 PROCEDURE — 93010 ELECTROCARDIOGRAM REPORT: CPT | Performed by: INTERNAL MEDICINE

## 2020-10-12 PROCEDURE — 93005 ELECTROCARDIOGRAM TRACING: CPT | Performed by: FAMILY MEDICINE

## 2020-10-12 PROCEDURE — 700102 HCHG RX REV CODE 250 W/ 637 OVERRIDE(OP): Performed by: STUDENT IN AN ORGANIZED HEALTH CARE EDUCATION/TRAINING PROGRAM

## 2020-10-12 PROCEDURE — 700101 HCHG RX REV CODE 250: Performed by: STUDENT IN AN ORGANIZED HEALTH CARE EDUCATION/TRAINING PROGRAM

## 2020-10-12 PROCEDURE — A9270 NON-COVERED ITEM OR SERVICE: HCPCS | Performed by: STUDENT IN AN ORGANIZED HEALTH CARE EDUCATION/TRAINING PROGRAM

## 2020-10-12 PROCEDURE — 36415 COLL VENOUS BLD VENIPUNCTURE: CPT

## 2020-10-12 PROCEDURE — 770020 HCHG ROOM/CARE - TELE (206)

## 2020-10-12 PROCEDURE — 80048 BASIC METABOLIC PNL TOTAL CA: CPT

## 2020-10-12 RX ORDER — IRBESARTAN 150 MG/1
150 TABLET ORAL ONCE
Status: COMPLETED | OUTPATIENT
Start: 2020-10-12 | End: 2020-10-12

## 2020-10-12 RX ORDER — IRBESARTAN 150 MG/1
150 TABLET ORAL 2 TIMES DAILY
Status: DISCONTINUED | OUTPATIENT
Start: 2020-10-12 | End: 2020-10-12

## 2020-10-12 RX ORDER — PRAZOSIN HYDROCHLORIDE 2 MG/1
2 CAPSULE ORAL TWICE DAILY
Status: DISCONTINUED | OUTPATIENT
Start: 2020-10-12 | End: 2020-10-12

## 2020-10-12 RX ORDER — IRBESARTAN 150 MG/1
300 TABLET ORAL DAILY
Status: DISCONTINUED | OUTPATIENT
Start: 2020-10-13 | End: 2020-10-15 | Stop reason: HOSPADM

## 2020-10-12 RX ORDER — ENALAPRILAT 1.25 MG/ML
1.25 INJECTION INTRAVENOUS EVERY 6 HOURS PRN
Status: DISCONTINUED | OUTPATIENT
Start: 2020-10-12 | End: 2020-10-15 | Stop reason: HOSPADM

## 2020-10-12 RX ORDER — PRAZOSIN HYDROCHLORIDE 1 MG/1
2 CAPSULE ORAL TWICE DAILY
Status: DISCONTINUED | OUTPATIENT
Start: 2020-10-13 | End: 2020-10-13

## 2020-10-12 RX ADMIN — PRAZOSIN HYDROCHLORIDE 2 MG: 2 CAPSULE ORAL at 16:59

## 2020-10-12 RX ADMIN — LABETALOL HYDROCHLORIDE 10 MG: 5 INJECTION, SOLUTION INTRAVENOUS at 09:28

## 2020-10-12 RX ADMIN — HYDROXYZINE HYDROCHLORIDE 25 MG: 25 TABLET, FILM COATED ORAL at 10:58

## 2020-10-12 RX ADMIN — PHENYTOIN 1 MG: 125 SUSPENSION ORAL at 05:18

## 2020-10-12 RX ADMIN — BUSPIRONE HYDROCHLORIDE 15 MG: 10 TABLET ORAL at 05:17

## 2020-10-12 RX ADMIN — CETIRIZINE HYDROCHLORIDE 10 MG: 10 TABLET, FILM COATED ORAL at 16:49

## 2020-10-12 RX ADMIN — ENOXAPARIN SODIUM 40 MG: 40 INJECTION SUBCUTANEOUS at 05:17

## 2020-10-12 RX ADMIN — FLUTICASONE PROPIONATE 100 MCG: 50 SPRAY, METERED NASAL at 21:00

## 2020-10-12 RX ADMIN — OMEPRAZOLE 20 MG: 20 CAPSULE, DELAYED RELEASE ORAL at 16:49

## 2020-10-12 RX ADMIN — IRBESARTAN 150 MG: 150 TABLET ORAL at 13:38

## 2020-10-12 RX ADMIN — LABETALOL HYDROCHLORIDE 10 MG: 5 INJECTION, SOLUTION INTRAVENOUS at 15:39

## 2020-10-12 RX ADMIN — BUSPIRONE HYDROCHLORIDE 22.5 MG: 10 TABLET ORAL at 16:49

## 2020-10-12 RX ADMIN — PRAVASTATIN SODIUM 20 MG: 20 TABLET ORAL at 21:00

## 2020-10-12 RX ADMIN — FLUOXETINE 10 MG: 10 CAPSULE ORAL at 05:19

## 2020-10-12 RX ADMIN — LABETALOL HYDROCHLORIDE 10 MG: 5 INJECTION, SOLUTION INTRAVENOUS at 08:09

## 2020-10-12 RX ADMIN — IRBESARTAN 150 MG: 150 TABLET ORAL at 05:18

## 2020-10-12 NOTE — PROGRESS NOTES
[facility] [place] [facility] PROGRESS NOTE     Attending:   Ambar Yuen MD    Resident:   Antonio Giraldo DO    PATIENT:   Kelly Oakley; [id number]; 1960    ID:   59 y.o. female admitted for hyponatremia and hypertensive urgency.     SUBJECTIVE:   No acute events overnight. Patient expressed anxiety regarding discharge before her blood pressure was in a better range.     OBJECTIVE:  Vitals:    10/12/20 1039 10/12/20 1420 10/12/20 1534 10/12/20 1535   BP: (!) 164/74 (!) 179/83 (!) 198/89 (!) 198/89   Pulse: 69 72 69 76   Resp: 18   18   Temp:    36.6 °C (97.9 °F)   TempSrc:    Temporal   SpO2: 98%   98%   Weight:       Height:           Intake/Output Summary (Last 24 hours) at 10/12/2020 1541  Last data filed at 10/12/2020 1300  Gross per 24 hour   Intake 1357 ml   Output --   Net 1357 ml       PHYSICAL EXAM:  General: No acute distress, resting comfortably, conversational, appears less anxious than yesterday  HEENT: NC, AT, EOMI, MMM  Heart: RRR, no murmur, rub, gallop  Lungs: CTAB, no wheezing, rales, rhonchi  Abdomen: soft, nontender, nondistended, bowel sounds present throughout  EXT:  CALVERT WNL, no lower extremity edema, 2+ PT pulses bilaterally  Skin: Warm, dry, intact  Neuro: Non-focal, A/Ox4    LABS:  Recent Labs     10/10/20  0957   WBC 6.4   RBC 4.12*   HEMOGLOBIN 13.0   HEMATOCRIT 36.5*   MCV 88.6   MCH 31.6   RDW 41.8   PLATELETCT 251   MPV 9.4   NEUTSPOLYS 73.70*   LYMPHOCYTES 13.70*   MONOCYTES 10.60   EOSINOPHILS 0.80   BASOPHILS 0.30     Recent Labs     10/10/20  0957  10/11/20  0647 10/11/20  1448 10/12/20  0355   SODIUM 121*   < > 124* 131* 134*   POTASSIUM 3.8   < > 4.1 3.6 3.7   CHLORIDE 85*   < > 94* 98 104   CO2 20   < > 18* 20 19*   BUN 4*   < > 9 11 14   CREATININE 0.65   < > 0.69 0.83 0.70   CALCIUM 9.2   < > 8.2* 8.3* 8.5   ALBUMIN 4.6  --   --   --   --     < > = values in this interval not displayed.     Estimated GFR/CRCL = Estimated Creatinine Clearance: 81.6 mL/min (by C-G formula based on  SCr of 0.7 mg/dL).  Recent Labs     10/11/20  0647 10/11/20  1448 10/12/20  0355   GLUCOSE 92 113* 96     Recent Labs     10/10/20  0957   ASTSGOT 23   ALTSGPT 25   TBILIRUBIN 0.7   ALKPHOSPHAT 74   GLOBULIN 2.5             No results for input(s): INR, APTT, FIBRINOGEN in the last 72 hours.    Invalid input(s): DIMER    MICROBIOLOGY:  Blood Culture Hold   Date Value Ref Range Status   09/24/2020 Collected  Final        IMAGING:  US-RENAL ARTERY DUPLEX COMP   Final Result      DX-CHEST-PORTABLE (1 VIEW)   Final Result      No radiographic evidence of acute cardiopulmonary process.          CULTURES:   Results     Procedure Component Value Units Date/Time    SARS-CoV-2, PCR (In-House) [[id number]] Collected: 10/10/20 1858    Order Status: Completed Updated: 10/10/20 2227     SARS-CoV-2 Source Nasal Swab     SARS-CoV-2 by PCR NotDetected     Comment: [facility] providers: PLEASE REFER TO DE-ESCALATION AND RETESTING PROTOCOL  on [email or web address]  **The TaqPath COVID-19 SARS-CoV-2 test has been made available for use under  the Emergency Use Authorization (EUA) only.         Narrative:      Collected By:[id number] JOANN CRAVEN  Is patient being admitted?->Yes  Does this patient meet criteria for Rush/Cepheid per [facility]  Inpatient Workflow? (See workflow link below)->No  Expected turn around time?->Routine (In-House PCR up to 24  hours)  Is this the patients First SARS CoV-2 test?->Yes  Is this patient employed in healthcare?->No  Is the patient symptomatic as defined by the CDC?->No  Is the patient hospitalized?->No  Is the patient a resident in a congregate care setting?->No  Is the patient pregnant?->No    COVID/SARS CoV-2 PCR [[id number]] Collected: 10/10/20 1858    Order Status: Completed Specimen: Respirate from Nasopharyngeal Updated: 10/10/20 1907     COVID Order Status Received     Comment: The order for SARS CoV-2 testing has been received by the  Laboratory. This result is neither positive nor negative.  Final results  of testing will report in 24-48 hours, separately.         Narrative:      Collected By:[id number] JOANN CRAVEN  Is patient being admitted?->Yes  Does this patient meet criteria for Rush/Cepheid per [facility]  Inpatient Workflow? (See workflow link below)->No  Expected turn around time?->Routine (In-House PCR up to 24  hours)  Is this the patients First SARS CoV-2 test?->Yes  Is this patient employed in healthcare?->No  Is the patient symptomatic as defined by the CDC?->No  Is the patient hospitalized?->No  Is the patient a resident in a congregate care setting?->No  Is the patient pregnant?->No          MEDS:  Current Facility-Administered Medications   Medication Last Dose   • [START ON 10/13/2020] irbesartan (AVAPRO) tablet 300 mg     • prazosin (MINIPRESS) capsule 2 mg     • cetirizine (ZYRTEC) tablet 10 mg 10 mg at 10/11/20 1632   • FLUoxetine (PROZAC) capsule 10 mg 10 mg at 10/12/20 0519   • fluticasone (FLONASE) nasal spray 100 mcg 100 mcg at 10/11/20 2049   • hydrOXYzine HCl (ATARAX) tablet 25 mg 25 mg at 10/12/20 1058   • pravastatin (PRAVACHOL) tablet 20 mg 20 mg at 10/11/20 2049   • senna-docusate (PERICOLACE or SENOKOT S) 8.6-50 MG per tablet 2 Tab 2 Tab at 10/11/20 0505    And   • polyethylene glycol/lytes (MIRALAX) PACKET 1 Packet      And   • magnesium hydroxide (MILK OF MAGNESIA) suspension 30 mL      And   • bisacodyl (DULCOLAX) suppository 10 mg     • enoxaparin (LOVENOX) inj 40 mg 40 mg at 10/12/20 0517   • acetaminophen (TYLENOL) tablet 650 mg 650 mg at 10/11/20 0337   • labetalol (NORMODYNE/TRANDATE) injection 10 mg 10 mg at 10/12/20 1539   • ondansetron (ZOFRAN) syringe/vial injection 4 mg     • ondansetron (ZOFRAN ODT) dispertab 4 mg     • promethazine (PHENERGAN) tablet 12.5-25 mg     • promethazine (PHENERGAN) suppository 12.5-25 mg     • prochlorperazine (COMPAZINE) injection 5-10 mg     • busPIRone (BUSPAR) tablet 15 mg 15 mg at 10/12/20 0517    And   • busPIRone (BUSPAR) tablet 22.5 mg 22.5 mg  at 10/11/20 1631   • omeprazole (PRILOSEC) capsule 20 mg 20 mg at 10/11/20 1632       PROBLEM LIST:  No problems updated.    ASSESSMENT/PLAN: 59 y.o. female admitted for hyponatremia and hypertensive urgency     #hyponatremia, improved  Patient appears euvolemic. Plasma osmolarity indicates hypotonic picture. Urinary osm >100. SIADH vs. HCTZ use vs. SSRI vs. Psychogenic polydipsia. Na this morning 134.  - Discontinue NS  - 1.5 L fluid restriction    #HTN  Patient on irbesartan 300 mg daily. Yesterday added prazosin. BP continue to be elevated. Discussed with patient that this can be continue to be managed in the outpatient setting but patient anxious about leaving prior to better control.   - irbesartan 300 mg daily  - prazosin 2 mg BID  - Vasotec PRN, SBP >180, DBP >120  - f/u plasma metanephrines     #elevated troponin, resolved  Repeat troponin trended down. ECG continues to show no ST/ T wave changes. Patient denies any chest pain.      #Anxiety, improved  Patient endorsed improved anxiety today.   - Continue Buspar  - Continue hydroxyzine  - Continue Prozac     Core Measures  Lines: PIV  Tubes: none  Fluids: none  Diet: Regular, 1.5 L fluid restriction  Abx: none  DVT prophylaxis: Lovenox  PCP: Tonya Toth MD  Code Status: Full Code    Disposition - inpatient for hypertensive urgency    Antonio Giraldo DO   PGY-1 Family Medicine Resident   [facility]

## 2020-10-12 NOTE — PROGRESS NOTES
Report received from day shift RN, assumed care of pt. Plan of care discussed with pt, labs and chart reviewed. All needs met at this time. Tele box on. Call light within reach, bed locked and in lowest position. All fall precautions and hourly rounding in place. Will continue to monitor.

## 2020-10-12 NOTE — DISCHARGE SUMMARY
[facility] [place] FAMILY MEDICINE ADULT DISCHARGE SUMMARY     Service:   [facility] Family Medicine Inpatient Team  Attending Physician(s):   Ambar Yuen MD, Nasim Hull MD  Senior Resident(s):   Amelia Bradley MD  Lon Resident(s):   Antonio Giraldo DO     Overnight Events: No acute overnight events    Subjective:  This morning, patient reports feeling somewhat improved.  She is still having some lightheadedness which she attributes to the antihypertensive medications.  She denies headache, chest pain, shortness of breath.  She remains nervous about discharge, but feels encouraged by the overall downward trend of her blood pressure.    OBJECTIVE:  Temp:  [36.4 °C (97.5 °F)-37.2 °C (99 °F)] 36.8 °C (98.2 °F)  Pulse:  [56-71] 63  Resp:  [17-18] 18  BP: (152-174)/(77-90) 161/77  SpO2:  [95 %-97 %] 97 %        General: No acute distress, resting comfortably, conversational, appears less anxious   HEENT: NC, AT, EOMI, MMM  Heart: RRR, no murmur, rub, gallop  Lungs: CTAB, no wheezing, rales, rhonchi  Abdomen: soft, nontender, nondistended, bowel sounds present throughout  EXT:  CALVERT WNL, no lower extremity edema, 2+ PT pulses bilaterally  Skin: Warm, dry, intact  Neuro: Non-focal, A/Ox4      LABS:  No results for input(s): WBC, RBC, HEMOGLOBIN, HEMATOCRIT, MCV, MCH, RDW, PLATELETCT, MPV, NEUTSPOLYS, LYMPHOCYTES, MONOCYTES, EOSINOPHILS, BASOPHILS, RBCMORPHOLO in the last 72 hours.  Recent Labs     10/10/20  0957  10/11/20  0647 10/11/20  1448 10/12/20  0355   SODIUM 121*   < > 124* 131* 134*   POTASSIUM 3.8   < > 4.1 3.6 3.7   CHLORIDE 85*   < > 94* 98 104   CO2 20   < > 18* 20 19*   BUN 4*   < > 9 11 14   CREATININE 0.65   < > 0.69 0.83 0.70   CALCIUM 9.2   < > 8.2* 8.3* 8.5   ALBUMIN 4.6  --   --   --   --     < > = values in this interval not displayed.     Estimated GFR/CRCL = Estimated Creatinine Clearance: 81.6 mL/min (by C-G formula based on SCr of 0.7 mg/dL).  Recent Labs     10/11/20  0647 10/11/20  1448 10/12/20  9027    GLUCOSE 92 113* 96     Recent Labs     10/10/20  0957   ASTSGOT 23   ALTSGPT 25   TBILIRUBIN 0.7   ALKPHOSPHAT 74   GLOBULIN 2.5             No results for input(s): INR, APTT, FIBRINOGEN in the last 72 hours.    Invalid input(s): DIMER    MICROBIOLOGY:   Blood Culture Hold   Date Value Ref Range Status   09/24/2020 Collected  Final        IMAGING:   US-RENAL ARTERY DUPLEX COMP   Final Result      DX-CHEST-PORTABLE (1 VIEW)   Final Result      No radiographic evidence of acute cardiopulmonary process.          MEDS:  Current Facility-Administered Medications   Medication Last Dose   • [START ON 10/13/2020] irbesartan (AVAPRO) tablet 300 mg     • prazosin (MINIPRESS) capsule 2 mg     • cetirizine (ZYRTEC) tablet 10 mg 10 mg at 10/11/20 1632   • FLUoxetine (PROZAC) capsule 10 mg 10 mg at 10/12/20 0519   • fluticasone (FLONASE) nasal spray 100 mcg 100 mcg at 10/11/20 2049   • hydrOXYzine HCl (ATARAX) tablet 25 mg 25 mg at 10/12/20 1058   • pravastatin (PRAVACHOL) tablet 20 mg 20 mg at 10/11/20 2049   • senna-docusate (PERICOLACE or SENOKOT S) 8.6-50 MG per tablet 2 Tab 2 Tab at 10/11/20 0505    And   • polyethylene glycol/lytes (MIRALAX) PACKET 1 Packet      And   • magnesium hydroxide (MILK OF MAGNESIA) suspension 30 mL      And   • bisacodyl (DULCOLAX) suppository 10 mg     • enoxaparin (LOVENOX) inj 40 mg 40 mg at 10/12/20 0517   • acetaminophen (TYLENOL) tablet 650 mg 650 mg at 10/11/20 0337   • labetalol (NORMODYNE/TRANDATE) injection 10 mg 10 mg at 10/12/20 0928   • ondansetron (ZOFRAN) syringe/vial injection 4 mg     • ondansetron (ZOFRAN ODT) dispertab 4 mg     • promethazine (PHENERGAN) tablet 12.5-25 mg     • promethazine (PHENERGAN) suppository 12.5-25 mg     • prochlorperazine (COMPAZINE) injection 5-10 mg     • busPIRone (BUSPAR) tablet 15 mg 15 mg at 10/12/20 0517    And   • busPIRone (BUSPAR) tablet 22.5 mg 22.5 mg at 10/11/20 1631   • omeprazole (PRILOSEC) capsule 20 mg 20 mg at 10/11/20 1632        Discharge Summary    Admit Date:  10/10/2020       Discharge Date: 10/15/2020    Admission Diagnosis:   Hyponatremia, improved  Hypertensive urgency, improved  Elevated troponin, improved    Chronic Diagnoses:               Essential hypertension  Anxiety     HPI:     Kelly Oakley is a 59-year-old female who presented to the ED on 10/10 with chief complaint of nausea, which had been bothering her since the previous night. Patient has been in the hospital or ED four times over the past month for elevated blood pressure, hyponatremia and anxiety. Patient reported that she was recently restarted on HCTZ and took two doses on the day prior to arrival. Her blood pressure remained elevated, so she took a dose of hydralazine. This made her feel shaky, anxious and nauseated and so she came into the ED. She had some associated chest pain, and numbness and tingling sensation in her lower extremities. She denied any facial droop, focal weakness, falls, loss of bowel or bladder control. She endorsed drinking 2 L of free H2O last night due to increased thirst.     In the ED, BP was up to 200s/90s, CMP with Na 121 (down from 135 on 10/8). Troponin 22 (up from 19 on 10/8). ECG showed sinus rhythm with no ST/ T wave changes. CXR reassuring.     Hospital Course:     #Hyponatremia  Patient was admitted and started on NS at 100cc/ hr. Patient Na corrected nicely and on discharge she was up to 136. As mentioned in the HPI, the patient has been seen in the ED and hospital multiple times over the past month with hyponatremia. The most likely etiology seems to be from hydrochlorothiazide.  Discussed with patient that she should be very careful in the future with thiazide diuretics and should likely carry a card to help her reminds doctors that this medication has caused significant hyponatremia in the past.  If a thiazide diuretic is considered for her in the future, very close monitoring of her sodium levels would be  required.    #Hypertensive urgency  Patient with initial SBPs in the ED >200. Patient takes irbesartan 300 mg daily at home for blood pressure control. Had been on HCTZ in the past but this was discontinued due to hyponatremia. She had been on Coreg in the past but it dropped her heart rate and she did not like the way it made her feel. She had been on amlodipine in the past but got lower extremity edema. Given the patient's level of anxiety, it was elected to start propranolol 40 mg BID. Patient got low heart rate after only one dose and overnight heart rate dropped into low 50s and 40s. Propranolol was discontinued and patient was started on Prazosin 1 mg BID. Cardiology was consulted and agreed that Prazosin was appropriate and to consider splitting irbesartan to BID dosing and considering trial of low dose amlodipine. Irbesartan dose was split into 150 mg BID and prazosin 1 mg BID was initiated, however, the patient did not have appropriate pressure control on this regimen. Irbesartan was switched back to 300 mg daily and prazosin was increased to 2 mg BID. Subsequently, prazosin was increased to 4 mg BID and then 6 mg BID. Amlodipine 5 mg daily was added (patient related that in the past her LE edema with amlodipine was minimal). Propranolol 10 mg BID was added. It was discussed with patient that she would be discharged on this regimen and that further blood pressure management could be achieved in the outpatient setting. Workup for secondary hypertension was unrevealing. Renal artery ultrasound did not reveal any renal artery stenosis. Plasma metanephrines and serum aldosterone level currently pending.  Random urine metanephrines was mildly elevated, and further testing should be considered outpatient.      #Anxiety  Patient received one dose of Ativan in the ED but otherwise was continued on her outpatient medication regimen. She had only recently started Prozac prior to coming to the ED and it was discussed  that this medicine would take multiple weeks to reach full effect. Patient's anxiety was adequately controlled while in the hospital and it was discussed that she could continue to take her Prozac, hydroxyzine and Buspar as prescribed.     #Elevated troponin  Patient had troponin of 22 in the ED and ECG at that time showed sinus rhythm with no ST/T changes. Troponin was trended and came down prior to discharge. Patient denied any chest pain on date of discharge. This was likely demand ischemia in the setting of elevated blood pressure.     Consultants:      Cardiology    Procedures:        None    Disposition: Patient is stable for discharge home    Diet: Regular    Activity: As tolerated    Discharge Medications:           Medication List      START taking these medications      Instructions   amLODIPine 5 MG Tabs  Start taking on: October 16, 2020  Commonly known as: NORVASC   Take 1 Tab by mouth every day.  Dose: 5 mg     prazosin 2 MG Caps  Commonly known as: MINIPRESS   Take 3 Caps by mouth 2 Times a Day.  Dose: 6 mg     propranolol 10 MG Tabs  Commonly known as: INDERAL   Take 1 Tab by mouth 2 times a day.  Dose: 10 mg        CONTINUE taking these medications      Instructions   * busPIRone 15 MG tablet  Commonly known as: BUSPAR   Take 15 mg by mouth 2 times a day. 15 mg in AM  15 mg + 7.5 mg =  22.5 mg evening  Dose: 15 mg     * busPIRone 7.5 MG tablet  Commonly known as: BUSPAR   Take 7.5 mg by mouth every evening. 7.5 mg  + 15 mg  = 22.5 mg in evening  Dose: 7.5 mg     cetirizine 10 MG Tabs  Commonly known as: ZYRTEC   Take 10 mg by mouth every evening.  Dose: 10 mg     FLUoxetine 10 MG Caps  Commonly known as: PROZAC   Take 10 mg by mouth every day.  Dose: 10 mg     fluticasone 50 MCG/ACT nasal spray  Commonly known as: FLONASE   Spray 2 Sprays in nose every bedtime.  Dose: 2 Spray     hydrOXYzine HCl 25 MG Tabs  Commonly known as: ATARAX   Take 25 mg by mouth 2 times a day as needed for Anxiety.  Dose:  25 mg     irbesartan 300 MG Tabs  Commonly known as: AVAPRO   Take 1 Tab by mouth every day.  Dose: 300 mg     NexIUM 20 MG capsule  Generic drug: esomeprazole   Take 20 mg by mouth every evening.  Dose: 20 mg     pravastatin 20 MG Tabs  Commonly known as: PRAVACHOL   Take 20 mg by mouth every bedtime.  Dose: 20 mg         * This list has 2 medication(s) that are the same as other medications prescribed for you. Read the directions carefully, and ask your doctor or other care provider to review them with you.            STOP taking these medications    hydrALAZINE 25 MG Tabs  Commonly known as: APRESOLINE     hydroCHLOROthiazide 25 MG Tabs  Commonly known as: HYDRODIURIL            Instructions:      I recommend an extra 2mg of the Minipress (prazosin) if your BP is >180/90 at home. If you are having severe headache which doesn't improve with tylenol, chest pain, difficulty breathing, fainting or feeling like you may pass out, please return to the ER. Otherwise, you can reach the on-call R Resident Physician at [phone].   Please follow up with Dr. Kent tomorrow as planned, and Dr. Fajardo on Monday as planned.    The patient was instructed to return to the ER in the event of worsening symptoms. I have counseled the patient on the importance of compliance and the patient has agreed to proceed with all medical recommendations and follow up plan indicated above.   The patient understands that all medications come with benefits and risks. Risks may include permanent injury or death and these risks can be minimized with close reassessment and monitoring.        Please CC: Dwayne Kent MD, Saulo Fajardo MD    Follow up appointment details :      With Dwayne Kent MD on 10/16/2020  With Saulo Fajardo MD on 10/19/2020    Pending Studies:        Plasma metanephrines, serum aldosterone

## 2020-10-12 NOTE — TELEPHONE ENCOUNTER
Geoffrey Hi M.D.  You 6 minutes ago (11:34 AM)     Could be side effects. Ok to stop Hydralazine and see if symptoms improve.     Geoffrey Hi M.D.    Message text       Bryan Hi M.D. 4 days ago     Please advise if you think these side effects could be attributed to hydralazine.     Message text      --------------------------------------------------------------------------------------------------------------------    Returned pt's call and informed her TT's response. Pt is currently in the hospital due to high BP but thinks she will be discharged today and can keep her appt w/ [facility] on 10/16. She reports feeling better after stopping the hydralazine while in the hospital.
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
## 2020-10-12 NOTE — PROGRESS NOTES
Patient suffering from elevated BP this AM.  Medicated per MAR with labetolol x2.  Will continue to monitor for stability.    1545: Patient continuing to have elevated blood pressure.  PRN labetolol administered again.  EKG at bedside.    1555: Updated Dr. Giraldo regarding patient status.

## 2020-10-12 NOTE — CARE PLAN
Problem: Safety  Goal: Will remain free from injury  Outcome: PROGRESSING AS EXPECTED     Problem: Infection  Goal: Will remain free from infection  Outcome: PROGRESSING AS EXPECTED     Problem: Knowledge Deficit  Goal: Knowledge of the prescribed therapeutic regimen will improve  Outcome: PROGRESSING AS EXPECTED     Problem: Discharge Barriers/Planning  Goal: Patient's continuum of care needs will be met  Outcome: PROGRESSING SLOWER THAN EXPECTED

## 2020-10-13 PROCEDURE — 700102 HCHG RX REV CODE 250 W/ 637 OVERRIDE(OP): Performed by: STUDENT IN AN ORGANIZED HEALTH CARE EDUCATION/TRAINING PROGRAM

## 2020-10-13 PROCEDURE — A9270 NON-COVERED ITEM OR SERVICE: HCPCS | Performed by: STUDENT IN AN ORGANIZED HEALTH CARE EDUCATION/TRAINING PROGRAM

## 2020-10-13 PROCEDURE — 700111 HCHG RX REV CODE 636 W/ 250 OVERRIDE (IP): Performed by: STUDENT IN AN ORGANIZED HEALTH CARE EDUCATION/TRAINING PROGRAM

## 2020-10-13 PROCEDURE — 770020 HCHG ROOM/CARE - TELE (206)

## 2020-10-13 RX ORDER — PRAZOSIN HYDROCHLORIDE 1 MG/1
2 CAPSULE ORAL ONCE
Status: COMPLETED | OUTPATIENT
Start: 2020-10-13 | End: 2020-10-13

## 2020-10-13 RX ORDER — OMEPRAZOLE 20 MG/1
20 CAPSULE, DELAYED RELEASE ORAL 2 TIMES DAILY
Status: DISCONTINUED | OUTPATIENT
Start: 2020-10-13 | End: 2020-10-15 | Stop reason: HOSPADM

## 2020-10-13 RX ORDER — AMLODIPINE BESYLATE 5 MG/1
5 TABLET ORAL
Status: DISCONTINUED | OUTPATIENT
Start: 2020-10-13 | End: 2020-10-15 | Stop reason: HOSPADM

## 2020-10-13 RX ORDER — PRAZOSIN HYDROCHLORIDE 1 MG/1
4 CAPSULE ORAL TWICE DAILY
Status: DISCONTINUED | OUTPATIENT
Start: 2020-10-13 | End: 2020-10-14

## 2020-10-13 RX ADMIN — ENALAPRILAT 1.25 MG: 1.25 INJECTION INTRAVENOUS at 05:51

## 2020-10-13 RX ADMIN — ACETAMINOPHEN 650 MG: 325 TABLET, FILM COATED ORAL at 05:51

## 2020-10-13 RX ADMIN — ACETAMINOPHEN 650 MG: 325 TABLET, FILM COATED ORAL at 13:59

## 2020-10-13 RX ADMIN — BUSPIRONE HYDROCHLORIDE 15 MG: 10 TABLET ORAL at 05:52

## 2020-10-13 RX ADMIN — PHENYTOIN 4 MG: 125 SUSPENSION ORAL at 17:33

## 2020-10-13 RX ADMIN — ENOXAPARIN SODIUM 40 MG: 40 INJECTION SUBCUTANEOUS at 05:52

## 2020-10-13 RX ADMIN — PHENYTOIN 2 MG: 125 SUSPENSION ORAL at 05:51

## 2020-10-13 RX ADMIN — HYDROXYZINE HYDROCHLORIDE 25 MG: 25 TABLET, FILM COATED ORAL at 16:29

## 2020-10-13 RX ADMIN — ENALAPRILAT 1.25 MG: 1.25 INJECTION INTRAVENOUS at 14:24

## 2020-10-13 RX ADMIN — CETIRIZINE HYDROCHLORIDE 10 MG: 10 TABLET, FILM COATED ORAL at 17:32

## 2020-10-13 RX ADMIN — FLUOXETINE 10 MG: 10 CAPSULE ORAL at 05:52

## 2020-10-13 RX ADMIN — PRAVASTATIN SODIUM 20 MG: 20 TABLET ORAL at 20:57

## 2020-10-13 RX ADMIN — OMEPRAZOLE 20 MG: 20 CAPSULE, DELAYED RELEASE ORAL at 17:33

## 2020-10-13 RX ADMIN — IRBESARTAN 300 MG: 150 TABLET ORAL at 05:52

## 2020-10-13 RX ADMIN — PHENYTOIN 2 MG: 125 SUSPENSION ORAL at 12:14

## 2020-10-13 RX ADMIN — BUSPIRONE HYDROCHLORIDE 22.5 MG: 10 TABLET ORAL at 17:33

## 2020-10-13 RX ADMIN — FLUTICASONE PROPIONATE 100 MCG: 50 SPRAY, METERED NASAL at 20:57

## 2020-10-13 RX ADMIN — AMLODIPINE BESYLATE 5 MG: 5 TABLET ORAL at 09:04

## 2020-10-13 NOTE — NON-PROVIDER
"Formerly [facility], later [facility] FAMILY MEDICINE PROGRESS NOTE     PATIENT: Kelly Oakley; [id number]; 1960    ID: 59 y.o. female admitted for hyponatremia and hypertensive urgency    SUBJECTIVE: No acute events overnight. Patient expressed anxiety regarding her discharge wanting to have better control of blood pressure before leaving.    ROS:  Constitutional- negative for fever chills   HEENT- no double vision, changes in hearing, cough  CV- no palpations, chest pain, SOB  GI- no pain nausea or vomiting, last BM   - voiding bladder   MSK- no for myalgia  Neuro- no headaches      OBJECTIVE:     BP (198-162) / (89-77) 167/88   Pulse (59-76) 61   Temp (36.1-36.7) 36.2 °C (97.2 °F) (Temporal)   Resp (15-18) 15   Ht 1.6 m (5' 3\")   Wt 70.6 kg (155 lb 10.3 oz)   SpO2 97% .    Intake/Output Summary (Last 24 hours) at 10/13/2020 0729  Last data filed at 10/12/2020 1420  Gross per 24 hour   Intake 1420 ml   Output --   Net 1420 ml       PE:  General: No acute distress, resting comfortably in bed.  HEENT: normocephalic, nontraumatic, PERRLA, EOMI  Cardiovascular: Heart RRR with no murmurs, rubs or gallops. Distal Pulses 2+  Respiratory: symmetric chest expansion, lungs CTA bilaterally with no wheezes rales or rhonci  Abdomen: soft, nontender, nondistended, no masses palpated, +BS  Musculoskeletal: strength 5/5 in four extremities  Neuro: non focal with no numbness, tingling or changes in sensation. A/O x4      LABS:  Recent Labs     10/10/20  0957   WBC 6.4   RBC 4.12*   HEMOGLOBIN 13.0   HEMATOCRIT 36.5*   MCV 88.6   MCH 31.6   RDW 41.8   PLATELETCT 251   MPV 9.4   NEUTSPOLYS 73.70*   LYMPHOCYTES 13.70*   MONOCYTES 10.60   EOSINOPHILS 0.80   BASOPHILS 0.30     Recent Labs     10/10/20  0957  10/11/20  0647 10/11/20  1448 10/12/20  0355   SODIUM 121*   < > 124* 131* 134*   POTASSIUM 3.8   < > 4.1 3.6 3.7   CHLORIDE 85*   < > 94* 98 104   CO2 20   < > 18* 20 19*   BUN 4*   < > 9 11 14   CREATININE 0.65   < > 0.69 0.83 0.70   CALCIUM 9.2   < > " 8.2* 8.3* 8.5   ALBUMIN 4.6  --   --   --   --     < > = values in this interval not displayed.     Estimated GFR/CRCL = Estimated Creatinine Clearance: 81.6 mL/min (by C-G formula based on SCr of 0.7 mg/dL).  Recent Labs     10/11/20  0647 10/11/20  1448 10/12/20  0355   GLUCOSE 92 113* 96     Recent Labs     10/10/20  0957   ASTSGOT 23   ALTSGPT 25   TBILIRUBIN 0.7   ALKPHOSPHAT 74   GLOBULIN 2.5             Lab interpretations - Labs from yesterday show sodium is trending normal - 134, up from 124 the day before.  Troponin - 24    IMAGING:  US-RENAL ARTERY DUPLEX COMP   Final Result      DX-CHEST-PORTABLE (1 VIEW)   Final Result      No radiographic evidence of acute cardiopulmonary process.        No new imaging in the past 48 hours    MEDS:  Current Facility-Administered Medications   Medication Last Dose   • irbesartan (AVAPRO) tablet 300 mg 300 mg at 10/13/20 0552   • enalaprilat (VASOTEC) injection 1.25 mg 1.25 mg at 10/13/20 0551   • prazosin (MINIPRESS) capsule 2 mg 2 mg at 10/13/20 0551   • cetirizine (ZYRTEC) tablet 10 mg 10 mg at 10/12/20 1649   • FLUoxetine (PROZAC) capsule 10 mg 10 mg at 10/13/20 0552   • fluticasone (FLONASE) nasal spray 100 mcg 100 mcg at 10/12/20 2100   • hydrOXYzine HCl (ATARAX) tablet 25 mg 25 mg at 10/12/20 1058   • pravastatin (PRAVACHOL) tablet 20 mg 20 mg at 10/12/20 2100   • senna-docusate (PERICOLACE or SENOKOT S) 8.6-50 MG per tablet 2 Tab Stopped at 10/12/20 1800    And   • polyethylene glycol/lytes (MIRALAX) PACKET 1 Packet      And   • magnesium hydroxide (MILK OF MAGNESIA) suspension 30 mL      And   • bisacodyl (DULCOLAX) suppository 10 mg     • enoxaparin (LOVENOX) inj 40 mg 40 mg at 10/13/20 0552   • acetaminophen (TYLENOL) tablet 650 mg 650 mg at 10/13/20 0551   • ondansetron (ZOFRAN) syringe/vial injection 4 mg     • ondansetron (ZOFRAN ODT) dispertab 4 mg     • promethazine (PHENERGAN) tablet 12.5-25 mg     • promethazine (PHENERGAN) suppository 12.5-25 mg     •  prochlorperazine (COMPAZINE) injection 5-10 mg     • busPIRone (BUSPAR) tablet 15 mg 15 mg at 10/13/20 0552    And   • busPIRone (BUSPAR) tablet 22.5 mg 22.5 mg at 10/12/20 1649   • omeprazole (PRILOSEC) capsule 20 mg 20 mg at 10/12/20 1649       PROBLEM LIST:        ASSESSMENT AND PLAN: 59 y.o. female admitted for hyponatremia and hypertensive urgency    Hyponatremia  Pt appears euvolemic. Electrolytes indicate resolution of hyponatremia. 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 Na yesterday morning was 134   - Discontinue NS   - 1.5 fluid restriction    HTN  Pt on irbesartan 300 mg daily. Prazosin added yesterday. BP trending down. Discuss with patient blood pressure can be managed outpatient.    - Irbesartan 300 mg daily   - Increase Prazosin 4 mg BID   - Add 5 mg Amlodipine   - Vasotec PRN SBP >180, DBP >120   - f/u plasma metanphrines for pheochromocytoma    Elevated troponin resolved  Repeat troponin trended down, ECG shows no ST/T wave changes    Anxiety improved  Pt states anxiety is improved today.   - Continue buspirone   - Hydroxyzine   - Prozac    Core Measures  Lines: PIV  Tubes: none  Fluids: none  Diet: Regular, 1.5 L fluid restriction  Abx: none  DVT prophylaxis: Lovenox  PCP: Tonya Toth MD  Code Status: Full Code    Disposition: Discharge patient pending blood pressure control with new medication.

## 2020-10-13 NOTE — PROGRESS NOTES
0715 assumed care. Resting in bed and in no distress. Bed in low position, call light w/in reach.

## 2020-10-13 NOTE — PROGRESS NOTES
[facility] FAMILY MEDICINE PROGRESS NOTE     Attending:   Ambar Yuen MD    Resident:   Antonio Giraldo DO    PATIENT:   Kelly Oakley; [id number]; 1960    ID:   59 y.o. female admitted for hyponatremia and hypertensive urgency.    SUBJECTIVE:   Patient with elevated blood pressure yesterday, required PRN labetalol. BP again elevated this morning and required PRN Vasotec.     OBJECTIVE:  Vitals:    10/12/20 2339 10/13/20 0352 10/13/20 0721 10/13/20 1237   BP: (!) 173/83 (!) 182/88 (!) 167/88 (!) 195/89   Pulse: 65 63 61 60   Resp: 18 17 15 19   Temp: 36.2 °C (97.2 °F) 36.7 °C (98 °F) 36.2 °C (97.2 °F) 36.6 °C (97.8 °F)   TempSrc: Temporal Temporal Temporal Temporal   SpO2: 96% 96% 97% 98%   Weight:       Height:         No intake or output data in the 24 hours ending 10/13/20 1528    PHYSICAL EXAM:  General: No acute distress, resting comfortably, conversational  HEENT: NC, AT, EOMI  Heart: RRR, no murmur, rub, gallop  Lungs: CTAB, no wheezing, rales, rhonchi  Abdomen: soft, nontender, nondistended, bowel sounds present throughout  EXT:  CALVERT WNL, no lower extremity edema, 2+ PT pulses bilaterally  Skin: Warm, dry, intact  Neuro: Non-focal, A/Ox4      LABS:  No results for input(s): WBC, RBC, HEMOGLOBIN, HEMATOCRIT, MCV, MCH, RDW, PLATELETCT, MPV, NEUTSPOLYS, LYMPHOCYTES, MONOCYTES, EOSINOPHILS, BASOPHILS, RBCMORPHOLO in the last 72 hours.  Recent Labs     10/11/20  0647 10/11/20  1448 10/12/20  0355   SODIUM 124* 131* 134*   POTASSIUM 4.1 3.6 3.7   CHLORIDE 94* 98 104   CO2 18* 20 19*   BUN 9 11 14   CREATININE 0.69 0.83 0.70   CALCIUM 8.2* 8.3* 8.5     Estimated GFR/CRCL = Estimated Creatinine Clearance: 81.6 mL/min (by C-G formula based on SCr of 0.7 mg/dL).  Recent Labs     10/11/20  0647 10/11/20  1448 10/12/20  0355   GLUCOSE 92 113* 96                 No results for input(s): INR, APTT, FIBRINOGEN in the last 72 hours.    Invalid input(s): DIMER    MICROBIOLOGY:  Blood Culture Hold   Date Value Ref Range Status    09/24/2020 Collected  Final        IMAGING:  US-RENAL ARTERY DUPLEX COMP   Final Result      DX-CHEST-PORTABLE (1 VIEW)   Final Result      No radiographic evidence of acute cardiopulmonary process.          CULTURES:   Results     Procedure Component Value Units Date/Time    SARS-CoV-2, PCR (In-House) [[id number]] Collected: 10/10/20 1858    Order Status: Completed Updated: 10/10/20 2227     SARS-CoV-2 Source Nasal Swab     SARS-CoV-2 by PCR NotDetected     Comment: [facility] providers: PLEASE REFER TO DE-ESCALATION AND RETESTING PROTOCOL  on [email or web address]  **The TaqPath COVID-19 SARS-CoV-2 test has been made available for use under  the Emergency Use Authorization (EUA) only.         Narrative:      Collected By:[id number] JOANN CRAVEN  Is patient being admitted?->Yes  Does this patient meet criteria for Rush/Cepheid per [facility]  Inpatient Workflow? (See workflow link below)->No  Expected turn around time?->Routine (In-House PCR up to 24  hours)  Is this the patients First SARS CoV-2 test?->Yes  Is this patient employed in healthcare?->No  Is the patient symptomatic as defined by the CDC?->No  Is the patient hospitalized?->No  Is the patient a resident in a congregate care setting?->No  Is the patient pregnant?->No    COVID/SARS CoV-2 PCR [[id number]] Collected: 10/10/20 1858    Order Status: Completed Specimen: Respirate from Nasopharyngeal Updated: 10/10/20 1907     COVID Order Status Received     Comment: The order for SARS CoV-2 testing has been received by the  Laboratory. This result is neither positive nor negative.  Final results of testing will report in 24-48 hours, separately.         Narrative:      Collected By:[id number] JOANN CRAVEN  Is patient being admitted?->Yes  Does this patient meet criteria for Rush/Cepheid per [facility]  Inpatient Workflow? (See workflow link below)->No  Expected turn around time?->Routine (In-House PCR up to 24  hours)  Is this the patients First SARS CoV-2 test?->Yes  Is this  patient employed in healthcare?->No  Is the patient symptomatic as defined by the CDC?->No  Is the patient hospitalized?->No  Is the patient a resident in a congregate care setting?->No  Is the patient pregnant?->No          MEDS:  Current Facility-Administered Medications   Medication Last Dose   • prazosin (MINIPRESS) capsule 4 mg     • amLODIPine (NORVASC) tablet 5 mg 5 mg at 10/13/20 0904   • irbesartan (AVAPRO) tablet 300 mg 300 mg at 10/13/20 0552   • enalaprilat (VASOTEC) injection 1.25 mg 1.25 mg at 10/13/20 1424   • cetirizine (ZYRTEC) tablet 10 mg 10 mg at 10/12/20 1649   • FLUoxetine (PROZAC) capsule 10 mg 10 mg at 10/13/20 0552   • fluticasone (FLONASE) nasal spray 100 mcg 100 mcg at 10/12/20 2100   • hydrOXYzine HCl (ATARAX) tablet 25 mg 25 mg at 10/12/20 1058   • pravastatin (PRAVACHOL) tablet 20 mg 20 mg at 10/12/20 2100   • senna-docusate (PERICOLACE or SENOKOT S) 8.6-50 MG per tablet 2 Tab Stopped at 10/12/20 1800    And   • polyethylene glycol/lytes (MIRALAX) PACKET 1 Packet      And   • magnesium hydroxide (MILK OF MAGNESIA) suspension 30 mL      And   • bisacodyl (DULCOLAX) suppository 10 mg     • enoxaparin (LOVENOX) inj 40 mg 40 mg at 10/13/20 0552   • acetaminophen (TYLENOL) tablet 650 mg 650 mg at 10/13/20 1359   • ondansetron (ZOFRAN) syringe/vial injection 4 mg     • ondansetron (ZOFRAN ODT) dispertab 4 mg     • promethazine (PHENERGAN) tablet 12.5-25 mg     • promethazine (PHENERGAN) suppository 12.5-25 mg     • prochlorperazine (COMPAZINE) injection 5-10 mg     • busPIRone (BUSPAR) tablet 15 mg 15 mg at 10/13/20 0552    And   • busPIRone (BUSPAR) tablet 22.5 mg 22.5 mg at 10/12/20 1649   • omeprazole (PRILOSEC) capsule 20 mg 20 mg at 10/12/20 1649       PROBLEM LIST:  No problems updated.    ASSESSMENT/PLAN: 59 y.o. female admitted for hyponatremia and hypertensive urgency     #hyponatremia, improved  Patient appears euvolemic. Plasma osmolarity indicates hypotonic picture. Urinary osm  >100. SIADH vs. HCTZ use vs. SSRI vs. Psychogenic polydipsia. Most recent Na 134.  - Discontinue NS  - 2 L fluid restriction     #HTN  Patient on irbesartan 300 mg daily. Have added prazosin, now up to 4 mg BID. Have added amlodipine 5 mg daily. BP continue to be elevated. Discussed with patient that this can be continue to be managed in the outpatient setting but patient anxious about leaving prior to better control.   - Consult Cardiology  - irbesartan 300 mg daily  - prazosin 4 mg BID  - amlodipine 5 mg daily  - Vasotec PRN, SBP >180, DBP >120  - f/u plasma metanephrines     #elevated troponin, resolved  Repeat troponin trended down. ECG continues to show no ST/ T wave changes. Patient denies any chest pain.      #Anxiety, improved  Patient endorsed improved anxiety today.   - Continue Buspar  - Continue hydroxyzine  - Continue Prozac    Core Measures  Lines: PIV  Tubes: none  Fluids: none  Diet: Cardiac, 2L fluid restriction  Abx: none  DVT prophylaxis: Lovenox  PCP: Tonya Toth MD  Code Status: Full Code    Disposition: inpatient for hypertensive urgency    Antonio Giraldo DO   PGY-1 Family Medicine Resident   [facility]

## 2020-10-13 NOTE — NON-PROVIDER
"Formerly [facility] FAMILY MEDICINE PROGRESS NOTE     PATIENT: Kelly Oakley; [id number]; 1960    ID: 59 y.o. female admitted for hyponatremia and hypertensive urgency    SUBJECTIVE: No acute events overnight. Patient expressed anxiety regarding her discharge wanting to have better control of blood pressure before leaving.    ROS:  Constitutional- negative for fever chills   HEENT- no double vision, changes in hearing, cough  CV- no palpations, chest pain, SOB  GI- no pain nausea or vomiting, last BM   - voiding bladder   MSK- no for myalgia  Neuro- no headaches      OBJECTIVE:     BP (198-162) / (89-77) 167/88   Pulse (59-76) 61   Temp (36.1-36.7) 36.2 °C (97.2 °F) (Temporal)   Resp (15-18) 15   Ht 1.6 m (5' 3\")   Wt 70.6 kg (155 lb 10.3 oz)   SpO2 97% .    Intake/Output Summary (Last 24 hours) at 10/13/2020 0729  Last data filed at 10/12/2020 1420  Gross per 24 hour   Intake 1420 ml   Output --   Net 1420 ml       PE:  General: No acute distress, resting comfortably in bed.  HEENT: normocephalic, nontraumatic, PERRLA, EOMI  Cardiovascular: Heart RRR with no murmurs, rubs or gallops. Distal Pulses 2+  Respiratory: symmetric chest expansion, lungs CTA bilaterally with no wheezes rales or rhonci  Abdomen: soft, nontender, nondistended, no masses palpated, +BS  Musculoskeletal: strength 5/5 in four extremities  Neuro: non focal with no numbness, tingling or changes in sensation. A/O x4      LABS:  No results for input(s): WBC, RBC, HEMOGLOBIN, HEMATOCRIT, MCV, MCH, RDW, PLATELETCT, MPV, NEUTSPOLYS, LYMPHOCYTES, MONOCYTES, EOSINOPHILS, BASOPHILS, RBCMORPHOLO in the last 72 hours.  Recent Labs     10/11/20  0647 10/11/20  1448 10/12/20  0355   SODIUM 124* 131* 134*   POTASSIUM 4.1 3.6 3.7   CHLORIDE 94* 98 104   CO2 18* 20 19*   BUN 9 11 14   CREATININE 0.69 0.83 0.70   CALCIUM 8.2* 8.3* 8.5     Estimated GFR/CRCL = Estimated Creatinine Clearance: 81.6 mL/min (by C-G formula based on SCr of 0.7 mg/dL).  Recent Labs     " 10/11/20  0647 10/11/20  1448 10/12/20  0355   GLUCOSE 92 113* 96                 Lab interpretations - Labs from yesterday show sodium is trending normal - 134, up from 124 the day before.  Troponin - 24    IMAGING:  US-RENAL ARTERY DUPLEX COMP   Final Result      DX-CHEST-PORTABLE (1 VIEW)   Final Result      No radiographic evidence of acute cardiopulmonary process.        No new imaging in the past 48 hours    MEDS:  Current Facility-Administered Medications   Medication Last Dose   • prazosin (MINIPRESS) capsule 4 mg     • amLODIPine (NORVASC) tablet 5 mg 5 mg at 10/13/20 0904   • irbesartan (AVAPRO) tablet 300 mg 300 mg at 10/13/20 0552   • enalaprilat (VASOTEC) injection 1.25 mg 1.25 mg at 10/13/20 0551   • cetirizine (ZYRTEC) tablet 10 mg 10 mg at 10/12/20 1649   • FLUoxetine (PROZAC) capsule 10 mg 10 mg at 10/13/20 0552   • fluticasone (FLONASE) nasal spray 100 mcg 100 mcg at 10/12/20 2100   • hydrOXYzine HCl (ATARAX) tablet 25 mg 25 mg at 10/12/20 1058   • pravastatin (PRAVACHOL) tablet 20 mg 20 mg at 10/12/20 2100   • senna-docusate (PERICOLACE or SENOKOT S) 8.6-50 MG per tablet 2 Tab Stopped at 10/12/20 1800    And   • polyethylene glycol/lytes (MIRALAX) PACKET 1 Packet      And   • magnesium hydroxide (MILK OF MAGNESIA) suspension 30 mL      And   • bisacodyl (DULCOLAX) suppository 10 mg     • enoxaparin (LOVENOX) inj 40 mg 40 mg at 10/13/20 0552   • acetaminophen (TYLENOL) tablet 650 mg 650 mg at 10/13/20 1359   • ondansetron (ZOFRAN) syringe/vial injection 4 mg     • ondansetron (ZOFRAN ODT) dispertab 4 mg     • promethazine (PHENERGAN) tablet 12.5-25 mg     • promethazine (PHENERGAN) suppository 12.5-25 mg     • prochlorperazine (COMPAZINE) injection 5-10 mg     • busPIRone (BUSPAR) tablet 15 mg 15 mg at 10/13/20 0552    And   • busPIRone (BUSPAR) tablet 22.5 mg 22.5 mg at 10/12/20 1649   • omeprazole (PRILOSEC) capsule 20 mg 20 mg at 10/12/20 1649       PROBLEM LIST:        ASSESSMENT AND PLAN: 59  y.o. female admitted for hyponatremia and hypertensive urgency    Hyponatremia  Pt appears euvolemic. Electrolytes indicate resolution of hyponatremia. 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 Na yesterday morning was 134   - Discontinue NS   - 1.5 fluid restriction    HTN  Pt on irbesartan 300 mg daily. Prazosin added yesterday. BP trending down. Discuss with patient blood pressure can be managed outpatient.    - Irbesartan 300 mg daily   - Increase Prazosin 4 mg BID   - Add 5 mg Amlodipine   - Vasotec PRN SBP >180, DBP >120   - f/u plasma metanphrines for pheochromocytoma    Elevated troponin resolved  Repeat troponin trended down, ECG shows no ST/T wave changes    Anxiety improved  Pt states anxiety is improved today.   - Continue buspirone   - Hydroxyzine   - Prozac    Core Measures  Lines: PIV  Tubes: none  Fluids: none  Diet: Regular, 1.5 L fluid restriction  Abx: none  DVT prophylaxis: Lovenox  PCP: Tonya Toth MD  Code Status: Full Code    Disposition: Discharge patient pending blood pressure control with new medication.

## 2020-10-13 NOTE — PROGRESS NOTES
Pt very anxious/teary/upset d/t bp still not in control. Listened to pt and comforted. Medicated for anxiety per pt request. Wants prilosec bid. Notified MD, spoke w/ Dr Bradley.

## 2020-10-13 NOTE — PROGRESS NOTES
Cardiology review note:     I was called by Dr. Giraldo regarding hypertension.  In review of the chart this is a 59-year-old female who presented with hypertensive urgency and hyponatremia.  Troponin was checked and found to be borderline indeterminant only, normal ECG, normal echocardiogram.  Remains hypertensive.  Her troponin is related to hypertension and requires no further evaluation at this time.  Outpatient cardiology consultation would be reasonable once her blood pressure is controlled.  Recommend up titrating her medical therapy to control her hypertension per the primary service.  At this time it was deemed no formal in person inpatient cardiology consultation was necessary, however if this changes due to changes in patient condition or abnormal test results, please consider formal cardiovascular consultation.    Electronically Signed by:    Kana Mcgregor MD, FACC, [facility]  Division of Interventional Cardiology  [facility]    10/13/2020  4:35 PM

## 2020-10-14 LAB
ANION GAP SERPL CALC-SCNC: 12 MMOL/L (ref 7–16)
BUN SERPL-MCNC: 10 MG/DL (ref 8–22)
CALCIUM SERPL-MCNC: 8.6 MG/DL (ref 8.5–10.5)
CHLORIDE SERPL-SCNC: 104 MMOL/L (ref 96–112)
CO2 SERPL-SCNC: 20 MMOL/L (ref 20–33)
CREAT SERPL-MCNC: 0.72 MG/DL (ref 0.5–1.4)
GLUCOSE SERPL-MCNC: 94 MG/DL (ref 65–99)
POTASSIUM SERPL-SCNC: 4 MMOL/L (ref 3.6–5.5)
SODIUM SERPL-SCNC: 136 MMOL/L (ref 135–145)

## 2020-10-14 PROCEDURE — 80048 BASIC METABOLIC PNL TOTAL CA: CPT

## 2020-10-14 PROCEDURE — 700102 HCHG RX REV CODE 250 W/ 637 OVERRIDE(OP): Performed by: STUDENT IN AN ORGANIZED HEALTH CARE EDUCATION/TRAINING PROGRAM

## 2020-10-14 PROCEDURE — A9270 NON-COVERED ITEM OR SERVICE: HCPCS | Performed by: STUDENT IN AN ORGANIZED HEALTH CARE EDUCATION/TRAINING PROGRAM

## 2020-10-14 PROCEDURE — 770020 HCHG ROOM/CARE - TELE (206)

## 2020-10-14 PROCEDURE — 700111 HCHG RX REV CODE 636 W/ 250 OVERRIDE (IP): Performed by: STUDENT IN AN ORGANIZED HEALTH CARE EDUCATION/TRAINING PROGRAM

## 2020-10-14 RX ORDER — PRAZOSIN HYDROCHLORIDE 2 MG/1
2 CAPSULE ORAL ONCE
Status: COMPLETED | OUTPATIENT
Start: 2020-10-14 | End: 2020-10-14

## 2020-10-14 RX ORDER — PROPRANOLOL HYDROCHLORIDE 10 MG/1
10 TABLET ORAL TWICE DAILY
Status: DISCONTINUED | OUTPATIENT
Start: 2020-10-14 | End: 2020-10-15 | Stop reason: HOSPADM

## 2020-10-14 RX ADMIN — IRBESARTAN 300 MG: 150 TABLET ORAL at 08:38

## 2020-10-14 RX ADMIN — AMLODIPINE BESYLATE 5 MG: 5 TABLET ORAL at 05:42

## 2020-10-14 RX ADMIN — PRAZOSIN HYDROCHLORIDE 2 MG: 2 CAPSULE ORAL at 08:38

## 2020-10-14 RX ADMIN — PRAZOSIN HYDROCHLORIDE 6 MG: 5 CAPSULE ORAL at 17:34

## 2020-10-14 RX ADMIN — OMEPRAZOLE 20 MG: 20 CAPSULE, DELAYED RELEASE ORAL at 17:44

## 2020-10-14 RX ADMIN — BUSPIRONE HYDROCHLORIDE 22.5 MG: 10 TABLET ORAL at 17:41

## 2020-10-14 RX ADMIN — PHENYTOIN 4 MG: 125 SUSPENSION ORAL at 05:41

## 2020-10-14 RX ADMIN — PROPRANOLOL HYDROCHLORIDE 10 MG: 10 TABLET ORAL at 10:11

## 2020-10-14 RX ADMIN — DOCUSATE SODIUM 50 MG AND SENNOSIDES 8.6 MG 2 TABLET: 8.6; 5 TABLET, FILM COATED ORAL at 05:42

## 2020-10-14 RX ADMIN — BUSPIRONE HYDROCHLORIDE 15 MG: 10 TABLET ORAL at 05:43

## 2020-10-14 RX ADMIN — ENOXAPARIN SODIUM 40 MG: 40 INJECTION SUBCUTANEOUS at 05:42

## 2020-10-14 RX ADMIN — PRAVASTATIN SODIUM 20 MG: 20 TABLET ORAL at 20:10

## 2020-10-14 RX ADMIN — PROPRANOLOL HYDROCHLORIDE 10 MG: 10 TABLET ORAL at 17:44

## 2020-10-14 RX ADMIN — CETIRIZINE HYDROCHLORIDE 10 MG: 10 TABLET, FILM COATED ORAL at 17:44

## 2020-10-14 RX ADMIN — ACETAMINOPHEN 650 MG: 325 TABLET, FILM COATED ORAL at 17:43

## 2020-10-14 RX ADMIN — FLUOXETINE 10 MG: 10 CAPSULE ORAL at 05:42

## 2020-10-14 RX ADMIN — OMEPRAZOLE 20 MG: 20 CAPSULE, DELAYED RELEASE ORAL at 05:42

## 2020-10-14 NOTE — NON-PROVIDER
"[facility] FAMILY MEDICINE PROGRESS NOTE     PATIENT: Kelly Oakley; [id number]; 1960    ID: 59 y.o. female admitted for hyponatremia and hypertensive urgency    SUBJECTIVE: No acute events overnight. Patient expressed anxiety regarding her discharge wanting to have better control of blood pressure before leaving.    ROS:  Constitutional- negative for fever chills   HEENT- no double vision, changes in hearing, cough  CV- no palpations, chest pain, SOB  GI- no pain nausea or vomiting, last BM   - voiding bladder   MSK- no for myalgia  Neuro- no headaches      OBJECTIVE:     BP (198-143) / (89-77) 182/86   Pulse (60-57) 61   Temp (36.1-36.7) 36.2 °C (97.2 °F) (Temporal)   Resp (15-18) 15   Ht 1.6 m (5' 3\")   Wt 70.6 kg (155 lb 10.3 oz)   SpO2 97% .    Intake/Output Summary (Last 24 hours) at 10/13/2020 0729  Last data filed at 10/12/2020 1420  Gross per 24 hour   Intake 1420 ml   Output --   Net 1420 ml       PE:  General: No acute distress, resting comfortably in bed.  HEENT: normocephalic, nontraumatic, PERRLA, EOMI  Cardiovascular: Heart RRR with no murmurs, rubs or gallops. Distal Pulses 2+  Respiratory: symmetric chest expansion, lungs CTA bilaterally with no wheezes rales or rhonci  Abdomen: soft, nontender, nondistended, no masses palpated, +BS  Musculoskeletal: strength 5/5 in four extremities  Neuro: non focal with no numbness, tingling or changes in sensation. A/O x4      LABS:  No results for input(s): WBC, RBC, HEMOGLOBIN, HEMATOCRIT, MCV, MCH, RDW, PLATELETCT, MPV, NEUTSPOLYS, LYMPHOCYTES, MONOCYTES, EOSINOPHILS, BASOPHILS, RBCMORPHOLO in the last 72 hours.  Recent Labs     10/11/20  1448 10/12/20  0355 10/14/20  0515   SODIUM 131* 134* 136   POTASSIUM 3.6 3.7 4.0   CHLORIDE 98 104 104   CO2 20 19* 20   BUN 11 14 10   CREATININE 0.83 0.70 0.72   CALCIUM 8.3* 8.5 8.6     Estimated GFR/CRCL = Estimated Creatinine Clearance: 79.3 mL/min (by C-G formula based on SCr of 0.72 mg/dL).  Recent Labs     " 10/11/20  1448 10/12/20  0355 10/14/20  0515   GLUCOSE 113* 96 94                 Lab interpretations - Labs show sodium is trending normal - 136, up from 134 the day before.  Troponin - 24    IMAGING:  US-RENAL ARTERY DUPLEX COMP   Final Result      DX-CHEST-PORTABLE (1 VIEW)   Final Result      No radiographic evidence of acute cardiopulmonary process.        No new imaging in the past 48 hours    MEDS:  Current Facility-Administered Medications   Medication Last Dose   • prazosin (MINIPRESS) capsule 6 mg     • prazosin (MINIPRESS) capsule 2 mg     • amLODIPine (NORVASC) tablet 5 mg 5 mg at 10/14/20 0542   • omeprazole (PRILOSEC) capsule 20 mg 20 mg at 10/14/20 0542   • irbesartan (AVAPRO) tablet 300 mg 300 mg at 10/13/20 0552   • enalaprilat (VASOTEC) injection 1.25 mg 1.25 mg at 10/13/20 1424   • cetirizine (ZYRTEC) tablet 10 mg 10 mg at 10/13/20 1732   • FLUoxetine (PROZAC) capsule 10 mg 10 mg at 10/14/20 0542   • fluticasone (FLONASE) nasal spray 100 mcg 100 mcg at 10/13/20 2057   • hydrOXYzine HCl (ATARAX) tablet 25 mg 25 mg at 10/13/20 1629   • pravastatin (PRAVACHOL) tablet 20 mg 20 mg at 10/13/20 2057   • senna-docusate (PERICOLACE or SENOKOT S) 8.6-50 MG per tablet 2 Tab 2 Tab at 10/14/20 0542    And   • polyethylene glycol/lytes (MIRALAX) PACKET 1 Packet      And   • magnesium hydroxide (MILK OF MAGNESIA) suspension 30 mL      And   • bisacodyl (DULCOLAX) suppository 10 mg     • enoxaparin (LOVENOX) inj 40 mg 40 mg at 10/14/20 0542   • acetaminophen (TYLENOL) tablet 650 mg 650 mg at 10/13/20 1359   • ondansetron (ZOFRAN) syringe/vial injection 4 mg     • ondansetron (ZOFRAN ODT) dispertab 4 mg     • promethazine (PHENERGAN) tablet 12.5-25 mg     • promethazine (PHENERGAN) suppository 12.5-25 mg     • prochlorperazine (COMPAZINE) injection 5-10 mg     • busPIRone (BUSPAR) tablet 15 mg 15 mg at 10/14/20 0543    And   • busPIRone (BUSPAR) tablet 22.5 mg 22.5 mg at 10/13/20 5863       PROBLEM  LIST:        ASSESSMENT AND PLAN: 59 y.o. female admitted for hyponatremia and hypertensive urgency    Hyponatremia  Pt appears euvolemic. Electrolytes indicate resolution of hyponatremia. 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   - Discontinue NS   - 2L fluid restriction    HTN  Pt on irbesartan 300 mg daily. Prazosin added yesterday. BP trending down. Discuss with patient blood pressure can be managed outpatient.    - Irbesartan 300 mg daily   - Prazosin 4 mg BID   -  5 mg Amlodipine   - 10 mg propanolol   - Vasotec PRN SBP >180, DBP >120   - f/u plasma metanphrines for pheochromocytoma   - Plasma Aldosterone Concentration order for primary aldosteronism    Elevated troponin resolved  Repeat troponin trended down, ECG shows no ST/T wave changes    Anxiety improved  Pt states anxiety is improved today.   - Continue buspirone   - Hydroxyzine   - Prozac    Core Measures  Lines: PIV  Tubes: none  Fluids: none  Diet: Regular, 2 L fluid restriction  Abx: none  DVT prophylaxis: Lovenox  PCP: Tonya Toth MD  Code Status: Full Code    Disposition: Discharge patient pending blood pressure control with propanolol

## 2020-10-14 NOTE — PROGRESS NOTES
[facility] MEDICINE PROGRESS NOTE     Attending:   Emily Beard MD    Resident:   Antonio Giraldo DO    PATIENT:   Kelly Oakley; [id number]; 1960    ID:   59 y.o. female admitted for hyponatremia and hypertensive urgency.    SUBJECTIVE:   Patient with elevated blood pressure that required PRN. Anxious about being discharged. Anxious about medication adjustments.     OBJECTIVE:  Vitals:    10/14/20 0000 10/14/20 0347 10/14/20 0800 10/14/20 1005   BP: 155/80 (!) 182/86 (!) 181/91 146/66   Pulse: 67 62 72 67   Resp: 15 16 17 18   Temp: 36.6 °C (97.9 °F) 36.3 °C (97.3 °F) 36.8 °C (98.2 °F)    TempSrc: Temporal Temporal Temporal    SpO2: 97% 96% 96%    Weight:       Height:           Intake/Output Summary (Last 24 hours) at 10/14/2020 1031  Last data filed at 10/14/2020 1000  Gross per 24 hour   Intake 480 ml   Output --   Net 480 ml       PHYSICAL EXAM:  General: No acute distress, resting comfortably, conversational  HEENT: NC, AT, EOMI  Heart: RRR, no murmur, rub, gallop  Lungs: CTAB, no wheezing, rales, rhonchi  Abdomen: soft, nontender, nondistended, bowel sounds present throughout  EXT:  CALVERT WNL, no lower extremity edema, 2+ PT pulses bilaterally  Skin: Warm, dry, intact  Neuro: Non-focal, A/Ox4      LABS:  No results for input(s): WBC, RBC, HEMOGLOBIN, HEMATOCRIT, MCV, MCH, RDW, PLATELETCT, MPV, NEUTSPOLYS, LYMPHOCYTES, MONOCYTES, EOSINOPHILS, BASOPHILS, RBCMORPHOLO in the last 72 hours.  Recent Labs     10/11/20  1448 10/12/20  0355 10/14/20  0515   SODIUM 131* 134* 136   POTASSIUM 3.6 3.7 4.0   CHLORIDE 98 104 104   CO2 20 19* 20   BUN 11 14 10   CREATININE 0.83 0.70 0.72   CALCIUM 8.3* 8.5 8.6     Estimated GFR/CRCL = Estimated Creatinine Clearance: 79.3 mL/min (by C-G formula based on SCr of 0.72 mg/dL).  Recent Labs     10/11/20  1448 10/12/20  0355 10/14/20  0515   GLUCOSE 113* 96 94                 No results for input(s): INR, APTT, FIBRINOGEN in the last 72 hours.    Invalid input(s):  DIMER    MICROBIOLOGY:  Blood Culture Hold   Date Value Ref Range Status   09/24/2020 Collected  Final        IMAGING:  US-RENAL ARTERY DUPLEX COMP   Final Result      DX-CHEST-PORTABLE (1 VIEW)   Final Result      No radiographic evidence of acute cardiopulmonary process.          CULTURES:   Results     Procedure Component Value Units Date/Time    SARS-CoV-2, PCR (In-House) [[id number]] Collected: 10/10/20 1858    Order Status: Completed Updated: 10/10/20 2227     SARS-CoV-2 Source Nasal Swab     SARS-CoV-2 by PCR NotDetected     Comment: [facility] providers: PLEASE REFER TO DE-ESCALATION AND RETESTING PROTOCOL  on [email or web address]  **The TaqPath COVID-19 SARS-CoV-2 test has been made available for use under  the Emergency Use Authorization (EUA) only.         Narrative:      Collected By:[id number] JOANN CRAVEN  Is patient being admitted?->Yes  Does this patient meet criteria for Rush/Cepheid per [facility]  Inpatient Workflow? (See workflow link below)->No  Expected turn around time?->Routine (In-House PCR up to 24  hours)  Is this the patients First SARS CoV-2 test?->Yes  Is this patient employed in healthcare?->No  Is the patient symptomatic as defined by the CDC?->No  Is the patient hospitalized?->No  Is the patient a resident in a congregate care setting?->No  Is the patient pregnant?->No    COVID/SARS CoV-2 PCR [[id number]] Collected: 10/10/20 1858    Order Status: Completed Specimen: Respirate from Nasopharyngeal Updated: 10/10/20 1907     COVID Order Status Received     Comment: The order for SARS CoV-2 testing has been received by the  Laboratory. This result is neither positive nor negative.  Final results of testing will report in 24-48 hours, separately.         Narrative:      Collected By:[id number] JOANN CRAVEN  Is patient being admitted?->Yes  Does this patient meet criteria for Rush/Cepheid per [facility]  Inpatient Workflow? (See workflow link below)->No  Expected turn around time?->Routine (In-House PCR up to  24  hours)  Is this the patients First SARS CoV-2 test?->Yes  Is this patient employed in healthcare?->No  Is the patient symptomatic as defined by the CDC?->No  Is the patient hospitalized?->No  Is the patient a resident in a congregate care setting?->No  Is the patient pregnant?->No          MEDS:  Current Facility-Administered Medications   Medication Last Dose   • prazosin (MINIPRESS) capsule 6 mg     • propranolol (INDERAL) tablet 10 mg 10 mg at 10/14/20 1011   • amLODIPine (NORVASC) tablet 5 mg 5 mg at 10/14/20 0542   • omeprazole (PRILOSEC) capsule 20 mg 20 mg at 10/14/20 0542   • irbesartan (AVAPRO) tablet 300 mg 300 mg at 10/14/20 0838   • enalaprilat (VASOTEC) injection 1.25 mg 1.25 mg at 10/13/20 1424   • cetirizine (ZYRTEC) tablet 10 mg 10 mg at 10/13/20 1732   • FLUoxetine (PROZAC) capsule 10 mg 10 mg at 10/14/20 0542   • fluticasone (FLONASE) nasal spray 100 mcg 100 mcg at 10/13/20 2057   • hydrOXYzine HCl (ATARAX) tablet 25 mg 25 mg at 10/13/20 1629   • pravastatin (PRAVACHOL) tablet 20 mg 20 mg at 10/13/20 2057   • senna-docusate (PERICOLACE or SENOKOT S) 8.6-50 MG per tablet 2 Tab 2 Tab at 10/14/20 0542    And   • polyethylene glycol/lytes (MIRALAX) PACKET 1 Packet      And   • magnesium hydroxide (MILK OF MAGNESIA) suspension 30 mL      And   • bisacodyl (DULCOLAX) suppository 10 mg     • enoxaparin (LOVENOX) inj 40 mg 40 mg at 10/14/20 0542   • acetaminophen (TYLENOL) tablet 650 mg 650 mg at 10/13/20 1359   • ondansetron (ZOFRAN) syringe/vial injection 4 mg     • ondansetron (ZOFRAN ODT) dispertab 4 mg     • promethazine (PHENERGAN) tablet 12.5-25 mg     • promethazine (PHENERGAN) suppository 12.5-25 mg     • prochlorperazine (COMPAZINE) injection 5-10 mg     • busPIRone (BUSPAR) tablet 15 mg 15 mg at 10/14/20 6719    And   • busPIRone (BUSPAR) tablet 22.5 mg 22.5 mg at 10/13/20 7826       PROBLEM LIST:  No problems updated.    ASSESSMENT/PLAN: 59 y.o. female admitted for hyponatremia and  hypertensive urgency     #HTN  Patient on irbesartan 300 mg daily. Have added prazosin, now up to 6 mg BID. Have added amlodipine 5 mg daily. Have added propranolol 10 mg BID. Discussed with patient that this can continue to be managed in the outpatient setting but patient anxious about leaving prior to better control. Cardiology recommendations appreciated.  - irbesartan 300 mg daily  - prazosin 6 mg BID  - amlodipine 5 mg daily  - propranolol 10 mg BID  - Vasotec PRN, SBP >180, DBP >120  - f/u plasma metanephrines  - consider checking aldosterone    #hyponatremia, resolved  Patient appears euvolemic. Plasma osmolarity indicates hypotonic picture. Urinary osm >100. SIADH vs. HCTZ use vs. SSRI vs. Psychogenic polydipsia. Most recent Na 136.   - 2 L fluid restriction     #elevated troponin, resolved  Repeat troponin trended down. ECG continues to show no ST/ T wave changes. Patient denies any chest pain.      #Anxiety, improved  Patient endorsed improved anxiety today.   - Continue Buspar  - Continue hydroxyzine  - Continue Prozac    Core Measures  Lines: PIV  Tubes: none  Fluids: none  Diet: Cardiac, 2 L fluid restriction  Abx: none  DVT prophylaxis: Lovenox   PCP: Tonya Toth MD  Code Status: Full Code    Disposition: inpatient for hypertensive urgency    Antonio Giraldo DO   PGY-1 Family Medicine Resident   [facility]

## 2020-10-15 ENCOUNTER — PHARMACY VISIT (OUTPATIENT)
Dept: PHARMACY | Facility: MEDICAL CENTER | Age: 60
End: 2020-10-15
Payer: COMMERCIAL

## 2020-10-15 ENCOUNTER — PATIENT OUTREACH (OUTPATIENT)
Dept: HEALTH INFORMATION MANAGEMENT | Facility: OTHER | Age: 60
End: 2020-10-15

## 2020-10-15 VITALS
TEMPERATURE: 98.2 F | OXYGEN SATURATION: 97 % | WEIGHT: 155.65 LBS | RESPIRATION RATE: 18 BRPM | HEIGHT: 63 IN | BODY MASS INDEX: 27.58 KG/M2 | SYSTOLIC BLOOD PRESSURE: 161 MMHG | HEART RATE: 63 BPM | DIASTOLIC BLOOD PRESSURE: 77 MMHG

## 2020-10-15 LAB
CHOLEST SERPL-MCNC: 123 MG/DL (ref 100–199)
COLLECT DURATION TIME SPEC: ABNORMAL HRS
CREAT 24H UR-MCNC: 48 MG/DL
CREAT 24H UR-MRATE: ABNORMAL MG/D (ref 500–1400)
HDLC SERPL-MCNC: 48 MG/DL
LDLC SERPL CALC-MCNC: 59 MG/DL
METANEPH 24H UR-MCNC: 98 UG/L
METANEPH 24H UR-MRATE: ABNORMAL UG/D (ref 36–229)
METANEPH/CREAT 24H UR: 204 UG/G CRT (ref 0–300)
NORMETANEPHRINE 24H UR-MCNC: 230 UG/L
NORMETANEPHRINE 24H UR-MRATE: ABNORMAL UG/D (ref 95–650)
NORMETANEPHRINE/CREAT 24H UR: 479 UG/G CRT (ref 0–400)
SPECIMEN VOL ?TM UR: ABNORMAL ML
TRIGL SERPL-MCNC: 79 MG/DL (ref 0–149)

## 2020-10-15 PROCEDURE — A9270 NON-COVERED ITEM OR SERVICE: HCPCS | Performed by: STUDENT IN AN ORGANIZED HEALTH CARE EDUCATION/TRAINING PROGRAM

## 2020-10-15 PROCEDURE — 82088 ASSAY OF ALDOSTERONE: CPT

## 2020-10-15 PROCEDURE — RXMED WILLOW AMBULATORY MEDICATION CHARGE: Performed by: STUDENT IN AN ORGANIZED HEALTH CARE EDUCATION/TRAINING PROGRAM

## 2020-10-15 PROCEDURE — 700111 HCHG RX REV CODE 636 W/ 250 OVERRIDE (IP): Performed by: STUDENT IN AN ORGANIZED HEALTH CARE EDUCATION/TRAINING PROGRAM

## 2020-10-15 PROCEDURE — 80061 LIPID PANEL: CPT

## 2020-10-15 PROCEDURE — 700102 HCHG RX REV CODE 250 W/ 637 OVERRIDE(OP): Performed by: STUDENT IN AN ORGANIZED HEALTH CARE EDUCATION/TRAINING PROGRAM

## 2020-10-15 RX ORDER — PROPRANOLOL HYDROCHLORIDE 10 MG/1
10 TABLET ORAL 2 TIMES DAILY
Qty: 60 TAB | Refills: 0 | Status: SHIPPED | OUTPATIENT
Start: 2020-10-15 | End: 2022-05-23 | Stop reason: SDUPTHER

## 2020-10-15 RX ORDER — PRAZOSIN HYDROCHLORIDE 2 MG/1
6 CAPSULE ORAL 2 TIMES DAILY
Qty: 90 CAP | Refills: 0 | Status: SHIPPED | OUTPATIENT
Start: 2020-10-15 | End: 2021-11-29

## 2020-10-15 RX ORDER — AMLODIPINE BESYLATE 5 MG/1
5 TABLET ORAL DAILY
Qty: 30 TAB | Refills: 0 | Status: SHIPPED | OUTPATIENT
Start: 2020-10-16 | End: 2020-11-18

## 2020-10-15 RX ADMIN — PROPRANOLOL HYDROCHLORIDE 10 MG: 10 TABLET ORAL at 05:07

## 2020-10-15 RX ADMIN — IRBESARTAN 300 MG: 150 TABLET ORAL at 05:06

## 2020-10-15 RX ADMIN — AMLODIPINE BESYLATE 5 MG: 5 TABLET ORAL at 05:07

## 2020-10-15 RX ADMIN — BUSPIRONE HYDROCHLORIDE 15 MG: 10 TABLET ORAL at 05:07

## 2020-10-15 RX ADMIN — ENOXAPARIN SODIUM 40 MG: 40 INJECTION SUBCUTANEOUS at 05:07

## 2020-10-15 RX ADMIN — FLUOXETINE 10 MG: 10 CAPSULE ORAL at 05:07

## 2020-10-15 RX ADMIN — OMEPRAZOLE 20 MG: 20 CAPSULE, DELAYED RELEASE ORAL at 05:07

## 2020-10-15 RX ADMIN — PRAZOSIN HYDROCHLORIDE 6 MG: 5 CAPSULE ORAL at 05:06

## 2020-10-15 NOTE — DISCHARGE PLANNING
Meds-to-Beds: Discharge prescription orders listed below delivered to patient's bedside. RN notified. Patient counseled.       Kelly Oakley   Home Medication Instructions LUIS ALBERTO:[id number]    Printed on:10/15/20 1600   Medication Information                      amLODIPine (NORVASC) 5 MG Tab  Take 1 Tab by mouth every day.             prazosin (MINIPRESS) 2 MG Cap  Take 3 Caps by mouth 2 Times a Day.             propranolol (INDERAL) 10 MG Tab  Take 1 Tab by mouth 2 times a day.               Halle Serna, PharmD

## 2020-10-15 NOTE — PROGRESS NOTES
Patient transferred to the discharge lounge. Pt provided discharge instructions, verbalized understanding, IV removed. [id number] brought patients medications. Patient walked out to curb.

## 2020-10-15 NOTE — CARE PLAN
Problem: Communication  Goal: The ability to communicate needs accurately and effectively will improve  Outcome: PROGRESSING AS EXPECTED     Problem: Safety  Goal: Will remain free from injury  Outcome: PROGRESSING AS EXPECTED  Goal: Will remain free from falls  Outcome: PROGRESSING AS EXPECTED     Problem: Venous Thromboembolism (VTW)/Deep Vein Thrombosis (DVT) Prevention:  Goal: Patient will participate in Venous Thrombosis (VTE)/Deep Vein Thrombosis (DVT)Prevention Measures  Outcome: PROGRESSING AS EXPECTED     Problem: Bowel/Gastric:  Goal: Normal bowel function is maintained or improved  Outcome: PROGRESSING AS EXPECTED  Goal: Will not experience complications related to bowel motility  Outcome: PROGRESSING AS EXPECTED     Problem: Discharge Barriers/Planning  Goal: Patient's continuum of care needs will be met  Outcome: PROGRESSING AS EXPECTED     Problem: Psychosocial Needs:  Goal: Level of anxiety will decrease  Outcome: PROGRESSING AS EXPECTED     Problem: Pain Management  Goal: Pain level will decrease to patient's comfort goal  Outcome: PROGRESSING AS EXPECTED

## 2020-10-15 NOTE — DISCHARGE INSTRUCTIONS
Instructions from Dr. Bradley:  Here is the list of medication changes:     Medication List      START taking these medications      Instructions   amLODIPine 5 MG Tabs  Start taking on: October 16, 2020  Commonly known as: NORVASC   Take 1 Tab by mouth every day.  Dose: 5 mg     prazosin 2 MG Caps  Commonly known as: MINIPRESS   Take 3 Caps by mouth 2 Times a Day.  Dose: 6 mg     propranolol 10 MG Tabs  Commonly known as: INDERAL   Take 1 Tab by mouth 2 times a day.  Dose: 10 mg        CONTINUE taking these medications      Instructions   * busPIRone 15 MG tablet  Commonly known as: BUSPAR   Take 15 mg by mouth 2 times a day. 15 mg in AM  15 mg + 7.5 mg =  22.5 mg evening  Dose: 15 mg     * busPIRone 7.5 MG tablet  Commonly known as: BUSPAR   Take 7.5 mg by mouth every evening. 7.5 mg  + 15 mg  = 22.5 mg in evening  Dose: 7.5 mg     cetirizine 10 MG Tabs  Commonly known as: ZYRTEC   Take 10 mg by mouth every evening.  Dose: 10 mg     FLUoxetine 10 MG Caps  Commonly known as: PROZAC   Take 10 mg by mouth every day.  Dose: 10 mg     fluticasone 50 MCG/ACT nasal spray  Commonly known as: FLONASE   Spray 2 Sprays in nose every bedtime.  Dose: 2 Spray     hydrOXYzine HCl 25 MG Tabs  Commonly known as: ATARAX   Take 25 mg by mouth 2 times a day as needed for Anxiety.  Dose: 25 mg     irbesartan 300 MG Tabs  Commonly known as: AVAPRO   Take 1 Tab by mouth every day.  Dose: 300 mg     NexIUM 20 MG capsule  Generic drug: esomeprazole   Take 20 mg by mouth every evening.  Dose: 20 mg     pravastatin 20 MG Tabs  Commonly known as: PRAVACHOL   Take 20 mg by mouth every bedtime.  Dose: 20 mg         * This list has 2 medication(s) that are the same as other medications prescribed for you. Read the directions carefully, and ask your doctor or other care provider to review them with you.            STOP taking these medications    hydrALAZINE 25 MG Tabs  Commonly known as: APRESOLINE     hydroCHLOROthiazide 25 MG Tabs  Commonly  "known as: HYDRODIURIL          I recommend an extra 2mg of the Minipress (prazosin) if your BP is >180/90 at home. If you are having severe headache which doesn't improve with tylenol, chest pain, difficulty breathing, fainting or feeling like you may pass out, please return to the ER. Otherwise, you can reach the on-call [facility] Resident Physician at [phone].   Please follow up with Dr. Kent tomorrow as planned, and Dr. Fajardo on Monday as planned.        Discharge Instructions    Discharged to home by car with relative. Discharged via wheelchair, hospital escort: Yes.  Special equipment needed: Not Applicable    Be sure to schedule a follow-up appointment with your primary care doctor or any specialists as instructed.     Discharge Plan:   Diet Plan: Discussed  Activity Level: Discussed  Confirmed Follow up Appointment: Appointment Scheduled  Confirmed Symptoms Management: Discussed  Medication Reconciliation Updated: Yes  Influenza Vaccine Indication: Patient Refuses    I understand that a diet low in cholesterol, fat, and sodium is recommended for good health. Unless I have been given specific instructions below for another diet, I accept this instruction as my diet prescription.   Other diet: Cardiac 2L FR    Special Instructions:   Hypertension, Adult  High blood pressure (hypertension) is when the force of blood pumping through the arteries is too strong. The arteries are the blood vessels that carry blood from the heart throughout the body. Hypertension forces the heart to work harder to pump blood and may cause arteries to become narrow or stiff. Untreated or uncontrolled hypertension can cause a heart attack, heart failure, a stroke, kidney disease, and other problems.  A blood pressure reading consists of a higher number over a lower number. Ideally, your blood pressure should be below 120/80. The first (\"top\") number is called the systolic pressure. It is a measure of the pressure in your arteries as " "your heart beats. The second (\"bottom\") number is called the diastolic pressure. It is a measure of the pressure in your arteries as the heart relaxes.  What are the causes?  The exact cause of this condition is not known. There are some conditions that result in or are related to high blood pressure.  What increases the risk?  Some risk factors for high blood pressure are under your control. The following factors may make you more likely to develop this condition:  · Smoking.  · Having type 2 diabetes mellitus, high cholesterol, or both.  · Not getting enough exercise or physical activity.  · Being overweight.  · Having too much fat, sugar, calories, or salt (sodium) in your diet.  · Drinking too much alcohol.  Some risk factors for high blood pressure may be difficult or impossible to change. Some of these factors include:  · Having chronic kidney disease.  · Having a family history of high blood pressure.  · Age. Risk increases with age.  · Race. You may be at higher risk if you are .  · Gender. Men are at higher risk than women before age 45. After age 65, women are at higher risk than men.  · Having obstructive sleep apnea.  · Stress.  What are the signs or symptoms?  High blood pressure may not cause symptoms. Very high blood pressure (hypertensive crisis) may cause:  · Headache.  · Anxiety.  · Shortness of breath.  · Nosebleed.  · Nausea and vomiting.  · Vision changes.  · Severe chest pain.  · Seizures.  How is this diagnosed?  This condition is diagnosed by measuring your blood pressure while you are seated, with your arm resting on a flat surface, your legs uncrossed, and your feet flat on the floor. The cuff of the blood pressure monitor will be placed directly against the skin of your upper arm at the level of your heart. It should be measured at least twice using the same arm. Certain conditions can cause a difference in blood pressure between your right and left arms.  Certain factors " can cause blood pressure readings to be lower or higher than normal for a short period of time:  · When your blood pressure is higher when you are in a health care provider's office than when you are at home, this is called white coat hypertension. Most people with this condition do not need medicines.  · When your blood pressure is higher at home than when you are in a health care provider's office, this is called masked hypertension. Most people with this condition may need medicines to control blood pressure.  If you have a high blood pressure reading during one visit or you have normal blood pressure with other risk factors, you may be asked to:  · Return on a different day to have your blood pressure checked again.  · Monitor your blood pressure at home for 1 week or longer.  If you are diagnosed with hypertension, you may have other blood or imaging tests to help your health care provider understand your overall risk for other conditions.  How is this treated?  This condition is treated by making healthy lifestyle changes, such as eating healthy foods, exercising more, and reducing your alcohol intake. Your health care provider may prescribe medicine if lifestyle changes are not enough to get your blood pressure under control, and if:  · Your systolic blood pressure is above 130.  · Your diastolic blood pressure is above 80.  Your personal target blood pressure may vary depending on your medical conditions, your age, and other factors.  Follow these instructions at home:  Eating and drinking    · Eat a diet that is high in fiber and potassium, and low in sodium, added sugar, and fat. An example eating plan is called the DASH (Dietary Approaches to Stop Hypertension) diet. To eat this way:  ? Eat plenty of fresh fruits and vegetables. Try to fill one half of your plate at each meal with fruits and vegetables.  ? Eat whole grains, such as whole-wheat pasta, brown rice, or whole-grain bread. Fill about one fourth  of your plate with whole grains.  ? Eat or drink low-fat dairy products, such as skim milk or low-fat yogurt.  ? Avoid fatty cuts of meat, processed or cured meats, and poultry with skin. Fill about one fourth of your plate with lean proteins, such as fish, chicken without skin, beans, eggs, or tofu.  ? Avoid pre-made and processed foods. These tend to be higher in sodium, added sugar, and fat.  · Reduce your daily sodium intake. Most people with hypertension should eat less than 1,500 mg of sodium a day.  · Do not drink alcohol if:  ? Your health care provider tells you not to drink.  ? You are pregnant, may be pregnant, or are planning to become pregnant.  · If you drink alcohol:  ? Limit how much you use to:  § 0-1 drink a day for women.  § 0-2 drinks a day for men.  ? Be aware of how much alcohol is in your drink. In the [place] one drink equals one 12 oz bottle of beer (355 mL), one 5 oz glass of wine (148 mL), or one 1½ oz glass of hard liquor (44 mL).  Lifestyle    · Work with your health care provider to maintain a healthy body weight or to lose weight. Ask what an ideal weight is for you.  · Get at least 30 minutes of exercise most days of the week. Activities may include walking, swimming, or biking.  · Include exercise to strengthen your muscles (resistance exercise), such as Pilates or lifting weights, as part of your weekly exercise routine. Try to do these types of exercises for 30 minutes at least 3 days a week.  · Do not use any products that contain nicotine or tobacco, such as cigarettes, e-cigarettes, and chewing tobacco. If you need help quitting, ask your health care provider.  · Monitor your blood pressure at home as told by your health care provider.  · Keep all follow-up visits as told by your health care provider. This is important.  Medicines  · Take over-the-counter and prescription medicines only as told by your health care provider. Follow directions carefully. Blood pressure medicines  must be taken as prescribed.  · Do not skip doses of blood pressure medicine. Doing this puts you at risk for problems and can make the medicine less effective.  · Ask your health care provider about side effects or reactions to medicines that you should watch for.  Contact a health care provider if you:  · Think you are having a reaction to a medicine you are taking.  · Have headaches that keep coming back (recurring).  · Feel dizzy.  · Have swelling in your ankles.  · Have trouble with your vision.  Get help right away if you:  · Develop a severe headache or confusion.  · Have unusual weakness or numbness.  · Feel faint.  · Have severe pain in your chest or abdomen.  · Vomit repeatedly.  · Have trouble breathing.  Summary  · Hypertension is when the force of blood pumping through your arteries is too strong. If this condition is not controlled, it may put you at risk for serious complications.  · Your personal target blood pressure may vary depending on your medical conditions, your age, and other factors. For most people, a normal blood pressure is less than 120/80.  · Hypertension is treated with lifestyle changes, medicines, or a combination of both. Lifestyle changes include losing weight, eating a healthy, low-sodium diet, exercising more, and limiting alcohol.  This information is not intended to replace advice given to you by your health care provider. Make sure you discuss any questions you have with your health care provider.  Document Released: 12/18/2006 Document Revised: 08/28/2019 Document Reviewed: 08/28/2019  [employer] Patient Education © 2020 [employer]      Hyponatremia  Hyponatremia is when the amount of salt (sodium) in your blood is too low. When salt levels are low, your body may take in extra water. This can cause swelling throughout the body. The swelling often affects the brain.  What are the causes?  This condition may be caused by:  · Certain medical problems or conditions.  · Vomiting  a lot.  · Having watery poop (diarrhea) often.  · Certain medicines or illegal drugs.  · Not having enough water in the body (dehydration).  · Drinking too much water.  · Eating a diet that is low in salt.  · Large burns on your body.  · Too much sweating.  What increases the risk?  You are more likely to get this condition if you:  · Have long-term (chronic) kidney disease.  · Have heart failure.  · Have a medical condition that causes you to have watery poop often.  · Do very hard exercises.  · Take medicines that affect the amount of salt is in your blood.  What are the signs or symptoms?  Symptoms of this condition include:  · Headache.  · Feeling like you may vomit (nausea).  · Vomiting.  · Being very tired (lethargic).  · Muscle weakness and cramps.  · Not wanting to eat as much as normal (loss of appetite).  · Feeling weak or light-headed.  Severe symptoms of this condition include:  · Confusion.  · Feeling restless (agitation).  · Having a fast heart rate.  · Passing out (fainting).  · Seizures.  · Coma.  How is this treated?  Treatment for this condition depends on the cause. Treatment may include:  · Getting fluids through an IV tube that is put into one of your veins.  · Taking medicines to fix the salt levels in your blood. If medicines are causing the problem, your medicines will need to be changed.  · Limiting how much water or fluid you take in.  · Monitoring in the hospital to watch your symptoms.  Follow these instructions at home:    · Take over-the-counter and prescription medicines only as told by your doctor. Many medicines can make this condition worse. Talk with your doctor about any medicines that you are taking.  · Eat and drink exactly as you are told by your doctor.  ? Eat only the foods you are told to eat.  ? Limit how much fluid you take.  · Do not drink alcohol.  · Keep all follow-up visits as told by your doctor. This is important.  Contact a doctor if:  · You feel more like you may  vomit.  · You feel more tired.  · Your headache gets worse.  · You feel more confused.  · You feel weaker.  · Your symptoms go away and then they come back.  · You have trouble following the diet instructions.  Get help right away if:  · You have a seizure.  · You pass out.  · You keep having watery poop.  · You keep vomiting.  Summary  · Hyponatremia is when the amount of salt in your blood is too low.  · When salt levels are low, you can have swelling throughout the body. The swelling mostly affects the brain.  · Treatment depends on the cause. Treatment may include getting IV fluids, medicines, or not drinking as much fluid.  This information is not intended to replace advice given to you by your health care provider. Make sure you discuss any questions you have with your health care provider.  Document Released: 08/29/2012 Document Revised: 03/05/2020 Document Reviewed: 11/21/2019  [employer] Patient Education © 2020 [employer]      · Is patient discharged on Warfarin / Coumadin?   No     Depression / Suicide Risk    As you are discharged from this [facility] facility, it is important to learn how to keep safe from harming yourself.    Recognize the warning signs:  · Abrupt changes in personality, positive or negative- including increase in energy   · Giving away possessions  · Change in eating patterns- significant weight changes-  positive or negative  · Change in sleeping patterns- unable to sleep or sleeping all the time   · Unwillingness or inability to communicate  · Depression  · Unusual sadness, discouragement and loneliness  · Talk of wanting to die  · Neglect of personal appearance   · Rebelliousness- reckless behavior  · Withdrawal from people/activities they love  · Confusion- inability to concentrate     If you or a loved one observes any of these behaviors or has concerns about self-harm, here's what you can do:  · Talk about it- your feelings and reasons for harming yourself  · Remove any  means that you might use to hurt yourself (examples: pills, rope, extension cords, firearm)  · Get professional help from the community (Mental Health, Substance Abuse, psychological counseling)  · Do not be alone:Call your Safe Contact- someone whom you trust who will be there for you.  · Call your local CRISIS HOTLINE 104-6638 or 901-788-3399  · Call your local Children's Mobile Crisis Response Team [place] (441) 444-3896 or www.[employer] [place]  · Call the toll free National Suicide Prevention Hotlines   · National Suicide Prevention Lifeline 129-745-BSFM (8553)  · National Hope Line Network 800-SUICIDE (905-3983)    Discharge Instructions    Discharged to home by car with relative. Discharged via wheelchair, hospital escort: Yes.  Special equipment needed: Not Applicable    Be sure to schedule a follow-up appointment with your primary care doctor or any specialists as instructed.     Discharge Plan:   Diet Plan: Discussed  Activity Level: Discussed  Confirmed Follow up Appointment: Patient to Call and Schedule Appointment  Confirmed Symptoms Management: Discussed  Medication Reconciliation Updated: Yes  Influenza Vaccine Indication: Patient Refuses    I understand that a diet low in cholesterol, fat, and sodium is recommended for good health. Unless I have been given specific instructions below for another diet, I accept this instruction as my diet prescription.   Other diet: Cardiac/DASH Diet    Special Instructions: None    · Is patient discharged on Warfarin / Coumadin?   No     Depression / Suicide Risk    As you are discharged from this [facility] facility, it is important to learn how to keep safe from harming yourself.    Recognize the warning signs:  · Abrupt changes in personality, positive or negative- including increase in energy   · Giving away possessions  · Change in eating patterns- significant weight changes-  positive or negative  · Change in sleeping patterns- unable to sleep or  sleeping all the time   · Unwillingness or inability to communicate  · Depression  · Unusual sadness, discouragement and loneliness  · Talk of wanting to die  · Neglect of personal appearance   · Rebelliousness- reckless behavior  · Withdrawal from people/activities they love  · Confusion- inability to concentrate     If you or a loved one observes any of these behaviors or has concerns about self-harm, here's what you can do:  · Talk about it- your feelings and reasons for harming yourself  · Remove any means that you might use to hurt yourself (examples: pills, rope, extension cords, firearm)  · Get professional help from the community (Mental Health, Substance Abuse, psychological counseling)  · Do not be alone:Call your Safe Contact- someone whom you trust who will be there for you.  · Call your local CRISIS HOTLINE 236-6999 or 448-175-1237  · Call your local Children's Mobile Crisis Response Team [place] (441) 125-6661 or www.[employer]  · Call the toll free National Suicide Prevention Hotlines   · National Suicide Prevention Lifeline 711-232-YTUI (2228)  · National Hope Line Network 800-SUICIDE (508-3442)

## 2020-10-15 NOTE — PROGRESS NOTES
NANO Weaver met with pt at bedside to introduce CCM and to complete inpatient assessment. Pt states that she sees PCP Saulo Fajardo and will make her own appt. She is an active , employed full time and has a good support system with her  and children. Pt asked for NANO Weaver to cancel her appt for tomorrow with Navneet because she will not be able to make it but will call herself to reschedule. Pt did not express any other questions or concerns for CCM at this time.     Community Health Worker Intake  • Social determinates of health intake completed  • Identified barriers to none  • Contact information provided to Kelly  • Accepted Meds-To-Beds.   • Inpatient assessment completed.    Plan:  I will call to cancel her appt with Navneet. I will also call pt post discharge to follow up and further offer CCM services.     Result:   Before signing this encounter, I was able to cancel her appt with Navneet.

## 2020-10-15 NOTE — NON-PROVIDER
"[facility] MEDICINE PROGRESS NOTE       PATIENT: Kelly Oakley; [id number]; 1960    ID: 59 y.o. female admitted 10/10 Saturday for  hyponatremia and hypertensive urgency    Pt was treated with normal saline and the HCTZ was held. Trending CMP demonstrates hyponatremia resolved. Pt blood pressure has been difficult to control. Started on beta blocker which better controlled blood pressure but discontinued due to bradycardia. Pt has maintained blood pressure around 180/100 while adding new medications making discharge difficult.    SUBJECTIVE: No acute events overnight. Pt's anxiety improving with going home. Pt's  wants her to come home \"and manage blood pressure out of the hospital\" which has recently changed from the day before making pt more open to discharge.    ROS:  Constitutional- negative for fever chills, pain well controlled  HEENT- no double vision, changes in hearing, cough  CV- no palpations, chest pain, SOB  GI- no pain nausea or vomiting, last BM yesterday  - voiding bladder  MSK- no for myalgia  Neuro- no headaches      OBJECTIVE:   BP (181-146) / (83-66) 174/90   Pulse 57-72 56   Temp TMAX - 37.4, 36.8 °C (98.2 °F) (Temporal)   Resp 17   Ht 1.6 m (5' 3\")   Wt 70.6 kg (155 lb 10.3 oz)   SpO2 97%        PE:  General: No acute distress, resting comfortably in bed.  HEENT: normocephalic, nontraumatic, PERRLA, EOMI  Cardiovascular: Heart RRR with no murmurs, rubs or gallops. Distal Pulses 2+  Respiratory: symmetric chest expansion, lungs CTA bilaterally with no wheezes rales or rhonci  Abdomen: soft, nontender, nondistended, no masses palpated,  Musculoskeletal: strength 5/5 in four extremities  Neuro: non focal with no numbness, tingling or changes in sensation A/O x4      LABS:    Recent Labs     10/14/20  0515   SODIUM 136   POTASSIUM 4.0   CHLORIDE 104   CO2 20   BUN 10   CREATININE 0.72   CALCIUM 8.6     Estimated GFR/CRCL = Estimated Creatinine Clearance: 79.3 mL/min (by C-G " formula based on SCr of 0.72 mg/dL).  Recent Labs     10/14/20  0515   GLUCOSE 94     Lab interpretations - yesterday labs show sodium trending normal - 136, up from 134 day before    Urine metanephrines demonstrated abnormalities in urine normetanephrine at 479 ug/g       IMAGING:  US-RENAL ARTERY DUPLEX COMP   Final Result      DX-CHEST-PORTABLE (1 VIEW)   Final Result      No radiographic evidence of acute cardiopulmonary process.        Abdominal CT obtained 9/18 demonstrated a left adrenal adenoma.    MEDS:  Current Facility-Administered Medications   Medication Last Dose   • prazosin (MINIPRESS) capsule 6 mg 6 mg at 10/15/20 0506   • propranolol (INDERAL) tablet 10 mg 10 mg at 10/15/20 0507   • amLODIPine (NORVASC) tablet 5 mg 5 mg at 10/15/20 0507   • omeprazole (PRILOSEC) capsule 20 mg 20 mg at 10/15/20 0507   • irbesartan (AVAPRO) tablet 300 mg 300 mg at 10/15/20 0506   • enalaprilat (VASOTEC) injection 1.25 mg 1.25 mg at 10/13/20 1424   • cetirizine (ZYRTEC) tablet 10 mg 10 mg at 10/14/20 1744   • FLUoxetine (PROZAC) capsule 10 mg 10 mg at 10/15/20 0507   • fluticasone (FLONASE) nasal spray 100 mcg 100 mcg at 10/13/20 2057   • hydrOXYzine HCl (ATARAX) tablet 25 mg 25 mg at 10/13/20 1629   • pravastatin (PRAVACHOL) tablet 20 mg 20 mg at 10/14/20 2010   • senna-docusate (PERICOLACE or SENOKOT S) 8.6-50 MG per tablet 2 Tab 2 Tab at 10/14/20 0542    And   • polyethylene glycol/lytes (MIRALAX) PACKET 1 Packet      And   • magnesium hydroxide (MILK OF MAGNESIA) suspension 30 mL      And   • bisacodyl (DULCOLAX) suppository 10 mg     • enoxaparin (LOVENOX) inj 40 mg 40 mg at 10/15/20 0507   • acetaminophen (TYLENOL) tablet 650 mg 650 mg at 10/14/20 1743   • ondansetron (ZOFRAN) syringe/vial injection 4 mg     • ondansetron (ZOFRAN ODT) dispertab 4 mg     • promethazine (PHENERGAN) tablet 12.5-25 mg     • promethazine (PHENERGAN) suppository 12.5-25 mg     • prochlorperazine (COMPAZINE) injection 5-10 mg     •  busPIRone (BUSPAR) tablet 15 mg 15 mg at 10/15/20 0507    And   • busPIRone (BUSPAR) tablet 22.5 mg 22.5 mg at 10/14/20 1741       PROBLEM LIST:  There are no active hospital problems to display for this patient.      ASSESSMENT AND PLAN: 59 y.o. female admitted for hyponatremia and hypertensive urgency     Hyponatremia  Pt appears euvolemic. Electrolytes indicate resolution of hyponatremia. HCTZ most likely mechanism.   - Discontinue NS   - 2L fluid restriction     HTN  Left Adrenal Adenoma  Pt on irbesartan 300 mg daily. Prazosin added 10/12. Amlodipine added 10/13. Propanolol added 10/14 yesterday. BP trending down. After talking with a [employer] friend, pt wants to be seen by Dr. Sewell. This was discussed with the patient yesterday, but she did not remember.    - Irbesartan 300 mg daily   - Prazosin 6 mg BID   -  7.5 mg Amlodipine   - 20 mg propanolol   - Vasotec PRN SBP >180, DBP >120   -  plasma metanphrines for pheochromocytoma at 479 ug in urine. Left adrenal adenoma in CT scan, order   - Plasma Aldosterone Concentration order for primary aldosteronism     Elevated troponin resolved  Repeat troponin trended down, ECG shows no ST/T wave changes     Anxiety improved  Pt states anxiety is improved today.   - Continue buspirone   - Hydroxyzine   - Prozac     Core Measures  Lines: PIV  Tubes: none  Fluids: none  Diet: Regular, 2 L fluid restriction  Abx: none  DVT prophylaxis: Lovenox  PCP: Tonya Toth MD  Code Status: Full Code     Disposition: Discharge patient today pending blood pressure stays below 180/120

## 2020-10-16 ENCOUNTER — OFFICE VISIT (OUTPATIENT)
Dept: CARDIOLOGY | Facility: MEDICAL CENTER | Age: 60
End: 2020-10-16
Payer: MEDICAID

## 2020-10-16 VITALS
RESPIRATION RATE: 16 BRPM | WEIGHT: 156 LBS | SYSTOLIC BLOOD PRESSURE: 144 MMHG | BODY MASS INDEX: 27.64 KG/M2 | DIASTOLIC BLOOD PRESSURE: 60 MMHG | HEIGHT: 63 IN | OXYGEN SATURATION: 96 % | HEART RATE: 56 BPM

## 2020-10-16 DIAGNOSIS — E27.8 ADRENAL NODULE (HCC): ICD-10-CM

## 2020-10-16 DIAGNOSIS — I10 ESSENTIAL HYPERTENSION: ICD-10-CM

## 2020-10-16 DIAGNOSIS — F17.200 TOBACCO DEPENDENCY: ICD-10-CM

## 2020-10-16 LAB
METANEPHS SERPL-SCNC: 0.2 NMOL/L (ref 0–0.49)
NORMETANEPHRINE SERPL-SCNC: 0.46 NMOL/L (ref 0–0.89)

## 2020-10-16 PROCEDURE — 99214 OFFICE O/P EST MOD 30 MIN: CPT | Performed by: INTERNAL MEDICINE

## 2020-10-16 RX ORDER — SPIRONOLACTONE 50 MG/1
50 TABLET, FILM COATED ORAL DAILY
Qty: 90 TAB | Refills: 3 | Status: SHIPPED | OUTPATIENT
Start: 2020-10-16 | End: 2021-12-01 | Stop reason: SDUPTHER

## 2020-10-16 ASSESSMENT — FIBROSIS 4 INDEX: FIB4 SCORE: 1.08

## 2020-10-16 NOTE — PROGRESS NOTES
CARDIOLOGY OUTPATIENT FOLLOWUP    PCP: Saulo Fajardo M.D.    1. Essential hypertension  Poorly controlled.  She is taking a complicated regimen including low-dose beta-blockade as well as alpha blockade with symptoms of orthostasis and fatigue.  Beta-blocker dose has been limited by bradycardia and she has been unable to tolerate HCTZ due to profound hyponatremia.  Prior higher doses of amlodipine complicated by edema.  Secondary hypertension work-up unrevealing aside from borderline elevated urine metanephrine  - Add spironolactone 50 mg daily, basic metabolic panel in 1 week  -Continue other antihypertensive medicines  - PRA and aldosterone level, and serum metanephrine to be obtained in 1 week.  I instructed her to obtain these labs in a well-hydrated and well salted state between 7 and 8 in the morning    2. Adrenal nodule (HCC)  This may be related elevation in blood pressure.  Testing as above.  Referral placed to endocrinology    3. Tobacco dependency  She has now 2 weeks abstinent.  I applauded her for the success and counseled her for 3 minutes about ways to maintain abstinence      Follow up with Dwayne Kent M.D. in 1 month    Chief Complaint   Patient presents with   • Hypertension     PP DX: HTN       History: Kelly Oakley is a 59 y.o. female with a past medical history of hypertension and tobacco use presenting for follow up of elevated blood pressure.  Since my last evaluation she has been hospitalized for elevation of the blood pressure.  She has not had any definite etiology identified through secondary hypertension evaluation.  She is now on a regimen of amlodipine 5 mg daily, irbesartan 300 mg daily, prazosin 6 mg twice daily, propranolol 10 mg twice daily.    In the past she has experienced profound hyponatremia with HCTZ.  She has had lower extremity edema with higher dose amlodipine.  Prazosin currently is leading to significant orthostasis which is managed by slow position changes.  She  "also feels fatigued and is bradycardic.    Home blood pressure readings are averaging 150/90    She does have adrenal nodulews and was previously followed by endocrinology for this.    ROS:  All other systems reviewed and negative except as per the HPI    PE:  /60 (BP Location: Left arm, Patient Position: Sitting, BP Cuff Size: Adult)   Pulse (!) 56   Resp 16   Ht 1.6 m (5' 3\")   Wt 70.8 kg (156 lb)   SpO2 96%   BMI 27.63 kg/m²   Gen: Well appearing  HEENT: Symmetric face. Anicteric sclerae. Moist mucus membranes  NECK: No JVD. No lymphadenopathy  CARDIAC: Normal PMI, regular, normal S1, S2. without murmur  VASCULATURE: Normal carotid amplitude without bruit.   RESP: Clear to auscultation bilaterally  ABD: Soft, non-tender, non-distended  EXT: No edema, no clubbing or cyanosis  SKIN: Warm and dry  NEURO: No gross deficits  PSYCH: Appropriate affect, participates in conversation    Past Medical History:   Diagnosis Date   • Adrenal nodule 2012 2015 / nonfunctional / benign   • Anemia    • Anxiety     Panic   • Arrhythmia    • Arthritis     Morning stiffness   • Atherosclerosis of arteries 2013    seen on abdominal CT   • Briseno's  esophagus    • Chest pain at rest 9/11/2015   • Cigarette smoker one half pack a day or less 5/28/2015   • Depression    • Fatigue 9/11/2015   • GERD (gastroesophageal reflux disease)    • Headache(784.0)     sinus headache   • Heart murmur     Dr. Krause-Stress trend   • History of HPV infection    • HLD (hyperlipidemia) 9/11/2015   • HTN (hypertension) 9/11/2015   • IBD (inflammatory bowel disease)     Dr. Cunningham   • MRSA carrier 2008    nose cellulitis   • Nocturnal hypoxemia 10/15/2015   • OSTEOPOROSIS     Osteopenia   • S/P appendectomy    • S/P cholecystectomy    • S/P hysterectomy with oophorectomy     endometriosis   • Ulcer     Barretts esophogitis   • Urinary tract infection, site not specified      Allergies   Allergen Reactions   • Macrodantin [Nitrofurantoin " Macrocrystal] Anaphylaxis     Prescription > 10 years ago   • Pcn [Penicillins] Anaphylaxis     Prescription > 10 years ago   • Doxycycline Itching   • Amlodipine      Leg swelling   • Bactrim Vomiting   • Clarithromycin Vomiting   • Hydrochlorothiazide      Hyponatremia   • Omnicef Itching     Outpatient Encounter Medications as of 10/16/2020   Medication Sig Dispense Refill   • spironolactone (ALDACTONE) 50 MG Tab Take 1 Tab by mouth every day. 90 Tab 3   • amLODIPine (NORVASC) 5 MG Tab Take 1 Tab by mouth every day. 30 Tab 0   • prazosin (MINIPRESS) 2 MG Cap Take 3 Caps by mouth 2 Times a Day. 90 Cap 0   • propranolol (INDERAL) 10 MG Tab Take 1 Tab by mouth 2 times a day. 60 Tab 0   • hydrOXYzine HCl (ATARAX) 25 MG Tab Take 25 mg by mouth 2 times a day as needed for Anxiety.     • FLUoxetine (PROZAC) 10 MG Cap Take 10 mg by mouth every day.     • busPIRone (BUSPAR) 7.5 MG tablet Take 7.5 mg by mouth every evening. 7.5 mg  + 15 mg  = 22.5 mg in evening     • pravastatin (PRAVACHOL) 20 MG Tab Take 20 mg by mouth every bedtime.     • busPIRone (BUSPAR) 15 MG tablet Take 15 mg by mouth 2 times a day. 15 mg in AM  15 mg + 7.5 mg =  22.5 mg evening     • esomeprazole (NEXIUM) 20 MG capsule Take 20 mg by mouth every evening.     • fluticasone (FLONASE) 50 MCG/ACT nasal spray Spray 2 Sprays in nose every bedtime.     • cetirizine (ZYRTEC) 10 MG Tab Take 10 mg by mouth every evening.     • irbesartan (AVAPRO) 300 MG Tab Take 1 Tab by mouth every day. 90 Tab 3     No facility-administered encounter medications on file as of 10/16/2020.      Social History     Socioeconomic History   • Marital status:      Spouse name: Not on file   • Number of children: Not on file   • Years of education: Not on file   • Highest education level: Not on file   Occupational History   • Not on file   Social Needs   • Financial resource strain: Somewhat hard   • Food insecurity     Worry: Never true     Inability: Never true   •  Transportation needs     Medical: No     Non-medical: No   Tobacco Use   • Smoking status: Former Smoker     Packs/day: 0.50     Years: 40.00     Pack years: 20.00     Types: Cigarettes     Quit date: 10/9/2020     Years since quittin.0   • Smokeless tobacco: Never Used   • Tobacco comment: last cigarette on 10/10/20    Substance and Sexual Activity   • Alcohol use: Yes     Alcohol/week: 0.6 oz     Types: 1 Standard drinks or equivalent per week     Comment: rarely   • Drug use: No   • Sexual activity: Not Currently     Comment:    Lifestyle   • Physical activity     Days per week: Patient refused     Minutes per session: Patient refused   • Stress: Very much   Relationships   • Social connections     Talks on phone: Patient refused     Gets together: Patient refused     Attends [employer] service: Patient refused     Active member of club or organization: Patient refused     Attends meetings of clubs or organizations: Patient refused     Relationship status: Patient refused   • Intimate partner violence     Fear of current or ex partner: Patient refused     Emotionally abused: Patient refused     Physically abused: Patient refused     Forced sexual activity: Patient refused   Other Topics Concern   • Not on file   Social History Narrative   • Not on file       Studies  Lab Results   Component Value Date/Time    CHOLSTRLTOT 123 10/15/2020 07:20 AM    LDL 59 10/15/2020 07:20 AM    HDL 48 10/15/2020 07:20 AM    TRIGLYCERIDE 79 10/15/2020 07:20 AM       Lab Results   Component Value Date/Time    SODIUM 136 10/14/2020 05:15 AM    POTASSIUM 4.0 10/14/2020 05:15 AM    CHLORIDE 104 10/14/2020 05:15 AM    CO2 20 10/14/2020 05:15 AM    GLUCOSE 94 10/14/2020 05:15 AM    BUN 10 10/14/2020 05:15 AM    CREATININE 0.72 10/14/2020 05:15 AM     Lab Results   Component Value Date/Time    ALKPHOSPHAT 74 10/10/2020 09:57 AM    ASTSGOT 23 10/10/2020 09:57 AM    ALTSGPT 25 10/10/2020 09:57 AM    TBILIRUBIN 0.7 10/10/2020 09:57  AM        For this encounter I directly reviewed ECG tracings I agree with the interpretations in the electronic health record

## 2020-10-17 LAB — ALDOST SERPL-MCNC: 8.8 NG/DL

## 2020-10-19 ENCOUNTER — PATIENT MESSAGE (OUTPATIENT)
Dept: CARDIOLOGY | Facility: MEDICAL CENTER | Age: 60
End: 2020-10-19

## 2020-10-19 ENCOUNTER — TELEPHONE (OUTPATIENT)
Dept: CARDIOLOGY | Facility: MEDICAL CENTER | Age: 60
End: 2020-10-19

## 2020-10-19 NOTE — TELEPHONE ENCOUNTER
Faxed lab orders to [facility] on [address] and fax confirmation received. Called patient and left voicemail informing her. Asked that she call back with any questions or concerns.

## 2020-10-19 NOTE — TELEPHONE ENCOUNTER
BE      Hello, patient has not received her labs slips through mail so she will like if you can fax them over to [facility] on [address]. Patient did not have a phone or fax number for them.  She does have an appt with them on Friday. She will like a call back at [phone]        Thank you!

## 2020-10-20 ENCOUNTER — PATIENT OUTREACH (OUTPATIENT)
Dept: HEALTH INFORMATION MANAGEMENT | Facility: OTHER | Age: 60
End: 2020-10-20

## 2020-10-20 LAB — NORWALK VIRUS PCR NORWK1: NORMAL

## 2020-10-20 NOTE — PROGRESS NOTES
NANO Weaver spoke with pt via mobile phone after dc from [facility]. Pt reports that she has all of her medications and has no questions for a CCM pharmD. Pt states that she has already seen her PCP and cardiologist and has another follow up appt with both of them in two weeks. Pt reports no transportation issues. Pt did inquire on how to obtain her medical records for her insurance. I provided her with [facility] Medical Records ext [phone].   Pt did not express any other questions or concerns for CCM at this time. Pt will be removed from CCM case load due to all goals/needs met.

## 2020-10-26 ENCOUNTER — TELEPHONE (OUTPATIENT)
Dept: CARDIOLOGY | Facility: MEDICAL CENTER | Age: 60
End: 2020-10-26

## 2020-10-26 NOTE — LETTER
[facility]   [address], [address]  [place] [address]  Phone: [phone]  Fax: [phone]              Kelly Oakley  1960    Encounter Date: 10/26/2020    Mili Mendoza M.D.          PROGRESS NOTE:  Received a phone call from the patient that her blood pressure were over 180/100.  She is having some headaches, no other symptoms.  She had just taken her evening meds.  When I asked her for a repeat blood pressure, she said she wanted to wait a little.  I have instructed her to check blood pressure again soon and to come to the ER for further evaluation if the blood pressure remains above 180/100 or if she continues to have headaches.  I have instructed that she may take another amlodipine 5 mg x 1.  She is given ER warning signs, and she voices understanding.      Dwayne Kent M.D.  [address]  [address]  [place] [address]  Via In Basket

## 2020-10-27 ENCOUNTER — TELEPHONE (OUTPATIENT)
Dept: CARDIOLOGY | Facility: MEDICAL CENTER | Age: 60
End: 2020-10-27

## 2020-10-27 RX ORDER — HYDRALAZINE HYDROCHLORIDE 25 MG/1
TABLET, FILM COATED ORAL
Qty: 60 TAB | Refills: 3 | Status: SHIPPED | OUTPATIENT
Start: 2020-10-27 | End: 2021-11-29

## 2020-10-27 NOTE — PROGRESS NOTES
Received a phone call from the patient that her blood pressure were over 180/100.  She is having some headaches, no other symptoms.  She had just taken her evening meds.  When I asked her for a repeat blood pressure, she said she wanted to wait a little.  I have instructed her to check blood pressure again soon and to come to the ER for further evaluation if the blood pressure remains above 180/100 or if she continues to have headaches.  I have instructed that she may take another amlodipine 5 mg x 1.  She is given ER warning signs, and she voices understanding.

## 2020-10-27 NOTE — TELEPHONE ENCOUNTER
Message  Received: Today  Message Contents   Dwayne Kent M.D.  Xena Gibson R.N.             I suggest the patient have hydralazine 25 mg one hand. Take one to two tablets as needed for blood pressure >160/100. She can take one tablet and if BP not better in two hours take a second tablet. Can take 25-50 mg every 6 hours as needed.      Called pt to f/u on her conversation with the on call cardiologist yesterday, Dr. Mendoza. Left detailed VM at [phone] instructing pt that she can take 25-50mg of hydralazine ever 6 hrs as needed for BP greater than 160/100. Start with 1 tab, and if still elevated after 2 hours can take a 2nd tab. Encouraged her to call back if she has any questions. Rx sent.

## 2020-10-29 DIAGNOSIS — E27.8 ADRENAL NODULE (HCC): ICD-10-CM

## 2020-10-29 DIAGNOSIS — I10 ESSENTIAL HYPERTENSION: ICD-10-CM

## 2020-11-18 ENCOUNTER — TELEMEDICINE (OUTPATIENT)
Dept: CARDIOLOGY | Facility: MEDICAL CENTER | Age: 60
End: 2020-11-18
Payer: MEDICAID

## 2020-11-18 VITALS
BODY MASS INDEX: 26.93 KG/M2 | HEIGHT: 63 IN | SYSTOLIC BLOOD PRESSURE: 185 MMHG | DIASTOLIC BLOOD PRESSURE: 102 MMHG | WEIGHT: 152 LBS | HEART RATE: 62 BPM

## 2020-11-18 DIAGNOSIS — E78.2 MIXED HYPERLIPIDEMIA: ICD-10-CM

## 2020-11-18 DIAGNOSIS — I10 ESSENTIAL HYPERTENSION, BENIGN: ICD-10-CM

## 2020-11-18 DIAGNOSIS — F17.210 CIGARETTE SMOKER ONE HALF PACK A DAY OR LESS: ICD-10-CM

## 2020-11-18 PROCEDURE — 99406 BEHAV CHNG SMOKING 3-10 MIN: CPT | Mod: CR | Performed by: INTERNAL MEDICINE

## 2020-11-18 PROCEDURE — 99214 OFFICE O/P EST MOD 30 MIN: CPT | Mod: 25,CR | Performed by: INTERNAL MEDICINE

## 2020-11-18 RX ORDER — OMEPRAZOLE 20 MG/1
20 CAPSULE, DELAYED RELEASE ORAL 2 TIMES DAILY
COMMUNITY
End: 2021-01-29

## 2020-11-18 RX ORDER — OLMESARTAN MEDOXOMIL, AMLODIPINE AND HYDROCHLOROTHIAZIDE TABLET 40/10/25 MG 40; 10; 25 MG/1; MG/1; MG/1
1 TABLET ORAL DAILY
Qty: 90 TAB | Refills: 3 | Status: SHIPPED
Start: 2020-11-18 | End: 2021-01-29

## 2020-11-18 ASSESSMENT — FIBROSIS 4 INDEX: FIB4 SCORE: 1.1

## 2020-11-19 ENCOUNTER — TELEPHONE (OUTPATIENT)
Dept: CARDIOLOGY | Facility: MEDICAL CENTER | Age: 60
End: 2020-11-19

## 2020-11-19 NOTE — PROGRESS NOTES
"CARDIOLOGY OUTPATIENT FOLLOWUP    PCP: Saulo Fajardo M.D.    1. Essential hypertension, benign  BP still inadequately controlled.  Regimen is complex and she has history of intolerances to conventional agents such as edema with higher dose amlodipine as well as hyponatremia with HCTZ.  Despite the side effects I think it is worth a try to use Tribenzor in place of amlodipine as well as irbesartan.  She will measure basic metabolic panel in 2 weeks and we will see her in 1 month.  Target home blood pressure less than 130/80    2. Cigarette smoker one half pack a day or less  I counseled her for 3 minutes about abstinence from tobacco and techniques in which she might do this.  She was receptive.    3. Mixed hyperlipidemia  Continue statin therapy no changes advised    4.  Adrenal mass  -Under evaluation with endocrinology.  It sounds as if she is can have a dexamethasone suppression test.  PRA/aldosterone and metanephrines were unremarkable.      Follow up with Dwayne Kent M.D. in 1 month    Chief Complaint   Patient presents with   • Hypertension       History: Kelly Oakley is a 60 y.o. female with a past medical history of hypertension, dyslipidemia and tobacco use presenting for follow up of hypertension.  Since her last evaluation the blood pressure has been improved but is also quite erratic with episodes of hypertension and hypotension.  On average the readings seem to be around 140 systolic.  She has also reverted back into cigarette smoking, 4/day.  She desires to reduce intake.    ROS:  All other systems reviewed and negative except as per the HPI    PE:  BP (!) 185/102 (BP Location: Left arm, Patient Position: Sitting, BP Cuff Size: Adult)   Pulse 62   Ht 1.6 m (5' 3\")   Wt 68.9 kg (152 lb)   BMI 26.93 kg/m²   Gen: Well appearing  HEENT: Symmetric face. Anicteric sclerae. Moist mucus membranes  NECK: No JVD. No lymphadenopathy  CARDIAC: Normal PMI, regular, normal S1, S2. without " murmur  VASCULATURE: Normal carotid amplitude without bruit.   RESP: Clear to auscultation bilaterally  ABD: Soft, non-tender, non-distended  EXT: No edema, no clubbing or cyanosis  SKIN: Warm and dry  NEURO: No gross deficits  PSYCH: Appropriate affect, participates in conversation    Past Medical History:   Diagnosis Date   • Adrenal nodule 2012 2015 / nonfunctional / benign   • Anemia    • Anxiety     Panic   • Arrhythmia    • Arthritis     Morning stiffness   • Atherosclerosis of arteries 2013    seen on abdominal CT   • Briseno's  esophagus    • Chest pain at rest 9/11/2015   • Cigarette smoker one half pack a day or less 5/28/2015   • Depression    • Fatigue 9/11/2015   • GERD (gastroesophageal reflux disease)    • Headache(784.0)     sinus headache   • Heart murmur     Dr. Krause-Stress trend   • History of HPV infection    • HLD (hyperlipidemia) 9/11/2015   • HTN (hypertension) 9/11/2015   • IBD (inflammatory bowel disease)     Dr. Cunningham   • MRSA carrier 2008    nose cellulitis   • Nocturnal hypoxemia 10/15/2015   • OSTEOPOROSIS     Osteopenia   • S/P appendectomy    • S/P cholecystectomy    • S/P hysterectomy with oophorectomy     endometriosis   • Ulcer     Barretts esophogitis   • Urinary tract infection, site not specified      Allergies   Allergen Reactions   • Macrodantin [Nitrofurantoin Macrocrystal] Anaphylaxis     Prescription > 10 years ago   • Pcn [Penicillins] Anaphylaxis     Prescription > 10 years ago   • Doxycycline Itching   • Amlodipine      Leg swelling   • Bactrim Vomiting   • Clarithromycin Vomiting   • Hydrochlorothiazide      Hyponatremia   • Omnicef Itching     Outpatient Encounter Medications as of 11/18/2020   Medication Sig Dispense Refill   • omeprazole (PRILOSEC) 20 MG delayed-release capsule Take 20 mg by mouth 2 times a day.     • Olmesartan-amLODIPine-HCTZ 40-10-25 MG Tab Take 1 Tab by mouth every day. 90 Tab 3   • hydrALAZINE (APRESOLINE) 25 MG Tab Take 1-2 tabs every 6  hours as needed for BP greater than 160/100. 60 Tab 3   • spironolactone (ALDACTONE) 50 MG Tab Take 1 Tab by mouth every day. 90 Tab 3   • prazosin (MINIPRESS) 2 MG Cap Take 3 Caps by mouth 2 Times a Day. (Patient taking differently: Take 4 mg by mouth 2 Times a Day.) 90 Cap 0   • propranolol (INDERAL) 10 MG Tab Take 1 Tab by mouth 2 times a day. 60 Tab 0   • hydrOXYzine HCl (ATARAX) 25 MG Tab Take 25 mg by mouth 2 times a day as needed for Anxiety.     • FLUoxetine (PROZAC) 10 MG Cap Take 10 mg by mouth every day.     • pravastatin (PRAVACHOL) 20 MG Tab Take 20 mg by mouth every bedtime.     • busPIRone (BUSPAR) 15 MG tablet Take 15 mg by mouth 2 times a day.     • esomeprazole (NEXIUM) 20 MG capsule Take 20 mg by mouth every evening.     • fluticasone (FLONASE) 50 MCG/ACT nasal spray Spray 2 Sprays in nose every bedtime.     • cetirizine (ZYRTEC) 10 MG Tab Take 10 mg by mouth every evening.     • [DISCONTINUED] amLODIPine (NORVASC) 5 MG Tab Take 1 Tab by mouth every day. 30 Tab 0   • [DISCONTINUED] busPIRone (BUSPAR) 7.5 MG tablet Take 7.5 mg by mouth every evening. 7.5 mg  + 15 mg  = 22.5 mg in evening     • [DISCONTINUED] irbesartan (AVAPRO) 300 MG Tab Take 1 Tab by mouth every day. 90 Tab 3     No facility-administered encounter medications on file as of 2020.      Social History     Socioeconomic History   • Marital status:      Spouse name: Not on file   • Number of children: Not on file   • Years of education: Not on file   • Highest education level: Not on file   Occupational History   • Not on file   Social Needs   • Financial resource strain: Somewhat hard   • Food insecurity     Worry: Never true     Inability: Never true   • Transportation needs     Medical: No     Non-medical: No   Tobacco Use   • Smoking status: Former Smoker     Packs/day: 0.50     Years: 40.00     Pack years: 20.00     Types: Cigarettes     Quit date: 10/9/2020     Years since quittin.1   • Smokeless tobacco: Never  Used   • Tobacco comment: last cigarette on 10/10/20    Substance and Sexual Activity   • Alcohol use: Yes     Alcohol/week: 0.6 oz     Types: 1 Standard drinks or equivalent per week     Comment: rarely   • Drug use: No   • Sexual activity: Not Currently     Comment:    Lifestyle   • Physical activity     Days per week: Patient refused     Minutes per session: Patient refused   • Stress: Very much   Relationships   • Social connections     Talks on phone: Patient refused     Gets together: Patient refused     Attends Gnosticist service: Patient refused     Active member of club or organization: Patient refused     Attends meetings of clubs or organizations: Patient refused     Relationship status: Patient refused   • Intimate partner violence     Fear of current or ex partner: Patient refused     Emotionally abused: Patient refused     Physically abused: Patient refused     Forced sexual activity: Patient refused   Other Topics Concern   • Not on file   Social History Narrative   • Not on file       Studies  Lab Results   Component Value Date/Time    CHOLSTRLTOT 123 10/15/2020 07:20 AM    LDL 59 10/15/2020 07:20 AM    HDL 48 10/15/2020 07:20 AM    TRIGLYCERIDE 79 10/15/2020 07:20 AM       Lab Results   Component Value Date/Time    SODIUM 136 10/14/2020 05:15 AM    POTASSIUM 4.0 10/14/2020 05:15 AM    CHLORIDE 104 10/14/2020 05:15 AM    CO2 20 10/14/2020 05:15 AM    GLUCOSE 94 10/14/2020 05:15 AM    BUN 10 10/14/2020 05:15 AM    CREATININE 0.72 10/14/2020 05:15 AM     Lab Results   Component Value Date/Time    ALKPHOSPHAT 74 10/10/2020 09:57 AM    ASTSGOT 23 10/10/2020 09:57 AM    ALTSGPT 25 10/10/2020 09:57 AM    TBILIRUBIN 0.7 10/10/2020 09:57 AM        For this encounter I reviewed medical records.     I directly reviewed ECG tracings and I agree with the interpretations in the electronic health record

## 2020-11-20 NOTE — TELEPHONE ENCOUNTER
Kelly Oakley Key: [id number] - PA Case ID: [id number] help? Call us at [phone]  Status  Sent to [facility]  Drug  Olmesartan-amLODIPine-HCTZ 40-10-25MG tablets  Form  [employer] Electronic PA Form (2017 NCPDP)

## 2020-12-28 ENCOUNTER — PATIENT MESSAGE (OUTPATIENT)
Dept: CARDIOLOGY | Facility: MEDICAL CENTER | Age: 60
End: 2020-12-28

## 2020-12-29 NOTE — PATIENT COMMUNICATION
You  Geoffrey Hi M.D. 56 minutes ago (3:55 PM)     Hi Dr. Hi,     Would you be able to advise in Dr. Kent's absence? He's out of the office all week, and you saw pt in June. Pt started Tribenzor on 12/19 and since then BP has been from 60s/40s-130/60s, and pt is reporting dizziness. Should she discontinue use, or maybe decrease something else? For BP she's also taking   2mg Prosozin BID   50mg Spironlactone   10 Mg Propanolol BID     Thank you!      Geoffrey Hi M.D.  You 37 minutes ago (4:13 PM)     Discontinue for now.     Geoffrey Hi M.D.

## 2021-01-29 ENCOUNTER — PRE-ADMISSION TESTING (OUTPATIENT)
Dept: ADMISSIONS | Facility: MEDICAL CENTER | Age: 61
End: 2021-01-29
Attending: ORTHOPAEDIC SURGERY
Payer: COMMERCIAL

## 2021-01-29 DIAGNOSIS — Z01.812 PRE-OPERATIVE LABORATORY EXAMINATION: ICD-10-CM

## 2021-01-29 LAB
ANION GAP SERPL CALC-SCNC: 12 MMOL/L (ref 7–16)
BUN SERPL-MCNC: 10 MG/DL (ref 8–22)
CALCIUM SERPL-MCNC: 9.3 MG/DL (ref 8.4–10.2)
CHLORIDE SERPL-SCNC: 99 MMOL/L (ref 96–112)
CO2 SERPL-SCNC: 22 MMOL/L (ref 20–33)
CREAT SERPL-MCNC: 0.76 MG/DL (ref 0.5–1.4)
GLUCOSE SERPL-MCNC: 84 MG/DL (ref 65–99)
POTASSIUM SERPL-SCNC: 4.2 MMOL/L (ref 3.6–5.5)
SODIUM SERPL-SCNC: 133 MMOL/L (ref 135–145)

## 2021-01-29 PROCEDURE — C9803 HOPD COVID-19 SPEC COLLECT: HCPCS

## 2021-01-29 PROCEDURE — U0003 INFECTIOUS AGENT DETECTION BY NUCLEIC ACID (DNA OR RNA); SEVERE ACUTE RESPIRATORY SYNDROME CORONAVIRUS 2 (SARS-COV-2) (CORONAVIRUS DISEASE [COVID-19]), AMPLIFIED PROBE TECHNIQUE, MAKING USE OF HIGH THROUGHPUT TECHNOLOGIES AS DESCRIBED BY CMS-2020-01-R: HCPCS

## 2021-01-29 PROCEDURE — 80048 BASIC METABOLIC PNL TOTAL CA: CPT

## 2021-01-29 PROCEDURE — U0005 INFEC AGEN DETEC AMPLI PROBE: HCPCS

## 2021-01-29 PROCEDURE — 36415 COLL VENOUS BLD VENIPUNCTURE: CPT

## 2021-01-29 RX ORDER — IRBESARTAN 300 MG/1
300 TABLET ORAL EVERY EVENING
COMMUNITY
Start: 2021-01-22 | End: 2022-03-11 | Stop reason: SDUPTHER

## 2021-01-29 RX ORDER — ESTRADIOL 10 UG/1
INSERT VAGINAL
COMMUNITY
Start: 2021-01-20 | End: 2022-10-20 | Stop reason: SDUPTHER

## 2021-01-29 RX ORDER — ACETAMINOPHEN 325 MG/1
650 TABLET ORAL EVERY 6 HOURS PRN
COMMUNITY

## 2021-01-29 RX ORDER — AZITHROMYCIN 250 MG/1
TABLET, FILM COATED ORAL
COMMUNITY
Start: 2020-12-02 | End: 2021-01-29

## 2021-01-29 RX ORDER — AMLODIPINE BESYLATE 5 MG/1
5 TABLET ORAL 2 TIMES DAILY
COMMUNITY
Start: 2021-01-22 | End: 2022-03-11 | Stop reason: SDUPTHER

## 2021-01-29 ASSESSMENT — FIBROSIS 4 INDEX: FIB4 SCORE: 1.1

## 2021-01-30 LAB
SARS-COV-2 RNA RESP QL NAA+PROBE: NOTDETECTED
SPECIMEN SOURCE: NORMAL

## 2021-01-30 NOTE — PREPROCEDURE INSTRUCTIONS
"Preadmit appointment: \" Preparing for your Procedure information\" sheet given to patient with verbal and written instructions. Patient instructed to continue prescribed medications through the day before surgery, instructed to take the following medications the day of surgery per anesthesia protocol:  Tylenol PRN, Norvasc, Buspar, Zytec, Nexium, Prozac, Apresoline PRN, Atarax PRN, Minipress PRN, Inderal.  Pt states, \"no issues with anesthesia\".  Anesthesia Fasting guidelines handout given to Pt, NPO at midnight prior to surgery and clear liquids up to 3 hours prior to surgery, clear liquids defined for Pt, Pt encouraged to hydrate the day before surgery.    All Pt's questions and concerns answered or addressed.   COVID test 1/29.  Pt has high anxiety and Blood pressure issues.  "

## 2021-02-03 ENCOUNTER — ANESTHESIA (OUTPATIENT)
Dept: SURGERY | Facility: MEDICAL CENTER | Age: 61
End: 2021-02-03
Payer: COMMERCIAL

## 2021-02-03 ENCOUNTER — ANESTHESIA EVENT (OUTPATIENT)
Dept: SURGERY | Facility: MEDICAL CENTER | Age: 61
End: 2021-02-03
Payer: COMMERCIAL

## 2021-02-03 ENCOUNTER — HOSPITAL ENCOUNTER (OUTPATIENT)
Facility: MEDICAL CENTER | Age: 61
End: 2021-02-03
Attending: ORTHOPAEDIC SURGERY | Admitting: ORTHOPAEDIC SURGERY
Payer: COMMERCIAL

## 2021-02-03 VITALS
RESPIRATION RATE: 16 BRPM | HEART RATE: 60 BPM | WEIGHT: 152 LBS | HEIGHT: 63 IN | OXYGEN SATURATION: 98 % | SYSTOLIC BLOOD PRESSURE: 144 MMHG | BODY MASS INDEX: 26.93 KG/M2 | TEMPERATURE: 97.3 F | DIASTOLIC BLOOD PRESSURE: 76 MMHG

## 2021-02-03 DIAGNOSIS — G89.18 POSTOPERATIVE PAIN: ICD-10-CM

## 2021-02-03 PROCEDURE — 700101 HCHG RX REV CODE 250: Performed by: ANESTHESIOLOGY

## 2021-02-03 PROCEDURE — 160002 HCHG RECOVERY MINUTES (STAT): Performed by: ORTHOPAEDIC SURGERY

## 2021-02-03 PROCEDURE — 501838 HCHG SUTURE GENERAL: Performed by: ORTHOPAEDIC SURGERY

## 2021-02-03 PROCEDURE — 160009 HCHG ANES TIME/MIN: Performed by: ORTHOPAEDIC SURGERY

## 2021-02-03 PROCEDURE — 700111 HCHG RX REV CODE 636 W/ 250 OVERRIDE (IP): Performed by: ANESTHESIOLOGY

## 2021-02-03 PROCEDURE — 160041 HCHG SURGERY MINUTES - EA ADDL 1 MIN LEVEL 4: Performed by: ORTHOPAEDIC SURGERY

## 2021-02-03 PROCEDURE — 160048 HCHG OR STATISTICAL LEVEL 1-5: Performed by: ORTHOPAEDIC SURGERY

## 2021-02-03 PROCEDURE — 700111 HCHG RX REV CODE 636 W/ 250 OVERRIDE (IP): Performed by: ORTHOPAEDIC SURGERY

## 2021-02-03 PROCEDURE — 160035 HCHG PACU - 1ST 60 MINS PHASE I: Performed by: ORTHOPAEDIC SURGERY

## 2021-02-03 PROCEDURE — 160046 HCHG PACU - 1ST 60 MINS PHASE II: Performed by: ORTHOPAEDIC SURGERY

## 2021-02-03 PROCEDURE — 700105 HCHG RX REV CODE 258: Performed by: ORTHOPAEDIC SURGERY

## 2021-02-03 PROCEDURE — 700102 HCHG RX REV CODE 250 W/ 637 OVERRIDE(OP): Performed by: ANESTHESIOLOGY

## 2021-02-03 PROCEDURE — A9270 NON-COVERED ITEM OR SERVICE: HCPCS | Performed by: ANESTHESIOLOGY

## 2021-02-03 PROCEDURE — 160025 RECOVERY II MINUTES (STATS): Performed by: ORTHOPAEDIC SURGERY

## 2021-02-03 PROCEDURE — 160029 HCHG SURGERY MINUTES - 1ST 30 MINS LEVEL 4: Performed by: ORTHOPAEDIC SURGERY

## 2021-02-03 PROCEDURE — 700101 HCHG RX REV CODE 250: Performed by: ORTHOPAEDIC SURGERY

## 2021-02-03 PROCEDURE — 500028 HCHG ARTHROWAND TURBOVAC 3.5/90 SUCT.: Performed by: ORTHOPAEDIC SURGERY

## 2021-02-03 PROCEDURE — 160036 HCHG PACU - EA ADDL 30 MINS PHASE I: Performed by: ORTHOPAEDIC SURGERY

## 2021-02-03 RX ORDER — ACETAMINOPHEN 500 MG
1000 TABLET ORAL ONCE
Status: COMPLETED | OUTPATIENT
Start: 2021-02-03 | End: 2021-02-03

## 2021-02-03 RX ORDER — SODIUM CHLORIDE, SODIUM LACTATE, POTASSIUM CHLORIDE, CALCIUM CHLORIDE 600; 310; 30; 20 MG/100ML; MG/100ML; MG/100ML; MG/100ML
INJECTION, SOLUTION INTRAVENOUS CONTINUOUS
Status: DISCONTINUED | OUTPATIENT
Start: 2021-02-03 | End: 2021-02-03 | Stop reason: HOSPADM

## 2021-02-03 RX ORDER — HYDROMORPHONE HYDROCHLORIDE 1 MG/ML
0.1 INJECTION, SOLUTION INTRAMUSCULAR; INTRAVENOUS; SUBCUTANEOUS
Status: DISCONTINUED | OUTPATIENT
Start: 2021-02-03 | End: 2021-02-03 | Stop reason: HOSPADM

## 2021-02-03 RX ORDER — ONDANSETRON 2 MG/ML
INJECTION INTRAMUSCULAR; INTRAVENOUS PRN
Status: DISCONTINUED | OUTPATIENT
Start: 2021-02-03 | End: 2021-02-03 | Stop reason: SURG

## 2021-02-03 RX ORDER — OXYCODONE HCL 5 MG/5 ML
5 SOLUTION, ORAL ORAL
Status: COMPLETED | OUTPATIENT
Start: 2021-02-03 | End: 2021-02-03

## 2021-02-03 RX ORDER — HYDROCODONE BITARTRATE AND ACETAMINOPHEN 5; 325 MG/1; MG/1
1 TABLET ORAL EVERY 6 HOURS PRN
Qty: 15 TAB | Refills: 0 | Status: SHIPPED | OUTPATIENT
Start: 2021-02-03 | End: 2021-02-07

## 2021-02-03 RX ORDER — CELECOXIB 200 MG/1
200 CAPSULE ORAL ONCE
Status: COMPLETED | OUTPATIENT
Start: 2021-02-03 | End: 2021-02-03

## 2021-02-03 RX ORDER — CEFAZOLIN SODIUM 1 G/3ML
INJECTION, POWDER, FOR SOLUTION INTRAMUSCULAR; INTRAVENOUS PRN
Status: DISCONTINUED | OUTPATIENT
Start: 2021-02-03 | End: 2021-02-03 | Stop reason: SURG

## 2021-02-03 RX ORDER — HYDRALAZINE HYDROCHLORIDE 20 MG/ML
5 INJECTION INTRAMUSCULAR; INTRAVENOUS
Status: DISCONTINUED | OUTPATIENT
Start: 2021-02-03 | End: 2021-02-03 | Stop reason: HOSPADM

## 2021-02-03 RX ORDER — ONDANSETRON 2 MG/ML
4 INJECTION INTRAMUSCULAR; INTRAVENOUS
Status: DISCONTINUED | OUTPATIENT
Start: 2021-02-03 | End: 2021-02-03 | Stop reason: HOSPADM

## 2021-02-03 RX ORDER — OXYCODONE HCL 5 MG/5 ML
10 SOLUTION, ORAL ORAL
Status: COMPLETED | OUTPATIENT
Start: 2021-02-03 | End: 2021-02-03

## 2021-02-03 RX ORDER — HYDROMORPHONE HYDROCHLORIDE 1 MG/ML
0.2 INJECTION, SOLUTION INTRAMUSCULAR; INTRAVENOUS; SUBCUTANEOUS
Status: DISCONTINUED | OUTPATIENT
Start: 2021-02-03 | End: 2021-02-03 | Stop reason: HOSPADM

## 2021-02-03 RX ORDER — ONDANSETRON 4 MG/1
4 TABLET, FILM COATED ORAL EVERY 4 HOURS PRN
Qty: 12 TAB | Refills: 0 | Status: SHIPPED
Start: 2021-02-03 | End: 2021-11-29

## 2021-02-03 RX ORDER — LIDOCAINE HYDROCHLORIDE 20 MG/ML
INJECTION, SOLUTION EPIDURAL; INFILTRATION; INTRACAUDAL; PERINEURAL PRN
Status: DISCONTINUED | OUTPATIENT
Start: 2021-02-03 | End: 2021-02-03 | Stop reason: HOSPADM

## 2021-02-03 RX ORDER — DEXAMETHASONE SODIUM PHOSPHATE 4 MG/ML
INJECTION, SOLUTION INTRA-ARTICULAR; INTRALESIONAL; INTRAMUSCULAR; INTRAVENOUS; SOFT TISSUE PRN
Status: DISCONTINUED | OUTPATIENT
Start: 2021-02-03 | End: 2021-02-03 | Stop reason: SURG

## 2021-02-03 RX ORDER — HALOPERIDOL 5 MG/ML
1 INJECTION INTRAMUSCULAR
Status: DISCONTINUED | OUTPATIENT
Start: 2021-02-03 | End: 2021-02-03 | Stop reason: HOSPADM

## 2021-02-03 RX ORDER — HYDROMORPHONE HYDROCHLORIDE 1 MG/ML
0.4 INJECTION, SOLUTION INTRAMUSCULAR; INTRAVENOUS; SUBCUTANEOUS
Status: DISCONTINUED | OUTPATIENT
Start: 2021-02-03 | End: 2021-02-03 | Stop reason: HOSPADM

## 2021-02-03 RX ORDER — DIPHENHYDRAMINE HYDROCHLORIDE 50 MG/ML
6.25 INJECTION INTRAMUSCULAR; INTRAVENOUS
Status: DISCONTINUED | OUTPATIENT
Start: 2021-02-03 | End: 2021-02-03 | Stop reason: HOSPADM

## 2021-02-03 RX ORDER — MEPERIDINE HYDROCHLORIDE 25 MG/ML
12.5 INJECTION INTRAMUSCULAR; INTRAVENOUS; SUBCUTANEOUS
Status: DISCONTINUED | OUTPATIENT
Start: 2021-02-03 | End: 2021-02-03 | Stop reason: HOSPADM

## 2021-02-03 RX ORDER — ROPIVACAINE HYDROCHLORIDE 5 MG/ML
INJECTION, SOLUTION EPIDURAL; INFILTRATION; PERINEURAL
Status: DISCONTINUED | OUTPATIENT
Start: 2021-02-03 | End: 2021-02-03 | Stop reason: HOSPADM

## 2021-02-03 RX ADMIN — FENTANYL CITRATE 25 MCG: 50 INJECTION, SOLUTION INTRAMUSCULAR; INTRAVENOUS at 14:16

## 2021-02-03 RX ADMIN — PROPOFOL 160 MG: 10 INJECTION, EMULSION INTRAVENOUS at 12:54

## 2021-02-03 RX ADMIN — OXYCODONE HYDROCHLORIDE 5 MG: 5 SOLUTION ORAL at 13:52

## 2021-02-03 RX ADMIN — FENTANYL CITRATE 50 MCG: 50 INJECTION, SOLUTION INTRAMUSCULAR; INTRAVENOUS at 12:54

## 2021-02-03 RX ADMIN — ONDANSETRON 4 MG: 2 INJECTION INTRAMUSCULAR; INTRAVENOUS at 13:10

## 2021-02-03 RX ADMIN — CEFAZOLIN 2 G: 1 INJECTION, POWDER, FOR SOLUTION INTRAVENOUS at 12:54

## 2021-02-03 RX ADMIN — DEXAMETHASONE SODIUM PHOSPHATE 8 MG: 4 INJECTION, SOLUTION INTRAMUSCULAR; INTRAVENOUS at 12:59

## 2021-02-03 RX ADMIN — ACETAMINOPHEN 1000 MG: 500 TABLET, FILM COATED ORAL at 12:08

## 2021-02-03 RX ADMIN — LIDOCAINE HYDROCHLORIDE 50 MG: 20 INJECTION, SOLUTION EPIDURAL; INFILTRATION; INTRACAUDAL; PERINEURAL at 12:54

## 2021-02-03 RX ADMIN — EPHEDRINE SULFATE 10 MG: 50 INJECTION INTRAMUSCULAR; INTRAVENOUS; SUBCUTANEOUS at 13:02

## 2021-02-03 RX ADMIN — CELECOXIB 200 MG: 200 CAPSULE ORAL at 12:08

## 2021-02-03 RX ADMIN — WATER 15 ML: 100 IRRIGANT IRRIGATION at 12:06

## 2021-02-03 RX ADMIN — SODIUM CHLORIDE, POTASSIUM CHLORIDE, SODIUM LACTATE AND CALCIUM CHLORIDE: 600; 310; 30; 20 INJECTION, SOLUTION INTRAVENOUS at 12:07

## 2021-02-03 RX ADMIN — EPHEDRINE SULFATE 10 MG: 50 INJECTION INTRAMUSCULAR; INTRAVENOUS; SUBCUTANEOUS at 13:04

## 2021-02-03 ASSESSMENT — PAIN DESCRIPTION - PAIN TYPE
TYPE: ACUTE PAIN
TYPE: ACUTE PAIN;SURGICAL PAIN
TYPE: ACUTE PAIN;SURGICAL PAIN
TYPE: ACUTE PAIN

## 2021-02-03 NOTE — OP REPORT
DATE OF SERVICE:  02/03/2021     SURGEON:  Guillermo Wyman MD     ASSISTANT:  Viktor Jeong PA-C     PREOPERATIVE DIAGNOSIS:  Right knee lateral meniscus tear.     POSTOPERATIVE DIAGNOSES:  Right knee lateral meniscus tear plus right knee   chondromalacia patella.     PROCEDURE:  Right knee arthroscopy with partial lateral meniscectomy and   chondroplasty.     ANESTHESIOLOGIST:  Vlad Chin MD     ANESTHETIC:  LMA anesthesia along with local injection.     INDICATIONS:  The patient had right knee pain for quite some time.  She has   failed nonoperative treatment, elected to proceed with operative intervention.    She was explained the risks, benefits, alternatives of procedure and   recovery in detail.  All questions were answered.  Informed consent was   obtained.  Site verification per protocol was done.  The patient received   appropriate preoperative antibiotics.     DESCRIPTION OF PROCEDURE:  After successful anesthesia, the patient's right   knee was examined.  She had good range of motion, no evidence of instability.    Leg was prepped and draped in the usual sterile fashion.  Anterolateral   portal was established with a knife and blunt trocar in the suprapatellar   pouch.  Suprapatellar pouch, medial and lateral gutters were free of   abnormalities.  Patella just had some minor chondromalacia that was smoothed   out with a shaver.  Trochlea was in good condition and patellofemoral joint   tracked well.  The medial joint line had near normal articular cartilage and   meniscus to visual inspection and probing.  The notch showed normal ACL and   PCL.  The lateral meniscus had a tear in the posterior horn mid body and a   little bit in the anterior horn.  The anterior horn had a little bit of   capsular separation, but it was still very stable.  A meniscectomy was done   with jn and baskets, then probed and felt to be stable.  She lost about   25% of the lateral meniscus.  The circumferential fibers  were still intact.    The cartilage was good.  Popliteus was normal.  Posterior recesses were   normal.  At that point, lavaged out the knee, suctioned out the fluid, closed   the portals with 3-0 Prolene fashion.  Xeroform, 4 x 4's and an Ace wrap was   applied.     ESTIMATED BLOOD LOSS:  Minimal.     COMPLICATIONS:  None.     Viktor Jeong PA-C, was medically necessary for the case.  He helped with   instrumentation, retraction and positioning.  Without his help, the case would   not have been as technically successful.        ______________________________  MD AMOS SAMANIEGO/EVELYN/RAFAELA    DD:  02/03/2021 13:27  DT:  02/03/2021 14:07    Job#:  [id number]

## 2021-02-03 NOTE — DISCHARGE INSTRUCTIONS
ACTIVITY: Rest and take it easy for the first 24 hours.  A responsible adult is recommended to remain with you during that time.  It is normal to feel sleepy.  We encourage you to not do anything that requires balance, judgment or coordination.    MILD FLU-LIKE SYMPTOMS ARE NORMAL. YOU MAY EXPERIENCE GENERALIZED MUSCLE ACHES, THROAT IRRITATION, HEADACHE AND/OR SOME NAUSEA.    FOR 24 HOURS DO NOT:  Drive, operate machinery or run household appliances.  Drink beer or alcoholic beverages.   Make important decisions or sign legal documents.    SPECIAL INSTRUCTIONS: May remove dressings Post op Day #2 and Shower with wound uncovered.  Apply bandaids after shower.  Do not soak or submerge incisions for two weeks. Ice and elevate extremity. Follow up 7-10 days    DIET: To avoid nausea, slowly advance diet as tolerated, avoiding spicy or greasy foods for the first day.  Add more substantial food to your diet according to your physician's instructions.     INCREASE FLUIDS AND FIBER TO AVOID CONSTIPATION.    SURGICAL DRESSING/BATHING: Keep dressing clean, dry and intact until instructed otherwise by surgeon    FOLLOW-UP APPOINTMENT:  A follow-up appointment should be arranged with your doctor, call to schedule.    You should CALL YOUR PHYSICIAN if you develop:  Fever greater than 101 degrees F.  Pain not relieved by medication, or persistent nausea or vomiting.  Excessive bleeding (blood soaking through dressing) or unexpected drainage from the wound.  Extreme redness or swelling around the incision site, drainage of pus or foul smelling drainage.  Inability to urinate or empty your bladder within 8 hours.  Problems with breathing or chest pain.    You should call 911 if you develop problems with breathing or chest pain.  If you are unable to contact your doctor or surgical center, you should go to the nearest emergency room or urgent care center.  Physician's telephone #: [phone]    If any questions arise, call your  doctor.  If your doctor is not available, please feel free to call the Surgical Center at [phone]. The Contact Center is open Monday through Friday 7AM to 5PM and may speak to a nurse at [phone], or toll free at [phone].     A registered nurse may call you a few days after your surgery to see how you are doing after your procedure.    MEDICATIONS: Resume taking daily medication.  Take prescribed pain medication with food.  If no medication is prescribed, you may take non-aspirin pain medication if needed.  PAIN MEDICATION CAN BE VERY CONSTIPATING.  Take a stool softener or laxative such as senokot, pericolace, or milk of magnesia if needed.    Prescription given for N/A.  Last pain medication given at _____________________________________.    If your physician has prescribed pain medication that includes Acetaminophen (Tylenol), do not take additional Acetaminophen (Tylenol) while taking the prescribed medication.    Depression / Suicide Risk    As you are discharged from this [facility] facility, it is important to learn how to keep safe from harming yourself.    Recognize the warning signs:  · Abrupt changes in personality, positive or negative- including increase in energy   · Giving away possessions  · Change in eating patterns- significant weight changes-  positive or negative  · Change in sleeping patterns- unable to sleep or sleeping all the time   · Unwillingness or inability to communicate  · Depression  · Unusual sadness, discouragement and loneliness  · Talk of wanting to die  · Neglect of personal appearance   · Rebelliousness- reckless behavior  · Withdrawal from people/activities they love  · Confusion- inability to concentrate     If you or a loved one observes any of these behaviors or has concerns about self-harm, here's what you can do:  · Talk about it- your feelings and reasons for harming yourself  · Remove any means that you might use to hurt yourself (examples: pills,  rope, extension cords, firearm)  · Get professional help from the community (Mental Health, Substance Abuse, psychological counseling)  · Do not be alone:Call your Safe Contact- someone whom you trust who will be there for you.  · Call your local CRISIS HOTLINE 009-7203 or 674-016-3259  · Call your local Children's Mobile Crisis Response Team [place] (246) 615-3102 or www.[employer]  · Call the toll free National Suicide Prevention Hotlines   · National Suicide Prevention Lifeline 014-641-GUKT (1762)  · National Hope Line Network 800-SUICIDE (161-9119)

## 2021-02-03 NOTE — PROGRESS NOTES
"1345 Assumed care of patient; pt reports \"burning\" to R knee but dressing CDI and elevated with gurney and ice pack in place. Pt reports hx of tingling to R toes; baseline CMS. +2 R pedal pulse with pink/warm toes and <3 sec cap refill.   1400 Pt reports being a \"lightweight\" with pain medication and it \"makes me dizzy and I have to lie down\". Pain rated as 7/10 and not tolerable described as \"burning\" and \"aching\". Instructed to DB; demonstrated understanding.   1415 Pain remains 7/10 and \"burning\" and agrees to PRN IV Fentanyl for comfort. Denies nausea; tolerating sips of water. Remains awake without stimulation.   1421 Report to PJ Oneill.   "

## 2021-02-03 NOTE — OR NURSING
1433 - pt arrived from PACU - VSS, AAOx4   Patient has been medicated for pain and has RX for percocet sent electronically     1500- patient requests device to help with ambulation and states that she does not believe she will be able to use crutches - she requests a walker  - a verbal order was given by Dr Kent   An ortho tech brought one to Phase 2 , instructed the patient on use and the patient verbalized that she had an understanding

## 2021-02-03 NOTE — ANESTHESIA POSTPROCEDURE EVALUATION
Patient: Kelly Oakley    Procedure Summary     Date: 02/03/21 Room / Location:  OR 06 / SURGERY [facility]    Anesthesia Start: 1251 Anesthesia Stop: 1325    Procedures:       ARTHROSCOPY, KNEE (Right Knee)      MENISCECTOMY, KNEE, MEDIAL - FOR PARTIAL LATERAL (Right Knee)      CHONDROPLASTY - AND WILBURN (Right Knee) Diagnosis: (COMPLEX TEAR OF LATERAL MENISCUS RIGHT KNEE)    Surgeons: Guillermo Wyman M.D. Responsible Provider: Vlad Chin M.D.    Anesthesia Type: general ASA Status: 2          Final Anesthesia Type: general  Last vitals  BP   Blood Pressure: 104/64    Temp   36.1 °C (97 °F)    Pulse   Pulse: 73   Resp   16    SpO2   100 %      Anesthesia Post Evaluation    Patient location during evaluation: PACU  Patient participation: complete - patient participated  Level of consciousness: awake and alert    Airway patency: patent  Anesthetic complications: no  Cardiovascular status: hemodynamically stable  Respiratory status: acceptable  Hydration status: euvolemic    PONV: none           Nurse Pain Score: 0 (NPRS)

## 2021-02-03 NOTE — OP REPORT
Surgeon: Guillermo Wyman MD    Assistant: Viktor Jeong PA-C    Preoperative diagnosis: Right knee lateral meniscus tear    Postoperative diagnosis: Right knee lateral meniscus tear, right knee chondromalacia    Procedure: Right knee arthroscopy with partial lateral meniscectomy and chondroplasty    EBL: minimal

## 2021-02-03 NOTE — ANESTHESIA PREPROCEDURE EVALUATION
Normal echo/stress test  Relevant Problems   CARDIAC   (+) Atherosclerosis of arteries      GI   (+) GERD (gastroesophageal reflux disease)      Other   (+) Briseno's esophagus   (+) Cigarette smoker one half pack a day or less   (+) Nocturnal hypoxemia       Physical Exam    Airway   Mallampati: II  TM distance: >3 FB  Neck ROM: full       Cardiovascular - normal exam  Rhythm: regular  Rate: normal  (-) murmur     Dental - normal exam  (+) upper dentures, lower dentures           Pulmonary - normal exam  Breath sounds clear to auscultation     Abdominal    Neurological - normal exam                 Anesthesia Plan    ASA 2       Plan - general       Airway plan will be LMA        Induction: intravenous    Postoperative Plan: Postoperative administration of opioids is intended.    Pertinent diagnostic labs and testing reviewed    Informed Consent:    Anesthetic plan and risks discussed with patient.

## 2021-02-03 NOTE — ANESTHESIA TIME REPORT
Anesthesia Start and Stop Event Times     Date Time Event    2/3/2021 1236 Ready for Procedure     1251 Anesthesia Start     1325 Anesthesia Stop        Responsible Staff  02/03/21    Name Role Begin End    Vlad Chin M.D. Anesth 1251 1325        Preop Diagnosis (Free Text):  Pre-op Diagnosis     COMPLEX TEAR OF LATERAL MENISCUS RIGHT KNEE        Preop Diagnosis (Codes):    Post op Diagnosis  Acute medial meniscus tear of right knee      Premium Reason  Non-Premium    Comments:

## 2021-02-03 NOTE — ANESTHESIA PROCEDURE NOTES
Airway    Date/Time: 2/3/2021 12:55 PM  Performed by: Vlad Chin M.D.  Authorized by: Vlad Chin M.D.     Location:  OR  Urgency:  Elective  Difficult Airway: No    Indications for Airway Management:  Anesthesia      Spontaneous Ventilation: absent    Sedation Level:  Deep  Preoxygenated: Yes    Mask Difficulty Assessment:  1 - vent by mask  Final Airway Type:  Supraglottic airway  Final Supraglottic Airway:  Standard LMA    SGA Size:  3  Number of Attempts at Approach:  1

## 2021-02-03 NOTE — OR NURSING
1324: To PACU from OR via ernst, Patient is receiving supplemental oxygen via mask at 6 lpm, sleeping, respirations spontaneous and non-labored via OPA. Patient has a dressing on the Right Knee.     1337:OPA DC'd    1340: Care to RN April    1415: Report from RN April. Patient resting. April has medicated Patient per MAR.      1425: Patient rates pain at 5/10 and tolerable. States she hates rating pain. Meets criteria to transfer to Stage 2

## 2021-03-15 DIAGNOSIS — Z23 NEED FOR VACCINATION: ICD-10-CM

## 2021-07-12 PROBLEM — S52.126D CLOSED NONDISPLACED FRACTURE OF HEAD OF RADIUS WITH ROUTINE HEALING: Status: ACTIVE | Noted: 2021-07-12

## 2021-11-11 RX ORDER — CETIRIZINE HYDROCHLORIDE 10 MG/1
10 TABLET ORAL EVERY EVENING
Qty: 30 TABLET | Refills: 1 | Status: SHIPPED | OUTPATIENT
Start: 2021-11-11 | End: 2022-01-12 | Stop reason: SDUPTHER

## 2021-11-11 NOTE — TELEPHONE ENCOUNTER
Received request via: Pharmacy    Was the patient seen in the last year in this department? Yes, 07/26/2021    Does the patient have an active prescription (recently filled or refills available) for medication(s) requested? No

## 2021-11-29 ENCOUNTER — OFFICE VISIT (OUTPATIENT)
Dept: MEDICAL GROUP | Facility: OTHER | Age: 61
End: 2021-11-29
Payer: MEDICAID

## 2021-11-29 VITALS
TEMPERATURE: 96.8 F | SYSTOLIC BLOOD PRESSURE: 120 MMHG | WEIGHT: 174 LBS | OXYGEN SATURATION: 94 % | DIASTOLIC BLOOD PRESSURE: 70 MMHG | RESPIRATION RATE: 14 BRPM | HEIGHT: 64 IN | BODY MASS INDEX: 29.71 KG/M2 | HEART RATE: 78 BPM

## 2021-11-29 DIAGNOSIS — Z00.00 ROUTINE GENERAL MEDICAL EXAMINATION AT A HEALTH CARE FACILITY: ICD-10-CM

## 2021-11-29 DIAGNOSIS — F17.210 CIGARETTE SMOKER ONE HALF PACK A DAY OR LESS: ICD-10-CM

## 2021-11-29 DIAGNOSIS — J44.9 CHRONIC OBSTRUCTIVE PULMONARY DISEASE, UNSPECIFIED COPD TYPE (HCC): ICD-10-CM

## 2021-11-29 DIAGNOSIS — F41.9 ANXIETY: ICD-10-CM

## 2021-11-29 PROBLEM — G89.18 POSTOPERATIVE PAIN: Status: RESOLVED | Noted: 2021-02-03 | Resolved: 2021-11-29

## 2021-11-29 PROBLEM — S52.126D CLOSED NONDISPLACED FRACTURE OF HEAD OF RADIUS WITH ROUTINE HEALING: Status: RESOLVED | Noted: 2021-07-12 | Resolved: 2021-11-29

## 2021-11-29 PROBLEM — E87.1 HYPONATREMIA: Status: RESOLVED | Noted: 2020-09-24 | Resolved: 2021-11-29

## 2021-11-29 PROCEDURE — 99214 OFFICE O/P EST MOD 30 MIN: CPT | Performed by: FAMILY MEDICINE

## 2021-11-29 RX ORDER — ALBUTEROL SULFATE 90 UG/1
2 AEROSOL, METERED RESPIRATORY (INHALATION) EVERY 4 HOURS PRN
Qty: 1 EACH | Refills: 11 | Status: SHIPPED | OUTPATIENT
Start: 2021-11-29 | End: 2022-10-20 | Stop reason: SDUPTHER

## 2021-11-29 RX ORDER — HYDRALAZINE HYDROCHLORIDE 25 MG/1
25 TABLET, FILM COATED ORAL 2 TIMES DAILY PRN
Qty: 60 TABLET | Refills: 3 | Status: SHIPPED | OUTPATIENT
Start: 2021-11-29 | End: 2022-10-20 | Stop reason: SDUPTHER

## 2021-11-29 RX ORDER — TRIAMCINOLONE ACETONIDE 55 UG/1
SPRAY, METERED NASAL
COMMUNITY
Start: 2021-08-04 | End: 2022-02-14

## 2021-11-29 ASSESSMENT — PATIENT HEALTH QUESTIONNAIRE - PHQ9
SUM OF ALL RESPONSES TO PHQ QUESTIONS 1-9: 9
5. POOR APPETITE OR OVEREATING: 1 - SEVERAL DAYS
CLINICAL INTERPRETATION OF PHQ2 SCORE: 2

## 2021-11-29 ASSESSMENT — FIBROSIS 4 INDEX: FIB4 SCORE: 1.12

## 2021-11-29 ASSESSMENT — ENCOUNTER SYMPTOMS: WHEEZING: 1

## 2021-11-30 ENCOUNTER — HOSPITAL ENCOUNTER (OUTPATIENT)
Dept: RADIOLOGY | Facility: MEDICAL CENTER | Age: 61
End: 2021-11-30
Attending: FAMILY MEDICINE
Payer: MEDICAID

## 2021-11-30 DIAGNOSIS — Z12.31 VISIT FOR SCREENING MAMMOGRAM: ICD-10-CM

## 2021-11-30 PROBLEM — F41.9 ANXIETY: Status: ACTIVE | Noted: 2021-11-30

## 2021-11-30 PROBLEM — J44.9 CHRONIC OBSTRUCTIVE PULMONARY DISEASE (HCC): Status: ACTIVE | Noted: 2021-11-30

## 2021-11-30 PROCEDURE — 77063 BREAST TOMOSYNTHESIS BI: CPT

## 2021-11-30 NOTE — PROGRESS NOTES
Subjective:   Kelly Oakley is a 61 y.o. female here for the evaluation and management of Follow-Up (fup on meds)    Anxiety-stable with current medications    Ongoing tobacco smoking    Chronic obstructive pulmonary disease-patient reports intermittent wheezing and congestion, discussed previous imaging and she requests an albuterol inhaler for as needed use  No problems updated.    Review of Systems   HENT: Positive for congestion.    Respiratory: Positive for wheezing.        Current Outpatient Medications   Medication Sig Dispense Refill   • triamcinolone (NASACORT ALLERGY 24HR) 55 MCG/ACT nasal inhaler NASACORT ALLERGY 24HR 55 MCG/ACT AERO     • cetirizine (ZYRTEC) 10 MG Tab Take 1 Tablet by mouth every evening. 30 Tablet 1   • amLODIPine (NORVASC) 5 MG Tab 5 mg 2 Times a Day.     • estradiol (VAGIFEM) 10 MCG Tab      • irbesartan (AVAPRO) 300 MG Tab 300 mg every evening.     • acetaminophen (TYLENOL) 325 MG Tab Take 650 mg by mouth every 6 hours as needed.     • hydrALAZINE (APRESOLINE) 25 MG Tab Take 1-2 tabs every 6 hours as needed for BP greater than 160/100. (Patient taking differently: as needed (Only taken as PRN). Take 1-2 tabs every 6 hours as needed for BP greater than 160/100.) 60 Tab 3   • spironolactone (ALDACTONE) 50 MG Tab Take 1 Tab by mouth every day. 90 Tab 3   • propranolol (INDERAL) 10 MG Tab Take 1 Tab by mouth 2 times a day. 60 Tab 0   • hydrOXYzine HCl (ATARAX) 25 MG Tab Take 25 mg by mouth 2 times a day as needed for Anxiety.     • FLUoxetine (PROZAC) 10 MG Cap Take 10 mg by mouth every day.     • pravastatin (PRAVACHOL) 20 MG Tab Take 20 mg by mouth every bedtime.     • busPIRone (BUSPAR) 15 MG tablet Take 15 mg by mouth 2 times a day. 15 mg in the AM and 7.5 in the eveing     • esomeprazole (NEXIUM) 20 MG capsule Take 20 mg by mouth 2 Times a Day.     • fluticasone (FLONASE) 50 MCG/ACT nasal spray Spray 2 Sprays in nose every bedtime.     • ondansetron (ZOFRAN) 4 MG Tab tablet Take  1 Tab by mouth every four hours as needed. (Patient not taking: Reported on 6/10/2021) 12 Tab 0   • prazosin (MINIPRESS) 2 MG Cap Take 3 Caps by mouth 2 Times a Day. (Patient not taking: Reported on 6/10/2021) 90 Cap 0     No current facility-administered medications for this visit.     Allergies  Macrodantin [nitrofurantoin macrocrystal], Pcn [penicillins], Doxycycline, Bactrim, Clarithromycin, Hydrochlorothiazide, and Omnicef    Past Medical History:   Diagnosis Date   • Adrenal nodule 2012 2015 / nonfunctional / benign   • Anemia    • Anesthesia    • Anxiety     Panic   • Arrhythmia     palpatations   • Arthritis     Morning stiffness   • Atherosclerosis of arteries 2013    seen on abdominal CT   • Briseno's  esophagus    • Bronchitis 2019   • Chest pain at rest 9/11/2015   • Cigarette smoker one half pack a day or less 5/28/2015   • Depression    • Fatigue 9/11/2015   • GERD (gastroesophageal reflux disease)    • Headache(784.0)     sinus headache   • Heart burn    • Heart murmur     Dr. Krause-Stress trend   • High cholesterol    • History of HPV infection    • HLD (hyperlipidemia) 9/11/2015   • HTN (hypertension) 9/11/2015   • IBD (inflammatory bowel disease)     Dr. Cunningham   • Indigestion    • MRSA carrier 2008    nose cellulitis   • Nocturnal hypoxemia 10/15/2015   • OSTEOPOROSIS     Osteopenia   • Pneumonia 2005   • PONV (postoperative nausea and vomiting)    • S/P appendectomy    • S/P cholecystectomy    • S/P hysterectomy with oophorectomy     endometriosis   • Snoring    • Ulcer     Barretts esophogitis   • Urinary incontinence    • Urinary tract infection, site not specified      Patient Active Problem List    Diagnosis Date Noted   • Closed nondisplaced fracture of head of radius with routine healing 07/12/2021   • Postoperative pain 02/03/2021   • Hyponatremia 09/24/2020   • GERD (gastroesophageal reflux disease) 05/05/2017   • Subacute maxillary sinusitis 03/15/2016   • Osteopenia 11/20/2015   •  "Nocturnal hypoxemia 10/15/2015   • Adrenal adenoma    • Fatigue 09/11/2015   • HLD (hyperlipidemia) 09/11/2015   • Cigarette smoker one half pack a day or less 05/28/2015   • Atherosclerosis of arteries    • Snoring 08/02/2013   • Lichen planus 11/02/2012   • Briseno's esophagus 11/02/2012   • MRSA (methicillin resistant Staphylococcus aureus) 11/02/2012   • Adrenal mass (HCC) 11/02/2012       Past Surgical History  Past Surgical History:   Procedure Laterality Date   • PB KNEE SCOPE,DIAGNOSTIC Right 2/3/2021    Procedure: ARTHROSCOPY, KNEE;  Surgeon: Guillermo Wyman M.D.;  Location: SURGERY [facility];  Service: Orthopedics   • MEDIAL MENISCECTOMY Right 2/3/2021    Procedure: MENISCECTOMY, KNEE, MEDIAL - FOR PARTIAL LATERAL;  Surgeon: Guillermo Wyman M.D.;  Location: SURGERY [facility];  Service: Orthopedics   • CHONDROPLASTY Right 2/3/2021    Procedure: CHONDROPLASTY - AND WILBURN;  Surgeon: Guillermo Wyman M.D.;  Location: SURGERY [facility];  Service: Orthopedics   • BREAST BIOPSY  1980's    benign left breast 35 years ago   • ABDOMINAL HYSTERECTOMY TOTAL      mennorrogia   • APPENDECTOMY     • CHOLECYSTECTOMY     • COLON RESECTION      Polyp removal   • ENDOMETRIAL ABLATION     • PRIMARY C SECTION     • TONSILLECTOMY     • TUBAL COAGULATION LAPAROSCOPIC BILATERAL     • US-NEEDLE CORE BX-BREAST PANEL          Objective:     Vitals:    11/29/21 1637   BP: 120/70   BP Location: Right arm   Patient Position: Sitting   BP Cuff Size: Adult   Pulse: 78   Resp: 14   Temp: 36 °C (96.8 °F)   TempSrc: Temporal   SpO2: 94%   Weight: 78.9 kg (174 lb)   Height: 1.613 m (5' 3.5\")     Body mass index is 30.34 kg/m².     Physical Exam  Constitutional:       Appearance: Normal appearance.   HENT:      Head: Normocephalic.   Eyes:      Extraocular Movements: Extraocular movements intact.      Conjunctiva/sclera: Conjunctivae normal.   Cardiovascular:      Rate and Rhythm: Normal rate and regular rhythm.      Heart sounds: " Normal heart sounds.   Pulmonary:      Effort: Pulmonary effort is normal.      Breath sounds: Normal breath sounds.   Skin:     General: Skin is warm and dry.   Neurological:      Mental Status: She is alert and oriented to person, place, and time. Mental status is at baseline.   Psychiatric:         Mood and Affect: Mood normal.         Behavior: Behavior normal.         Assessment and Plan:   Kelly Oakley is a 61 y.o. female with a Follow-Up (fup on meds)     The following was discussed with the patient today.    Problem List Items Addressed This Visit     None      Medications reviewed and refills provided, laboratory studies recommended, referred for screening mammography, close follow-up recommended with strong consideration for spirometry to confirm suspected COPD diagnosis.  Consideration for lung cancer screening CT scan.    Followup: No follow-ups on file.    Saulo Fajardo M.D.    Please note that this dictation was created using voice recognition software. I have made every reasonable attempt to correct obvious errors, but I expect that there are errors of grammar and possibly content that I did not discover before finalizing the note.

## 2021-12-01 RX ORDER — HYDROXYZINE HYDROCHLORIDE 25 MG/1
25 TABLET, FILM COATED ORAL 2 TIMES DAILY PRN
Qty: 30 TABLET | Refills: 3 | Status: SHIPPED | OUTPATIENT
Start: 2021-12-01 | End: 2022-03-11 | Stop reason: SDUPTHER

## 2021-12-01 RX ORDER — SPIRONOLACTONE 50 MG/1
50 TABLET, FILM COATED ORAL DAILY
Qty: 90 TABLET | Refills: 3 | Status: SHIPPED | OUTPATIENT
Start: 2021-12-01 | End: 2022-03-11 | Stop reason: SDUPTHER

## 2021-12-30 ENCOUNTER — TELEMEDICINE (OUTPATIENT)
Dept: MEDICAL GROUP | Facility: OTHER | Age: 61
End: 2021-12-30
Payer: MEDICAID

## 2021-12-30 VITALS — DIASTOLIC BLOOD PRESSURE: 69 MMHG | TEMPERATURE: 98.1 F | SYSTOLIC BLOOD PRESSURE: 111 MMHG | HEART RATE: 62 BPM

## 2021-12-30 DIAGNOSIS — J01.00 ACUTE MAXILLARY SINUSITIS, RECURRENCE NOT SPECIFIED: ICD-10-CM

## 2021-12-30 PROBLEM — J01.90 ACUTE SINUS INFECTION: Status: ACTIVE | Noted: 2021-12-30

## 2021-12-30 PROCEDURE — 99213 OFFICE O/P EST LOW 20 MIN: CPT | Mod: 95 | Performed by: FAMILY MEDICINE

## 2021-12-30 RX ORDER — AZITHROMYCIN 250 MG/1
TABLET, FILM COATED ORAL
Qty: 6 TABLET | Refills: 0 | Status: SHIPPED
Start: 2021-12-30 | End: 2022-02-14

## 2021-12-30 ASSESSMENT — ENCOUNTER SYMPTOMS
NAUSEA: 0
PALPITATIONS: 0
FEVER: 0
CONSTIPATION: 0
VOMITING: 0
CHILLS: 0
DIARRHEA: 0

## 2021-12-30 NOTE — PROGRESS NOTES
"Subjective:     Chief Complaint   Patient presents with   • Cough     no fever, sinus congested rt ear.      Kelly Oakley is a 61 y.o. female here today for concern of a \"sinus thing\", has hx of sinus congestion, had a +sick contact and congestion worsened over the last week or two, now has ear pain on the right side, has green discharge, has pain over cheeks, no fever or chills, has hx of sinus infection,   Virtual Visit: Established Patient   This visit was conducted via Zoom using secure and encrypted videoconferencing technology.   The patient was in a private location in the [facility].    The patient's identity was confirmed and verbal consent was obtained for this virtual visit.    Subjective:   CC:   Chief Complaint   Patient presents with   • Cough     no fever, sinus congested rt ear.      Kelly Oakley is a 61 y.o. female presenting for evaluation and management of:        ROS       Current medicines (including changes today)  Current Outpatient Medications   Medication Sig Dispense Refill   • azithromycin (ZITHROMAX) 250 MG Tab Take 2 tabs PO on day one and then one tab PO daily for 4 days 6 Tablet 0   • spironolactone (ALDACTONE) 50 MG Tab Take 1 Tablet by mouth every day. 90 Tablet 3   • hydrOXYzine HCl (ATARAX) 25 MG Tab Take 1 Tablet by mouth 2 times a day as needed for Anxiety. 30 Tablet 3   • triamcinolone (NASACORT ALLERGY 24HR) 55 MCG/ACT nasal inhaler NASACORT ALLERGY 24HR 55 MCG/ACT AERO     • albuterol 108 (90 Base) MCG/ACT Aero Soln inhalation aerosol Inhale 2 Puffs every four hours as needed for Shortness of Breath. 1 Each 11   • hydrALAZINE (APRESOLINE) 25 MG Tab Take 1 Tablet by mouth 2 times a day as needed. 60 Tablet 3   • cetirizine (ZYRTEC) 10 MG Tab Take 1 Tablet by mouth every evening. 30 Tablet 1   • amLODIPine (NORVASC) 5 MG Tab 5 mg 2 Times a Day.     • estradiol (VAGIFEM) 10 MCG Tab      • irbesartan (AVAPRO) 300 MG Tab 300 mg every evening.     • acetaminophen " (TYLENOL) 325 MG Tab Take 650 mg by mouth every 6 hours as needed.     • propranolol (INDERAL) 10 MG Tab Take 1 Tab by mouth 2 times a day. 60 Tab 0   • FLUoxetine (PROZAC) 10 MG Cap Take 10 mg by mouth every day.     • pravastatin (PRAVACHOL) 20 MG Tab Take 20 mg by mouth every bedtime.     • busPIRone (BUSPAR) 15 MG tablet Take 15 mg by mouth 2 times a day. 15 mg in the AM and 7.5 in the eveing     • esomeprazole (NEXIUM) 20 MG capsule Take 20 mg by mouth 2 Times a Day.     • fluticasone (FLONASE) 50 MCG/ACT nasal spray Spray 2 Sprays in nose every bedtime.       No current facility-administered medications for this visit.       Patient Active Problem List    Diagnosis Date Noted   • Acute sinus infection 12/30/2021   • Chronic obstructive pulmonary disease (HCC) 11/30/2021   • Anxiety 11/30/2021   • GERD (gastroesophageal reflux disease) 05/05/2017   • Osteopenia 11/20/2015   • Nocturnal hypoxemia 10/15/2015   • Adrenal adenoma    • HLD (hyperlipidemia) 09/11/2015   • Cigarette smoker one half pack a day or less 05/28/2015   • Atherosclerosis of arteries    • Snoring 08/02/2013   • Lichen planus 11/02/2012   • Briseno's esophagus 11/02/2012   • Adrenal mass (HCC) 11/02/2012        Objective:   /69 (BP Location: Left arm, Patient Position: Sitting, BP Cuff Size: Adult)   Pulse 62   Temp 36.7 °C (98.1 °F) (Oral)     Physical Exam:  Constitutional: Alert, no distress, well-groomed.  Skin: No rashes in visible areas.  Eye: Round. Conjunctiva clear, lids normal. No icterus.   ENMT: Lips pink without lesions, good dentition, moist mucous membranes. Phonation normal.  Neck: No masses, no thyromegaly. Moves freely without pain.  Respiratory: Unlabored respiratory effort, no cough or audible wheeze  Psych: Alert and oriented x3, normal affect and mood.     Assessment and Plan:   The following treatment plan was discussed:     1. Acute maxillary sinusitis, recurrence not specified    Other orders  - azithromycin  (ZITHROMAX) 250 MG Tab; Take 2 tabs PO on day one and then one tab PO daily for 4 days  Dispense: 6 Tablet; Refill: 0      Follow-up: Return if symptoms worsen or fail to improve.           Problem   Acute Sinus Infection        Current medicines (including changes today)  Current Outpatient Medications   Medication Sig Dispense Refill   • azithromycin (ZITHROMAX) 250 MG Tab Take 2 tabs PO on day one and then one tab PO daily for 4 days 6 Tablet 0   • spironolactone (ALDACTONE) 50 MG Tab Take 1 Tablet by mouth every day. 90 Tablet 3   • hydrOXYzine HCl (ATARAX) 25 MG Tab Take 1 Tablet by mouth 2 times a day as needed for Anxiety. 30 Tablet 3   • triamcinolone (NASACORT ALLERGY 24HR) 55 MCG/ACT nasal inhaler NASACORT ALLERGY 24HR 55 MCG/ACT AERO     • albuterol 108 (90 Base) MCG/ACT Aero Soln inhalation aerosol Inhale 2 Puffs every four hours as needed for Shortness of Breath. 1 Each 11   • hydrALAZINE (APRESOLINE) 25 MG Tab Take 1 Tablet by mouth 2 times a day as needed. 60 Tablet 3   • cetirizine (ZYRTEC) 10 MG Tab Take 1 Tablet by mouth every evening. 30 Tablet 1   • amLODIPine (NORVASC) 5 MG Tab 5 mg 2 Times a Day.     • estradiol (VAGIFEM) 10 MCG Tab      • irbesartan (AVAPRO) 300 MG Tab 300 mg every evening.     • acetaminophen (TYLENOL) 325 MG Tab Take 650 mg by mouth every 6 hours as needed.     • propranolol (INDERAL) 10 MG Tab Take 1 Tab by mouth 2 times a day. 60 Tab 0   • FLUoxetine (PROZAC) 10 MG Cap Take 10 mg by mouth every day.     • pravastatin (PRAVACHOL) 20 MG Tab Take 20 mg by mouth every bedtime.     • busPIRone (BUSPAR) 15 MG tablet Take 15 mg by mouth 2 times a day. 15 mg in the AM and 7.5 in the eveing     • esomeprazole (NEXIUM) 20 MG capsule Take 20 mg by mouth 2 Times a Day.     • fluticasone (FLONASE) 50 MCG/ACT nasal spray Spray 2 Sprays in nose every bedtime.       No current facility-administered medications for this visit.       Social History     Tobacco Use   • Smoking status:  Current Some Day Smoker     Packs/day: 0.50     Years: 40.00     Pack years: 20.00     Types: Cigarettes   • Smokeless tobacco: Never Used   Vaping Use   • Vaping Use: Never used   Substance Use Topics   • Alcohol use: Not Currently     Alcohol/week: 0.6 oz     Types: 1 Standard drinks or equivalent per week     Comment: rarely   • Drug use: No     Past Medical History:   Diagnosis Date   • Adrenal nodule 2012 2015 / nonfunctional / benign   • Anemia    • Anesthesia    • Anxiety     Panic   • Arrhythmia     palpatations   • Arthritis     Morning stiffness   • Atherosclerosis of arteries 2013    seen on abdominal CT   • Briseno's  esophagus    • Bronchitis 2019   • Chest pain at rest 9/11/2015   • Cigarette smoker one half pack a day or less 5/28/2015   • Depression    • Fatigue 9/11/2015   • GERD (gastroesophageal reflux disease)    • Headache(784.0)     sinus headache   • Heart burn    • Heart murmur     Dr. Krause-Stress trend   • High cholesterol    • History of HPV infection    • HLD (hyperlipidemia) 9/11/2015   • HTN (hypertension) 9/11/2015   • IBD (inflammatory bowel disease)     Dr. Cunningham   • Indigestion    • MRSA carrier 2008    nose cellulitis   • Nocturnal hypoxemia 10/15/2015   • OSTEOPOROSIS     Osteopenia   • Pneumonia 2005   • PONV (postoperative nausea and vomiting)    • S/P appendectomy    • S/P cholecystectomy    • S/P hysterectomy with oophorectomy     endometriosis   • Snoring    • Ulcer     Barretts esophogitis   • Urinary incontinence    • Urinary tract infection, site not specified       Family History   Problem Relation Age of Onset   • Cancer Mother         Breast/Liver   • Diabetes Mother    • Hypertension Mother    • Hyperlipidemia Mother    • Heart Disease Mother    • Lung Disease Father         Emphysema   • Psychiatric Illness Father         Paranoid schitzophenia   • Psychiatric Illness Sister         Paronoid Schitzo-disorder, Bipolar   • Arthritis Sister         RA, Fibromyalgia    • Cancer Maternal Aunt         Breast   • Cancer Paternal Aunt         Bone   • Arthritis Paternal Aunt    • Genetic Disorder Paternal Aunt         SLE   • Heart Disease Maternal Grandmother    • Hypertension Maternal Grandmother    • Hyperlipidemia Maternal Grandmother    • Genetic Disorder Maternal Grandmother         Brights disease   • Stroke Maternal Grandmother    • Heart Disease Maternal Grandfather    • Hypertension Maternal Grandfather    • Hyperlipidemia Maternal Grandfather    • Stroke Paternal Grandmother    • Hyperlipidemia Paternal Grandmother    • Hypertension Paternal Grandmother    • Heart Disease Paternal Grandfather    • Hypertension Paternal Grandfather    • Hyperlipidemia Paternal Grandfather         Review of Systems   Constitutional: Negative for chills and fever.   Cardiovascular: Negative for chest pain and palpitations.   Gastrointestinal: Negative for constipation, diarrhea, nausea and vomiting.        Objective:     Vitals:    12/30/21 1107   BP: 111/69   BP Location: Left arm   Patient Position: Sitting   BP Cuff Size: Adult   Pulse: 62   Temp: 36.7 °C (98.1 °F)   TempSrc: Oral     There is no height or weight on file to calculate BMI.     Physical Exam     Assessment and Plan:   Acute sinus infection  2 week hx of URI symptoms, now with headaches, sinus pressure in maxillary sinuses, ear pain, and purulent discharge, it has been greater than 10 days of symptoms and now worsening, qualifies for antibiotic treatment, per patient azithromycin clears them and has trouble with other antibiotics, Zpack sent to pharmacy, follow up if not improving.       Followup: Return if symptoms worsen or fail to improve.    Bautista Rondon M.D.

## 2021-12-30 NOTE — PROGRESS NOTES
Virtual Visit: Established Patient   This visit was conducted via Zoom using secure and encrypted videoconferencing technology.   The patient was in a private location in the state of [place].    The patient's identity was confirmed and verbal consent was obtained for this virtual visit.    Subjective:   CC:   Chief Complaint   Patient presents with   • Cough     no fever, sinus congested rt ear.      Kelly Oakley is a 61 y.o. female presenting for evaluation of possible sinus infection, reports sick contact and 2 week hx of worsening sinus congestion, now has sinus pressure in cheeks, ear pain, headaches, and green discharge from her nose, reports she has sinus problems and is prone to infections, they usually clear with antibiotics, no fever or chills      ROS   Neg for nausea, vomiting, diarrhea, shortness or breath, palpitations.     Current medicines (including changes today)  Current Outpatient Medications   Medication Sig Dispense Refill   • azithromycin (ZITHROMAX) 250 MG Tab Take 2 tabs PO on day one and then one tab PO daily for 4 days 6 Tablet 0   • spironolactone (ALDACTONE) 50 MG Tab Take 1 Tablet by mouth every day. 90 Tablet 3   • hydrOXYzine HCl (ATARAX) 25 MG Tab Take 1 Tablet by mouth 2 times a day as needed for Anxiety. 30 Tablet 3   • triamcinolone (NASACORT ALLERGY 24HR) 55 MCG/ACT nasal inhaler NASACORT ALLERGY 24HR 55 MCG/ACT AERO     • albuterol 108 (90 Base) MCG/ACT Aero Soln inhalation aerosol Inhale 2 Puffs every four hours as needed for Shortness of Breath. 1 Each 11   • hydrALAZINE (APRESOLINE) 25 MG Tab Take 1 Tablet by mouth 2 times a day as needed. 60 Tablet 3   • cetirizine (ZYRTEC) 10 MG Tab Take 1 Tablet by mouth every evening. 30 Tablet 1   • amLODIPine (NORVASC) 5 MG Tab 5 mg 2 Times a Day.     • estradiol (VAGIFEM) 10 MCG Tab      • irbesartan (AVAPRO) 300 MG Tab 300 mg every evening.     • acetaminophen (TYLENOL) 325 MG Tab Take 650 mg by mouth every 6 hours as needed.     •  propranolol (INDERAL) 10 MG Tab Take 1 Tab by mouth 2 times a day. 60 Tab 0   • FLUoxetine (PROZAC) 10 MG Cap Take 10 mg by mouth every day.     • pravastatin (PRAVACHOL) 20 MG Tab Take 20 mg by mouth every bedtime.     • busPIRone (BUSPAR) 15 MG tablet Take 15 mg by mouth 2 times a day. 15 mg in the AM and 7.5 in the eveing     • esomeprazole (NEXIUM) 20 MG capsule Take 20 mg by mouth 2 Times a Day.     • fluticasone (FLONASE) 50 MCG/ACT nasal spray Spray 2 Sprays in nose every bedtime.       No current facility-administered medications for this visit.       Patient Active Problem List    Diagnosis Date Noted   • Acute sinus infection 12/30/2021   • Chronic obstructive pulmonary disease (HCC) 11/30/2021   • Anxiety 11/30/2021   • GERD (gastroesophageal reflux disease) 05/05/2017   • Osteopenia 11/20/2015   • Nocturnal hypoxemia 10/15/2015   • Adrenal adenoma    • HLD (hyperlipidemia) 09/11/2015   • Cigarette smoker one half pack a day or less 05/28/2015   • Atherosclerosis of arteries    • Snoring 08/02/2013   • Lichen planus 11/02/2012   • Briseno's esophagus 11/02/2012   • Adrenal mass (HCC) 11/02/2012        Objective:   /69 (BP Location: Left arm, Patient Position: Sitting, BP Cuff Size: Adult)   Pulse 62   Temp 36.7 °C (98.1 °F) (Oral)     Physical Exam:  Constitutional: Alert, no distress, well-groomed.  Skin: No rashes in visible areas.  Eye: Round. Conjunctiva clear, lids normal. No icterus.   ENMT: Lips pink without lesions, good dentition, moist mucous membranes. Phonation normal.  Neck: No masses, no thyromegaly. Moves freely without pain.  Respiratory: Unlabored respiratory effort, no cough or audible wheeze  Psych: Alert and oriented x3, normal affect and mood.     Assessment and Plan:   The following treatment plan was discussed:     1. Acute maxillary sinusitis, recurrence not specified    Other orders  - azithromycin (ZITHROMAX) 250 MG Tab; Take 2 tabs PO on day one and then one tab PO daily  for 4 days  Dispense: 6 Tablet; Refill: 0  2 week hx of URI symptoms, now with headaches, sinus pressure in maxillary sinuses, ear pain, and purulent discharge, it has been greater than 10 days of symptoms and now worsening, qualifies for antibiotic treatment, per patient azithromycin clears them and has trouble with other antibiotics, Zpack sent to pharmacy, follow up if not improving.     Follow-up: Return if symptoms worsen or fail to improve.

## 2021-12-30 NOTE — LETTER
12/30/21    To whom it may concern:    Kelly was seen in clinic today for illness, please excuse her from work on 12/27-12/31. She may return to work without restrictions on January 3rd, 2022    Sincerely,    Bautista Rondon MD

## 2021-12-30 NOTE — ASSESSMENT & PLAN NOTE
2 week hx of URI symptoms, now with headaches, sinus pressure in maxillary sinuses, ear pain, and purulent discharge, it has been greater than 10 days of symptoms and now worsening, qualifies for antibiotic treatment, per patient azithromycin clears them and has trouble with other antibiotics, Zpack sent to pharmacy, follow up if not improving.

## 2022-01-12 ENCOUNTER — OFFICE VISIT (OUTPATIENT)
Dept: MEDICAL GROUP | Facility: OTHER | Age: 62
End: 2022-01-12
Payer: MEDICAID

## 2022-01-12 DIAGNOSIS — J30.89 SEASONAL ALLERGIC RHINITIS DUE TO OTHER ALLERGIC TRIGGER: ICD-10-CM

## 2022-01-12 DIAGNOSIS — J01.00 ACUTE MAXILLARY SINUSITIS, RECURRENCE NOT SPECIFIED: ICD-10-CM

## 2022-01-12 PROCEDURE — 99441 PR PHYSICIAN TELEPHONE EVALUATION 5-10 MIN: CPT | Mod: GT | Performed by: FAMILY MEDICINE

## 2022-01-12 RX ORDER — CETIRIZINE HYDROCHLORIDE 10 MG/1
10 TABLET ORAL EVERY EVENING
Qty: 90 TABLET | Refills: 1 | Status: SHIPPED | OUTPATIENT
Start: 2022-01-12

## 2022-01-12 ASSESSMENT — ENCOUNTER SYMPTOMS: FEVER: 1

## 2022-01-12 NOTE — PROGRESS NOTES
Subjective:   Kelly Oakley is a 61 y.o. female here for the evaluation and management of Telephone Visit (sinus inefection)    Persistent sinus issues-she reports previous evaluation approximately 2 weeks ago with prescription for azithromycin and questionable improvement, she has had some reduction in mucopurulent discharge but reports intermittent fevers managed with Tylenol recently.  She also has associated nasal congestion described as white  No problems updated.    Review of Systems   Constitutional: Positive for fever.   HENT: Positive for congestion.        Current Outpatient Medications   Medication Sig Dispense Refill   • cetirizine (ZYRTEC) 10 MG Tab Take 1 Tablet by mouth every evening. 90 Tablet 1   • azithromycin (ZITHROMAX) 250 MG Tab Take 2 tabs PO on day one and then one tab PO daily for 4 days 6 Tablet 0   • spironolactone (ALDACTONE) 50 MG Tab Take 1 Tablet by mouth every day. 90 Tablet 3   • hydrOXYzine HCl (ATARAX) 25 MG Tab Take 1 Tablet by mouth 2 times a day as needed for Anxiety. 30 Tablet 3   • triamcinolone (NASACORT ALLERGY 24HR) 55 MCG/ACT nasal inhaler NASACORT ALLERGY 24HR 55 MCG/ACT AERO     • albuterol 108 (90 Base) MCG/ACT Aero Soln inhalation aerosol Inhale 2 Puffs every four hours as needed for Shortness of Breath. 1 Each 11   • hydrALAZINE (APRESOLINE) 25 MG Tab Take 1 Tablet by mouth 2 times a day as needed. 60 Tablet 3   • amLODIPine (NORVASC) 5 MG Tab 5 mg 2 Times a Day.     • estradiol (VAGIFEM) 10 MCG Tab      • irbesartan (AVAPRO) 300 MG Tab 300 mg every evening.     • acetaminophen (TYLENOL) 325 MG Tab Take 650 mg by mouth every 6 hours as needed.     • propranolol (INDERAL) 10 MG Tab Take 1 Tab by mouth 2 times a day. 60 Tab 0   • FLUoxetine (PROZAC) 10 MG Cap Take 10 mg by mouth every day.     • pravastatin (PRAVACHOL) 20 MG Tab Take 20 mg by mouth every bedtime.     • busPIRone (BUSPAR) 15 MG tablet Take 15 mg by mouth 2 times a day. 15 mg in the AM and 7.5 in the  eveing     • esomeprazole (NEXIUM) 20 MG capsule Take 20 mg by mouth 2 Times a Day.     • fluticasone (FLONASE) 50 MCG/ACT nasal spray Spray 2 Sprays in nose every bedtime.       No current facility-administered medications for this visit.     Allergies  Macrodantin [nitrofurantoin macrocrystal], Pcn [penicillins], Doxycycline, Bactrim, Clarithromycin, Hydrochlorothiazide, and Omnicef    Past Medical History:   Diagnosis Date   • Adrenal nodule 2012 2015 / nonfunctional / benign   • Anemia    • Anesthesia    • Anxiety     Panic   • Arrhythmia     palpatations   • Arthritis     Morning stiffness   • Atherosclerosis of arteries 2013    seen on abdominal CT   • Briseno's  esophagus    • Bronchitis 2019   • Chest pain at rest 9/11/2015   • Cigarette smoker one half pack a day or less 5/28/2015   • Depression    • Fatigue 9/11/2015   • GERD (gastroesophageal reflux disease)    • Headache(784.0)     sinus headache   • Heart burn    • Heart murmur     Dr. Krause-Stress trend   • High cholesterol    • History of HPV infection    • HLD (hyperlipidemia) 9/11/2015   • HTN (hypertension) 9/11/2015   • IBD (inflammatory bowel disease)     Dr. Cunningham   • Indigestion    • MRSA carrier 2008    nose cellulitis   • Nocturnal hypoxemia 10/15/2015   • OSTEOPOROSIS     Osteopenia   • Pneumonia 2005   • PONV (postoperative nausea and vomiting)    • S/P appendectomy    • S/P cholecystectomy    • S/P hysterectomy with oophorectomy     endometriosis   • Snoring    • Ulcer     Barretts esophogitis   • Urinary incontinence    • Urinary tract infection, site not specified      Patient Active Problem List    Diagnosis Date Noted   • Acute sinus infection 12/30/2021   • Chronic obstructive pulmonary disease (HCC) 11/30/2021   • Anxiety 11/30/2021   • GERD (gastroesophageal reflux disease) 05/05/2017   • Osteopenia 11/20/2015   • Nocturnal hypoxemia 10/15/2015   • Adrenal adenoma    • HLD (hyperlipidemia) 09/11/2015   • Cigarette smoker one  half pack a day or less 05/28/2015   • Atherosclerosis of arteries    • Snoring 08/02/2013   • Lichen planus 11/02/2012   • Briseno's esophagus 11/02/2012   • Adrenal mass (HCC) 11/02/2012       Past Surgical History  Past Surgical History:   Procedure Laterality Date   • PB KNEE SCOPE,DIAGNOSTIC Right 2/3/2021    Procedure: ARTHROSCOPY, KNEE;  Surgeon: Guillermo Wyman M.D.;  Location: SURGERY [facility];  Service: Orthopedics   • MENISCECTOMY, KNEE, MEDIAL Right 2/3/2021    Procedure: MENISCECTOMY, KNEE, MEDIAL - FOR PARTIAL LATERAL;  Surgeon: Guillermo Wyman M.D.;  Location: SURGERY [facility];  Service: Orthopedics   • CHONDROPLASTY Right 2/3/2021    Procedure: CHONDROPLASTY - AND WILBURN;  Surgeon: Guillermo Wyman M.D.;  Location: SURGERY [facility];  Service: Orthopedics   • BREAST BIOPSY  1980's    benign left breast 35 years ago   • ABDOMINAL HYSTERECTOMY TOTAL      mennorrogia   • APPENDECTOMY     • CHOLECYSTECTOMY     • COLON RESECTION      Polyp removal   • ENDOMETRIAL ABLATION     • PRIMARY C SECTION     • TONSILLECTOMY     • TUBAL COAGULATION LAPAROSCOPIC BILATERAL     • US-NEEDLE CORE BX-BREAST PANEL          Objective:   There were no vitals filed for this visit.  There is no height or weight on file to calculate BMI.     Physical Exam    Assessment and Plan:   Kelly Oakley is a 61 y.o. female with a Telephone Visit (sinus inefection)     The following was discussed with the patient today.    Problem List Items Addressed This Visit     Acute sinus infection      Other Visit Diagnoses     Seasonal allergic rhinitis due to other allergic trigger        Relevant Medications    cetirizine (ZYRTEC) 10 MG Tab        Prior evaluation was reviewed with confirmation prescription for a azithromycin, based on the community currently I suspect she may have COVID I recommend she go ahead and get tested.  I suggested management of her fevers with Tylenol as needed and ongoing monitoring of home blood pressure.   I gave her recommendations for supportive management if her COVID test is positive.  If negative we could consider another round of antibiotics but I did rather have a blood test first.  Followup: No follow-ups on file.    Saulo Fajardo M.D.    Please note that this dictation was created using voice recognition software. I have made every reasonable attempt to correct obvious errors, but I expect that there are errors of grammar and possibly content that I did not discover before finalizing the note.    This evaluation was conducted via phone using secure and encrypted videoconferencing technology. The patient was physically located at  in [place], [place]. The patient was presented by phone .   The patient's identity was confirmed and verbal consent was obtained for this telemedicine encounter.

## 2022-02-14 ENCOUNTER — OFFICE VISIT (OUTPATIENT)
Dept: MEDICAL GROUP | Facility: OTHER | Age: 62
End: 2022-02-14
Payer: COMMERCIAL

## 2022-02-14 VITALS
OXYGEN SATURATION: 97 % | TEMPERATURE: 98.3 F | DIASTOLIC BLOOD PRESSURE: 66 MMHG | RESPIRATION RATE: 16 BRPM | BODY MASS INDEX: 29.19 KG/M2 | SYSTOLIC BLOOD PRESSURE: 136 MMHG | WEIGHT: 171 LBS | HEART RATE: 76 BPM | HEIGHT: 64 IN

## 2022-02-14 DIAGNOSIS — N30.01 ACUTE CYSTITIS WITH HEMATURIA: ICD-10-CM

## 2022-02-14 DIAGNOSIS — I10 PRIMARY HYPERTENSION: ICD-10-CM

## 2022-02-14 DIAGNOSIS — J01.00 ACUTE MAXILLARY SINUSITIS, RECURRENCE NOT SPECIFIED: ICD-10-CM

## 2022-02-14 PROCEDURE — 99214 OFFICE O/P EST MOD 30 MIN: CPT | Performed by: FAMILY MEDICINE

## 2022-02-14 RX ORDER — CIPROFLOXACIN 500 MG/1
500 TABLET, FILM COATED ORAL 2 TIMES DAILY
Qty: 10 TABLET | Refills: 0 | Status: SHIPPED
Start: 2022-02-14 | End: 2022-03-11

## 2022-02-14 ASSESSMENT — FIBROSIS 4 INDEX: FIB4 SCORE: 1.12

## 2022-02-14 ASSESSMENT — ENCOUNTER SYMPTOMS
SINUS PAIN: 1
CONSTITUTIONAL NEGATIVE: 1
FLANK PAIN: 1

## 2022-02-14 NOTE — PROGRESS NOTES
Subjective:   Kelly Oakley is a 61 y.o. female here for the evaluation and management of Dysuria (uti sx)    Dysuria-spontaneous onset, 4 days duration with associated flank discomfort, attempted to treat with over-the-counter supplements without improvement    Maxillary sinus discomfort with suspected infection-persistent, Covid diagnosis several weeks ago with persistent maxillary sinus pressure and pain, no significant improvement with sinus rinsing and Flonase    Hypertension-stable  No problems updated.    Review of Systems   Constitutional: Negative.    HENT: Positive for congestion and sinus pain.    Genitourinary: Positive for dysuria and flank pain.       Current Outpatient Medications   Medication Sig Dispense Refill   • cetirizine (ZYRTEC) 10 MG Tab Take 1 Tablet by mouth every evening. 90 Tablet 1   • azithromycin (ZITHROMAX) 250 MG Tab Take 2 tabs PO on day one and then one tab PO daily for 4 days (Patient not taking: Reported on 2/14/2022) 6 Tablet 0   • spironolactone (ALDACTONE) 50 MG Tab Take 1 Tablet by mouth every day. 90 Tablet 3   • hydrOXYzine HCl (ATARAX) 25 MG Tab Take 1 Tablet by mouth 2 times a day as needed for Anxiety. 30 Tablet 3   • triamcinolone (NASACORT ALLERGY 24HR) 55 MCG/ACT nasal inhaler NASACORT ALLERGY 24HR 55 MCG/ACT AERO     • albuterol 108 (90 Base) MCG/ACT Aero Soln inhalation aerosol Inhale 2 Puffs every four hours as needed for Shortness of Breath. 1 Each 11   • hydrALAZINE (APRESOLINE) 25 MG Tab Take 1 Tablet by mouth 2 times a day as needed. 60 Tablet 3   • amLODIPine (NORVASC) 5 MG Tab 5 mg 2 Times a Day.     • estradiol (VAGIFEM) 10 MCG Tab      • irbesartan (AVAPRO) 300 MG Tab 300 mg every evening.     • acetaminophen (TYLENOL) 325 MG Tab Take 650 mg by mouth every 6 hours as needed.     • propranolol (INDERAL) 10 MG Tab Take 1 Tab by mouth 2 times a day. 60 Tab 0   • FLUoxetine (PROZAC) 10 MG Cap Take 10 mg by mouth every day.     • pravastatin (PRAVACHOL) 20 MG  Tab Take 20 mg by mouth every bedtime.     • busPIRone (BUSPAR) 15 MG tablet Take 15 mg by mouth 2 times a day. 15 mg in the AM and 7.5 in the eveing     • esomeprazole (NEXIUM) 20 MG capsule Take 20 mg by mouth 2 Times a Day.     • fluticasone (FLONASE) 50 MCG/ACT nasal spray Spray 2 Sprays in nose every bedtime.       No current facility-administered medications for this visit.     Allergies  Macrodantin [nitrofurantoin macrocrystal], Pcn [penicillins], Doxycycline, Bactrim, Clarithromycin, Hydrochlorothiazide, and Omnicef    Past Medical History:   Diagnosis Date   • Adrenal nodule 2012 2015 / nonfunctional / benign   • Anemia    • Anesthesia    • Anxiety     Panic   • Arrhythmia     palpatations   • Arthritis     Morning stiffness   • Atherosclerosis of arteries 2013    seen on abdominal CT   • Briseno's  esophagus    • Bronchitis 2019   • Chest pain at rest 9/11/2015   • Cigarette smoker one half pack a day or less 5/28/2015   • Depression    • Fatigue 9/11/2015   • GERD (gastroesophageal reflux disease)    • Headache(784.0)     sinus headache   • Heart burn    • Heart murmur     Dr. Krause-Stress trend   • High cholesterol    • History of HPV infection    • HLD (hyperlipidemia) 9/11/2015   • HTN (hypertension) 9/11/2015   • IBD (inflammatory bowel disease)     Dr. Cunningham   • Indigestion    • MRSA carrier 2008    nose cellulitis   • Nocturnal hypoxemia 10/15/2015   • OSTEOPOROSIS     Osteopenia   • Pneumonia 2005   • PONV (postoperative nausea and vomiting)    • S/P appendectomy    • S/P cholecystectomy    • S/P hysterectomy with oophorectomy     endometriosis   • Snoring    • Ulcer     Barretts esophogitis   • Urinary incontinence    • Urinary tract infection, site not specified      Patient Active Problem List    Diagnosis Date Noted   • Acute sinus infection 12/30/2021   • Chronic obstructive pulmonary disease (HCC) 11/30/2021   • Anxiety 11/30/2021   • GERD (gastroesophageal reflux disease) 05/05/2017   •  "Osteopenia 11/20/2015   • Nocturnal hypoxemia 10/15/2015   • Adrenal adenoma    • HLD (hyperlipidemia) 09/11/2015   • Cigarette smoker one half pack a day or less 05/28/2015   • Atherosclerosis of arteries    • Snoring 08/02/2013   • Lichen planus 11/02/2012   • Briseno's esophagus 11/02/2012   • Adrenal mass (HCC) 11/02/2012       Past Surgical History  Past Surgical History:   Procedure Laterality Date   • PB KNEE SCOPE,DIAGNOSTIC Right 2/3/2021    Procedure: ARTHROSCOPY, KNEE;  Surgeon: Guillermo Wyman M.D.;  Location: [facility];  Service: Orthopedics   • MENISCECTOMY, KNEE, MEDIAL Right 2/3/2021    Procedure: MENISCECTOMY, KNEE, MEDIAL - FOR PARTIAL LATERAL;  Surgeon: Guillermo Wyman M.D.;  Location: [facility];  Service: Orthopedics   • CHONDROPLASTY Right 2/3/2021    Procedure: CHONDROPLASTY - AND WILBURN;  Surgeon: Guillermo Wyman M.D.;  Location: [facility];  Service: Orthopedics   • BREAST BIOPSY  1980's    benign left breast 35 years ago   • ABDOMINAL HYSTERECTOMY TOTAL      mennorrogia   • APPENDECTOMY     • CHOLECYSTECTOMY     • COLON RESECTION      Polyp removal   • ENDOMETRIAL ABLATION     • PRIMARY C SECTION     • TONSILLECTOMY     • TUBAL COAGULATION LAPAROSCOPIC BILATERAL     • US-NEEDLE CORE BX-BREAST PANEL          Objective:     Vitals:    02/14/22 1310   BP: 136/66   BP Location: Right arm   Patient Position: Sitting   BP Cuff Size: Adult   Pulse: 76   Resp: 16   Temp: 36.8 °C (98.3 °F)   TempSrc: Temporal   SpO2: 97%   Weight: 77.6 kg (171 lb)   Height: 1.613 m (5' 3.5\")     Body mass index is 29.82 kg/m².     Physical Exam  Constitutional:       Appearance: Normal appearance.   HENT:      Head: Normocephalic.      Right Ear: Tympanic membrane, ear canal and external ear normal.      Left Ear: Tympanic membrane, ear canal and external ear normal.      Mouth/Throat:      Mouth: Mucous membranes are moist.      Pharynx: Oropharynx is clear.   Eyes:      Extraocular " Movements: Extraocular movements intact.      Conjunctiva/sclera: Conjunctivae normal.      Pupils: Pupils are equal, round, and reactive to light.   Cardiovascular:      Rate and Rhythm: Normal rate and regular rhythm.      Heart sounds: Normal heart sounds.   Pulmonary:      Effort: Pulmonary effort is normal.      Breath sounds: Normal breath sounds.   Skin:     General: Skin is warm and dry.   Neurological:      Mental Status: She is alert and oriented to person, place, and time. Mental status is at baseline.   Psychiatric:         Mood and Affect: Mood normal.         Behavior: Behavior normal.     Bilateral maxillary sinus tenderness on percussion    Assessment and Plan:   Kelly Oakley is a 61 y.o. female with a Dysuria (uti sx)     The following was discussed with the patient today.    Problem List Items Addressed This Visit     None        Urinalysis was concerning for a urinary tract infection which is consistent with the patient's subjective symptoms, reviewed her previous allergies with extensive allergies to antibiotics.  Discussed treatment options and elected ciprofloxacin based on her allergy profile and urinary tract infection with suspected sinus infection.  Routine laboratory studies recommended and she already has a laboratory requisition with close follow-up recommended in approximately 2 weeks followup: No follow-ups on file.    Saulo Fajardo M.D.    Please note that this dictation was created using voice recognition software. I have made every reasonable attempt to correct obvious errors, but I expect that there are errors of grammar and possibly content that I did not discover before finalizing the note.

## 2022-02-28 ENCOUNTER — APPOINTMENT (OUTPATIENT)
Dept: MEDICAL GROUP | Facility: OTHER | Age: 62
End: 2022-02-28
Payer: COMMERCIAL

## 2022-03-11 ENCOUNTER — OFFICE VISIT (OUTPATIENT)
Dept: MEDICAL GROUP | Facility: OTHER | Age: 62
End: 2022-03-11
Payer: COMMERCIAL

## 2022-03-11 VITALS
WEIGHT: 173 LBS | OXYGEN SATURATION: 95 % | HEART RATE: 57 BPM | TEMPERATURE: 98.6 F | DIASTOLIC BLOOD PRESSURE: 78 MMHG | HEIGHT: 63 IN | BODY MASS INDEX: 30.65 KG/M2 | SYSTOLIC BLOOD PRESSURE: 134 MMHG

## 2022-03-11 DIAGNOSIS — Z00.00 ROUTINE GENERAL MEDICAL EXAMINATION AT A HEALTH CARE FACILITY: ICD-10-CM

## 2022-03-11 DIAGNOSIS — K21.9 GASTROESOPHAGEAL REFLUX DISEASE WITHOUT ESOPHAGITIS: ICD-10-CM

## 2022-03-11 DIAGNOSIS — J30.89 SEASONAL ALLERGIC RHINITIS DUE TO OTHER ALLERGIC TRIGGER: ICD-10-CM

## 2022-03-11 DIAGNOSIS — E78.2 MIXED HYPERLIPIDEMIA: ICD-10-CM

## 2022-03-11 DIAGNOSIS — I10 PRIMARY HYPERTENSION: ICD-10-CM

## 2022-03-11 DIAGNOSIS — F41.9 ANXIETY: ICD-10-CM

## 2022-03-11 DIAGNOSIS — F17.210 CIGARETTE SMOKER ONE HALF PACK A DAY OR LESS: ICD-10-CM

## 2022-03-11 PROCEDURE — 96372 THER/PROPH/DIAG INJ SC/IM: CPT | Performed by: FAMILY MEDICINE

## 2022-03-11 PROCEDURE — 99214 OFFICE O/P EST MOD 30 MIN: CPT | Mod: 25 | Performed by: FAMILY MEDICINE

## 2022-03-11 RX ORDER — IRBESARTAN 300 MG/1
300 TABLET ORAL DAILY
Qty: 90 TABLET | Refills: 3 | Status: SHIPPED | OUTPATIENT
Start: 2022-03-11 | End: 2022-10-20 | Stop reason: SDUPTHER

## 2022-03-11 RX ORDER — MULTIVITAMIN/IRON/FOLIC ACID 18MG-0.4MG
1 TABLET ORAL DAILY
COMMUNITY
End: 2023-10-26

## 2022-03-11 RX ORDER — AMLODIPINE BESYLATE 5 MG/1
5 TABLET ORAL DAILY
Qty: 90 TABLET | Refills: 3 | Status: SHIPPED | OUTPATIENT
Start: 2022-03-11 | End: 2022-10-20 | Stop reason: SDUPTHER

## 2022-03-11 RX ORDER — FLUTICASONE PROPIONATE 50 MCG
2 SPRAY, SUSPENSION (ML) NASAL
Qty: 16 G | Refills: 11 | Status: SHIPPED | OUTPATIENT
Start: 2022-03-11 | End: 2023-02-03 | Stop reason: SDUPTHER

## 2022-03-11 RX ORDER — CYANOCOBALAMIN 1000 UG/ML
1000 INJECTION, SOLUTION INTRAMUSCULAR; SUBCUTANEOUS ONCE
Status: COMPLETED | OUTPATIENT
Start: 2022-03-11 | End: 2022-03-11

## 2022-03-11 RX ORDER — BUSPIRONE HYDROCHLORIDE 15 MG/1
15 TABLET ORAL 2 TIMES DAILY
Qty: 180 TABLET | Refills: 3 | Status: SHIPPED | OUTPATIENT
Start: 2022-03-11 | End: 2022-10-20 | Stop reason: SDUPTHER

## 2022-03-11 RX ORDER — PRAVASTATIN SODIUM 20 MG
20 TABLET ORAL DAILY
Qty: 90 TABLET | Refills: 3 | Status: SHIPPED | OUTPATIENT
Start: 2022-03-11 | End: 2022-10-20 | Stop reason: SDUPTHER

## 2022-03-11 RX ORDER — HYDROXYZINE HYDROCHLORIDE 25 MG/1
25 TABLET, FILM COATED ORAL 2 TIMES DAILY PRN
Qty: 30 TABLET | Refills: 5 | Status: SHIPPED | OUTPATIENT
Start: 2022-03-11 | End: 2022-10-20 | Stop reason: SDUPTHER

## 2022-03-11 RX ORDER — FLUOXETINE 10 MG/1
10 CAPSULE ORAL DAILY
Qty: 90 CAPSULE | Refills: 3 | Status: SHIPPED | OUTPATIENT
Start: 2022-03-11 | End: 2022-10-20 | Stop reason: SDUPTHER

## 2022-03-11 RX ORDER — SPIRONOLACTONE 50 MG/1
50 TABLET, FILM COATED ORAL DAILY
Qty: 90 TABLET | Refills: 3 | Status: SHIPPED | OUTPATIENT
Start: 2022-03-11 | End: 2022-10-20 | Stop reason: SDUPTHER

## 2022-03-11 RX ORDER — ERGOCALCIFEROL 1.25 MG/1
1 CAPSULE ORAL
COMMUNITY

## 2022-03-11 RX ADMIN — CYANOCOBALAMIN 1000 MCG: 1000 INJECTION, SOLUTION INTRAMUSCULAR; SUBCUTANEOUS at 17:35

## 2022-03-11 ASSESSMENT — FIBROSIS 4 INDEX: FIB4 SCORE: 1.12

## 2022-03-11 ASSESSMENT — PATIENT HEALTH QUESTIONNAIRE - PHQ9: CLINICAL INTERPRETATION OF PHQ2 SCORE: 0

## 2022-03-11 ASSESSMENT — ENCOUNTER SYMPTOMS: CONSTITUTIONAL NEGATIVE: 1

## 2022-03-12 NOTE — PROGRESS NOTES
Subjective:   Kelly Oakley is a 61 y.o. female here for the evaluation and management of Follow-Up (Follow up labs)    Ongoing cigarette smoking-stable, discussed discontinuation    Hypertension-stable    Hyperlipidemia-stable      Anxiety-stable with current medications, discussed previous therapy visits    No problems updated.    Review of Systems   Constitutional: Negative.    Cardiovascular: Positive for leg swelling. Negative for chest pain.       Current Outpatient Medications   Medication Sig Dispense Refill   • vitamin D2, Ergocalciferol, (DRISDOL) 1.25 MG (47600 UT) Cap capsule Take 1 Units by mouth every 7 days.     • Multiple Vitamins-Minerals (CENTRUM ADULTS) Tab tablet Take 1 Tablet by mouth every day.     • ciprofloxacin (CIPRO) 500 MG Tab Take 1 Tablet by mouth 2 times a day. 10 Tablet 0   • cetirizine (ZYRTEC) 10 MG Tab Take 1 Tablet by mouth every evening. 90 Tablet 1   • spironolactone (ALDACTONE) 50 MG Tab Take 1 Tablet by mouth every day. 90 Tablet 3   • hydrOXYzine HCl (ATARAX) 25 MG Tab Take 1 Tablet by mouth 2 times a day as needed for Anxiety. 30 Tablet 3   • albuterol 108 (90 Base) MCG/ACT Aero Soln inhalation aerosol Inhale 2 Puffs every four hours as needed for Shortness of Breath. 1 Each 11   • hydrALAZINE (APRESOLINE) 25 MG Tab Take 1 Tablet by mouth 2 times a day as needed. 60 Tablet 3   • amLODIPine (NORVASC) 5 MG Tab 5 mg 2 Times a Day.     • estradiol (VAGIFEM) 10 MCG Tab      • irbesartan (AVAPRO) 300 MG Tab 300 mg every evening.     • acetaminophen (TYLENOL) 325 MG Tab Take 650 mg by mouth every 6 hours as needed.     • propranolol (INDERAL) 10 MG Tab Take 1 Tab by mouth 2 times a day. 60 Tab 0   • FLUoxetine (PROZAC) 10 MG Cap Take 10 mg by mouth every day.     • pravastatin (PRAVACHOL) 20 MG Tab Take 20 mg by mouth every bedtime.     • busPIRone (BUSPAR) 15 MG tablet Take 15 mg by mouth 2 times a day. 15 mg in the AM and 7.5 in the eveing     • esomeprazole (NEXIUM) 20 MG  capsule Take 20 mg by mouth 2 Times a Day.     • fluticasone (FLONASE) 50 MCG/ACT nasal spray Spray 2 Sprays in nose every bedtime.       No current facility-administered medications for this visit.     Allergies  Macrodantin [nitrofurantoin macrocrystal], Pcn [penicillins], Doxycycline, Bactrim, Clarithromycin, Hydrochlorothiazide, and Omnicef    Past Medical History:   Diagnosis Date   • Adrenal nodule 2012 2015 / nonfunctional / benign   • Anemia    • Anesthesia    • Anxiety     Panic   • Arrhythmia     palpatations   • Arthritis     Morning stiffness   • Atherosclerosis of arteries 2013    seen on abdominal CT   • Briseno's  esophagus    • Bronchitis 2019   • Chest pain at rest 9/11/2015   • Cigarette smoker one half pack a day or less 5/28/2015   • Depression    • Fatigue 9/11/2015   • GERD (gastroesophageal reflux disease)    • Headache(784.0)     sinus headache   • Heart burn    • Heart murmur     Dr. Krause-Stress trend   • High cholesterol    • History of HPV infection    • HLD (hyperlipidemia) 9/11/2015   • HTN (hypertension) 9/11/2015   • IBD (inflammatory bowel disease)     Dr. Cunningham   • Indigestion    • MRSA carrier 2008    nose cellulitis   • Nocturnal hypoxemia 10/15/2015   • OSTEOPOROSIS     Osteopenia   • Pneumonia 2005   • PONV (postoperative nausea and vomiting)    • S/P appendectomy    • S/P cholecystectomy    • S/P hysterectomy with oophorectomy     endometriosis   • Snoring    • Ulcer     Barretts esophogitis   • Urinary incontinence    • Urinary tract infection, site not specified      Patient Active Problem List    Diagnosis Date Noted   • Acute cystitis with hematuria 02/14/2022   • Acute sinus infection 12/30/2021   • Chronic obstructive pulmonary disease (HCC) 11/30/2021   • Anxiety 11/30/2021   • GERD (gastroesophageal reflux disease) 05/05/2017   • Osteopenia 11/20/2015   • Nocturnal hypoxemia 10/15/2015   • Adrenal adenoma    • Primary hypertension 09/11/2015   • HLD (hyperlipidemia)  "09/11/2015   • Cigarette smoker one half pack a day or less 05/28/2015   • Atherosclerosis of arteries    • Snoring 08/02/2013   • Lichen planus 11/02/2012   • Briseno's esophagus 11/02/2012       Past Surgical History  Past Surgical History:   Procedure Laterality Date   • PB KNEE SCOPE,DIAGNOSTIC Right 2/3/2021    Procedure: ARTHROSCOPY, KNEE;  Surgeon: Guillermo Wyman M.D.;  Location: SURGERY [facility];  Service: Orthopedics   • MENISCECTOMY, KNEE, MEDIAL Right 2/3/2021    Procedure: MENISCECTOMY, KNEE, MEDIAL - FOR PARTIAL LATERAL;  Surgeon: Guillermo Wyman M.D.;  Location: SURGERY [facility];  Service: Orthopedics   • CHONDROPLASTY Right 2/3/2021    Procedure: CHONDROPLASTY - AND WILBURN;  Surgeon: Guillermo Wyman M.D.;  Location: SURGERY [facility];  Service: Orthopedics   • BREAST BIOPSY  1980's    benign left breast 35 years ago   • ABDOMINAL HYSTERECTOMY TOTAL      mennorrogia   • APPENDECTOMY     • CHOLECYSTECTOMY     • COLON RESECTION      Polyp removal   • ENDOMETRIAL ABLATION     • PRIMARY C SECTION     • TONSILLECTOMY     • TUBAL COAGULATION LAPAROSCOPIC BILATERAL     • US-NEEDLE CORE BX-BREAST PANEL          Objective:     Vitals:    03/11/22 1618   BP: 134/78   BP Location: Left arm   Patient Position: Sitting   Pulse: (!) 57   Temp: 37 °C (98.6 °F)   SpO2: 95%   Weight: 78.5 kg (173 lb)   Height: 1.6 m (5' 3\")     Body mass index is 30.65 kg/m².     Physical Exam  Constitutional:       Appearance: Normal appearance.   HENT:      Head: Normocephalic.   Eyes:      Extraocular Movements: Extraocular movements intact.      Conjunctiva/sclera: Conjunctivae normal.   Cardiovascular:      Rate and Rhythm: Normal rate and regular rhythm.      Heart sounds: Normal heart sounds.   Pulmonary:      Effort: Pulmonary effort is normal.      Breath sounds: Normal breath sounds.   Skin:     General: Skin is warm and dry.   Neurological:      Mental Status: She is alert and oriented to person, place, and time. " Mental status is at baseline.   Psychiatric:         Mood and Affect: Mood normal.         Behavior: Behavior normal.         Assessment and Plan:   Kelly Oakley is a 61 y.o. female with a Follow-Up (Follow up labs)     The following was discussed with the patient today.    Problem List Items Addressed This Visit    None       Reviewed and discussed recent laboratory studies with low HDL cholesterol.  Discussed ways to improve her HDL cholesterol with discontinuing smoking, lower carbohydrate diet and regular exercise.  Discussed elevation in fasting blood sugar and recommended lifestyle changes.  No evidence of hyponatremia which is encouraging.  Medications reviewed and refills provided    Reviewed previous cardiology evaluation including echocardiogram approximately 18 months ago with normal ejection fraction.  Patient reports bilateral lower extremity swelling and on exam today there is perhaps trace pedal edema.  She gives a clear history of dependent edema which improves when lying flat.  She is managing with compression stockings and I recommend continuing.  Recommended mammogram and provide a requisition, close follow-up in 2 to 3 months  Followup: No follow-ups on file.    Saulo Fajardo M.D.    Please note that this dictation was created using voice recognition software. I have made every reasonable attempt to correct obvious errors, but I expect that there are errors of grammar and possibly content that I did not discover before finalizing the note.

## 2022-05-20 NOTE — TELEPHONE ENCOUNTER
Pt states she needs a refill on her Propranolol and any other meds that she will be out of. But she is out of her BP med Propranolol.

## 2022-05-23 RX ORDER — PROPRANOLOL HYDROCHLORIDE 10 MG/1
10 TABLET ORAL 2 TIMES DAILY
Qty: 180 TABLET | Refills: 3 | Status: SHIPPED | OUTPATIENT
Start: 2022-05-23 | End: 2022-10-20 | Stop reason: SDUPTHER

## 2022-08-19 NOTE — DISCHARGE INSTRUCTIONS
Completed form has been faxed to the number listed below; and placed in "TO BE SCANNED [place] [id number]"      [phone] fax Please continue to take all of your blood pressure medications.  Follow-up with 1 of the clinics or health centers listed on the follow-up sheet given to you by our behavioral health team.  Please call tomorrow morning to schedule a follow-up appointment with a psychiatrist or behavioral health clinic for your anxiety.  Additionally, please contact your primary care physician regarding your blood pressure, and your visit today.  Please schedule a follow-up appointment with your primary care physician within 1 week.  Return to the emergency department immediately if you develop any new or worsening symptoms, this includes lightheadedness, headaches, nausea, vomiting, chest pain or shortness of breath, or if you have any further concerns.

## 2022-10-20 ENCOUNTER — OFFICE VISIT (OUTPATIENT)
Dept: MEDICAL GROUP | Facility: PHYSICIAN GROUP | Age: 62
End: 2022-10-20
Payer: COMMERCIAL

## 2022-10-20 VITALS
OXYGEN SATURATION: 93 % | HEIGHT: 63 IN | BODY MASS INDEX: 32.04 KG/M2 | HEART RATE: 74 BPM | WEIGHT: 180.8 LBS | TEMPERATURE: 98.1 F | RESPIRATION RATE: 16 BRPM | DIASTOLIC BLOOD PRESSURE: 84 MMHG | SYSTOLIC BLOOD PRESSURE: 152 MMHG

## 2022-10-20 DIAGNOSIS — H53.9 VISION CHANGES: ICD-10-CM

## 2022-10-20 DIAGNOSIS — F41.9 ANXIETY: ICD-10-CM

## 2022-10-20 DIAGNOSIS — R06.2 WHEEZING: ICD-10-CM

## 2022-10-20 DIAGNOSIS — K22.710 BARRETT'S ESOPHAGUS WITH LOW GRADE DYSPLASIA: ICD-10-CM

## 2022-10-20 DIAGNOSIS — J44.9 CHRONIC OBSTRUCTIVE PULMONARY DISEASE, UNSPECIFIED COPD TYPE (HCC): ICD-10-CM

## 2022-10-20 DIAGNOSIS — E78.2 MIXED HYPERLIPIDEMIA: ICD-10-CM

## 2022-10-20 DIAGNOSIS — E66.9 OBESITY (BMI 30-39.9): ICD-10-CM

## 2022-10-20 DIAGNOSIS — Z13.79 GENETIC SCREENING: ICD-10-CM

## 2022-10-20 DIAGNOSIS — F17.210 CIGARETTE SMOKER ONE HALF PACK A DAY OR LESS: ICD-10-CM

## 2022-10-20 DIAGNOSIS — H53.8 FLASHING LIGHTS SEEN: ICD-10-CM

## 2022-10-20 DIAGNOSIS — Z12.12 SCREENING FOR COLORECTAL CANCER: ICD-10-CM

## 2022-10-20 DIAGNOSIS — I10 PRIMARY HYPERTENSION: ICD-10-CM

## 2022-10-20 DIAGNOSIS — R06.83 SNORING: ICD-10-CM

## 2022-10-20 DIAGNOSIS — N95.2 POSTMENOPAUSAL ATROPHIC VAGINITIS: ICD-10-CM

## 2022-10-20 DIAGNOSIS — Z12.11 SCREENING FOR COLORECTAL CANCER: ICD-10-CM

## 2022-10-20 PROBLEM — N30.01 ACUTE CYSTITIS WITH HEMATURIA: Status: RESOLVED | Noted: 2022-02-14 | Resolved: 2022-10-20

## 2022-10-20 PROCEDURE — 99214 OFFICE O/P EST MOD 30 MIN: CPT

## 2022-10-20 RX ORDER — PRAVASTATIN SODIUM 20 MG
20 TABLET ORAL DAILY
Qty: 90 TABLET | Refills: 3 | Status: SHIPPED | OUTPATIENT
Start: 2022-10-20 | End: 2023-02-03 | Stop reason: SDUPTHER

## 2022-10-20 RX ORDER — BUSPIRONE HYDROCHLORIDE 15 MG/1
15 TABLET ORAL 2 TIMES DAILY
Qty: 180 TABLET | Refills: 3 | Status: SHIPPED | OUTPATIENT
Start: 2022-10-20 | End: 2023-02-03 | Stop reason: SDUPTHER

## 2022-10-20 RX ORDER — ALBUTEROL SULFATE 90 UG/1
2 AEROSOL, METERED RESPIRATORY (INHALATION) EVERY 4 HOURS PRN
Qty: 1 EACH | Refills: 11 | Status: SHIPPED | OUTPATIENT
Start: 2022-10-20 | End: 2023-02-03 | Stop reason: SDUPTHER

## 2022-10-20 RX ORDER — HYDRALAZINE HYDROCHLORIDE 25 MG/1
25 TABLET, FILM COATED ORAL 2 TIMES DAILY PRN
Qty: 60 TABLET | Refills: 3 | Status: SHIPPED | OUTPATIENT
Start: 2022-10-20 | End: 2023-02-03 | Stop reason: SDUPTHER

## 2022-10-20 RX ORDER — FLUOXETINE 10 MG/1
10 CAPSULE ORAL DAILY
Qty: 90 CAPSULE | Refills: 3 | Status: SHIPPED | OUTPATIENT
Start: 2022-10-20 | End: 2023-02-03 | Stop reason: SDUPTHER

## 2022-10-20 RX ORDER — HYDROXYZINE HYDROCHLORIDE 25 MG/1
25 TABLET, FILM COATED ORAL 2 TIMES DAILY PRN
Qty: 90 TABLET | Refills: 3 | Status: SHIPPED | OUTPATIENT
Start: 2022-10-20 | End: 2023-02-03 | Stop reason: SDUPTHER

## 2022-10-20 RX ORDER — PROPRANOLOL HYDROCHLORIDE 10 MG/1
10 TABLET ORAL 2 TIMES DAILY
Qty: 180 TABLET | Refills: 3 | Status: SHIPPED | OUTPATIENT
Start: 2022-10-20 | End: 2023-02-03 | Stop reason: SDUPTHER

## 2022-10-20 RX ORDER — IRBESARTAN 300 MG/1
300 TABLET ORAL DAILY
Qty: 90 TABLET | Refills: 3 | Status: SHIPPED | OUTPATIENT
Start: 2022-10-20 | End: 2023-02-03 | Stop reason: SDUPTHER

## 2022-10-20 RX ORDER — SPIRONOLACTONE 50 MG/1
50 TABLET, FILM COATED ORAL DAILY
Qty: 90 TABLET | Refills: 3 | Status: SHIPPED | OUTPATIENT
Start: 2022-10-20 | End: 2023-02-03 | Stop reason: SDUPTHER

## 2022-10-20 RX ORDER — ESTRADIOL 10 UG/1
10 INSERT VAGINAL
Qty: 8 TABLET | Refills: 3 | Status: SHIPPED | OUTPATIENT
Start: 2022-10-20 | End: 2023-02-03 | Stop reason: SDUPTHER

## 2022-10-20 RX ORDER — AMLODIPINE BESYLATE 5 MG/1
5 TABLET ORAL DAILY
Qty: 90 TABLET | Refills: 3 | Status: SHIPPED | OUTPATIENT
Start: 2022-10-20 | End: 2023-02-03 | Stop reason: SDUPTHER

## 2022-10-20 RX ORDER — BUSPIRONE HYDROCHLORIDE 15 MG/1
15 TABLET ORAL 2 TIMES DAILY
Qty: 180 TABLET | Refills: 3 | Status: SHIPPED
Start: 2022-10-20 | End: 2022-10-20

## 2022-10-20 ASSESSMENT — ENCOUNTER SYMPTOMS
FEVER: 0
NERVOUS/ANXIOUS: 1
HEARTBURN: 1
CHILLS: 0
WHEEZING: 0
HEADACHES: 0
COUGH: 1
SHORTNESS OF BREATH: 0
DEPRESSION: 1

## 2022-10-20 NOTE — PATIENT INSTRUCTIONS
Healthy Diet  -Mediterranean Diet or DASH Diet  -good rule of thumb for portions = size of the palm of your hand  -healthy plate = tells you how to build a meal healthily    Exercise  -150 minutes of moderate aerobic activity per week OR 75 minutes of vigorous aerobic activity per week  +  -2 days per week of strength    Fat-burning exercise  -you want to be able to talk but not sing while doing the exercise  -Heart rate zone = 60-70% of max heart rate    Calculate Heart Rate  -Max HR = 220 - age  -Fat burning zone = 0.6 x max HR --- 0.7 x max HR     Journaling  -The best way to keep track of calories in and calories burned by exercise  -Possible smart phone apps = My Fitness Pal, Fitbit

## 2022-10-20 NOTE — PROGRESS NOTES
"Subjective:     CC: Pt presents today to establish care with me. Prior pcp Dr. Saulo Fajardo. Requesting refills on medications.     HPI:   Kelly presents today with    Problem   Postmenopausal Atrophic Vaginitis   Obesity (Bmi 30-39.9)   Anxiety   Primary Hypertension   Hld (Hyperlipidemia)   Cigarette Smoker One Half Pack A Day Or Less   Snoring   Briseno's Esophagus    Dr. Cunningham     Acute Cystitis With Hematuria (Resolved)   Chronic Obstructive Pulmonary Disease (Hcc) (Resolved)       Health Maintenance: Discussed recommended vaccinations, patient declines hep B, influenza, Tdap, COVID, pneumococcal.  Patient is agreeable to colon cancer screening, as well as spirometry.  Diet: breakfast: nothing, or cereal with 2% milk; lunch: nothing, or salad, fruit snacks; dinner: pizza. Eating fresh veggies/fruits couple times weekly, variety of meats, limited fast food/junk food/ no soda  Exercise: cleaning house/yard work, physical job at [employer].  Recommendation 150 minutes/week  Substance Abuse: no  Safe in relationship. Yes,   Seat belts, bike helmet, gun safety discussed.  Sun protection used.    ROS:  Review of Systems   Constitutional:  Negative for chills and fever.   HENT:  Positive for congestion (chronic).    Eyes:         Left eye flashing lights occasionally   Respiratory:  Positive for cough. Negative for shortness of breath and wheezing.    Gastrointestinal:  Positive for heartburn.   Genitourinary:         Stress Incontinence s/p hysterectomy  Frequent UTI   Neurological:  Negative for headaches.   Psychiatric/Behavioral:  Positive for depression. The patient is nervous/anxious.    All other systems reviewed and are negative.    Objective:     Exam:  BP (!) 152/84 (BP Location: Right arm, Patient Position: Sitting, BP Cuff Size: Small adult)   Pulse 74   Temp 36.7 °C (98.1 °F) (Temporal)   Resp 16   Ht 1.6 m (5' 3\")   Wt 82 kg (180 lb 12.8 oz)   SpO2 93%   BMI 32.03 kg/m²  Body mass " index is 32.03 kg/m².    Physical Exam  Vitals reviewed.   Constitutional:       Appearance: Normal appearance.   HENT:      Head: Normocephalic and atraumatic.      Right Ear: Tympanic membrane, ear canal and external ear normal.      Left Ear: Ear canal and external ear normal. Tympanic membrane is injected.   Eyes:      Extraocular Movements: Extraocular movements intact.      Conjunctiva/sclera: Conjunctivae normal.      Pupils: Pupils are equal, round, and reactive to light.   Cardiovascular:      Rate and Rhythm: Normal rate and regular rhythm.      Pulses: Normal pulses.      Heart sounds: Normal heart sounds. No murmur heard.  Pulmonary:      Effort: Pulmonary effort is normal. No respiratory distress.      Breath sounds: Normal breath sounds. No wheezing.   Abdominal:      General: Bowel sounds are normal.      Palpations: Abdomen is soft.   Musculoskeletal:         General: No swelling, tenderness or deformity. Normal range of motion.   Skin:     General: Skin is warm and dry.      Capillary Refill: Capillary refill takes less than 2 seconds.   Neurological:      General: No focal deficit present.      Mental Status: She is alert and oriented to person, place, and time.      Cranial Nerves: No cranial nerve deficit.      Sensory: No sensory deficit.      Motor: No weakness.       Labs: Reviewed from March 2022, [employer]    Assessment & Plan:     61 y.o. female with the following -     Problem List Items Addressed This Visit       Briseno's esophagus     Chronic, stable. Continue nexium 20 mg daily for this.          Snoring     Chronic, unstable. Reports  complains about snoring, feeling tired frequently.          Relevant Orders    Referral to Pulmonary and Sleep Medicine    Cigarette smoker one half pack a day or less     Chronic, stable. Currently smoking 6 cigarettes daily. Has the desire to quit.          Relevant Orders    PULMONARY FUNCTION TESTS -Test requested: Complete Pulmonary  Function Test    Primary hypertension     Chronic, stable. Continue amlodipine 5 mg daily, propranolol 10 mg twice daily, irbesartan 300 mg daily, spironolactone 50 mg daily.   Will get home readings and follow up.          Relevant Medications    pravastatin (PRAVACHOL) 20 MG Tab    amLODIPine (NORVASC) 5 MG Tab    irbesartan (AVAPRO) 300 MG Tab    spironolactone (ALDACTONE) 50 MG Tab    propranolol (INDERAL) 10 MG Tab    hydrALAZINE (APRESOLINE) 25 MG Tab    Other Relevant Orders    Referral to Pulmonary and Sleep Medicine    Comp Metabolic Panel    HLD (hyperlipidemia)     Chronic, stable. Continue Pravastatin 20 mg daily for this.         Relevant Medications    pravastatin (PRAVACHOL) 20 MG Tab    amLODIPine (NORVASC) 5 MG Tab    irbesartan (AVAPRO) 300 MG Tab    spironolactone (ALDACTONE) 50 MG Tab    propranolol (INDERAL) 10 MG Tab    hydrALAZINE (APRESOLINE) 25 MG Tab    Anxiety     Chronic, unstable. Has run out of medications, requesting refills for fluoxetine, buspirone, and hydroxyzine for this. Feels her symptoms are stable on these medications.          Relevant Medications    hydrOXYzine HCl (ATARAX) 25 MG Tab    FLUoxetine (PROZAC) 10 MG Cap    hydrALAZINE (APRESOLINE) 25 MG Tab    busPIRone (BUSPAR) 15 MG tablet    Postmenopausal atrophic vaginitis     Chronic, stable. S/p hysterectomy. Has taken estradiol 10 mcg tab vaginally for dryness.         Relevant Medications    estradiol (VAGIFEM) 10 MCG Tab    Obesity (BMI 30-39.9)    Relevant Orders    Patient identified as having weight management issue.  Appropriate orders and counseling given.     Other Visit Diagnoses       Chronic obstructive pulmonary disease, unspecified COPD type (HCC)        Relevant Medications    albuterol 108 (90 Base) MCG/ACT Aero Soln inhalation aerosol    Screening for colorectal cancer        Relevant Orders    Referral to GI for Colonoscopy    Wheezing        Relevant Medications    albuterol 108 (90 Base) MCG/ACT Aero  Soln inhalation aerosol    Other Relevant Orders    PULMONARY FUNCTION TESTS -Test requested: Complete Pulmonary Function Test    Genetic screening        Relevant Orders    Referral to Genetic Research Studies    Vision changes        Relevant Orders    Referral to Ophthalmology    Flashing lights seen        Relevant Orders    Referral to Ophthalmology          I have placed the below orders and discussed them with an approved delegating provider.  The MA is performing the below orders under the direction of Dr. Martinez.    I spent a total of 45 minutes with record review, exam, communication with the patient, communication with other providers, and documentation of this encounter.    Return in about 3 months (around 1/20/2023) for Labs.    Please note that this dictation was created using voice recognition software. I have made every reasonable attempt to correct obvious errors, but I expect that there are errors of grammar and possibly content that I did not discover before finalizing the note.

## 2022-10-20 NOTE — ASSESSMENT & PLAN NOTE
Chronic, unstable. Has run out of medications, requesting refills for fluoxetine, buspirone, and hydroxyzine for this. Feels her symptoms are stable on these medications.

## 2022-10-20 NOTE — ASSESSMENT & PLAN NOTE
Chronic, stable. Continue amlodipine 5 mg daily, propranolol 10 mg twice daily, irbesartan 300 mg daily, spironolactone 50 mg daily.   Will get home readings and follow up.

## 2022-10-26 ENCOUNTER — OFFICE VISIT (OUTPATIENT)
Dept: MEDICAL GROUP | Facility: PHYSICIAN GROUP | Age: 62
End: 2022-10-26
Payer: COMMERCIAL

## 2022-10-26 VITALS
TEMPERATURE: 98.4 F | WEIGHT: 179.4 LBS | HEIGHT: 63 IN | SYSTOLIC BLOOD PRESSURE: 122 MMHG | OXYGEN SATURATION: 97 % | HEART RATE: 82 BPM | RESPIRATION RATE: 16 BRPM | DIASTOLIC BLOOD PRESSURE: 76 MMHG | BODY MASS INDEX: 31.79 KG/M2

## 2022-10-26 DIAGNOSIS — R05.1 ACUTE COUGH: ICD-10-CM

## 2022-10-26 DIAGNOSIS — J01.11 ACUTE RECURRENT FRONTAL SINUSITIS: ICD-10-CM

## 2022-10-26 DIAGNOSIS — B00.9 HERPES SIMPLEX: ICD-10-CM

## 2022-10-26 PROCEDURE — 99214 OFFICE O/P EST MOD 30 MIN: CPT

## 2022-10-26 RX ORDER — LEVOFLOXACIN 500 MG/1
500 TABLET, FILM COATED ORAL DAILY
Qty: 7 TABLET | Refills: 0 | Status: SHIPPED | OUTPATIENT
Start: 2022-10-26 | End: 2022-11-02

## 2022-10-26 RX ORDER — VALACYCLOVIR HYDROCHLORIDE 1 G/1
2000 TABLET, FILM COATED ORAL 2 TIMES DAILY
Qty: 20 TABLET | Refills: 0 | Status: SHIPPED | OUTPATIENT
Start: 2022-10-26 | End: 2022-10-31

## 2022-10-26 ASSESSMENT — ENCOUNTER SYMPTOMS
WHEEZING: 1
COUGH: 1
CHILLS: 1
SINUS PAIN: 1
FEVER: 1
SPUTUM PRODUCTION: 1

## 2022-10-26 NOTE — PATIENT INSTRUCTIONS
Sinusitis, Adult  Sinusitis is inflammation of your sinuses. Sinuses are hollow spaces in the bones around your face. Your sinuses are located:  Around your eyes.  In the middle of your forehead.  Behind your nose.  In your cheekbones.  Mucus normally drains out of your sinuses. When your nasal tissues become inflamed or swollen, mucus can become trapped or blocked. This allows bacteria, viruses, and fungi to grow, which leads to infection. Most infections of the sinuses are caused by a virus.  Sinusitis can develop quickly. It can last for up to 4 weeks (acute) or for more than 12 weeks (chronic). Sinusitis often develops after a cold.  What are the causes?  This condition is caused by anything that creates swelling in the sinuses or stops mucus from draining. This includes:  Allergies.  Asthma.  Infection from bacteria or viruses.  Deformities or blockages in your nose or sinuses.  Abnormal growths in the nose (nasal polyps).  Pollutants, such as chemicals or irritants in the air.  Infection from fungi (rare).  What increases the risk?  You are more likely to develop this condition if you:  Have a weak body defense system (immune system).  Do a lot of swimming or diving.  Overuse nasal sprays.  Smoke.  What are the signs or symptoms?  The main symptoms of this condition are pain and a feeling of pressure around the affected sinuses. Other symptoms include:  Stuffy nose or congestion.  Thick drainage from your nose.  Swelling and warmth over the affected sinuses.  Headache.  Upper toothache.  A cough that may get worse at night.  Extra mucus that collects in the throat or the back of the nose (postnasal drip).  Decreased sense of smell and taste.  Fatigue.  A fever.  Sore throat.  Bad breath.  How is this diagnosed?  This condition is diagnosed based on:  Your symptoms.  Your medical history.  A physical exam.  Tests to find out if your condition is acute or chronic. This may include:  Checking your nose for nasal  polyps.  Viewing your sinuses using a device that has a light (endoscope).  Testing for allergies or bacteria.  Imaging tests, such as an MRI or CT scan.  In rare cases, a bone biopsy may be done to rule out more serious types of fungal sinus disease.  How is this treated?  Treatment for sinusitis depends on the cause and whether your condition is chronic or acute.  If caused by a virus, your symptoms should go away on their own within 10 days. You may be given medicines to relieve symptoms. They include:  Medicines that shrink swollen nasal passages (topical intranasal decongestants).  Medicines that treat allergies (antihistamines).  A spray that eases inflammation of the nostrils (topical intranasal corticosteroids).  Rinses that help get rid of thick mucus in your nose (nasal saline washes).  If caused by bacteria, your health care provider may recommend waiting to see if your symptoms improve. Most bacterial infections will get better without antibiotic medicine. You may be given antibiotics if you have:  A severe infection.  A weak immune system.  If caused by narrow nasal passages or nasal polyps, you may need to have surgery.  Follow these instructions at home:  Medicines  Take, use, or apply over-the-counter and prescription medicines only as told by your health care provider. These may include nasal sprays.  If you were prescribed an antibiotic medicine, take it as told by your health care provider. Do not stop taking the antibiotic even if you start to feel better.  Hydrate and humidify    Drink enough fluid to keep your urine pale yellow. Staying hydrated will help to thin your mucus.  Use a cool mist humidifier to keep the humidity level in your home above 50%.  Inhale steam for 10-15 minutes, 3-4 times a day, or as told by your health care provider. You can do this in the bathroom while a hot shower is running.  Limit your exposure to cool or dry air.  Rest  Rest as much as possible.  Sleep with your  head raised (elevated).  Make sure you get enough sleep each night.  General instructions    Apply a warm, moist washcloth to your face 3-4 times a day or as told by your health care provider. This will help with discomfort.  Wash your hands often with soap and water to reduce your exposure to germs. If soap and water are not available, use hand .  Do not smoke. Avoid being around people who are smoking (secondhand smoke).  Keep all follow-up visits as told by your health care provider. This is important.  Contact a health care provider if:  You have a fever.  Your symptoms get worse.  Your symptoms do not improve within 10 days.  Get help right away if:  You have a severe headache.  You have persistent vomiting.  You have severe pain or swelling around your face or eyes.  You have vision problems.  You develop confusion.  Your neck is stiff.  You have trouble breathing.  Summary  Sinusitis is soreness and inflammation of your sinuses. Sinuses are hollow spaces in the bones around your face.  This condition is caused by nasal tissues that become inflamed or swollen. The swelling traps or blocks the flow of mucus. This allows bacteria, viruses, and fungi to grow, which leads to infection.  If you were prescribed an antibiotic medicine, take it as told by your health care provider. Do not stop taking the antibiotic even if you start to feel better.  Keep all follow-up visits as told by your health care provider. This is important.  This information is not intended to replace advice given to you by your health care provider. Make sure you discuss any questions you have with your health care provider.  Document Released: 12/18/2006 Document Revised: 05/20/2019 Document Reviewed: 05/20/2019  Elsevier Patient Education © 2020 Elsevier Inc.

## 2022-10-26 NOTE — ASSESSMENT & PLAN NOTE
Acute, symptoms started 10/23/22, with fevers, which have resolved. Currently having pain to sinuses. Has been using neti pot and flonase, taking OTC medications for symptom relief, but sinus pain and pressure is worsening. Cannot take pcn for anaphylaxis, or doxy for itching. Recommend taking antihystamine with levofloxacin if this is a problem.  Recommend sterilizing neti pot/getting new tooth brush and new flonase vial to prevent recontamination.

## 2022-10-26 NOTE — LETTER
[facility]  [address]  [place] [address]     October 26, 2022    Patient: Kelly Oakley   YOB: 1960   Date of Visit: 10/26/2022       To Whom It May Concern:    Kelly Oakley was seen and treated in our department on 10/26/2022. It is my recommendation she remain home until she is no longer experiencing fevers.       Sincerely,         POPPY Duncan.

## 2022-10-26 NOTE — PROGRESS NOTES
"Subjective:     CC: Patient presents today complaining of sinus infection which started over the weekend on her birthday.  She reports that she gets sinus infections on somewhat of a regular basis.  Requesting [facility] for this, discussed recommended antibiotic regimen for sinus infection does not include this medication and preferred medications are listed as allergies.    HPI:   Kelly presents today with    Problem   Acute Sinus Infection       ROS:  Review of Systems   Constitutional:  Positive for chills, fever (last fever [place] 10/23/2022) and malaise/fatigue.   HENT:  Positive for congestion and sinus pain.    Respiratory:  Positive for cough, sputum production and wheezing.    All other systems reviewed and are negative.    Objective:     Exam:  /76 (BP Location: Right arm, Patient Position: Sitting, BP Cuff Size: Small adult)   Pulse 82   Temp 36.9 °C (98.4 °F) (Oral)   Resp 16   Ht 1.6 m (5' 3\")   Wt 81.4 kg (179 lb 6.4 oz)   SpO2 97%   BMI 31.78 kg/m²  Body mass index is 31.78 kg/m².    Physical Exam  Vitals reviewed.   Constitutional:       Appearance: She is ill-appearing.   HENT:      Head: Normocephalic and atraumatic.      Right Ear: Tympanic membrane, ear canal and external ear normal.      Left Ear: Tympanic membrane, ear canal and external ear normal.      Nose: Congestion present.      Right Sinus: Maxillary sinus tenderness and frontal sinus tenderness present.      Left Sinus: Maxillary sinus tenderness and frontal sinus tenderness present.   Eyes:      Extraocular Movements: Extraocular movements intact.      Conjunctiva/sclera: Conjunctivae normal.      Pupils: Pupils are equal, round, and reactive to light.   Cardiovascular:      Rate and Rhythm: Normal rate and regular rhythm.      Pulses: Normal pulses.      Heart sounds: Normal heart sounds. No murmur heard.  Pulmonary:      Effort: Pulmonary effort is normal. No respiratory distress.      Breath sounds: Normal breath sounds. No " wheezing.   Abdominal:      General: Bowel sounds are normal.      Palpations: Abdomen is soft.   Lymphadenopathy:      Cervical: No cervical adenopathy.   Skin:     General: Skin is warm and dry.      Capillary Refill: Capillary refill takes less than 2 seconds.   Neurological:      General: No focal deficit present.      Mental Status: She is alert and oriented to person, place, and time.   Psychiatric:         Mood and Affect: Mood normal.         Behavior: Behavior normal.     Assessment & Plan:     62 y.o. female with the following -     Problem List Items Addressed This Visit       Acute sinus infection     Acute, symptoms started 10/23/22, with fevers, which have resolved. Currently having pain to sinuses. Has been using neti pot and flonase, taking OTC medications for symptom relief, but sinus pain and pressure is worsening. Cannot take pcn for anaphylaxis, or doxy for itching. Recommend taking antihystamine with levofloxacin if this is a problem.  Recommend sterilizing neti pot/getting new tooth brush and new flonase vial to prevent recontamination.          Relevant Medications    levoFLOXacin (LEVAQUIN) 500 MG tablet     Other Visit Diagnoses       Herpes simplex        Relevant Medications    valacyclovir (VALTREX) 1 GM Tab    Acute cough              I have placed the below orders and discussed them with an approved delegating provider.  The MA is performing the below orders under the direction of Dr. Martinez.    I spent a total of 24 minutes with record review, exam, communication with the patient, communication with other providers, and documentation of this encounter.    Return if symptoms worsen or fail to improve.    Please note that this dictation was created using voice recognition software. I have made every reasonable attempt to correct obvious errors, but I expect that there are errors of grammar and possibly content that I did not discover before finalizing the note.

## 2022-12-07 ENCOUNTER — OFFICE VISIT (OUTPATIENT)
Dept: MEDICAL GROUP | Facility: PHYSICIAN GROUP | Age: 62
End: 2022-12-07
Payer: COMMERCIAL

## 2022-12-07 DIAGNOSIS — R05.1 ACUTE COUGH: ICD-10-CM

## 2022-12-07 DIAGNOSIS — J06.9 ACUTE URI: ICD-10-CM

## 2022-12-07 LAB
EXTERNAL QUALITY CONTROL: NORMAL
FLUAV+FLUBV AG SPEC QL IA: NEGATIVE
INT CON NEG: NORMAL
INT CON NEG: NORMAL
INT CON POS: NORMAL
INT CON POS: NORMAL
SARS-COV+SARS-COV-2 AG RESP QL IA.RAPID: NEGATIVE

## 2022-12-07 PROCEDURE — 99214 OFFICE O/P EST MOD 30 MIN: CPT

## 2022-12-07 PROCEDURE — 87426 SARSCOV CORONAVIRUS AG IA: CPT

## 2022-12-07 PROCEDURE — 87804 INFLUENZA ASSAY W/OPTIC: CPT

## 2022-12-07 RX ORDER — BENZONATATE 100 MG/1
100 CAPSULE ORAL 3 TIMES DAILY PRN
Qty: 60 CAPSULE | Refills: 0 | Status: SHIPPED | OUTPATIENT
Start: 2022-12-07 | End: 2023-10-26

## 2022-12-07 RX ORDER — AZELASTINE 1 MG/ML
1 SPRAY, METERED NASAL 2 TIMES DAILY
Qty: 30 ML | Refills: 0 | Status: SHIPPED | OUTPATIENT
Start: 2022-12-07

## 2022-12-07 NOTE — PATIENT INSTRUCTIONS
Cough, Adult  Coughing is a reflex that clears your throat and your airways (respiratory system). Coughing helps to heal and protect your lungs. It is normal to cough occasionally, but a cough that happens with other symptoms or lasts a long time may be a sign of a condition that needs treatment. An acute cough may only last 2-3 weeks, while a chronic cough may last 8 or more weeks.  Coughing is commonly caused by:  Infection of the respiratory systemby viruses or bacteria.  Breathing in substances that irritate your lungs.  Allergies.  Asthma.  Mucus that runs down the back of your throat (postnasal drip).  Smoking.  Acid backing up from the stomach into the esophagus (gastroesophageal reflux).  Certain medicines.  Chronic lung problems.  Other medical conditions such as heart failure or a blood clot in the lung (pulmonary embolism).  Follow these instructions at home:  Medicines  Take over-the-counter and prescription medicines only as told by your health care provider.  Talk with your health care provider before you take a cough suppressant medicine.  Lifestyle    Avoid cigarette smoke. Do not use any products that contain nicotine or tobacco, such as cigarettes, e-cigarettes, and chewing tobacco. If you need help quitting, ask your health care provider.  Drink enough fluid to keep your urine pale yellow.  Avoid caffeine.  Do not drink alcohol if your health care provider tells you not to drink.  General instructions    Pay close attention to changes in your cough. Tell your health care provider about them.  Always cover your mouth when you cough.  Avoid things that make you cough, such as perfume, candles, cleaning products, or campfire or tobacco smoke.  If the air is dry, use a cool mist vaporizer or humidifier in your bedroom or your home to help loosen secretions.  If your cough is worse at night, try to sleep in a semi-upright position.  Rest as needed.  Keep all follow-up visits as told by your health care  provider. This is important.  Contact a health care provider if you:  Have new symptoms.  Cough up pus.  Have a cough that does not get better after 2-3 weeks or gets worse.  Cannot control your cough with cough suppressant medicines and you are losing sleep.  Have pain that gets worse or pain that is not helped with medicine.  Have a fever.  Have unexplained weight loss.  Have night sweats.  Get help right away if:  You cough up blood.  You have difficulty breathing.  Your heartbeat is very fast.  These symptoms may represent a serious problem that is an emergency. Do not wait to see if the symptoms will go away. Get medical help right away. Call your local emergency services (911 in the [place] Do not drive yourself to the hospital.  Summary  Coughing is a reflex that clears your throat and your airways. It is normal to cough occasionally, but a cough that happens with other symptoms or lasts a long time may be a sign of a condition that needs treatment.  Take over-the-counter and prescription medicines only as told by your health care provider.  Always cover your mouth when you cough.  Contact a health care provider if you have new symptoms or a cough that does not get better after 2-3 weeks or gets worse.  This information is not intended to replace advice given to you by your health care provider. Make sure you discuss any questions you have with your health care provider.  Document Released: 06/15/2012 Document Revised: 01/06/2020 Document Reviewed: 01/06/2020  Elsevier Patient Education © 2020 Elsevier Inc.

## 2022-12-07 NOTE — LETTER
[facility]  [address]  [place] [address]     December 7, 2022    Patient: Kelly Oakley   YOB: 1960   Date of Visit: 12/7/2022       To Whom It May Concern:    Kelly Oakley was seen and treated in our department on 12/7/2022. Due to her current condition, is my recommendation she remain home, and she can return to work on 12/12/2022.     Sincerely,         POPPY Duncan.

## 2022-12-07 NOTE — PROGRESS NOTES
Chief Complaint   Patient presents with    Cough     X cough + phlegm     Nasal Congestion    Body Aches    Diarrhea     Watery         HPI: Patient is a 62 y.o. female complaining of 3 days of illness including: chills, dry and nonproductive cough, diarrhea, facial pain, fever, nasal congestion.   Mucus is: thick. Yellow  Similarly ill exposures: yes.  Treatments tried: OTC tylenol/ibuprofen, coricidin day/night  She  reports that she has been smoking cigarettes. She has a 20.00 pack-year smoking history. She has never used smokeless tobacco..     ROS: Reports fever, cough, nausea, changes in bowel movements- diarrhea or skin rash.      I reviewed the patient's medications, allergies and medical history:  Current Outpatient Medications   Medication Sig Dispense Refill    azelastine (ASTELIN) 137 MCG/SPRAY nasal spray Administer 1 Spray into affected nostril(S) 2 times a day. 30 mL 0    benzonatate (TESSALON) 100 MG Cap Take 1 Capsule by mouth 3 times a day as needed for Cough. 60 Capsule 0    hydrOXYzine HCl (ATARAX) 25 MG Tab Take 1 Tablet by mouth 2 times a day as needed for Anxiety. 90 Tablet 3    FLUoxetine (PROZAC) 10 MG Cap Take 1 Capsule by mouth every day. 90 Capsule 3    albuterol 108 (90 Base) MCG/ACT Aero Soln inhalation aerosol Inhale 2 Puffs every four hours as needed for Shortness of Breath. 1 Each 11    pravastatin (PRAVACHOL) 20 MG Tab Take 1 Tablet by mouth every day. 90 Tablet 3    amLODIPine (NORVASC) 5 MG Tab Take 1 Tablet by mouth every day. 90 Tablet 3    irbesartan (AVAPRO) 300 MG Tab Take 1 Tablet by mouth every day. 90 Tablet 3    spironolactone (ALDACTONE) 50 MG Tab Take 1 Tablet by mouth every day. 90 Tablet 3    estradiol (VAGIFEM) 10 MCG Tab Insert 1 Tablet into the vagina two times a week. 8 Tablet 3    propranolol (INDERAL) 10 MG Tab Take 1 Tablet by mouth 2 times a day. 180 Tablet 3    hydrALAZINE (APRESOLINE) 25 MG Tab Take 1 Tablet by mouth 2 times a day as needed (only if bp is  160 or above). 60 Tablet 3    busPIRone (BUSPAR) 15 MG tablet Take 1 Tablet by mouth 2 times a day. 180 Tablet 3    vitamin D2, Ergocalciferol, (DRISDOL) 1.25 MG (89647 UT) Cap capsule Take 1 Units by mouth every 7 days.      Multiple Vitamins-Minerals (CENTRUM ADULTS) Tab tablet Take 1 Tablet by mouth every day.      fluticasone (FLONASE) 50 MCG/ACT nasal spray Administer 2 Sprays into affected nostril(S) at bedtime. 16 g 11    cetirizine (ZYRTEC) 10 MG Tab Take 1 Tablet by mouth every evening. 90 Tablet 1    acetaminophen (TYLENOL) 325 MG Tab Take 650 mg by mouth every 6 hours as needed.      esomeprazole (NEXIUM) 20 MG capsule Take 20 mg by mouth 2 Times a Day.       No current facility-administered medications for this visit.     Macrodantin [nitrofurantoin macrocrystal], Pcn [penicillins], Doxycycline, Bactrim, Clarithromycin, Hydrochlorothiazide, and Omnicef  Past Medical History:   Diagnosis Date    Acute cystitis with hematuria 2/14/2022    Adrenal nodule 2012 2015 / nonfunctional / benign    Anemia     Anesthesia     Anxiety     Panic    Arrhythmia     palpatations    Arthritis     Morning stiffness    Atherosclerosis of arteries 2013    seen on abdominal CT    Briseno's  esophagus     Bronchitis 2019    Chest pain at rest 9/11/2015    Chronic obstructive pulmonary disease (HCC) 11/30/2021    Cigarette smoker one half pack a day or less 5/28/2015    Depression     Fatigue 9/11/2015    GERD (gastroesophageal reflux disease)     Headache(784.0)     sinus headache    Heart burn     Heart murmur     Dr. Krause-Stress trend    High cholesterol     History of HPV infection     HLD (hyperlipidemia) 9/11/2015    HTN (hypertension) 9/11/2015    IBD (inflammatory bowel disease)     Dr. Cunningham    Indigestion     MRSA carrier 2008    nose cellulitis    Nocturnal hypoxemia 10/15/2015    OSTEOPOROSIS     Osteopenia    Pneumonia 2005    PONV (postoperative nausea and vomiting)     S/P appendectomy     S/P  cholecystectomy     S/P hysterectomy with oophorectomy     endometriosis    Snoring     Ulcer     Barretts esophogitis    Urinary incontinence     Urinary tract infection, site not specified         EXAM:  There were no vitals taken for this visit.  General: Alert, no conversational dyspnea or audible wheeze, non-toxic appearance.  Eyes: PERRL, conjunctiva slightly injected, no eye discharge.  Ears: Normal pinnae,TM's normal bilaterally.  Nares: Patent with thick mucus.  Sinuses: tender over maxillary / frontal sinuses.  Throat: Erythematous injection without exudate.   Neck: Supple, with mildly enlarged cervical nodes.  Lungs: Clear to auscultation bilaterally, no wheeze, crackles or rhonchi.   Heart: Regular rate without murmur.  Skin: Warm and dry without rash.     ASSESSMENT:   1. Acute cough    2. Acute URI          PLAN:  1. Educated patient that majority of upper respiratory infections are viral and do not need antibiotics.   2. Twice daily use of nasal saline rinse or Neti-Pot.  3. OTC anti-pyretics and decongestants as needed.  4. Follow-up in office or urgent care for worsening symptoms, difficulty breathing, lack of expected recovery, or should new symptoms or problems arise.    I have placed the below orders and discussed them with an approved delegating provider.  The MA is performing the below orders under the direction of Dr. Goldman.

## 2022-12-12 ENCOUNTER — OFFICE VISIT (OUTPATIENT)
Dept: MEDICAL GROUP | Facility: PHYSICIAN GROUP | Age: 62
End: 2022-12-12
Payer: COMMERCIAL

## 2022-12-12 DIAGNOSIS — J01.10 ACUTE NON-RECURRENT FRONTAL SINUSITIS: ICD-10-CM

## 2022-12-12 PROCEDURE — 99214 OFFICE O/P EST MOD 30 MIN: CPT

## 2022-12-12 RX ORDER — LEVOFLOXACIN 500 MG/1
500 TABLET, FILM COATED ORAL DAILY
Qty: 7 TABLET | Refills: 0 | Status: SHIPPED | OUTPATIENT
Start: 2022-12-12 | End: 2022-12-19

## 2022-12-12 NOTE — PROGRESS NOTES
Chief Complaint   Patient presents with    Cough     Chest pain     Nasal Congestion    Headache        HPI: Patient is a 62 y.o. female complaining of 9 days of illness including: productive of green sputum cough, shortness of breath, facial pain, nasal congestion.   Mucus is: thick.  Similarly ill exposures: no.  Treatments tried: [place], nasal spray  She  reports that she has been smoking cigarettes. She has a 20.00 pack-year smoking history. She has never used smokeless tobacco..     ROS:  No nausea, changes in bowel movements or skin rash. Cough, low appetite, facial pain, ear pain.     I reviewed the patient's medications, allergies and medical history:  Current Outpatient Medications   Medication Sig Dispense Refill    levoFLOXacin (LEVAQUIN) 500 MG tablet Take 1 Tablet by mouth every day for 7 days. 7 Tablet 0    azelastine (ASTELIN) 137 MCG/SPRAY nasal spray Administer 1 Spray into affected nostril(S) 2 times a day. 30 mL 0    benzonatate (TESSALON) 100 MG Cap Take 1 Capsule by mouth 3 times a day as needed for Cough. 60 Capsule 0    hydrOXYzine HCl (ATARAX) 25 MG Tab Take 1 Tablet by mouth 2 times a day as needed for Anxiety. 90 Tablet 3    FLUoxetine (PROZAC) 10 MG Cap Take 1 Capsule by mouth every day. 90 Capsule 3    albuterol 108 (90 Base) MCG/ACT Aero Soln inhalation aerosol Inhale 2 Puffs every four hours as needed for Shortness of Breath. 1 Each 11    pravastatin (PRAVACHOL) 20 MG Tab Take 1 Tablet by mouth every day. 90 Tablet 3    amLODIPine (NORVASC) 5 MG Tab Take 1 Tablet by mouth every day. 90 Tablet 3    irbesartan (AVAPRO) 300 MG Tab Take 1 Tablet by mouth every day. 90 Tablet 3    spironolactone (ALDACTONE) 50 MG Tab Take 1 Tablet by mouth every day. 90 Tablet 3    estradiol (VAGIFEM) 10 MCG Tab Insert 1 Tablet into the vagina two times a week. 8 Tablet 3    propranolol (INDERAL) 10 MG Tab Take 1 Tablet by mouth 2 times a day. 180 Tablet 3    hydrALAZINE (APRESOLINE) 25 MG Tab Take 1  Tablet by mouth 2 times a day as needed (only if bp is 160 or above). 60 Tablet 3    busPIRone (BUSPAR) 15 MG tablet Take 1 Tablet by mouth 2 times a day. 180 Tablet 3    vitamin D2, Ergocalciferol, (DRISDOL) 1.25 MG (00746 UT) Cap capsule Take 1 Units by mouth every 7 days.      Multiple Vitamins-Minerals (CENTRUM ADULTS) Tab tablet Take 1 Tablet by mouth every day.      fluticasone (FLONASE) 50 MCG/ACT nasal spray Administer 2 Sprays into affected nostril(S) at bedtime. 16 g 11    cetirizine (ZYRTEC) 10 MG Tab Take 1 Tablet by mouth every evening. 90 Tablet 1    acetaminophen (TYLENOL) 325 MG Tab Take 650 mg by mouth every 6 hours as needed.      esomeprazole (NEXIUM) 20 MG capsule Take 20 mg by mouth 2 Times a Day.       No current facility-administered medications for this visit.     Macrodantin [nitrofurantoin macrocrystal], Pcn [penicillins], Doxycycline, Bactrim, Clarithromycin, Hydrochlorothiazide, and Omnicef  Past Medical History:   Diagnosis Date    Acute cystitis with hematuria 2/14/2022    Adrenal nodule 2012 2015 / nonfunctional / benign    Anemia     Anesthesia     Anxiety     Panic    Arrhythmia     palpatations    Arthritis     Morning stiffness    Atherosclerosis of arteries 2013    seen on abdominal CT    Briseno's  esophagus     Bronchitis 2019    Chest pain at rest 9/11/2015    Chronic obstructive pulmonary disease (HCC) 11/30/2021    Cigarette smoker one half pack a day or less 5/28/2015    Depression     Fatigue 9/11/2015    GERD (gastroesophageal reflux disease)     Headache(784.0)     sinus headache    Heart burn     Heart murmur     Dr. Krause-Stress trend    High cholesterol     History of HPV infection     HLD (hyperlipidemia) 9/11/2015    HTN (hypertension) 9/11/2015    IBD (inflammatory bowel disease)     Dr. Cunningham    Indigestion     MRSA carrier 2008    nose cellulitis    Nocturnal hypoxemia 10/15/2015    OSTEOPOROSIS     Osteopenia    Pneumonia 2005    PONV (postoperative nausea  and vomiting)     S/P appendectomy     S/P cholecystectomy     S/P hysterectomy with oophorectomy     endometriosis    Snoring     Ulcer     Barretts esophogitis    Urinary incontinence     Urinary tract infection, site not specified         EXAM:  There were no vitals taken for this visit.  General: Alert, no conversational dyspnea or audible wheeze, non-toxic appearance.  Eyes: PERRL, conjunctiva slightly injected, no eye discharge.  Ears: Normal pinnae,TM's normal bilaterally.  Nares: Patent with thick mucus.  Sinuses: tender over maxillary / frontal sinuses.  Throat: Erythematous injection without exudate.   Neck: Supple, with no adenopathy.  Lungs: Clear to auscultation bilaterally, no wheeze, crackles or rhonchi.   Heart: Regular rate without murmur.  Skin: Warm and dry without rash.     ASSESSMENT:   1. Acute non-recurrent frontal sinusitis       1. Acute non-recurrent frontal sinusitis  - DX-CHEST-2 VIEWS; Future  - levoFLOXacin (LEVAQUIN) 500 MG tablet; Take 1 Tablet by mouth every day for 7 days.  Dispense: 7 Tablet; Refill: 0    I have placed the below orders and discussed them with an approved delegating provider.  The MA is performing the below orders under the direction of Dr. Goldman.    PLAN:  1. Educated patient that majority of upper respiratory infections are viral and do not need antibiotics. As symptoms have been worsening over the last week, will treat with antibiotics.  2. Twice daily use of nasal saline rinse or Neti-Pot.  3. OTC anti-pyretics and decongestants as needed.  4. Follow-up in office or urgent care for worsening symptoms, difficulty breathing, lack of expected recovery, or should new symptoms or problems arise.

## 2022-12-12 NOTE — LETTER
[facility]  [address]  [place] [address]     December 12, 2022    Patient: Kelly Oakley   YOB: 1960   Date of Visit: 12/12/2022       To Whom It May Concern:    Kelly Oakley was seen and treated in our department on 12/12/2022. It is my recommendation she remain home from work until her symptoms are improved.     Sincerely,         POPPY Duncan.

## 2022-12-13 ENCOUNTER — APPOINTMENT (OUTPATIENT)
Dept: RADIOLOGY | Facility: IMAGING CENTER | Age: 62
End: 2022-12-13
Payer: COMMERCIAL

## 2022-12-13 DIAGNOSIS — J01.10 ACUTE NON-RECURRENT FRONTAL SINUSITIS: ICD-10-CM

## 2022-12-13 PROCEDURE — 71046 X-RAY EXAM CHEST 2 VIEWS: CPT | Mod: TC | Performed by: FAMILY MEDICINE

## 2023-02-03 DIAGNOSIS — R05.1 ACUTE COUGH: ICD-10-CM

## 2023-02-03 DIAGNOSIS — N95.2 POSTMENOPAUSAL ATROPHIC VAGINITIS: ICD-10-CM

## 2023-02-03 DIAGNOSIS — E78.2 MIXED HYPERLIPIDEMIA: ICD-10-CM

## 2023-02-03 DIAGNOSIS — I10 PRIMARY HYPERTENSION: ICD-10-CM

## 2023-02-03 DIAGNOSIS — R06.2 WHEEZING: ICD-10-CM

## 2023-02-03 DIAGNOSIS — F41.9 ANXIETY: ICD-10-CM

## 2023-02-03 DIAGNOSIS — J30.89 SEASONAL ALLERGIC RHINITIS DUE TO OTHER ALLERGIC TRIGGER: ICD-10-CM

## 2023-02-03 DIAGNOSIS — K22.710 BARRETT'S ESOPHAGUS WITH LOW GRADE DYSPLASIA: ICD-10-CM

## 2023-02-03 RX ORDER — AMLODIPINE BESYLATE 5 MG/1
5 TABLET ORAL DAILY
Qty: 90 TABLET | Refills: 3 | Status: SHIPPED | OUTPATIENT
Start: 2023-02-03 | End: 2023-04-28

## 2023-02-03 RX ORDER — BUSPIRONE HYDROCHLORIDE 15 MG/1
15 TABLET ORAL 2 TIMES DAILY
Qty: 180 TABLET | Refills: 3 | Status: SHIPPED | OUTPATIENT
Start: 2023-02-03 | End: 2024-02-13

## 2023-02-03 RX ORDER — CETIRIZINE HYDROCHLORIDE 10 MG/1
10 TABLET ORAL EVERY EVENING
Qty: 90 TABLET | Refills: 1 | OUTPATIENT
Start: 2023-02-03

## 2023-02-03 RX ORDER — FLUOXETINE 10 MG/1
10 CAPSULE ORAL DAILY
Qty: 90 CAPSULE | Refills: 3 | Status: SHIPPED | OUTPATIENT
Start: 2023-02-03 | End: 2023-12-22

## 2023-02-03 RX ORDER — MULTIVITAMIN/IRON/FOLIC ACID 18MG-0.4MG
1 TABLET ORAL DAILY
OUTPATIENT
Start: 2023-02-03

## 2023-02-03 RX ORDER — FLUTICASONE PROPIONATE 50 MCG
2 SPRAY, SUSPENSION (ML) NASAL
Qty: 16 G | Refills: 11 | Status: SHIPPED | OUTPATIENT
Start: 2023-02-03

## 2023-02-03 RX ORDER — ALBUTEROL SULFATE 90 UG/1
2 AEROSOL, METERED RESPIRATORY (INHALATION) EVERY 4 HOURS PRN
Qty: 1 EACH | Refills: 3 | Status: SHIPPED | OUTPATIENT
Start: 2023-02-03

## 2023-02-03 RX ORDER — PROPRANOLOL HYDROCHLORIDE 10 MG/1
10 TABLET ORAL 2 TIMES DAILY
Qty: 180 TABLET | Refills: 3 | Status: SHIPPED | OUTPATIENT
Start: 2023-02-03

## 2023-02-03 RX ORDER — ACETAMINOPHEN 325 MG/1
650 TABLET ORAL EVERY 6 HOURS PRN
Qty: 30 TABLET | OUTPATIENT
Start: 2023-02-03

## 2023-02-03 RX ORDER — ERGOCALCIFEROL 1.25 MG/1
1 CAPSULE ORAL
Qty: 5 CAPSULE | OUTPATIENT
Start: 2023-02-03

## 2023-02-03 RX ORDER — PRAVASTATIN SODIUM 20 MG
20 TABLET ORAL DAILY
Qty: 90 TABLET | Refills: 3 | Status: SHIPPED | OUTPATIENT
Start: 2023-02-03 | End: 2024-02-13

## 2023-02-03 RX ORDER — AZELASTINE 1 MG/ML
1 SPRAY, METERED NASAL 2 TIMES DAILY
Qty: 30 ML | Refills: 0 | OUTPATIENT
Start: 2023-02-03

## 2023-02-03 RX ORDER — ESTRADIOL 10 UG/1
10 INSERT VAGINAL
Qty: 8 TABLET | Refills: 3 | Status: SHIPPED | OUTPATIENT
Start: 2023-02-03 | End: 2023-06-23

## 2023-02-03 RX ORDER — BENZONATATE 100 MG/1
100 CAPSULE ORAL 3 TIMES DAILY PRN
Qty: 60 CAPSULE | Refills: 0 | OUTPATIENT
Start: 2023-02-03

## 2023-02-03 RX ORDER — HYDROXYZINE HYDROCHLORIDE 25 MG/1
25 TABLET, FILM COATED ORAL 2 TIMES DAILY PRN
Qty: 90 TABLET | Refills: 3 | Status: SHIPPED | OUTPATIENT
Start: 2023-02-03 | End: 2023-07-27

## 2023-02-03 RX ORDER — SPIRONOLACTONE 50 MG/1
50 TABLET, FILM COATED ORAL DAILY
Qty: 90 TABLET | Refills: 3 | Status: SHIPPED | OUTPATIENT
Start: 2023-02-03 | End: 2024-02-13

## 2023-02-03 RX ORDER — IRBESARTAN 300 MG/1
300 TABLET ORAL DAILY
Qty: 90 TABLET | Refills: 3 | Status: SHIPPED | OUTPATIENT
Start: 2023-02-03 | End: 2024-02-13

## 2023-02-03 RX ORDER — HYDRALAZINE HYDROCHLORIDE 25 MG/1
25 TABLET, FILM COATED ORAL 2 TIMES DAILY PRN
Qty: 60 TABLET | Refills: 3 | Status: SHIPPED | OUTPATIENT
Start: 2023-02-03

## 2023-02-27 ENCOUNTER — OFFICE VISIT (OUTPATIENT)
Dept: MEDICAL GROUP | Facility: PHYSICIAN GROUP | Age: 63
End: 2023-02-27
Payer: COMMERCIAL

## 2023-02-27 VITALS
BODY MASS INDEX: 31.01 KG/M2 | SYSTOLIC BLOOD PRESSURE: 142 MMHG | WEIGHT: 175 LBS | DIASTOLIC BLOOD PRESSURE: 88 MMHG | HEIGHT: 63 IN | TEMPERATURE: 97 F | OXYGEN SATURATION: 97 % | HEART RATE: 56 BPM

## 2023-02-27 DIAGNOSIS — E66.09 CLASS 1 OBESITY DUE TO EXCESS CALORIES WITH SERIOUS COMORBIDITY AND BODY MASS INDEX (BMI) OF 31.0 TO 31.9 IN ADULT: ICD-10-CM

## 2023-02-27 DIAGNOSIS — R19.7 DIARRHEA OF PRESUMED INFECTIOUS ORIGIN: ICD-10-CM

## 2023-02-27 DIAGNOSIS — F17.210 NICOTINE DEPENDENCE, CIGARETTES, UNCOMPLICATED: ICD-10-CM

## 2023-02-27 DIAGNOSIS — Z12.12 SCREENING FOR COLORECTAL CANCER: ICD-10-CM

## 2023-02-27 DIAGNOSIS — Z12.11 SCREENING FOR COLORECTAL CANCER: ICD-10-CM

## 2023-02-27 DIAGNOSIS — L43.9 LICHEN PLANUS: ICD-10-CM

## 2023-02-27 PROCEDURE — 99214 OFFICE O/P EST MOD 30 MIN: CPT

## 2023-02-27 RX ORDER — TRIAMCINOLONE ACETONIDE 40 MG/ML
40 INJECTION, SUSPENSION INTRA-ARTICULAR; INTRAMUSCULAR ONCE
OUTPATIENT
Start: 2023-02-27 | End: 2023-03-02

## 2023-02-27 RX ORDER — TRIAMCINOLONE ACETONIDE 1 MG/G
1 OINTMENT TOPICAL 2 TIMES DAILY
Qty: 80 G | Refills: 1 | Status: SHIPPED
Start: 2023-02-27 | End: 2023-02-27

## 2023-02-27 RX ORDER — TRIAMCINOLONE ACETONIDE 1 MG/G
1 OINTMENT TOPICAL 2 TIMES DAILY
Qty: 80 G | Refills: 1 | Status: SHIPPED
Start: 2023-02-27 | End: 2023-03-03

## 2023-02-27 ASSESSMENT — ENCOUNTER SYMPTOMS
DIARRHEA: 1
ABDOMINAL PAIN: 1
BLOOD IN STOOL: 0

## 2023-02-27 NOTE — PROGRESS NOTES
"Subjective:     CC: Pt presents today with     HPI:   Kelly presents today with    Problem   Nicotine dependence, uncomplicated (Current Smoker)   Obesity Due to Excess Calories With Serious Comorbidity   Diarrhea of Presumed Infectious Origin   Lichen Planus   ROS:  Review of Systems   Gastrointestinal:  Positive for abdominal pain and diarrhea. Negative for blood in stool.   Skin:  Positive for rash.   All other systems reviewed and are negative.    Objective:     Exam:  BP (!) 142/88 (BP Location: Left arm, Patient Position: Sitting, BP Cuff Size: Large adult)   Pulse (!) 56   Temp 36.1 °C (97 °F) (Temporal)   Ht 1.6 m (5' 3\")   Wt 79.4 kg (175 lb)   SpO2 97%   BMI 31.00 kg/m²  Body mass index is 31 kg/m².    Physical Exam  Vitals reviewed.   Constitutional:       General: She is not in acute distress.     Appearance: Normal appearance. She is not ill-appearing.   HENT:      Head: Normocephalic and atraumatic.   Cardiovascular:      Rate and Rhythm: Normal rate.      Pulses: Normal pulses.      Heart sounds: Normal heart sounds.   Pulmonary:      Effort: Pulmonary effort is normal.      Breath sounds: Normal breath sounds.   Abdominal:      General: Bowel sounds are normal.   Musculoskeletal:         General: Normal range of motion.   Skin:     General: Skin is warm and dry.      Capillary Refill: Capillary refill takes less than 2 seconds.      Findings: Lesion and rash present.   Neurological:      General: No focal deficit present.      Mental Status: She is alert and oriented to person, place, and time.   Psychiatric:         Mood and Affect: Mood normal.         Behavior: Behavior normal.     Assessment & Plan:     62 y.o. female with the following -     Problem List Items Addressed This Visit       Lichen planus     Chronic, exacerbated, with itching patches to arms, legs, trunk, feet, pruritic, raised, erythematous patches with scale. Will provide kenalog shot. Triamcinolone cream for rash.          " Relevant Medications    triamcinolone acetonide (KENALOG-40) injection 40 mg    triamcinolone acetonide (KENALOG) 0.1 % Ointment    Diarrhea of presumed infectious origin     Acute, unstable. For 5 days has had liquid diarrhea, with 5-6 times daily of brown watery stool, accompanied with abdominal cramping and pain. Denies fevers, but reports subjective chills, no known exposures to to others with similar symptoms. Has remained hydrated with increased po fluids and gatorade, brat diet, and yesterday took kaopectate with relief of symptom, no bm today.          Relevant Orders    CULTURE STOOL    Nicotine dependence, uncomplicated (Current Smoker)    Relevant Orders    Referral to GI for Colonoscopy    Obesity due to excess calories with serious comorbidity    Relevant Orders    Patient identified as having weight management issue.  Appropriate orders and counseling given.     Other Visit Diagnoses       Screening for colorectal cancer              Patient was educated in proper administration of medication(s) ordered today including safety, possible SE, risks, benefits, rationale and alternatives to therapy.   Supportive care, differential diagnoses, and indications for immediate follow-up discussed with patient.    Pathogenesis of diagnosis discussed including typical length and natural progression.    Instructed to return to clinic or nearest emergency department for any change in condition, further concerns, or worsening of symptoms.  Patient states understanding of the plan of care and discharge instructions.    I have placed the below orders and discussed them with an approved delegating provider.  The MA is performing the below orders under the direction of Dr. Golmdan.    I spent a total of 17 minutes with record review, exam, communication with the patient, communication with other providers, and documentation of this encounter.    Return if symptoms worsen or fail to improve.    Please note that this  dictation was created using voice recognition software. I have made every reasonable attempt to correct obvious errors, but I expect that there are errors of grammar and possibly content that I did not discover before finalizing the note.

## 2023-02-27 NOTE — LETTER
[facility]  [address]  [place] [address]     February 27, 2023    Patient: Kelly Oakley   YOB: 1960   Date of Visit: 2/27/2023       To Whom It May Concern:    Kelly Oakley was seen and treated in our department on 2/27/2023. She has had symptoms since 2/22/2023 which have prevented her from working, and are being evaluated for by me. Please excuse her from work until acute symptoms resolve.         Sincerely,           POPPY Duncan.

## 2023-02-27 NOTE — ASSESSMENT & PLAN NOTE
Acute, unstable. For 5 days has had liquid diarrhea, with 5-6 times daily of brown watery stool, accompanied with abdominal cramping and pain. Denies fevers, but reports subjective chills, no known exposures to to others with similar symptoms. Has remained hydrated with increased po fluids and gatorade, brat diet, and yesterday took kaopectate with relief of symptom, no bm today.

## 2023-02-27 NOTE — ASSESSMENT & PLAN NOTE
Chronic, exacerbated, with itching patches to arms, legs, trunk, feet, pruritic, raised, erythematous patches with scale. Will provide kenalog shot. Triamcinolone cream for rash.

## 2023-03-02 ENCOUNTER — HOSPITAL ENCOUNTER (OUTPATIENT)
Facility: MEDICAL CENTER | Age: 63
End: 2023-03-02
Payer: COMMERCIAL

## 2023-03-02 DIAGNOSIS — R19.7 DIARRHEA, UNSPECIFIED TYPE: ICD-10-CM

## 2023-03-02 PROCEDURE — 87899 AGENT NOS ASSAY W/OPTIC: CPT

## 2023-03-02 PROCEDURE — 87045 FECES CULTURE AEROBIC BACT: CPT

## 2023-03-03 DIAGNOSIS — L43.9 LICHEN PLANUS: ICD-10-CM

## 2023-03-03 LAB
E COLI SXT1+2 STL IA: NORMAL
SIGNIFICANT IND 70042: NORMAL
SITE SITE: NORMAL
SOURCE SOURCE: NORMAL

## 2023-03-03 RX ORDER — TRIAMCINOLONE ACETONIDE 1 MG/G
1 OINTMENT TOPICAL 2 TIMES DAILY
Qty: 30 G | Refills: 1 | Status: SHIPPED | OUTPATIENT
Start: 2023-03-03

## 2023-03-04 LAB
BACTERIA STL CULT: NORMAL
C JEJUNI+C COLI AG STL QL: NORMAL
E COLI SXT1+2 STL IA: NORMAL
SIGNIFICANT IND 70042: NORMAL
SITE SITE: NORMAL
SOURCE SOURCE: NORMAL

## 2023-03-28 ENCOUNTER — OFFICE VISIT (OUTPATIENT)
Dept: MEDICAL GROUP | Facility: PHYSICIAN GROUP | Age: 63
End: 2023-03-28
Payer: COMMERCIAL

## 2023-03-28 VITALS
TEMPERATURE: 98 F | DIASTOLIC BLOOD PRESSURE: 70 MMHG | BODY MASS INDEX: 31.36 KG/M2 | HEART RATE: 60 BPM | OXYGEN SATURATION: 94 % | WEIGHT: 177 LBS | SYSTOLIC BLOOD PRESSURE: 128 MMHG | HEIGHT: 63 IN

## 2023-03-28 DIAGNOSIS — I10 PRIMARY HYPERTENSION: ICD-10-CM

## 2023-03-28 DIAGNOSIS — L43.9 LICHEN PLANUS: ICD-10-CM

## 2023-03-28 DIAGNOSIS — K13.0 SORE LIP: ICD-10-CM

## 2023-03-28 DIAGNOSIS — B00.9 HERPES SIMPLEX: ICD-10-CM

## 2023-03-28 PROCEDURE — 99214 OFFICE O/P EST MOD 30 MIN: CPT

## 2023-03-28 RX ORDER — ACYCLOVIR 50 MG/G
1 CREAM TOPICAL
Qty: 5 G | Refills: 1 | Status: SHIPPED | OUTPATIENT
Start: 2023-03-28 | End: 2023-04-01

## 2023-03-28 ASSESSMENT — PATIENT HEALTH QUESTIONNAIRE - PHQ9: CLINICAL INTERPRETATION OF PHQ2 SCORE: 0

## 2023-03-28 ASSESSMENT — ENCOUNTER SYMPTOMS: ROS SKIN COMMENTS: LIP LESION

## 2023-03-28 NOTE — ASSESSMENT & PLAN NOTE
Chronic, unstable.  Previously prescribed valacyclovir for onset of prodrome.  This was not taken and lesion erupted with URI.  Lesion persists and it has been greater than 5 weeks.  Has tried topical Abreva, Carmex, witch hazel. reports painful, and poor healing, is concerned.   Ulcer to right lower lip, scarring to lower lip present.  Topical acyclovir  Referral to Derm

## 2023-03-28 NOTE — ASSESSMENT & PLAN NOTE
Chronic. Stable. 128/70. Denies symptoms of hyper or hypotension.  Continue amlodipine 5 mg daily, propanolol 10 mg twice daily irbesartan 300 mg daily spironolactone 50 mg daily  CMP lab as ordered    11/18/20 Donte:  Regimen is complex and she has history of intolerances to conventional agents such as edema with higher dose amlodipine as well as hyponatremia with HCTZ.  Despite the side effects I think it is worth a try to use Tribenzor in place of amlodipine as well as irbesartan.  Target home blood pressure less than 130/80

## 2023-03-28 NOTE — PROGRESS NOTES
"Subjective:     CC: Diagnoses of Herpes simplex, Sore lip, Lichen planus, and Primary hypertension were pertinent to this visit.    HPI:   Kelly presents today with    Problem   Herpes Simplex   Primary Hypertension     ROS:  Review of Systems   Constitutional:  Positive for malaise/fatigue.   HENT:  Positive for ear pain (and itching).         Recent uri    Skin:         Lip lesion   All other systems reviewed and are negative.    Objective:     Exam:  /70 (BP Location: Left arm, Patient Position: Sitting, BP Cuff Size: Small adult)   Pulse 60   Temp 36.7 °C (98 °F) (Temporal)   Ht 1.6 m (5' 3\")   Wt 80.3 kg (177 lb)   SpO2 94%   BMI 31.35 kg/m²  Body mass index is 31.35 kg/m².    Physical Exam  Vitals reviewed.   Constitutional:       General: She is not in acute distress.     Appearance: Normal appearance. She is not ill-appearing.   HENT:      Head: Normocephalic and atraumatic.      Right Ear: Tympanic membrane, ear canal and external ear normal.      Left Ear: Tympanic membrane, ear canal and external ear normal.   Neck:      Thyroid: No thyromegaly.      Trachea: Trachea normal.   Cardiovascular:      Rate and Rhythm: Normal rate.   Pulmonary:      Effort: Pulmonary effort is normal. No respiratory distress.   Lymphadenopathy:      Cervical: No cervical adenopathy.   Skin:     General: Skin is warm and dry.      Capillary Refill: Capillary refill takes less than 2 seconds.      Comments: Ulcerative lesion to right lower lip   Neurological:      General: No focal deficit present.      Mental Status: She is alert and oriented to person, place, and time.   Psychiatric:         Mood and Affect: Mood normal.         Behavior: Behavior normal.     Assessment & Plan:     62 y.o. female with the following -     Problem List Items Addressed This Visit       Lichen planus    Relevant Orders    Referral to Dermatology    Primary hypertension     Chronic. Stable. 128/70. Denies symptoms of hyper or " hypotension.  Continue amlodipine 5 mg daily, propanolol 10 mg twice daily irbesartan 300 mg daily spironolactone 50 mg daily  CMP lab as ordered    11/18/20 Donte:  Regimen is complex and she has history of intolerances to conventional agents such as edema with higher dose amlodipine as well as hyponatremia with HCTZ.  Despite the side effects I think it is worth a try to use Tribenzor in place of amlodipine as well as irbesartan.  Target home blood pressure less than 130/80         Herpes simplex     Chronic, unstable.  Previously prescribed valacyclovir for onset of prodrome.  This was not taken and lesion erupted with URI.  Lesion persists and it has been greater than 5 weeks.  Has tried topical Abreva, Carmex, witch hazel. reports painful, and poor healing, is concerned.   Ulcer to right lower lip, scarring to lower lip present.  Topical acyclovir  Referral to Derm         Relevant Medications    acyclovir (ZOVIRAX) 5 % Cream     Other Visit Diagnoses       Sore lip        Relevant Orders    Referral to Dermatology          Patient was educated in proper administration of medication(s) ordered today including safety, possible SE, risks, benefits, rationale and alternatives to therapy.   Supportive care, differential diagnoses, and indications for immediate follow-up discussed with patient.    Pathogenesis of diagnosis discussed including typical length and natural progression.    Instructed to return to clinic or nearest emergency department for any change in condition, further concerns, or worsening of symptoms.  Patient states understanding of the plan of care and discharge instructions.    I have placed the below orders and discussed them with an approved delegating provider.  The MA is performing the below orders under the direction of Dr. Goldman.    I spent a total of 22 minutes with record review, exam, communication with the patient, communication with other providers, and documentation of this  encounter.    Return if symptoms worsen or fail to improve.    Please note that this dictation was created using voice recognition software. I have made every reasonable attempt to correct obvious errors, but I expect that there are errors of grammar and possibly content that I did not discover before finalizing the note.

## 2023-04-26 DIAGNOSIS — I10 PRIMARY HYPERTENSION: ICD-10-CM

## 2023-04-28 RX ORDER — AMLODIPINE BESYLATE 5 MG/1
5 TABLET ORAL DAILY
Qty: 90 TABLET | Refills: 3 | Status: SHIPPED | OUTPATIENT
Start: 2023-04-28 | End: 2024-02-13

## 2023-04-28 NOTE — TELEPHONE ENCOUNTER
Received request via: Pharmacy    Was the patient seen in the last year in this department? Yes    Does the patient have an active prescription (recently filled or refills available) for medication(s) requested? No    Does the patient have USP Plus and need 100 day supply (blood pressure, diabetes and cholesterol meds only)? Patient does not have SCP   [FreeTextEntry1] : CHECK ROUTINE LAB WORK \par EKG: NSR AT 63 BPM, CHRONIC Q WAVES INFERIOR LEADS WITHOUT CHANGES; PRIOR CARDIOLOGY EVALUATION\par VISION SCREENING \par HEALTHY DIET AND LIFESTYLE MODIFICATIONS\par HEALTHY WEIGHT LOSS \par FOLLOW-UP ALL SPECIALISTS AS DIRECTED; ENCOURAGED FOLLOW-UP WITH ORAL SURGERY\par NEED ADDITIONAL BREAST IMAGING PERFORMED AFTER INITIAL MAMMOGRAM\par FLU VACCINE IN THE FALL RECOMMENDED \par CONSIDER SHINGRIX\par CALL WITH ANY QUESTIONS, CONCERNS OR CHANGES.

## 2023-05-12 ENCOUNTER — OFFICE VISIT (OUTPATIENT)
Dept: MEDICAL GROUP | Facility: PHYSICIAN GROUP | Age: 63
End: 2023-05-12
Payer: COMMERCIAL

## 2023-05-12 VITALS
WEIGHT: 174 LBS | BODY MASS INDEX: 30.83 KG/M2 | HEART RATE: 69 BPM | OXYGEN SATURATION: 95 % | SYSTOLIC BLOOD PRESSURE: 124 MMHG | DIASTOLIC BLOOD PRESSURE: 80 MMHG | HEIGHT: 63 IN | TEMPERATURE: 96.7 F

## 2023-05-12 DIAGNOSIS — J02.9 ACUTE PHARYNGITIS, UNSPECIFIED ETIOLOGY: ICD-10-CM

## 2023-05-12 DIAGNOSIS — B00.9 HERPES SIMPLEX: ICD-10-CM

## 2023-05-12 DIAGNOSIS — J01.40 ACUTE PANSINUSITIS, RECURRENCE NOT SPECIFIED: ICD-10-CM

## 2023-05-12 DIAGNOSIS — R05.9 COUGH, UNSPECIFIED TYPE: ICD-10-CM

## 2023-05-12 PROCEDURE — 99214 OFFICE O/P EST MOD 30 MIN: CPT

## 2023-05-12 RX ORDER — ECHINACEA PURPUREA EXTRACT 125 MG
2 TABLET ORAL
Qty: 30 ML | Refills: 0 | Status: SHIPPED | OUTPATIENT
Start: 2023-05-12 | End: 2023-11-15

## 2023-05-12 RX ORDER — VALACYCLOVIR HYDROCHLORIDE 1 G/1
1000 TABLET, FILM COATED ORAL DAILY
Qty: 20 TABLET | Refills: 1 | Status: SHIPPED | OUTPATIENT
Start: 2023-05-12

## 2023-05-12 RX ORDER — LEVOFLOXACIN 500 MG/1
500 TABLET, FILM COATED ORAL DAILY
Qty: 7 TABLET | Refills: 0 | Status: SHIPPED | OUTPATIENT
Start: 2023-05-12 | End: 2023-05-19

## 2023-05-12 NOTE — PROGRESS NOTES
Chief Complaint   Patient presents with    Sinusitis     X few weeks     Cough     X 3 days    Headache     HPI: Patient is a 62 y.o. female complaining of 14 days of illness including: productive of yellow sputum cough, ear pain, facial pain, nasal congestion, malaise, muscle aches  headache.   Mucus is: thick.  Similarly ill exposures: no.  Treatments tried: otc cough cold medicines.   She  reports that she has been smoking cigarettes. She has a 20.00 pack-year smoking history. She has never used smokeless tobacco..     ROS:  No fever, or skin rash. Has had nausea, diarrhea intermittently, sore throat secondary to coughing, mucus production.      I reviewed the patient's medications, allergies and medical history:  Current Outpatient Medications   Medication Sig Dispense Refill    levoFLOXacin (LEVAQUIN) 500 MG tablet Take 1 Tablet by mouth every day for 7 days. 7 Tablet 0    sodium chloride (OCEAN NASAL SPRAY) 0.65 % Solution Administer 2 Sprays into affected nostril(S) every 2 hours as needed for Congestion. 30 mL 0    amLODIPine (NORVASC) 5 MG Tab TAKE 1 TABLET BY MOUTH EVERY DAY 90 Tablet 3    triamcinolone acetonide (KENALOG) 0.1 % Ointment Apply 1 Application topically 2 times a day. 30 g 1    albuterol 108 (90 Base) MCG/ACT Aero Soln inhalation aerosol Inhale 2 Puffs every four hours as needed for Shortness of Breath. 1 Each 3    busPIRone (BUSPAR) 15 MG tablet Take 1 Tablet by mouth 2 times a day. 180 Tablet 3    esomeprazole (NEXIUM) 20 MG capsule Take 1 Capsule by mouth 2 times a day. 90 Capsule 3    estradiol (VAGIFEM) 10 MCG Tab Insert 1 Tablet into the vagina two times a week. 8 Tablet 3    FLUoxetine (PROZAC) 10 MG Cap Take 1 Capsule by mouth every day. 90 Capsule 3    fluticasone (FLONASE) 50 MCG/ACT nasal spray Administer 2 Sprays into affected nostril(S) at bedtime. 16 g 11    hydrALAZINE (APRESOLINE) 25 MG Tab Take 1 Tablet by mouth 2 times a day as needed (only if bp is 160 or above). 60 Tablet  3    hydrOXYzine HCl (ATARAX) 25 MG Tab Take 1 Tablet by mouth 2 times a day as needed for Anxiety. 90 Tablet 3    irbesartan (AVAPRO) 300 MG Tab Take 1 Tablet by mouth every day. 90 Tablet 3    spironolactone (ALDACTONE) 50 MG Tab Take 1 Tablet by mouth every day. 90 Tablet 3    propranolol (INDERAL) 10 MG Tab Take 1 Tablet by mouth 2 times a day. 180 Tablet 3    pravastatin (PRAVACHOL) 20 MG Tab Take 1 Tablet by mouth every day. 90 Tablet 3    azelastine (ASTELIN) 137 MCG/SPRAY nasal spray Administer 1 Spray into affected nostril(S) 2 times a day. 30 mL 0    benzonatate (TESSALON) 100 MG Cap Take 1 Capsule by mouth 3 times a day as needed for Cough. 60 Capsule 0    vitamin D2, Ergocalciferol, (DRISDOL) 1.25 MG (23899 UT) Cap capsule Take 1 Units by mouth every 7 days.      Multiple Vitamins-Minerals (CENTRUM ADULTS) Tab tablet Take 1 Tablet by mouth every day.      cetirizine (ZYRTEC) 10 MG Tab Take 1 Tablet by mouth every evening. 90 Tablet 1    acetaminophen (TYLENOL) 325 MG Tab Take 650 mg by mouth every 6 hours as needed.       No current facility-administered medications for this visit.     Macrodantin [nitrofurantoin macrocrystal], Pcn [penicillins], Doxycycline, Bactrim, Clarithromycin, Hydrochlorothiazide, and Omnicef  Past Medical History:   Diagnosis Date    Acute cystitis with hematuria 2/14/2022    Adrenal nodule 2012 2015 / nonfunctional / benign    Anemia     Anesthesia     Anxiety     Panic    Arrhythmia     palpatations    Arthritis     Morning stiffness    Atherosclerosis of arteries 2013    seen on abdominal CT    Briseno's  esophagus     Bronchitis 2019    Chest pain at rest 9/11/2015    Chronic obstructive pulmonary disease (HCC) 11/30/2021    Cigarette smoker one half pack a day or less 5/28/2015    Depression     Fatigue 9/11/2015    GERD (gastroesophageal reflux disease)     Headache(784.0)     sinus headache    Heart burn     Heart murmur     Dr. Krause-Stress trend    High cholesterol   "   History of HPV infection     HLD (hyperlipidemia) 9/11/2015    HTN (hypertension) 9/11/2015    IBD (inflammatory bowel disease)     Dr. Cunningham    Indigestion     MRSA carrier 2008    nose cellulitis    Nocturnal hypoxemia 10/15/2015    OSTEOPOROSIS     Osteopenia    Pneumonia 2005    PONV (postoperative nausea and vomiting)     S/P appendectomy     S/P cholecystectomy     S/P hysterectomy with oophorectomy     endometriosis    Snoring     Ulcer     Barretts esophogitis    Urinary incontinence     Urinary tract infection, site not specified         EXAM:  /80 (BP Location: Right arm, Patient Position: Sitting, BP Cuff Size: Small adult)   Pulse 69   Temp 35.9 °C (96.7 °F) (Temporal)   Ht 1.6 m (5' 3\")   Wt 78.9 kg (174 lb)   SpO2 95%   General: Alert, no conversational dyspnea or audible wheeze, non-toxic appearance.  Eyes: PERRL, conjunctiva slightly injected, no eye discharge.  Ears: Normal pinnae,TM's bulging bilaterally.  Nares: Patent with thick mucus.  Sinuses: tender over maxillary / frontal sinuses.  Throat: Erythematous injection without exudate.   Neck: Supple, with normal cervical nodes.  Lungs: Clear to auscultation bilaterally, no wheeze, crackles or rhonchi.   Heart: Regular rate without murmur.  Skin: Warm and dry without rash.     ASSESSMENT:   1. Cough, unspecified type    2. Acute pharyngitis, unspecified etiology    3. Acute pansinusitis, recurrence not specified       Problem List Items Addressed This Visit       Acute sinus infection    Relevant Medications    levoFLOXacin (LEVAQUIN) 500 MG tablet    sodium chloride (OCEAN NASAL SPRAY) 0.65 % Solution    Herpes simplex    Relevant Medications    valacyclovir (VALTREX) 1 GM Tab     Other Visit Diagnoses       Cough, unspecified type        Acute pharyngitis, unspecified etiology                    PLAN:  1. Educated patient that majority of upper respiratory infections are viral and do not need antibiotics. As symptoms have been " worsening over the last week, will treat with antibiotics.  2. Twice daily use of nasal saline rinse or Neti-Pot.  3. OTC anti-pyretics and decongestants as needed.  4. Follow-up in office or urgent care for worsening symptoms, difficulty breathing, lack of expected recovery, or should new symptoms or problems arise.

## 2023-05-12 NOTE — PATIENT INSTRUCTIONS
[facility] DERMATOLOGY  [address] [place] [address]  [place] [address]  Phone: [phone]      Viral Symptom Relief for Adults  You have been diagnosed with a viral illness.  Antibiotics do not cure viral infections. [facility] is committed to treating your illness in the best way possible and that includes avoiding antibiotics when they are likely to do more harm than good. The treatments recommended below will help you feel better while your body’s own defenses are fighting the virus    General instructions:  Stay hydrated  Use a cool mist vaporizer to relieve congestion    Specific medications:   Cough   Suppressant: Dextromethorphan polistirex suspension 30 mg/5 mL (Delsym, Robitussin 12 Hr Cough)    Expectorant: Guaifenesin extended release 600 mg tablets  (Mucinex, Mucus Relief)       Headache; ear, throat, muscle, or joint pain; or fever   Alternate acetaminophen and ibuprofen every 4 hours      Sore throat   Menthol lozenges (Cepacol, Vicks VapoDrops)     Nasal congestion   Nasal Spray: Sodium chloride (saline) nasal spray (Ocean, Simply Saline)    By mouth: Pseudoephedrine 30 mg tablets (Sudafed)      Runny nose, itchy and watery eyes, and sneezing   Loratadine 10 mg tablets (Claritin)      Important Medication Instructions   Use medicines according to package instructions or as directed by your healthcare provider.    Stop the medications when symptoms improve.    Brand names are listed convenience; any brand may be utilized as long as it has the same active ingredient     Follow-up:   If not improved in 7 days, new symptoms occur, or you have other concerns please call or return for a recheck.    Sinusitis, Adult  Sinusitis is inflammation of your sinuses. Sinuses are hollow spaces in the bones around your face. Your sinuses are located:  Around your eyes.  In the middle of your forehead.  Behind your nose.  In your cheekbones.  Mucus normally drains out of your sinuses. When your nasal tissues become inflamed or swollen,  mucus can become trapped or blocked. This allows bacteria, viruses, and fungi to grow, which leads to infection. Most infections of the sinuses are caused by a virus.  Sinusitis can develop quickly. It can last for up to 4 weeks (acute) or for more than 12 weeks (chronic). Sinusitis often develops after a cold.  What are the causes?  This condition is caused by anything that creates swelling in the sinuses or stops mucus from draining. This includes:  Allergies.  Asthma.  Infection from bacteria or viruses.  Deformities or blockages in your nose or sinuses.  Abnormal growths in the nose (nasal polyps).  Pollutants, such as chemicals or irritants in the air.  Infection from fungi (rare).  What increases the risk?  You are more likely to develop this condition if you:  Have a weak body defense system (immune system).  Do a lot of swimming or diving.  Overuse nasal sprays.  Smoke.  What are the signs or symptoms?  The main symptoms of this condition are pain and a feeling of pressure around the affected sinuses. Other symptoms include:  Stuffy nose or congestion.  Thick drainage from your nose.  Swelling and warmth over the affected sinuses.  Headache.  Upper toothache.  A cough that may get worse at night.  Extra mucus that collects in the throat or the back of the nose (postnasal drip).  Decreased sense of smell and taste.  Fatigue.  A fever.  Sore throat.  Bad breath.  How is this diagnosed?  This condition is diagnosed based on:  Your symptoms.  Your medical history.  A physical exam.  Tests to find out if your condition is acute or chronic. This may include:  Checking your nose for nasal polyps.  Viewing your sinuses using a device that has a light (endoscope).  Testing for allergies or bacteria.  Imaging tests, such as an MRI or CT scan.  In rare cases, a bone biopsy may be done to rule out more serious types of fungal sinus disease.  How is this treated?  Treatment for sinusitis depends on the cause and whether  your condition is chronic or acute.  If caused by a virus, your symptoms should go away on their own within 10 days. You may be given medicines to relieve symptoms. They include:  Medicines that shrink swollen nasal passages (topical intranasal decongestants).  Medicines that treat allergies (antihistamines).  A spray that eases inflammation of the nostrils (topical intranasal corticosteroids).  Rinses that help get rid of thick mucus in your nose (nasal saline washes).  If caused by bacteria, your health care provider may recommend waiting to see if your symptoms improve. Most bacterial infections will get better without antibiotic medicine. You may be given antibiotics if you have:  A severe infection.  A weak immune system.  If caused by narrow nasal passages or nasal polyps, you may need to have surgery.  Follow these instructions at home:  Medicines  Take, use, or apply over-the-counter and prescription medicines only as told by your health care provider. These may include nasal sprays.  If you were prescribed an antibiotic medicine, take it as told by your health care provider. Do not stop taking the antibiotic even if you start to feel better.  Hydrate and humidify    Drink enough fluid to keep your urine pale yellow. Staying hydrated will help to thin your mucus.  Use a cool mist humidifier to keep the humidity level in your home above 50%.  Inhale steam for 10-15 minutes, 3-4 times a day, or as told by your health care provider. You can do this in the bathroom while a hot shower is running.  Limit your exposure to cool or dry air.  Rest  Rest as much as possible.  Sleep with your head raised (elevated).  Make sure you get enough sleep each night.  General instructions    Apply a warm, moist washcloth to your face 3-4 times a day or as told by your health care provider. This will help with discomfort.  Wash your hands often with soap and water to reduce your exposure to germs. If soap and water are not  available, use hand .  Do not smoke. Avoid being around people who are smoking (secondhand smoke).  Keep all follow-up visits as told by your health care provider. This is important.  Contact a health care provider if:  You have a fever.  Your symptoms get worse.  Your symptoms do not improve within 10 days.  Get help right away if:  You have a severe headache.  You have persistent vomiting.  You have severe pain or swelling around your face or eyes.  You have vision problems.  You develop confusion.  Your neck is stiff.  You have trouble breathing.  Summary  Sinusitis is soreness and inflammation of your sinuses. Sinuses are hollow spaces in the bones around your face.  This condition is caused by nasal tissues that become inflamed or swollen. The swelling traps or blocks the flow of mucus. This allows bacteria, viruses, and fungi to grow, which leads to infection.  If you were prescribed an antibiotic medicine, take it as told by your health care provider. Do not stop taking the antibiotic even if you start to feel better.  Keep all follow-up visits as told by your health care provider. This is important.  This information is not intended to replace advice given to you by your health care provider. Make sure you discuss any questions you have with your health care provider.  Document Released: 12/18/2006 Document Revised: 05/20/2019 Document Reviewed: 05/20/2019  [employer] Patient Education © 2020 [employer]    Cough, Adult  Coughing is a reflex that clears your throat and your airways (respiratory system). Coughing helps to heal and protect your lungs. It is normal to cough occasionally, but a cough that happens with other symptoms or lasts a long time may be a sign of a condition that needs treatment. An acute cough may only last 2-3 weeks, while a chronic cough may last 8 or more weeks.  Coughing is commonly caused by:  Infection of the respiratory systemby viruses or bacteria.  Breathing in  substances that irritate your lungs.  Allergies.  Asthma.  Mucus that runs down the back of your throat (postnasal drip).  Smoking.  Acid backing up from the stomach into the esophagus (gastroesophageal reflux).  Certain medicines.  Chronic lung problems.  Other medical conditions such as heart failure or a blood clot in the lung (pulmonary embolism).  Follow these instructions at home:  Medicines  Take over-the-counter and prescription medicines only as told by your health care provider.  Talk with your health care provider before you take a cough suppressant medicine.  Lifestyle    Avoid cigarette smoke. Do not use any products that contain nicotine or tobacco, such as cigarettes, e-cigarettes, and chewing tobacco. If you need help quitting, ask your health care provider.  Drink enough fluid to keep your urine pale yellow.  Avoid caffeine.  Do not drink alcohol if your health care provider tells you not to drink.  General instructions    Pay close attention to changes in your cough. Tell your health care provider about them.  Always cover your mouth when you cough.  Avoid things that make you cough, such as perfume, candles, cleaning products, or campfire or tobacco smoke.  If the air is dry, use a cool mist vaporizer or humidifier in your bedroom or your home to help loosen secretions.  If your cough is worse at night, try to sleep in a semi-upright position.  Rest as needed.  Keep all follow-up visits as told by your health care provider. This is important.  Contact a health care provider if you:  Have new symptoms.  Cough up pus.  Have a cough that does not get better after 2-3 weeks or gets worse.  Cannot control your cough with cough suppressant medicines and you are losing sleep.  Have pain that gets worse or pain that is not helped with medicine.  Have a fever.  Have unexplained weight loss.  Have night sweats.  Get help right away if:  You cough up blood.  You have difficulty breathing.  Your heartbeat is  very fast.  These symptoms may represent a serious problem that is an emergency. Do not wait to see if the symptoms will go away. Get medical help right away. Call your local emergency services (911 in the [place] Do not drive yourself to the hospital.  Summary  Coughing is a reflex that clears your throat and your airways. It is normal to cough occasionally, but a cough that happens with other symptoms or lasts a long time may be a sign of a condition that needs treatment.  Take over-the-counter and prescription medicines only as told by your health care provider.  Always cover your mouth when you cough.  Contact a health care provider if you have new symptoms or a cough that does not get better after 2-3 weeks or gets worse.  This information is not intended to replace advice given to you by your health care provider. Make sure you discuss any questions you have with your health care provider.  Document Released: 06/15/2012 Document Revised: 01/06/2020 Document Reviewed: 01/06/2020  [employer] Patient Education © 2020 [employer]

## 2023-05-12 NOTE — LETTER
[facility]  [address]  [place] [address]     May 12, 2023    Patient: Kelly Oakley   YOB: 1960   Date of Visit: 5/12/2023       To Whom It May Concern:    Kelly Oakley was seen and treated in our department on 5/12/2023. Please excuse her for missed days during this week as she has been quite ill, she should be able to return to full duty Monday 5/15/23.      Sincerely,     POPPY Duncan.

## 2023-05-12 NOTE — LETTER
[facility]  [address]  [place] [address]     May 15, 2023    Patient: Kelly Oakley   YOB: 1960   Date of Visit: 5/12/2023       To Whom It May Concern:    Kelly Oakley was seen and treated in our department on 5/12/2023. Please excuse missed work through 5/15/23.      Sincerely,     POPPY Duncan.

## 2023-05-12 NOTE — ASSESSMENT & PLAN NOTE
Chronic, unstable.lesions with cold. Previously prescribed Valacyclovir 1g once daily for 5 days with onset of prodrome, continue this.

## 2023-05-15 DIAGNOSIS — J32.9 RECURRENT SINUSITIS: ICD-10-CM

## 2023-05-23 ENCOUNTER — HOSPITAL ENCOUNTER (OUTPATIENT)
Dept: RADIOLOGY | Facility: MEDICAL CENTER | Age: 63
End: 2023-05-23
Payer: COMMERCIAL

## 2023-05-23 DIAGNOSIS — Z12.31 VISIT FOR SCREENING MAMMOGRAM: ICD-10-CM

## 2023-05-23 PROCEDURE — 77063 BREAST TOMOSYNTHESIS BI: CPT

## 2023-06-23 DIAGNOSIS — N95.2 POSTMENOPAUSAL ATROPHIC VAGINITIS: ICD-10-CM

## 2023-06-23 RX ORDER — ESTRADIOL 10 UG/1
TABLET VAGINAL
Qty: 24 TABLET | Refills: 1 | Status: SHIPPED | OUTPATIENT
Start: 2023-06-23 | End: 2023-11-15

## 2023-07-27 DIAGNOSIS — F41.9 ANXIETY: ICD-10-CM

## 2023-07-27 RX ORDER — HYDROXYZINE HYDROCHLORIDE 25 MG/1
25 TABLET, FILM COATED ORAL 2 TIMES DAILY PRN
Qty: 180 TABLET | Refills: 1 | Status: SHIPPED | OUTPATIENT
Start: 2023-07-27

## 2023-08-25 ENCOUNTER — OFFICE VISIT (OUTPATIENT)
Dept: URGENT CARE | Facility: PHYSICIAN GROUP | Age: 63
End: 2023-08-25
Payer: COMMERCIAL

## 2023-08-25 VITALS
RESPIRATION RATE: 16 BRPM | HEIGHT: 63 IN | SYSTOLIC BLOOD PRESSURE: 128 MMHG | BODY MASS INDEX: 30.83 KG/M2 | HEART RATE: 53 BPM | WEIGHT: 174 LBS | DIASTOLIC BLOOD PRESSURE: 70 MMHG | TEMPERATURE: 96.8 F | OXYGEN SATURATION: 96 %

## 2023-08-25 DIAGNOSIS — R68.89 FLU-LIKE SYMPTOMS: ICD-10-CM

## 2023-08-25 DIAGNOSIS — U07.1 COVID-19: ICD-10-CM

## 2023-08-25 LAB
FLUAV RNA SPEC QL NAA+PROBE: NEGATIVE
FLUBV RNA SPEC QL NAA+PROBE: NEGATIVE
RSV RNA SPEC QL NAA+PROBE: NEGATIVE
SARS-COV-2 RNA RESP QL NAA+PROBE: POSITIVE

## 2023-08-25 PROCEDURE — 0241U POCT CEPHEID COV-2, FLU A/B, RSV - PCR: CPT | Performed by: NURSE PRACTITIONER

## 2023-08-25 PROCEDURE — 99214 OFFICE O/P EST MOD 30 MIN: CPT | Performed by: NURSE PRACTITIONER

## 2023-08-25 PROCEDURE — 3074F SYST BP LT 130 MM HG: CPT | Performed by: NURSE PRACTITIONER

## 2023-08-25 PROCEDURE — 3078F DIAST BP <80 MM HG: CPT | Performed by: NURSE PRACTITIONER

## 2023-08-25 ASSESSMENT — ENCOUNTER SYMPTOMS
SORE THROAT: 1
ORTHOPNEA: 0
SHORTNESS OF BREATH: 0
MYALGIAS: 1
WHEEZING: 0
HEADACHES: 1
FEVER: 0
NAUSEA: 0
EYE DISCHARGE: 0
SPUTUM PRODUCTION: 1
COUGH: 1
CHILLS: 0
DIARRHEA: 0

## 2023-08-25 NOTE — PROGRESS NOTES
Subjective     Kelly Oakley is a 62 y.o. female who presents with Illness (X4days Fever, aches, cough, sweating, diarrhea )            HPI  New problem.  Patient is a very pleasant 62-year-old female who presents with a 4-day history of fever, body aches, cough, sweating, and diarrhea.  She has some wheezing as well but denies any shortness of breath.  She states her headache is her worst symptom.  She has not tested for COVID at home.  She reports that she has been taking Coricidin HBP as well as Tylenol for her symptoms.    Macrodantin [nitrofurantoin macrocrystal], Pcn [penicillins], Doxycycline, Bactrim, Clarithromycin, Hydrochlorothiazide, and Omnicef  Current Outpatient Medications on File Prior to Visit   Medication Sig Dispense Refill    hydrOXYzine HCl (ATARAX) 25 MG Tab TAKE 1 TABLET BY MOUTH TWICE A DAY AS NEEDED FOR ANXIETY 180 Tablet 1    YUVAFEM 10 MCG Tab INSERT 1 TABLET INTO THE VAGINA TWO TIMES A WEEK. 24 Tablet 1    sodium chloride (OCEAN NASAL SPRAY) 0.65 % Solution Administer 2 Sprays into affected nostril(S) every 2 hours as needed for Congestion. 30 mL 0    valacyclovir (VALTREX) 1 GM Tab Take 1 Tablet by mouth every day. Take 1 g daily for 5 days at onset of prodrome 20 Tablet 1    amLODIPine (NORVASC) 5 MG Tab TAKE 1 TABLET BY MOUTH EVERY DAY 90 Tablet 3    triamcinolone acetonide (KENALOG) 0.1 % Ointment Apply 1 Application topically 2 times a day. 30 g 1    albuterol 108 (90 Base) MCG/ACT Aero Soln inhalation aerosol Inhale 2 Puffs every four hours as needed for Shortness of Breath. 1 Each 3    busPIRone (BUSPAR) 15 MG tablet Take 1 Tablet by mouth 2 times a day. 180 Tablet 3    esomeprazole (NEXIUM) 20 MG capsule Take 1 Capsule by mouth 2 times a day. 90 Capsule 3    FLUoxetine (PROZAC) 10 MG Cap Take 1 Capsule by mouth every day. 90 Capsule 3    fluticasone (FLONASE) 50 MCG/ACT nasal spray Administer 2 Sprays into affected nostril(S) at bedtime. 16 g 11    hydrALAZINE (APRESOLINE) 25  MG Tab Take 1 Tablet by mouth 2 times a day as needed (only if bp is 160 or above). 60 Tablet 3    irbesartan (AVAPRO) 300 MG Tab Take 1 Tablet by mouth every day. 90 Tablet 3    spironolactone (ALDACTONE) 50 MG Tab Take 1 Tablet by mouth every day. 90 Tablet 3    propranolol (INDERAL) 10 MG Tab Take 1 Tablet by mouth 2 times a day. 180 Tablet 3    pravastatin (PRAVACHOL) 20 MG Tab Take 1 Tablet by mouth every day. 90 Tablet 3    azelastine (ASTELIN) 137 MCG/SPRAY nasal spray Administer 1 Spray into affected nostril(S) 2 times a day. 30 mL 0    benzonatate (TESSALON) 100 MG Cap Take 1 Capsule by mouth 3 times a day as needed for Cough. 60 Capsule 0    vitamin D2, Ergocalciferol, (DRISDOL) 1.25 MG (58376 UT) Cap capsule Take 1 Units by mouth every 7 days.      Multiple Vitamins-Minerals (CENTRUM ADULTS) Tab tablet Take 1 Tablet by mouth every day.      cetirizine (ZYRTEC) 10 MG Tab Take 1 Tablet by mouth every evening. 90 Tablet 1    acetaminophen (TYLENOL) 325 MG Tab Take 650 mg by mouth every 6 hours as needed.       No current facility-administered medications on file prior to visit.     Social History     Socioeconomic History    Marital status:      Spouse name: Not on file    Number of children: Not on file    Years of education: Not on file    Highest education level: Not on file   Occupational History    Not on file   Tobacco Use    Smoking status: Some Days     Current packs/day: 0.50     Average packs/day: 0.5 packs/day for 40.0 years (20.0 ttl pk-yrs)     Types: Cigarettes    Smokeless tobacco: Never   Vaping Use    Vaping Use: Never used   Substance and Sexual Activity    Alcohol use: Not Currently     Alcohol/week: 0.6 oz     Types: 1 Standard drinks or equivalent per week     Comment: rarely    Drug use: No    Sexual activity: Not Currently     Comment:    Other Topics Concern    Not on file   Social History Narrative    Not on file     Social Determinants of Health     Financial Resource  Strain: Medium Risk (9/29/2020)    Overall Financial Resource Strain (CARDIA)     Difficulty of Paying Living Expenses: Somewhat hard   Food Insecurity: No Food Insecurity (9/29/2020)    Hunger Vital Sign     Worried About Running Out of Food in the Last Year: Never true     Ran Out of Food in the Last Year: Never true   Transportation Needs: No Transportation Needs (9/29/2020)    PRAPARE - Transportation     Lack of Transportation (Medical): No     Lack of Transportation (Non-Medical): No   Physical Activity: Unknown (9/29/2020)    Exercise Vital Sign     Days of Exercise per Week: Patient refused     Minutes of Exercise per Session: Patient refused   Stress: Stress Concern Present (9/29/2020)    [place] [employer] - Occupational Stress Questionnaire     Feeling of Stress : Very much   Social Connections: Unknown (9/29/2020)    Social Connection and Isolation Panel [NHANES]     Frequency of Communication with Friends and Family: Patient refused     Frequency of Social Gatherings with Friends and Family: Patient refused     Attends [employer] Services: Patient refused     Active Member of Clubs or Organizations: Patient refused     Attends Club or Organization Meetings: Patient refused     Marital Status: Patient refused   Intimate Partner Violence: Unknown (9/29/2020)    Humiliation, Afraid, Rape, and Kick questionnaire     Fear of Current or Ex-Partner: Patient refused     Emotionally Abused: Patient refused     Physically Abused: Patient refused     Sexually Abused: Patient refused   Housing Stability: Not on file     Breast Cancer-related family history is not on file.      Review of Systems   Constitutional:  Positive for malaise/fatigue. Negative for chills and fever.   HENT:  Positive for congestion and sore throat.    Eyes:  Negative for discharge.   Respiratory:  Positive for cough and sputum production. Negative for shortness of breath and wheezing.    Cardiovascular:  Negative for  "chest pain and orthopnea.   Gastrointestinal:  Negative for diarrhea and nausea.   Musculoskeletal:  Positive for myalgias.   Neurological:  Positive for headaches.   Endo/Heme/Allergies:  Negative for environmental allergies.              Objective     /70 (BP Location: Left arm, Patient Position: Sitting, BP Cuff Size: Adult)   Pulse (!) 53   Temp 36 °C (96.8 °F) (Temporal)   Resp 16   Ht 1.6 m (5' 3\")   Wt 78.9 kg (174 lb)   SpO2 96%   BMI 30.82 kg/m²      Physical Exam  Constitutional:       Appearance: She is well-developed. She is ill-appearing.   HENT:      Head: Normocephalic.      Right Ear: Tympanic membrane and external ear normal.      Left Ear: Tympanic membrane and external ear normal.      Nose: Mucosal edema and rhinorrhea present. No congestion.      Mouth/Throat:      Pharynx: No posterior oropharyngeal erythema.   Eyes:      General:         Right eye: No discharge.         Left eye: No discharge.      Conjunctiva/sclera: Conjunctivae normal.   Cardiovascular:      Rate and Rhythm: Normal rate and regular rhythm.      Heart sounds: Normal heart sounds.   Pulmonary:      Effort: Pulmonary effort is normal.      Breath sounds: Wheezing present.   Musculoskeletal:         General: Normal range of motion.      Cervical back: Normal range of motion and neck supple.   Lymphadenopathy:      Cervical: No cervical adenopathy.   Skin:     General: Skin is warm and dry.   Neurological:      Mental Status: She is alert and oriented to person, place, and time.   Psychiatric:         Behavior: Behavior normal.         Thought Content: Thought content normal.                             Assessment & Plan        1. COVID-19  Nirmatrelvir&Ritonavir 300/100 20 x 150 MG & 10 x 100MG Tablet Therapy Pack      2. Flu-like symptoms  POCT CEPHEID COV-2, FLU A/B, RSV - PCR        Positive covid.  Paxlovid.  Reviewed isolation and symptomatic care for her.  Work note.  Fluids/rest.  Differential diagnosis, " natural history, supportive care, and indications for immediate follow-up were discussed.

## 2023-08-25 NOTE — LETTER
August 30, 2023         Patient: Kelly Oakley   YOB: 1960   Date of Visit: 8/25/2023           To Whom it May Concern:    Kelly Oakley was seen in my clinic on 8/25/2023. Pt was positive for covid. Please excuse her for 8/24/23 - 8/30/23. She may return to work if she is fever free for a minimum of 24hrs. If she does not have a fever she may return on 8/31/23. We appreciate your cooperation.     If you have any questions or concerns, please don't hesitate to call.        Sincerely,           VINOD Trinidad.P.JUSTO.  Electronically Signed

## 2023-08-25 NOTE — LETTER
August 30, 2023         Patient: Kelly Oakley   YOB: 1960   Date of Visit: 8/25/2023           To Whom it May Concern:    Kelly Oakley was seen in my clinic on 8/25/2023. Kelly was seen in our clinic today and she is excused from work for today through Monday.  Thank you.    If you have any questions or concerns, please don't hesitate to call.        Sincerely,           Mitch Dukes A.P.N.  Electronically Signed

## 2023-09-28 DIAGNOSIS — E55.9 VITAMIN D DEFICIENCY: ICD-10-CM

## 2023-09-28 DIAGNOSIS — E78.2 MIXED HYPERLIPIDEMIA: ICD-10-CM

## 2023-09-28 DIAGNOSIS — Z13.29 THYROID DISORDER SCREEN: ICD-10-CM

## 2023-09-28 DIAGNOSIS — E66.9 OBESITY (BMI 30-39.9): ICD-10-CM

## 2023-09-28 DIAGNOSIS — Z13.0 SCREENING FOR DEFICIENCY ANEMIA: ICD-10-CM

## 2023-10-26 ENCOUNTER — OFFICE VISIT (OUTPATIENT)
Dept: MEDICAL GROUP | Facility: PHYSICIAN GROUP | Age: 63
End: 2023-10-26
Payer: COMMERCIAL

## 2023-10-26 VITALS
HEART RATE: 61 BPM | RESPIRATION RATE: 16 BRPM | WEIGHT: 177 LBS | BODY MASS INDEX: 31.36 KG/M2 | SYSTOLIC BLOOD PRESSURE: 124 MMHG | DIASTOLIC BLOOD PRESSURE: 68 MMHG | OXYGEN SATURATION: 94 % | HEIGHT: 63 IN | TEMPERATURE: 98.4 F

## 2023-10-26 DIAGNOSIS — Z11.4 ENCOUNTER FOR SCREENING FOR HIV: ICD-10-CM

## 2023-10-26 DIAGNOSIS — F17.210 CIGARETTE SMOKER ONE HALF PACK A DAY OR LESS: ICD-10-CM

## 2023-10-26 DIAGNOSIS — L43.9 LICHEN PLANUS: ICD-10-CM

## 2023-10-26 DIAGNOSIS — G47.19 EXCESSIVE DAYTIME SLEEPINESS: ICD-10-CM

## 2023-10-26 DIAGNOSIS — I83.899 RUPTURED VARICOSE VEIN: ICD-10-CM

## 2023-10-26 DIAGNOSIS — I83.813 VARICOSE VEINS OF BOTH LOWER EXTREMITIES WITH PAIN: ICD-10-CM

## 2023-10-26 DIAGNOSIS — R06.83 SNORING: ICD-10-CM

## 2023-10-26 DIAGNOSIS — E78.2 MIXED HYPERLIPIDEMIA: ICD-10-CM

## 2023-10-26 DIAGNOSIS — I10 PRIMARY HYPERTENSION: ICD-10-CM

## 2023-10-26 DIAGNOSIS — J43.1 PANLOBULAR EMPHYSEMA (HCC): ICD-10-CM

## 2023-10-26 DIAGNOSIS — F17.210 NICOTINE DEPENDENCE, CIGARETTES, UNCOMPLICATED: ICD-10-CM

## 2023-10-26 PROBLEM — I83.893 VARICOSE VEINS OF BILATERAL LOWER EXTREMITIES WITH OTHER COMPLICATIONS: Status: ACTIVE | Noted: 2023-10-26

## 2023-10-26 PROCEDURE — 3074F SYST BP LT 130 MM HG: CPT

## 2023-10-26 PROCEDURE — 3078F DIAST BP <80 MM HG: CPT

## 2023-10-26 PROCEDURE — 99214 OFFICE O/P EST MOD 30 MIN: CPT

## 2023-10-26 RX ORDER — ZOLPIDEM TARTRATE 5 MG/1
TABLET ORAL
Qty: 2 TABLET | Refills: 0 | Status: SHIPPED | OUTPATIENT
Start: 2023-10-26 | End: 2023-10-27

## 2023-10-26 NOTE — ASSESSMENT & PLAN NOTE
Acute, unstable. 10/24/23 vericose vein ruptured to left lower extremity medial malleolus area- when removing compression socks. She reports bleeding continued for 1-1.5 hours, despite pressure application, elevation. Lesion finally stopped bleeding with superglue and bandage, but is bruised and tender still.  Will provide referral to vascular  Encouraged continued use of compression socks, emollient hydrating lubricant for lichen planus lesions to prevent dryness of skin, elevation of lower extremities when able

## 2023-10-26 NOTE — ASSESSMENT & PLAN NOTE
Chronic, stable.  Blood pressure today 124/68.  Continue irbesartan 300 mg daily, amlodipine 5 mg daily, hydralazine as needed, propanolol 10 mg twice daily, spironolactone 50 mg daily

## 2023-10-26 NOTE — PATIENT INSTRUCTIONS
SLEEP STUDY INSTRUCTIONS    1. Our main concern is to provide the best test and evaluation of your sleep and your cooperation in following the guidelines is very necessary.    2. We have no facilities for family members or guests at the sleep center. Special arrangements will be made for children requiring overnight sleep studies.    3. Unless otherwise instructed, AVOID alcoholic beverages on the day of your sleep study.    4. DO NOT drink coffee or caffeine-containing beverages after 12:00 noon on the day of your sleep study.    5. There is NO smoking at the sleep center.    6. Try to maintain a usual daytime schedule prior to the study (avoid unusual physical activity or meals).    7. DO NOT take a nap on the day of your study.    8. This is an outpatient procedure (test); therefore, nursing services, medications, and meals ARE NOT provided. If you take medications, bring them with you and take them on the schedule you do at home.    9. Please fill your sleep aid prescription (Ambien or Lunesta) and bring to your sleep study. Even patients who normally have no problem going to sleep often need a sleep aid in this different environment.    10. We ask that you wear conventional sleep attire (pajamas or sweats) for the sleep study. We discourage patients from wearing only their underwear to bed. We recommend two-piece pajamas as the techs will need to apply sensors to your stomach.    11. Please shampoo your hair the day of the sleep study. Please DO NOT use any other hair or skin products before your arrival (e.g., mousse, gel, hair or body spray, perfume, body lotion etc.) NOTE: Women should not wear heavy makeup prior to arrival as some wires are taped to the face area.    12. The technician will be applying several small electrodes to the scalp, eye area, chin, chest, and legs, plus respiratory effort belts around the chest. Also, there will be a device placed directly under the nose. (THIS WILL NOT OBSTRUCT  YOUR BREATHING.) This is a painless procedure and the skin is not broken.    13. The test is generally completed in six to eight hours; We are usually done between 6 - 7 a.m., unless you are scheduled for a nap study. You may need to come back another night for a second study to complete your treatment plan.    14. Patients who are scheduled for an MSLT (nap study) will stay at the sleep center for the day following their nighttime study. You will be notified if a nap study was ordered for you at the time the night study is scheduled. Generally, patients having a nap study will leave the sleep center by 4 p.m.    15. You will need to bring food for the following day if you are scheduled for a nap study. A refrigerator and microwave are available.    16. A bathroom is available for your use.    17. We are able to adjust the room temperature for your comfort. Please let the technologist know if you are uncomfortable during the study.    18. If you sleep better with a special pillow or stuffed animal, you may bring it along. Service animals are the only live animals permitted.    19. Cable T.V. is available.    20. You will be scheduled for a follow-up appointment three to five days after the sleep study to review your results.    21. A copy of your sleep study is sent to the referring physician approximately two weeks after your study.    22. Any questions can be directed to our staff at [phone].    23. If CPAP therapy is applied, a home unit will be ordered for you through the [employer] company. You will be contacted to schedule delivery after insurance authorization.      Josefina Letter of Caution    You have been given this prescription for Ambien in case you have difficulty sleeping the night of your sleep study. Your doctor would like you to get the prescription filled and bring the medication with you to your sleep study appointment. Do not use it before your sleep study.     We make every  effort to accomplish all we need in one sleep study. If you do not sleep enough during the first half of the night, you may need to have another sleep study to complete the treatment portion of the test. To help avoid the possibility of a second sleep study, we ask you to bring the Ambien with you. Do not take the Ambien before you arrive at the Sleep Center. The Ambien should only be taken once your sleep technologist has finished attaching the sensors. Your technologist may suggest you take the Ambien if you are unable to fall asleep within the first 30 minutes.     The prescription you have been given is for two (2) 5 mg doses. Do not take the Ambien if you have consumed any alcohol or have taken any narcotic medications on the day of your sleep test. Please tell your physician or your sleep technologist if you have any medication allergies or have had problems with sleep medications in the past.     Ambien does not affect the results of the sleep study. Your doctor has determined this medication is safe for you to take. You are not required to take the Ambien; however, we still recommend you fill the prescription and bring it with you even if you feel you won’t need it. Our technicians are unable to administer any sleep aides at your study but will do their best to aid in your comfort.     If you take Ambien during your study, you may need to sleep later to make sure you are fully awake before leaving the Sleep Center. Ambien can impair alertness and motor coordination. We recommend you plan on staying as long as needed in the morning or arrange transportation. Please read all the information provided to you by your pharmacist about Ambien.             Thank you,                      [facility]

## 2023-10-26 NOTE — ASSESSMENT & PLAN NOTE
Chronic, unstable. Pt had varicose vein rupture 10/24/23. Multiple bilateral varicosities bilaterally, with associated swelling to LE.   Wearing compression socks, elevating LE when able. They are painful like bruising.  Ref vascular medicine.

## 2023-10-26 NOTE — PROGRESS NOTES
"Subjective:     CC: Diagnoses of Encounter for screening for HIV, Lichen planus, Panlobular emphysema (HCC), Ruptured varicose vein, Primary hypertension, Mixed hyperlipidemia, Cigarette smoker one half pack a day or less, Nicotine dependence, uncomplicated (Current Smoker), Varicose veins of both lower extremities with pain, Excessive daytime sleepiness, and Snoring were pertinent to this visit.    HPI:   Kelly presents today with    Problem   Panlobular Emphysema (Hcc)   Ruptured Varicose Vein   Varicose Veins of Both Lower Extremities With Pain   Primary Hypertension   Hld (Hyperlipidemia)   Lichen Planus     ROS:  Review of Systems   Cardiovascular:         Multiple varicose veins to bilateral LE, ruptured varicose vein 2 days ago   Skin:         Multiple lichen planus skin lesions to LE bilaterally   All other systems reviewed and are negative.      Objective:     Exam:  /68 (BP Location: Left arm, Patient Position: Sitting, BP Cuff Size: Small adult)   Pulse 61   Temp 36.9 °C (98.4 °F) (Temporal)   Resp 16   Ht 1.6 m (5' 3\")   Wt 80.3 kg (177 lb)   SpO2 94%   BMI 31.35 kg/m²  Body mass index is 31.35 kg/m².    Physical Exam  Vitals reviewed.   Constitutional:       General: She is not in acute distress.     Appearance: Normal appearance. She is not ill-appearing.   HENT:      Head: Normocephalic and atraumatic.   Cardiovascular:      Rate and Rhythm: Normal rate.      Pulses: Normal pulses.           Dorsalis pedis pulses are 2+ on the right side and 2+ on the left side.   Pulmonary:      Effort: Pulmonary effort is normal. No respiratory distress.   Musculoskeletal:      Right lower le+ Edema present.   Skin:     General: Skin is warm and dry.      Findings: Lesion present.             Comments: Bandaid in place over site of ruptured varicose vein.  Multiple lichen planus lesions, dry hyperpigmented polygonal papules to lower extremities bilaterally   Neurological:      General: No focal " deficit present.      Mental Status: She is alert and oriented to person, place, and time.   Psychiatric:         Mood and Affect: Mood normal.         Behavior: Behavior normal.       Assessment & Plan:     63 y.o. female with the following -     Problem List Items Addressed This Visit       Lichen planus     Chronic, unstable. Multiple lesions to legs         Relevant Orders    Referral to Dermatology    Snoring    Relevant Medications    zolpidem (AMBIEN) 5 MG Tab    Other Relevant Orders    Polysomnography, 4 or More    Referral to Pulmonary and Sleep Medicine    Cigarette smoker one half pack a day or less    Relevant Orders    REFERRAL TO LUNG CANCER SCREENING PROGRAM    Primary hypertension     Chronic, stable.  Blood pressure today 124/68.  Continue irbesartan 300 mg daily, amlodipine 5 mg daily, hydralazine as needed, propanolol 10 mg twice daily, spironolactone 50 mg daily         HLD (hyperlipidemia)     Chronic, stable.  Continue pravastatin 20 mg daily.         Nicotine dependence, uncomplicated (Current Smoker)    Relevant Orders    REFERRAL TO LUNG CANCER SCREENING PROGRAM    Panlobular emphysema (HCC)     Chronic, unstable. Chronic cough.  PFT ordered.         Relevant Orders    PULMONARY FUNCTION TESTS -Test requested: Spirometry, simple    Ruptured varicose vein     Acute, unstable. 10/24/23 vericose vein ruptured to left lower extremity medial malleolus area- when removing compression socks. She reports bleeding continued for 1-1.5 hours, despite pressure application, elevation. Lesion finally stopped bleeding with superglue and bandage, but is bruised and tender still.  Will provide referral to vascular  Encouraged continued use of compression socks, emollient hydrating lubricant for lichen planus lesions to prevent dryness of skin, elevation of lower extremities when able         Relevant Orders    Referral to Vascular Medicine    Varicose veins of both lower extremities with pain     Chronic,  unstable. Pt had varicose vein rupture 10/24/23. Multiple bilateral varicosities bilaterally, with associated swelling to LE.   Wearing compression socks, elevating LE when able. They are painful like bruising.  Ref vascular medicine.          Other Visit Diagnoses       Encounter for screening for HIV        Relevant Orders    HIV AG/AB COMBO ASSAY SCREENING    Excessive daytime sleepiness        Relevant Medications    zolpidem (AMBIEN) 5 MG Tab    Other Relevant Orders    Polysomnography, 4 or More    Referral to Pulmonary and Sleep Medicine          Patient was educated in proper administration of medication(s) ordered today including safety, possible SE, risks, benefits, rationale and alternatives to therapy.   Supportive care, differential diagnoses, and indications for immediate follow-up discussed with patient.    Pathogenesis of diagnosis discussed including typical length and natural progression.    Instructed to return to clinic or nearest emergency department for any change in condition, further concerns, or worsening of symptoms.  Patient states understanding of the plan of care and discharge instructions.    Return in about 3 months (around 1/26/2024), or if symptoms worsen or fail to improve, for Labs.    Please note that this dictation was created using voice recognition software. I have made every reasonable attempt to correct obvious errors, but I expect that there are errors of grammar and possibly content that I did not discover before finalizing the note.

## 2023-10-30 ENCOUNTER — HOSPITAL ENCOUNTER (OUTPATIENT)
Dept: LAB | Facility: MEDICAL CENTER | Age: 63
End: 2023-10-30
Payer: COMMERCIAL

## 2023-10-30 DIAGNOSIS — Z13.29 THYROID DISORDER SCREEN: ICD-10-CM

## 2023-10-30 DIAGNOSIS — E66.9 OBESITY (BMI 30-39.9): ICD-10-CM

## 2023-10-30 DIAGNOSIS — Z13.0 SCREENING FOR DEFICIENCY ANEMIA: ICD-10-CM

## 2023-10-30 DIAGNOSIS — Z11.4 ENCOUNTER FOR SCREENING FOR HIV: ICD-10-CM

## 2023-10-30 DIAGNOSIS — E55.9 VITAMIN D DEFICIENCY: ICD-10-CM

## 2023-10-30 LAB
25(OH)D3 SERPL-MCNC: 21 NG/ML (ref 30–100)
ALBUMIN SERPL BCP-MCNC: 4.7 G/DL (ref 3.2–4.9)
ALBUMIN/GLOB SERPL: 1.7 G/DL
ALP SERPL-CCNC: 75 U/L (ref 30–99)
ALT SERPL-CCNC: 14 U/L (ref 2–50)
ANION GAP SERPL CALC-SCNC: 11 MMOL/L (ref 7–16)
AST SERPL-CCNC: 18 U/L (ref 12–45)
BILIRUB SERPL-MCNC: 0.5 MG/DL (ref 0.1–1.5)
BUN SERPL-MCNC: 11 MG/DL (ref 8–22)
CALCIUM ALBUM COR SERPL-MCNC: 8.7 MG/DL (ref 8.5–10.5)
CALCIUM SERPL-MCNC: 9.3 MG/DL (ref 8.5–10.5)
CHLORIDE SERPL-SCNC: 104 MMOL/L (ref 96–112)
CHOLEST SERPL-MCNC: 148 MG/DL (ref 100–199)
CO2 SERPL-SCNC: 23 MMOL/L (ref 20–33)
CREAT SERPL-MCNC: 0.98 MG/DL (ref 0.5–1.4)
ERYTHROCYTE [DISTWIDTH] IN BLOOD BY AUTOMATED COUNT: 43.3 FL (ref 35.9–50)
EST. AVERAGE GLUCOSE BLD GHB EST-MCNC: 137 MG/DL
FASTING STATUS PATIENT QL REPORTED: NORMAL
GFR SERPLBLD CREATININE-BSD FMLA CKD-EPI: 65 ML/MIN/1.73 M 2
GLOBULIN SER CALC-MCNC: 2.7 G/DL (ref 1.9–3.5)
GLUCOSE SERPL-MCNC: 91 MG/DL (ref 65–99)
HBA1C MFR BLD: 6.4 % (ref 4–5.6)
HCT VFR BLD AUTO: 41.1 % (ref 37–47)
HDLC SERPL-MCNC: 37 MG/DL
HGB BLD-MCNC: 13.8 G/DL (ref 12–16)
HIV 1+2 AB+HIV1 P24 AG SERPL QL IA: NORMAL
LDLC SERPL CALC-MCNC: 88 MG/DL
MCH RBC QN AUTO: 30.3 PG (ref 27–33)
MCHC RBC AUTO-ENTMCNC: 33.6 G/DL (ref 32.2–35.5)
MCV RBC AUTO: 90.3 FL (ref 81.4–97.8)
PLATELET # BLD AUTO: 217 K/UL (ref 164–446)
PMV BLD AUTO: 10.8 FL (ref 9–12.9)
POTASSIUM SERPL-SCNC: 4.6 MMOL/L (ref 3.6–5.5)
PROT SERPL-MCNC: 7.4 G/DL (ref 6–8.2)
RBC # BLD AUTO: 4.55 M/UL (ref 4.2–5.4)
SODIUM SERPL-SCNC: 138 MMOL/L (ref 135–145)
TRIGL SERPL-MCNC: 116 MG/DL (ref 0–149)
TSH SERPL DL<=0.005 MIU/L-ACNC: 2.29 UIU/ML (ref 0.38–5.33)
WBC # BLD AUTO: 6 K/UL (ref 4.8–10.8)

## 2023-10-30 PROCEDURE — 80053 COMPREHEN METABOLIC PANEL: CPT

## 2023-10-30 PROCEDURE — 80061 LIPID PANEL: CPT

## 2023-10-30 PROCEDURE — 84443 ASSAY THYROID STIM HORMONE: CPT

## 2023-10-30 PROCEDURE — 85027 COMPLETE CBC AUTOMATED: CPT

## 2023-10-30 PROCEDURE — 82306 VITAMIN D 25 HYDROXY: CPT

## 2023-10-30 PROCEDURE — 87389 HIV-1 AG W/HIV-1&-2 AB AG IA: CPT

## 2023-10-30 PROCEDURE — 83036 HEMOGLOBIN GLYCOSYLATED A1C: CPT

## 2023-10-30 PROCEDURE — 36415 COLL VENOUS BLD VENIPUNCTURE: CPT

## 2023-11-03 ENCOUNTER — TELEPHONE (OUTPATIENT)
Dept: HEALTH INFORMATION MANAGEMENT | Facility: OTHER | Age: 63
End: 2023-11-03
Payer: COMMERCIAL

## 2023-11-08 ENCOUNTER — DOCUMENTATION (OUTPATIENT)
Dept: VASCULAR LAB | Facility: MEDICAL CENTER | Age: 63
End: 2023-11-08
Payer: COMMERCIAL

## 2023-11-08 NOTE — PROGRESS NOTES
Left message for patient to call back and schedule new patient appt with Dr. Rogers. Next available ok.       Yobany Coley, Medical Assistant   [facility]   Ph: [phone]  Fx: [phone]

## 2023-11-13 ENCOUNTER — PATIENT MESSAGE (OUTPATIENT)
Dept: MEDICAL GROUP | Facility: PHYSICIAN GROUP | Age: 63
End: 2023-11-13

## 2023-11-13 ENCOUNTER — OFFICE VISIT (OUTPATIENT)
Dept: URGENT CARE | Facility: PHYSICIAN GROUP | Age: 63
End: 2023-11-13
Payer: COMMERCIAL

## 2023-11-13 VITALS
HEART RATE: 67 BPM | DIASTOLIC BLOOD PRESSURE: 66 MMHG | BODY MASS INDEX: 30.87 KG/M2 | SYSTOLIC BLOOD PRESSURE: 134 MMHG | TEMPERATURE: 98 F | OXYGEN SATURATION: 93 % | WEIGHT: 174.2 LBS | RESPIRATION RATE: 20 BRPM | HEIGHT: 63 IN

## 2023-11-13 DIAGNOSIS — J01.40 ACUTE PANSINUSITIS, RECURRENCE NOT SPECIFIED: ICD-10-CM

## 2023-11-13 DIAGNOSIS — N95.2 POSTMENOPAUSAL ATROPHIC VAGINITIS: ICD-10-CM

## 2023-11-13 DIAGNOSIS — M54.50 ACUTE BILATERAL LOW BACK PAIN WITHOUT SCIATICA: Primary | ICD-10-CM

## 2023-11-13 DIAGNOSIS — M62.830 MUSCLE SPASM OF BACK: ICD-10-CM

## 2023-11-13 PROCEDURE — 3075F SYST BP GE 130 - 139MM HG: CPT | Performed by: PHYSICIAN ASSISTANT

## 2023-11-13 PROCEDURE — 99213 OFFICE O/P EST LOW 20 MIN: CPT | Performed by: PHYSICIAN ASSISTANT

## 2023-11-13 PROCEDURE — 3078F DIAST BP <80 MM HG: CPT | Performed by: PHYSICIAN ASSISTANT

## 2023-11-13 RX ORDER — CYCLOBENZAPRINE HCL 10 MG
10 TABLET ORAL 3 TIMES DAILY PRN
Qty: 30 TABLET | Refills: 0 | Status: SHIPPED | OUTPATIENT
Start: 2023-11-13

## 2023-11-13 ASSESSMENT — ENCOUNTER SYMPTOMS: BACK PAIN: 1

## 2023-11-13 ASSESSMENT — FIBROSIS 4 INDEX: FIB4 SCORE: 1.4

## 2023-11-13 NOTE — PROGRESS NOTES
"Subjective     Kelly Oakley is a 63 y.o. female who presents with Back Pain (Lower back pain, no known injury, achy pain, limited ROM, x2 days )            Patient presents with:  Back Pain: Lower back pain, no known injury, achy pain, limited ROM, x2 days.  PT states she was moving some roller skates from her game room to stocking shelves and thinks she tweaked her back somehow she has had a sore back ever since.  Patient is just having some significant muscle spasm in her low back, denies radiation to legs.  Patient denies incontinence or saddle anesthesia.  Over-the-counter Tylenol and ibuprofen take the edge off but the spasm just will not go.  Patient has had muscle relaxants in the past with good effect.  She also needs a note for work.  No other complaints.        Back Pain  This is a new problem. The problem occurs constantly. The problem has been gradually worsening since onset. The pain is present in the lumbar spine. The quality of the pain is described as aching, burning and cramping. The pain does not radiate. The pain is at a severity of 7/10. The symptoms are aggravated by bending, position, twisting and standing. Pertinent negatives include no bladder incontinence, bowel incontinence, dysuria, leg pain, paresthesias, perianal numbness or tingling. She has tried heat and NSAIDs for the symptoms. The treatment provided no relief.       Review of Systems   Gastrointestinal: Negative.  Negative for bowel incontinence.   Genitourinary:  Negative for bladder incontinence and dysuria.   Musculoskeletal:  Positive for back pain and myalgias.   Neurological:  Negative for tingling, sensory change, focal weakness and paresthesias.   All other systems reviewed and are negative.             Objective     /66 (BP Location: Left arm, Patient Position: Sitting, BP Cuff Size: Adult)   Pulse 67   Temp 36.7 °C (98 °F) (Temporal)   Resp 20   Ht 1.6 m (5' 3\")   Wt 79 kg (174 lb 3.2 oz)   SpO2 93%   BMI " 30.86 kg/m²      Physical Exam  Vitals and nursing note reviewed.   Constitutional:       General: She is not in acute distress.     Appearance: Normal appearance. She is well-developed and normal weight. She is not toxic-appearing.   HENT:      Head: Normocephalic and atraumatic.      Nose: Nose normal.      Mouth/Throat:      Lips: Pink.      Mouth: Mucous membranes are moist.   Eyes:      Extraocular Movements: Extraocular movements intact.      Conjunctiva/sclera: Conjunctivae normal.      Pupils: Pupils are equal, round, and reactive to light.   Cardiovascular:      Rate and Rhythm: Normal rate and regular rhythm.      Pulses: Normal pulses.      Heart sounds: Normal heart sounds.   Pulmonary:      Effort: Pulmonary effort is normal.      Breath sounds: Normal breath sounds.   Abdominal:      General: Bowel sounds are normal.      Palpations: Abdomen is soft.      Tenderness: There is no guarding.   Musculoskeletal:      Cervical back: Normal range of motion and neck supple.      Lumbar back: Spasms and tenderness present. No bony tenderness. Decreased range of motion.        Back:       Comments: Bilateral lower extremity strength and sensory intact.  Negative straight leg raise.     Skin:     General: Skin is warm and dry.      Capillary Refill: Capillary refill takes less than 2 seconds.   Neurological:      General: No focal deficit present.      Mental Status: She is alert and oriented to person, place, and time.      Motor: No abnormal muscle tone.      Coordination: Coordination normal.      Deep Tendon Reflexes: Reflexes are normal and symmetric.   Psychiatric:         Mood and Affect: Mood normal.         Behavior: Behavior is cooperative.                             Assessment & Plan              1. Acute bilateral low back pain without sciatica  cyclobenzaprine (FLEXERIL) 10 mg Tab      2. Muscle spasm of back  cyclobenzaprine (FLEXERIL) 10 mg Tab        Patient HPI and physical exam are consistent  with acute muscle spasm of lumbar area causing pain.  Patient has decreased range of motion in all planes of movement secondary to spasm.    I will treat with Flexeril up to 3 times daily, though patient was instructed not to take this while working or driving.  Patient verbalized understanding of these instructions.    Motrin/Advil/Ibuprophen 600 mg every 6 hours as needed for pain or fever.    Gentle range of motion exercises discussed, demonstrated and encouraged.      Differential diagnosis, supportive care, and indications for immediate follow-up discussed with patient.  Instructed to return to clinic or nearest emergency department for any change in condition, further concerns, or worsening of symptoms.    I personally reviewed prior external notes and test results pertinent to today's visit.  I have independently reviewed and interpreted all diagnostics ordered during this urgent care visit.    PT should follow up with PCP in 1-2 days for re-evaluation if symptoms have not improved.      Discussed red flags and reasons to return to UC or ED.      Pt and/or family verbalized understanding of diagnosis and follow up instructions and was offered informational handout on diagnosis.  PT discharged.     Please note that this dictation was created using voice recognition software. I have made every reasonable attempt to correct obvious errors, but I expect that there may be errors of grammar and possibly content that I did not discover before finalizing the note.

## 2023-11-13 NOTE — LETTER
November 13, 2023         Patient: Kelly Oakley   YOB: 1960   Date of Visit: 11/13/2023           To Whom it May Concern:    Kelly Oakley was seen in my clinic on 11/13/2023. She may return to work on 11/17/2023.    If you have any questions or concerns, please don't hesitate to call.        Sincerely,           Yolanda Gray P.A.-C.  Electronically Signed

## 2023-11-15 ENCOUNTER — DOCUMENTATION (OUTPATIENT)
Dept: VASCULAR LAB | Facility: MEDICAL CENTER | Age: 63
End: 2023-11-15
Payer: COMMERCIAL

## 2023-11-15 RX ORDER — ESTRADIOL 10 UG/1
TABLET VAGINAL
Qty: 24 TABLET | Refills: 0 | Status: SHIPPED | OUTPATIENT
Start: 2023-11-15 | End: 2024-03-08

## 2023-11-15 RX ORDER — SODIUM CHLORIDE 0.65 %
AEROSOL, SPRAY (ML) NASAL
Qty: 30 ML | Refills: 3 | Status: SHIPPED | OUTPATIENT
Start: 2023-11-15

## 2023-11-16 NOTE — PROGRESS NOTES
Patient referred to vascular care  Unfortunately our schedulers have been unable to reach patient despite multiple attempts  Unless patient establishes with a face-to-face visit in our office will be unable to take part in care  Await further patient contact.  Pending further contact, we will defer all further management of vascular disease and its risk factors to PCP and/or referring MD.    Michael J. Bloch, MD  Vascular Care    cc:   LINDA Duong

## 2023-11-16 NOTE — PROGRESS NOTES
Left message for patient to call back and schedule new patient appt with Dr. Rogers. Next available ok.       Yobany Coley, Medical Assistant   [facility]   Ph: [phone]  Fx: [phone]

## 2023-11-17 ASSESSMENT — ENCOUNTER SYMPTOMS
BOWEL INCONTINENCE: 0
LEG PAIN: 0
SENSORY CHANGE: 0
GASTROINTESTINAL NEGATIVE: 1
TINGLING: 0
PERIANAL NUMBNESS: 0
PARESTHESIAS: 0
FOCAL WEAKNESS: 0
MYALGIAS: 1

## 2023-12-02 ENCOUNTER — OFFICE VISIT (OUTPATIENT)
Dept: URGENT CARE | Facility: PHYSICIAN GROUP | Age: 63
End: 2023-12-02
Payer: COMMERCIAL

## 2023-12-02 VITALS
OXYGEN SATURATION: 93 % | HEIGHT: 63 IN | RESPIRATION RATE: 14 BRPM | SYSTOLIC BLOOD PRESSURE: 112 MMHG | DIASTOLIC BLOOD PRESSURE: 60 MMHG | BODY MASS INDEX: 30.58 KG/M2 | TEMPERATURE: 97.9 F | HEART RATE: 55 BPM | WEIGHT: 172.6 LBS

## 2023-12-02 DIAGNOSIS — J01.90 ACUTE BACTERIAL SINUSITIS: ICD-10-CM

## 2023-12-02 DIAGNOSIS — B96.89 ACUTE BACTERIAL SINUSITIS: ICD-10-CM

## 2023-12-02 PROCEDURE — 99213 OFFICE O/P EST LOW 20 MIN: CPT | Performed by: NURSE PRACTITIONER

## 2023-12-02 PROCEDURE — 3078F DIAST BP <80 MM HG: CPT | Performed by: NURSE PRACTITIONER

## 2023-12-02 PROCEDURE — 3074F SYST BP LT 130 MM HG: CPT | Performed by: NURSE PRACTITIONER

## 2023-12-02 RX ORDER — AZITHROMYCIN 250 MG/1
TABLET, FILM COATED ORAL
Qty: 6 TABLET | Refills: 0 | Status: SHIPPED | OUTPATIENT
Start: 2023-12-02 | End: 2024-01-10

## 2023-12-02 ASSESSMENT — PAIN SCALES - GENERAL: PAINLEVEL: 6=MODERATE PAIN

## 2023-12-02 ASSESSMENT — FIBROSIS 4 INDEX: FIB4 SCORE: 1.4

## 2023-12-12 ASSESSMENT — ENCOUNTER SYMPTOMS
SORE THROAT: 0
SINUS PAIN: 1
CHILLS: 0
MUSCULOSKELETAL NEGATIVE: 1
RESPIRATORY NEGATIVE: 1
GASTROINTESTINAL NEGATIVE: 1
NEUROLOGICAL NEGATIVE: 1
EYES NEGATIVE: 1
CARDIOVASCULAR NEGATIVE: 1
FEVER: 0
COUGH: 0

## 2023-12-12 NOTE — PROGRESS NOTES
"Subjective:   Kelly Oakley is a 63 y.o. female who presents for Sinus Problem (X 2 weeks ago ) and Otalgia (Left ear x 3 days)      Sinus Problem  This is a new problem. The current episode started 1 to 4 weeks ago (2 weeks). The problem has been gradually worsening since onset. There has been no fever. Her pain is at a severity of 5/10. The pain is moderate. Associated symptoms include congestion and ear pain. Pertinent negatives include no chills, coughing or sore throat. Past treatments include oral decongestants, saline nose sprays, nasal decongestants and acetaminophen. The treatment provided mild relief.       Review of Systems   Constitutional:  Positive for malaise/fatigue. Negative for chills and fever.   HENT:  Positive for congestion, ear pain and sinus pain. Negative for ear discharge and sore throat.    Eyes: Negative.    Respiratory: Negative.  Negative for cough.    Cardiovascular: Negative.    Gastrointestinal: Negative.    Genitourinary: Negative.    Musculoskeletal: Negative.    Skin: Negative.    Neurological: Negative.        Medications, Allergies, and current problem list reviewed today in Epic.     Objective:     /60 (BP Location: Left arm, Patient Position: Sitting, BP Cuff Size: Adult)   Pulse (!) 55   Temp 36.6 °C (97.9 °F) (Temporal)   Resp 14   Ht 1.6 m (5' 3\")   Wt 78.3 kg (172 lb 9.6 oz)   SpO2 93%     Physical Exam  Vitals reviewed.   Constitutional:       General: She is not in acute distress.     Appearance: Normal appearance. She is ill-appearing.   HENT:      Head: Normocephalic and atraumatic.      Right Ear: Tympanic membrane, ear canal and external ear normal.      Left Ear: Tympanic membrane, ear canal and external ear normal.      Nose: Nasal tenderness and congestion present.      Right Nostril: Occlusion present.      Left Nostril: Occlusion present.      Right Turbinates: Swollen.      Left Turbinates: Swollen.      Right Sinus: Maxillary sinus tenderness " present.      Left Sinus: Maxillary sinus tenderness present.      Mouth/Throat:      Mouth: Mucous membranes are moist.      Pharynx: Oropharynx is clear. No posterior oropharyngeal erythema.   Eyes:      Extraocular Movements: Extraocular movements intact.      Conjunctiva/sclera: Conjunctivae normal.      Pupils: Pupils are equal, round, and reactive to light.   Cardiovascular:      Rate and Rhythm: Normal rate and regular rhythm.      Pulses: Normal pulses.      Heart sounds: Normal heart sounds.   Pulmonary:      Effort: Pulmonary effort is normal.      Breath sounds: Normal breath sounds.   Abdominal:      General: Abdomen is flat.      Palpations: Abdomen is soft.   Musculoskeletal:         General: Normal range of motion.      Cervical back: Normal range of motion and neck supple.   Skin:     General: Skin is warm and dry.      Capillary Refill: Capillary refill takes less than 2 seconds.   Neurological:      General: No focal deficit present.      Mental Status: She is alert.   Psychiatric:         Mood and Affect: Mood normal.         Behavior: Behavior normal.         Assessment/Plan:     Diagnosis and associated orders:     1. Acute bacterial sinusitis  azithromycin (ZITHROMAX) 250 MG Tab         Comments/MDM:     Due to duration of symptoms, sinus tenderness, and failure of OTC therapies- ABX was written to tx. For bacterial etiology for sinusitis.   Continue OTC supportive therapies- Flonase, OTC allergy meds, avoid night time dairy. Increase fluids. Humidification.   Patient given precautionary s/sx that mandate immediate follow up and evaluation in the ED. Advised of risks of not doing so.    DDX, Supportive care, and indications for immediate follow-up discussed with patient.    Instructed to return to clinic or nearest emergency department if we are not available for any change in condition, further concerns, or worsening of symptoms.    The patient demonstrated a good understanding and agreed with  the treatment plan           Differential diagnosis, natural history, supportive care, and indications for immediate follow-up discussed.    Advised the patient to follow-up with the primary care physician for recheck, reevaluation, and consideration of further management.    Please note that this dictation was created using voice recognition software. I have made a reasonable attempt to correct obvious errors, but I expect that there are errors of grammar and possibly content that I did not discover before finalizing the note.    This note was electronically signed by KYLIE Bryant

## 2023-12-17 DIAGNOSIS — F41.9 ANXIETY: ICD-10-CM

## 2023-12-22 RX ORDER — FLUOXETINE 10 MG/1
10 CAPSULE ORAL DAILY
Qty: 90 CAPSULE | Refills: 3 | Status: SHIPPED | OUTPATIENT
Start: 2023-12-22

## 2024-01-10 ENCOUNTER — OFFICE VISIT (OUTPATIENT)
Dept: MEDICAL GROUP | Facility: PHYSICIAN GROUP | Age: 64
End: 2024-01-10
Payer: COMMERCIAL

## 2024-01-10 VITALS
BODY MASS INDEX: 30.83 KG/M2 | OXYGEN SATURATION: 93 % | DIASTOLIC BLOOD PRESSURE: 74 MMHG | HEIGHT: 63 IN | RESPIRATION RATE: 16 BRPM | TEMPERATURE: 97.1 F | HEART RATE: 80 BPM | SYSTOLIC BLOOD PRESSURE: 118 MMHG | WEIGHT: 174 LBS

## 2024-01-10 DIAGNOSIS — R19.7 DIARRHEA, UNSPECIFIED TYPE: ICD-10-CM

## 2024-01-10 DIAGNOSIS — R11.0 NAUSEA: ICD-10-CM

## 2024-01-10 DIAGNOSIS — R73.03 PREDIABETES: ICD-10-CM

## 2024-01-10 DIAGNOSIS — R19.7 DIARRHEA OF PRESUMED INFECTIOUS ORIGIN: ICD-10-CM

## 2024-01-10 DIAGNOSIS — R51.9 ACUTE INTRACTABLE HEADACHE, UNSPECIFIED HEADACHE TYPE: ICD-10-CM

## 2024-01-10 DIAGNOSIS — K21.9 GASTROESOPHAGEAL REFLUX DISEASE WITHOUT ESOPHAGITIS: ICD-10-CM

## 2024-01-10 PROBLEM — J01.90 ACUTE SINUS INFECTION: Status: RESOLVED | Noted: 2021-12-30 | Resolved: 2024-01-10

## 2024-01-10 PROCEDURE — 0241U POCT CEPHEID COV-2, FLU A/B, RSV - PCR: CPT

## 2024-01-10 PROCEDURE — 3074F SYST BP LT 130 MM HG: CPT

## 2024-01-10 PROCEDURE — 99214 OFFICE O/P EST MOD 30 MIN: CPT

## 2024-01-10 PROCEDURE — 3078F DIAST BP <80 MM HG: CPT

## 2024-01-10 RX ORDER — ONDANSETRON 4 MG/1
4 TABLET, ORALLY DISINTEGRATING ORAL EVERY 6 HOURS PRN
Qty: 10 TABLET | Refills: 0 | Status: SHIPPED | OUTPATIENT
Start: 2024-01-10 | End: 2024-01-10

## 2024-01-10 RX ORDER — ONDANSETRON 4 MG/1
4 TABLET, ORALLY DISINTEGRATING ORAL EVERY 6 HOURS PRN
Qty: 10 TABLET | Refills: 0 | Status: SHIPPED
Start: 2024-01-10 | End: 2024-01-10

## 2024-01-10 RX ORDER — ONDANSETRON 4 MG/1
4 TABLET, ORALLY DISINTEGRATING ORAL EVERY 6 HOURS PRN
Qty: 10 TABLET | Refills: 0 | Status: SHIPPED | OUTPATIENT
Start: 2024-01-10

## 2024-01-10 ASSESSMENT — PATIENT HEALTH QUESTIONNAIRE - PHQ9: CLINICAL INTERPRETATION OF PHQ2 SCORE: 0

## 2024-01-10 ASSESSMENT — FIBROSIS 4 INDEX: FIB4 SCORE: 1.4

## 2024-01-10 ASSESSMENT — ENCOUNTER SYMPTOMS
NAUSEA: 1
DIARRHEA: 1
ABDOMINAL PAIN: 1
FEVER: 0
HEADACHES: 1
DIZZINESS: 1
HEARTBURN: 1

## 2024-01-10 NOTE — PROGRESS NOTES
"Subjective:     CC: Diagnoses of Acute intractable headache, unspecified headache type, Diarrhea, unspecified type, Nausea, Gastroesophageal reflux disease without esophagitis, Diarrhea of presumed infectious origin, and Prediabetes were pertinent to this visit.    Pt presents today with headache, diarrhea, abdominal pain, nausea, reflux, reports symptoms started 3 days ago, without improvement since onset.    Denies fevers, or known exposure to anybody else with similar symptoms. Denies rash, fever, blood in stool.    Has taken tums for symptoms.     HPI:   Kelly presents today with    Problem   Prediabetes   Diarrhea of Presumed Infectious Origin   Gerd (Gastroesophageal Reflux Disease)   Acute Sinus Infection (Resolved)       ROS:  Review of Systems   Constitutional:  Positive for malaise/fatigue. Negative for fever.   Gastrointestinal:  Positive for abdominal pain, diarrhea (nearly hourly bm with liquid stool), heartburn and nausea.        Increased gas/bloating   Skin:  Negative for rash.   Neurological:  Positive for dizziness and headaches.   All other systems reviewed and are negative.      Objective:     Exam:  /74 (BP Location: Right arm, Patient Position: Sitting, BP Cuff Size: Adult)   Pulse 80   Temp 36.2 °C (97.1 °F) (Temporal)   Resp 16   Ht 1.6 m (5' 3\")   Wt 78.9 kg (174 lb)   SpO2 93%   BMI 30.82 kg/m²  Body mass index is 30.82 kg/m².    Physical Exam  Vitals reviewed.   Constitutional:       General: She is not in acute distress.     Appearance: Normal appearance. She is ill-appearing.   HENT:      Head: Normocephalic and atraumatic.   Cardiovascular:      Rate and Rhythm: Normal rate.      Pulses: Normal pulses.      Heart sounds: Normal heart sounds.   Pulmonary:      Effort: Pulmonary effort is normal. No respiratory distress.      Breath sounds: Normal breath sounds.   Abdominal:      General: Bowel sounds are normal.      Tenderness: There is abdominal tenderness.   Skin:     " General: Skin is warm and dry.      Findings: No rash.   Neurological:      General: No focal deficit present.      Mental Status: She is alert and oriented to person, place, and time.   Psychiatric:         Mood and Affect: Mood normal.         Behavior: Behavior normal.         Assessment & Plan:     63 y.o. female with the following -     Problem List Items Addressed This Visit       GERD (gastroesophageal reflux disease)     Chronic, unstable. Is taking Nexium twice daily continues to have symptoms of epigastric pain, nausea and reflux with current stomach bug.  Has taken Tums with some temporary relief of symptoms  Continue Nexium 20 mg twice daily on an empty stomach.         Relevant Medications    ondansetron (ZOFRAN ODT) 4 MG TABLET DISPERSIBLE    Diarrhea of presumed infectious origin     Acute, recurring. Current episode of frequent liquid diarrhea. Reports stool nearly every hour; is able to maintain hydration. Discussed pepto bismol and immodium as options for relief.          Prediabetes     Chronic, unstable. Discussed healthy lifestyle recommendations. Positive family history of diabetes in mom and sister.   Plan to recheck A1c after 6 months.           Other Visit Diagnoses       Acute intractable headache, unspecified headache type        Relevant Orders    POCT CEPHEID COV-2, FLU A/B, RSV - PCR    Diarrhea, unspecified type        Relevant Orders    POCT CEPHEID COV-2, FLU A/B, RSV - PCR    Nausea        Relevant Medications    ondansetron (ZOFRAN ODT) 4 MG TABLET DISPERSIBLE          Patient was educated in proper administration of medication(s) ordered today including safety, possible SE, risks, benefits, rationale and alternatives to therapy.   Supportive care, differential diagnoses, and indications for immediate follow-up discussed with patient.    Pathogenesis of diagnosis discussed including typical length and natural progression.    Instructed to return to clinic or nearest emergency  department for any change in condition, further concerns, or worsening of symptoms.  Patient states understanding of the plan of care and discharge instructions.    Return in about 3 months (around 4/10/2024), or if symptoms worsen or fail to improve, for A1C.    Please note that this dictation was created using voice recognition software. I have made every reasonable attempt to correct obvious errors, but I expect that there are errors of grammar and possibly content that I did not discover before finalizing the note.

## 2024-01-10 NOTE — ASSESSMENT & PLAN NOTE
Chronic, unstable. Discussed healthy lifestyle recommendations. Positive family history of diabetes in mom and sister.   Plan to recheck A1c after 6 months.

## 2024-01-10 NOTE — LETTER
January 10, 2024    To Whom It May Concern:         This is confirmation that Kelly Oakley attended her scheduled appointment with EUGENIE Duncan on 1/10/24.       I recommend she remain home from work until symptoms resolve.       If you have any questions please do not hesitate to call me at the phone number listed below.    Sincerely,          POPPY Duncan.  [phone]                   Patient/Caregiver provided printed discharge information.

## 2024-01-10 NOTE — ASSESSMENT & PLAN NOTE
Acute, recurring. Current episode of frequent liquid diarrhea. Reports stool nearly every hour; is able to maintain hydration. Discussed pepto bismol and immodium as options for relief.

## 2024-01-10 NOTE — ASSESSMENT & PLAN NOTE
Chronic, unstable. Is taking Nexium twice daily continues to have symptoms of epigastric pain, nausea and reflux with current stomach bug.  Has taken Tums with some temporary relief of symptoms  Continue Nexium 20 mg twice daily on an empty stomach.

## 2024-01-11 LAB
FLUAV RNA SPEC QL NAA+PROBE: NEGATIVE
FLUBV RNA SPEC QL NAA+PROBE: NEGATIVE
RSV RNA SPEC QL NAA+PROBE: NEGATIVE
SARS-COV-2 RNA RESP QL NAA+PROBE: NEGATIVE

## 2024-02-13 DIAGNOSIS — E78.2 MIXED HYPERLIPIDEMIA: ICD-10-CM

## 2024-02-13 DIAGNOSIS — F41.9 ANXIETY: ICD-10-CM

## 2024-02-13 DIAGNOSIS — I10 PRIMARY HYPERTENSION: ICD-10-CM

## 2024-02-13 RX ORDER — AMLODIPINE BESYLATE 5 MG/1
5 TABLET ORAL DAILY
Qty: 90 TABLET | Refills: 3 | Status: SHIPPED | OUTPATIENT
Start: 2024-02-13

## 2024-02-13 RX ORDER — PRAVASTATIN SODIUM 20 MG
20 TABLET ORAL DAILY
Qty: 90 TABLET | Refills: 3 | Status: SHIPPED | OUTPATIENT
Start: 2024-02-13

## 2024-02-13 RX ORDER — SPIRONOLACTONE 50 MG/1
50 TABLET, FILM COATED ORAL DAILY
Qty: 90 TABLET | Refills: 3 | Status: SHIPPED | OUTPATIENT
Start: 2024-02-13

## 2024-02-13 RX ORDER — IRBESARTAN 300 MG/1
300 TABLET ORAL DAILY
Qty: 90 TABLET | Refills: 3 | Status: SHIPPED | OUTPATIENT
Start: 2024-02-13

## 2024-02-13 RX ORDER — BUSPIRONE HYDROCHLORIDE 15 MG/1
15 TABLET ORAL 2 TIMES DAILY
Qty: 180 TABLET | Refills: 3 | Status: SHIPPED | OUTPATIENT
Start: 2024-02-13

## 2024-02-13 NOTE — TELEPHONE ENCOUNTER
Received request via: Pharmacy    Was the patient seen in the last year in this department? Yes    Does the patient have an active prescription (recently filled or refills available) for medication(s) requested? No    Pharmacy Name:   [id number] - [place], [place] - [address]     Does the patient have long-term Plus and need 100 day supply (blood pressure, diabetes and cholesterol meds only)? Patient does not have SCP

## 2024-03-08 DIAGNOSIS — N95.2 POSTMENOPAUSAL ATROPHIC VAGINITIS: ICD-10-CM

## 2024-03-08 RX ORDER — ESTRADIOL 10 UG/1
TABLET VAGINAL
Qty: 24 TABLET | Refills: 3 | Status: SHIPPED | OUTPATIENT
Start: 2024-03-08

## 2024-03-08 NOTE — TELEPHONE ENCOUNTER
Received request via: Patient    Was the patient seen in the last year in this department? Yes    Does the patient have an active prescription (recently filled or refills available) for medication(s) requested? No    Pharmacy Name: [facility]    Does the patient have assisted Plus and need 100 day supply (blood pressure, diabetes and cholesterol meds only)? Patient does not have SCP

## 2024-03-20 ENCOUNTER — OFFICE VISIT (OUTPATIENT)
Dept: URGENT CARE | Facility: PHYSICIAN GROUP | Age: 64
End: 2024-03-20
Payer: COMMERCIAL

## 2024-03-20 VITALS
BODY MASS INDEX: 30.3 KG/M2 | DIASTOLIC BLOOD PRESSURE: 62 MMHG | WEIGHT: 171 LBS | TEMPERATURE: 96.7 F | OXYGEN SATURATION: 97 % | SYSTOLIC BLOOD PRESSURE: 120 MMHG | HEART RATE: 72 BPM | HEIGHT: 63 IN | RESPIRATION RATE: 14 BRPM

## 2024-03-20 DIAGNOSIS — J01.90 ACUTE BACTERIAL SINUSITIS: ICD-10-CM

## 2024-03-20 DIAGNOSIS — B96.89 ACUTE BACTERIAL SINUSITIS: ICD-10-CM

## 2024-03-20 PROCEDURE — 99213 OFFICE O/P EST LOW 20 MIN: CPT

## 2024-03-20 PROCEDURE — 3078F DIAST BP <80 MM HG: CPT

## 2024-03-20 PROCEDURE — 3074F SYST BP LT 130 MM HG: CPT

## 2024-03-20 RX ORDER — AZITHROMYCIN 250 MG/1
TABLET, FILM COATED ORAL
Qty: 6 TABLET | Refills: 0 | Status: SHIPPED | OUTPATIENT
Start: 2024-03-20

## 2024-03-20 ASSESSMENT — ENCOUNTER SYMPTOMS
SINUS PAIN: 1
CHILLS: 0
COUGH: 1
FEVER: 0

## 2024-03-20 ASSESSMENT — FIBROSIS 4 INDEX: FIB4 SCORE: 1.4

## 2024-03-20 NOTE — PROGRESS NOTES
CHIEF COMPLAINT  Chief Complaint   Patient presents with    Sinus Problem     Head ache, ear aches, running nose x3 weeks      Subjective:   Kelly Oakley is a 63 y.o. female who presents to urgent care with complaints of sinus pressure, intermittent headache earaches as well as runny nose x 3 weeks.  She reports sinus pressure primarily to both her maxillary sinuses.  Patient also reports associated symptoms of cough that she attributes to postnasal drip.  Patient denies any symptoms of fever or chills.  Patient reports taking Tylenol and Claritin for alleviation of discomfort symptoms.  She denies any other pertinent past medical history.        Review of Systems   Constitutional:  Negative for chills and fever.   HENT:  Positive for congestion and sinus pain.    Respiratory:  Positive for cough.        PAST MEDICAL HISTORY  Patient Active Problem List    Diagnosis Date Noted    Prediabetes 01/10/2024    Panlobular emphysema (HCC) 10/26/2023    Ruptured varicose vein 10/26/2023    Varicose veins of both lower extremities with pain 10/26/2023    Herpes simplex 03/28/2023    Nicotine dependence, uncomplicated (Current Smoker) 02/27/2023    Obesity due to excess calories with serious comorbidity 02/27/2023    Postmenopausal atrophic vaginitis 10/20/2022    Obesity (BMI 30-39.9) 10/20/2022    Anxiety 11/30/2021    Diarrhea of presumed infectious origin 09/24/2020    GERD (gastroesophageal reflux disease) 05/05/2017    Osteopenia 11/20/2015    Nocturnal hypoxemia 10/15/2015    Adrenal adenoma     Primary hypertension 09/11/2015    HLD (hyperlipidemia) 09/11/2015    Cigarette smoker one half pack a day or less 05/28/2015    Atherosclerosis of arteries     Snoring 08/02/2013    Lichen planus 11/02/2012    Briseno's esophagus 11/02/2012       SURGICAL HISTORY   has a past surgical history that includes abdominal hysterectomy total; endometrial ablation; cholecystectomy; appendectomy; tonsillectomy; primary c section;  tubal coagulation laparoscopic bilateral; colon resection; breast biopsy (1980's); us-needle core bx-breast panel; knee scope,diagnostic (Right, 2/3/2021); meniscectomy, knee, medial (Right, 2/3/2021); and chondroplasty (Right, 2/3/2021).    ALLERGIES  Allergies   Allergen Reactions    Macrodantin [Nitrofurantoin Macrocrystal] Anaphylaxis     Prescription > 10 years ago    Pcn [Penicillins] Anaphylaxis     Prescription > 10 years ago    Doxycycline Itching    Bactrim Vomiting    Clarithromycin Vomiting    Hydrochlorothiazide      Hyponatremia    Omnicef Itching       CURRENT MEDICATIONS  Home Medications       Reviewed by Danilo Mayo Ass't (Medical Assistant) on 03/20/24 at 1520  Med List Status: <None>     Medication Last Dose Status   acetaminophen (TYLENOL) 325 MG Tab PRN Active   albuterol 108 (90 Base) MCG/ACT Aero Soln inhalation aerosol PRN Active   amLODIPine (NORVASC) 5 MG Tab Taking Active   azelastine (ASTELIN) 137 MCG/SPRAY nasal spray Taking Active   busPIRone (BUSPAR) 15 MG tablet Taking Active   cetirizine (ZYRTEC) 10 MG Tab Taking Active   cyclobenzaprine (FLEXERIL) 10 mg Tab Not Taking Active   esomeprazole (NEXIUM) 20 MG capsule Taking Active   estradiol (YUVAFEM) 10 MCG Tab Taking Active   FLUoxetine (PROZAC) 10 MG Cap Taking Active   fluticasone (FLONASE) 50 MCG/ACT nasal spray Not Taking Active   hydrALAZINE (APRESOLINE) 25 MG Tab PRN Active   hydrOXYzine HCl (ATARAX) 25 MG Tab Taking Active   irbesartan (AVAPRO) 300 MG Tab Taking Active   ondansetron (ZOFRAN ODT) 4 MG TABLET DISPERSIBLE Not Taking Active   pravastatin (PRAVACHOL) 20 MG Tab Taking Active   propranolol (INDERAL) 10 MG Tab Taking Active   sodium chloride ([employer] SALINE NASAL SPRAY) 0.65 % Solution PRN Active   spironolactone (ALDACTONE) 50 MG Tab Taking Active   triamcinolone acetonide (KENALOG) 0.1 % Ointment Taking Active   valacyclovir (VALTREX) 1 GM Tab PRN Active   vitamin D2, Ergocalciferol, (DRISDOL) 1.25 MG (20895  "UT) Cap capsule Not Taking Active                    SOCIAL HISTORY  Social History     Tobacco Use    Smoking status: Some Days     Current packs/day: 0.50     Average packs/day: 0.5 packs/day for 40.0 years (20.0 ttl pk-yrs)     Types: Cigarettes    Smokeless tobacco: Never   Vaping Use    Vaping Use: Never used   Substance and Sexual Activity    Alcohol use: Not Currently     Alcohol/week: 0.6 oz     Types: 1 Standard drinks or equivalent per week    Drug use: No    Sexual activity: Not Currently     Comment:        FAMILY HISTORY  Family History   Problem Relation Age of Onset    Cancer Mother         Breast/Liver    Diabetes Mother     Hypertension Mother     Hyperlipidemia Mother     Heart Disease Mother     Lung Disease Father         Emphysema    Psychiatric Illness Father         Paranoid schitzophenia    Psychiatric Illness Sister         Paronoid Schitzo-disorder, Bipolar    Arthritis Sister         RA, Fibromyalgia    Cancer Maternal Aunt         Breast    Cancer Paternal Aunt         Bone    Arthritis Paternal Aunt     Genetic Disorder Paternal Aunt         SLE    Heart Disease Maternal Grandmother     Hypertension Maternal Grandmother     Hyperlipidemia Maternal Grandmother     Genetic Disorder Maternal Grandmother         Brights disease    Stroke Maternal Grandmother     Heart Disease Maternal Grandfather     Hypertension Maternal Grandfather     Hyperlipidemia Maternal Grandfather     Stroke Paternal Grandmother     Hyperlipidemia Paternal Grandmother     Hypertension Paternal Grandmother     Heart Disease Paternal Grandfather     Hypertension Paternal Grandfather     Hyperlipidemia Paternal Grandfather          Medications, Allergies, and current problem list reviewed today in Epic.     Objective:     /62 (BP Location: Right arm, Patient Position: Sitting, BP Cuff Size: Adult)   Pulse 72   Temp 35.9 °C (96.7 °F) (Temporal)   Resp 14   Ht 1.6 m (5' 3\")   Wt 77.6 kg (171 lb)   SpO2 " 97%     Physical Exam  Vitals reviewed.   Constitutional:       General: She is not in acute distress.     Appearance: Normal appearance. She is not ill-appearing or toxic-appearing.   HENT:      Head: Normocephalic.      Right Ear: Tympanic membrane normal.      Left Ear: Tympanic membrane normal.      Nose: Congestion and rhinorrhea present.      Mouth/Throat:      Mouth: Mucous membranes are moist.      Pharynx: Oropharynx is clear. Posterior oropharyngeal erythema present. No oropharyngeal exudate.   Cardiovascular:      Rate and Rhythm: Normal rate and regular rhythm.      Pulses: Normal pulses.      Heart sounds: Normal heart sounds.   Pulmonary:      Effort: Pulmonary effort is normal. No respiratory distress.      Breath sounds: Normal breath sounds. No stridor. No wheezing, rhonchi or rales.   Musculoskeletal:      Cervical back: Normal range of motion. No rigidity or tenderness.   Lymphadenopathy:      Cervical: No cervical adenopathy.   Skin:     General: Skin is warm.      Capillary Refill: Capillary refill takes less than 2 seconds.   Neurological:      General: No focal deficit present.      Mental Status: She is alert.   Psychiatric:         Mood and Affect: Mood normal.         Assessment/Plan:     Diagnosis and associated orders:     1. Acute bacterial sinusitis  azithromycin (ZITHROMAX) 250 MG Tab         Comments/MDM:     Patient unable to tolerate penicillins or doxycycline.  Prescription for azithromycin is sent for pharmacy for treatment of an acute bacterial sinusitis.  Patient counseled on appropriate medication ministration.  Encouraged on adequate hydration and rest.  Red flag signs and symptoms discussed.  Instructed to return to ER or urgent care if symptoms worsen or fail to improve.         Differential diagnosis, natural history, supportive care, and indications for immediate follow-up discussed.    Advised the patient to follow-up with the primary care physician for recheck,  reevaluation, and consideration of further management.    Please note that this dictation was created using voice recognition software. I have made a reasonable attempt to correct obvious errors, but I expect that there are errors of grammar and possibly content that I did not discover before finalizing the note.    This note was electronically signed by EUGENIE Bernabe

## 2024-03-20 NOTE — LETTER
March 20, 2024    To Whom It May Concern:         This is confirmation that Kelly Oakley attended her scheduled appointment with EUGENIE Bernabe on 3/20/24.         If you have any questions please do not hesitate to call me at the phone number listed below.    Sincerely,          SCOTTY BernabeRJULIETTE.  [phone]

## 2024-04-02 DIAGNOSIS — I10 PRIMARY HYPERTENSION: ICD-10-CM

## 2024-04-05 DIAGNOSIS — F41.9 ANXIETY: ICD-10-CM

## 2024-04-05 DIAGNOSIS — J30.89 SEASONAL ALLERGIC RHINITIS DUE TO OTHER ALLERGIC TRIGGER: ICD-10-CM

## 2024-04-05 RX ORDER — PROPRANOLOL HYDROCHLORIDE 10 MG/1
10 TABLET ORAL 2 TIMES DAILY
Qty: 180 TABLET | Refills: 3 | Status: SHIPPED | OUTPATIENT
Start: 2024-04-05

## 2024-04-05 NOTE — TELEPHONE ENCOUNTER
Received request via: Pharmacy    Was the patient seen in the last year in this department? Yes    Does the patient have an active prescription (recently filled or refills available) for medication(s) requested? No    Pharmacy Name: Pharmacy:   [facility]arm[facility] #[id number] - [place], [place] - [address]     Does the patient have group home Plus and need 100 day supply (blood pressure, diabetes and cholesterol meds only)? Patient does not have SCP

## 2024-04-11 ENCOUNTER — OFFICE VISIT (OUTPATIENT)
Dept: MEDICAL GROUP | Facility: PHYSICIAN GROUP | Age: 64
End: 2024-04-11
Payer: COMMERCIAL

## 2024-04-11 VITALS
SYSTOLIC BLOOD PRESSURE: 124 MMHG | OXYGEN SATURATION: 100 % | RESPIRATION RATE: 20 BRPM | TEMPERATURE: 97.9 F | HEIGHT: 63 IN | HEART RATE: 68 BPM | WEIGHT: 172.4 LBS | BODY MASS INDEX: 30.55 KG/M2 | DIASTOLIC BLOOD PRESSURE: 68 MMHG

## 2024-04-11 DIAGNOSIS — K21.9 GASTROESOPHAGEAL REFLUX DISEASE WITHOUT ESOPHAGITIS: Primary | ICD-10-CM

## 2024-04-11 DIAGNOSIS — E66.9 OBESITY (BMI 30-39.9): ICD-10-CM

## 2024-04-11 DIAGNOSIS — K22.710 BARRETT'S ESOPHAGUS WITH LOW GRADE DYSPLASIA: ICD-10-CM

## 2024-04-11 DIAGNOSIS — Z23 NEED FOR VACCINATION: ICD-10-CM

## 2024-04-11 DIAGNOSIS — J30.9 ALLERGIC SINUSITIS: ICD-10-CM

## 2024-04-11 DIAGNOSIS — R73.03 PREDIABETES: ICD-10-CM

## 2024-04-11 DIAGNOSIS — I10 PRIMARY HYPERTENSION: ICD-10-CM

## 2024-04-11 DIAGNOSIS — R73.09 ELEVATED HEMOGLOBIN A1C: ICD-10-CM

## 2024-04-11 DIAGNOSIS — E78.2 MIXED HYPERLIPIDEMIA: ICD-10-CM

## 2024-04-11 DIAGNOSIS — L43.9 LICHEN PLANUS: ICD-10-CM

## 2024-04-11 LAB
HBA1C MFR BLD: 6.5 % (ref ?–5.8)
POCT INT CON NEG: NEGATIVE
POCT INT CON POS: POSITIVE

## 2024-04-11 PROCEDURE — 90472 IMMUNIZATION ADMIN EACH ADD: CPT

## 2024-04-11 PROCEDURE — 90715 TDAP VACCINE 7 YRS/> IM: CPT

## 2024-04-11 PROCEDURE — 3078F DIAST BP <80 MM HG: CPT

## 2024-04-11 PROCEDURE — 99214 OFFICE O/P EST MOD 30 MIN: CPT | Mod: 25

## 2024-04-11 PROCEDURE — 90471 IMMUNIZATION ADMIN: CPT

## 2024-04-11 PROCEDURE — 3074F SYST BP LT 130 MM HG: CPT

## 2024-04-11 PROCEDURE — 90677 PCV20 VACCINE IM: CPT

## 2024-04-11 PROCEDURE — 83036 HEMOGLOBIN GLYCOSYLATED A1C: CPT

## 2024-04-11 RX ORDER — ESOMEPRAZOLE MAGNESIUM 40 MG/1
40 CAPSULE, DELAYED RELEASE ORAL
Qty: 90 CAPSULE | Refills: 3 | Status: SHIPPED | OUTPATIENT
Start: 2024-04-11

## 2024-04-11 RX ORDER — TRIAMCINOLONE ACETONIDE 1 MG/G
1 OINTMENT TOPICAL 2 TIMES DAILY
Qty: 30 G | Refills: 1 | Status: SHIPPED | OUTPATIENT
Start: 2024-04-11

## 2024-04-11 RX ORDER — HYDROXYZINE HYDROCHLORIDE 25 MG/1
25 TABLET, FILM COATED ORAL 2 TIMES DAILY PRN
Qty: 180 TABLET | Refills: 3 | Status: SHIPPED | OUTPATIENT
Start: 2024-04-11

## 2024-04-11 RX ORDER — FLUTICASONE PROPIONATE 50 MCG
2 SPRAY, SUSPENSION (ML) NASAL
Qty: 16 G | Refills: 3 | Status: SHIPPED | OUTPATIENT
Start: 2024-04-11

## 2024-04-11 ASSESSMENT — FIBROSIS 4 INDEX: FIB4 SCORE: 1.4

## 2024-04-11 NOTE — ASSESSMENT & PLAN NOTE
Chronic, stable. 124/68 in clinic today.  Continue spironolactone 50 mg daily; irbesartan 300 mg daily; propranolol 10 mg bid; amlodipine 5 mg. Ok to continue hydralazine as needed for sbp >160

## 2024-04-11 NOTE — PATIENT INSTRUCTIONS
[facility]  [address] [place] [address]  Phone: [phone]

## 2024-04-11 NOTE — PROGRESS NOTES
"Subjective:     CC: The primary encounter diagnosis was Gastroesophageal reflux disease without esophagitis. Diagnoses of Primary hypertension, Prediabetes, Elevated hemoglobin A1c, Allergic sinusitis, Briseno's esophagus with low grade dysplasia, Lichen planus, Mixed hyperlipidemia, Obesity (BMI 30-39.9), and Need for vaccination were also pertinent to this visit.    HPI:   Kelly presents today with    Problem   Prediabetes   Allergic Sinusitis   Primary Hypertension   Hld (Hyperlipidemia)   Briseno's Esophagus    Dr. Cunningham         ROS:  Review of Systems   Constitutional:  Positive for malaise/fatigue.   Skin:         Lesions to lower extremity  Cheilitis         Objective:     Exam:  /68 (BP Location: Right arm, Patient Position: Sitting, BP Cuff Size: Adult)   Pulse 68   Temp 36.6 °C (97.9 °F) (Temporal)   Resp 20   Ht 1.6 m (5' 3\")   Wt 78.2 kg (172 lb 6.4 oz)   SpO2 100%   BMI 30.54 kg/m²  Body mass index is 30.54 kg/m².    Physical Exam  Vitals reviewed.   Constitutional:       General: She is not in acute distress.     Appearance: Normal appearance. She is not ill-appearing.   HENT:      Head: Normocephalic and atraumatic.      Mouth/Throat:        Comments: Angular cheilitis   Cardiovascular:      Rate and Rhythm: Normal rate.      Pulses: Normal pulses.   Pulmonary:      Effort: Pulmonary effort is normal. No respiratory distress.   Skin:     General: Skin is warm and dry.      Findings: Rash (multiple nodular, urticarial lesions to LE bilaterally) present.   Neurological:      General: No focal deficit present.      Mental Status: She is alert and oriented to person, place, and time.   Psychiatric:         Mood and Affect: Mood normal.         Behavior: Behavior normal.       Assessment & Plan:     63 y.o. female with the following -     Problem List Items Addressed This Visit          Family Medicine Problems    Primary hypertension     Chronic, stable. 124/68 in clinic today.  Continue " spironolactone 50 mg daily; irbesartan 300 mg daily; propranolol 10 mg bid; amlodipine 5 mg. Ok to continue hydralazine as needed for sbp >160            Other    Lichen planus    Relevant Medications    triamcinolone acetonide (KENALOG) 0.1 % Ointment    Briseno's esophagus     Chronic, stable. Continue nexium 40 mg will provide rx for daily          Relevant Medications    esomeprazole (NEXIUM) 40 MG delayed-release capsule    HLD (hyperlipidemia)     Chronic, stable. Continue pravastatin 20 mg daily, continue healthy lifestyle recommendations.         GERD (gastroesophageal reflux disease) - Primary    Relevant Medications    esomeprazole (NEXIUM) 40 MG delayed-release capsule    Allergic sinusitis     Chronic ongoing. Using flonase daily as needed.  Continue fluticasone daily.         Obesity (BMI 30-39.9)    Relevant Orders    Patient identified as having weight management issue.  Appropriate orders and counseling given.    Prediabetes     Chronic, unstable. Will recheck poct A1c today 6.5%. Reports improvement in dietary consumption of sweets.  Continue healthy lifestyle recommendations, will recheck 6 months, consider starting metformin         Relevant Orders    POCT  A1C (Completed)     Other Visit Diagnoses       Elevated hemoglobin A1c        Relevant Orders    POCT  A1C (Completed)    Need for vaccination        Relevant Orders    Tdap Vaccine =>6YO IM    Pneumococcal Conjugate Vaccine 20-Valent (6 wks+)          Patient was educated in proper administration of medication(s) ordered today including safety, possible SE, risks, benefits, rationale and alternatives to therapy.   Supportive care, differential diagnoses, and indications for immediate follow-up discussed with patient.    Pathogenesis of diagnosis discussed including typical length and natural progression.    Instructed to return to clinic or nearest emergency department for any change in condition, further concerns, or worsening of  symptoms.  Patient states understanding of the plan of care and discharge instructions.    Return in about 6 months (around 10/11/2024) for Blood Pressure, A1C.    Please note that this dictation was created using voice recognition software. I have made every reasonable attempt to correct obvious errors, but I expect that there are errors of grammar and possibly content that I did not discover before finalizing the note.

## 2024-04-11 NOTE — ASSESSMENT & PLAN NOTE
Chronic, unstable. Will recheck poct A1c today 6.5%. Reports improvement in dietary consumption of sweets.  Continue healthy lifestyle recommendations, will recheck 6 months, consider starting metformin

## 2024-04-12 RX ORDER — FLUOXETINE 10 MG/1
10 CAPSULE ORAL DAILY
Qty: 90 CAPSULE | Refills: 3 | Status: SHIPPED | OUTPATIENT
Start: 2024-04-12

## 2024-04-13 NOTE — TELEPHONE ENCOUNTER
Received request via: Patient    Was the patient seen in the last year in this department? Yes    Does the patient have an active prescription (recently filled or refills available) for medication(s) requested? No    Pharmacy Name:   [facility]/Pharmacy #[id number] - [place], [place] - [address]     Does the patient have skilled nursing Plus and need 100 day supply (blood pressure, diabetes and cholesterol meds only)? Patient does not have SCP

## 2024-05-06 DIAGNOSIS — J30.89 SEASONAL ALLERGIC RHINITIS DUE TO OTHER ALLERGIC TRIGGER: ICD-10-CM

## 2024-05-14 NOTE — TELEPHONE ENCOUNTER
Received request via: Pharmacy    Was the patient seen in the last year in this department? Yes    Does the patient have an active prescription (recently filled or refills available) for medication(s) requested? No    Pharmacy Name: Pharmacy:   [facility]/Pharmacy #[id number] - [place], [place] - [address]     Does the patient have retirement Plus and need 100 day supply (blood pressure, diabetes and cholesterol meds only)? Patient does not have SCP

## 2024-05-15 RX ORDER — FLUTICASONE PROPIONATE 50 MCG
2 SPRAY, SUSPENSION (ML) NASAL
Qty: 48 ML | Refills: 2 | Status: SHIPPED | OUTPATIENT
Start: 2024-05-15

## 2024-05-22 ENCOUNTER — OFFICE VISIT (OUTPATIENT)
Dept: URGENT CARE | Facility: PHYSICIAN GROUP | Age: 64
End: 2024-05-22
Payer: COMMERCIAL

## 2024-05-22 VITALS
OXYGEN SATURATION: 95 % | RESPIRATION RATE: 16 BRPM | WEIGHT: 170 LBS | HEART RATE: 69 BPM | BODY MASS INDEX: 29.02 KG/M2 | SYSTOLIC BLOOD PRESSURE: 100 MMHG | DIASTOLIC BLOOD PRESSURE: 60 MMHG | HEIGHT: 64 IN | TEMPERATURE: 97.6 F

## 2024-05-22 DIAGNOSIS — R11.0 NAUSEA: ICD-10-CM

## 2024-05-22 DIAGNOSIS — R19.7 DIARRHEA, UNSPECIFIED TYPE: ICD-10-CM

## 2024-05-22 PROCEDURE — 3078F DIAST BP <80 MM HG: CPT | Performed by: PHYSICIAN ASSISTANT

## 2024-05-22 PROCEDURE — 3074F SYST BP LT 130 MM HG: CPT | Performed by: PHYSICIAN ASSISTANT

## 2024-05-22 PROCEDURE — 99213 OFFICE O/P EST LOW 20 MIN: CPT | Performed by: PHYSICIAN ASSISTANT

## 2024-05-22 RX ORDER — ONDANSETRON 4 MG/1
4 TABLET, ORALLY DISINTEGRATING ORAL EVERY 6 HOURS PRN
Qty: 15 TABLET | Refills: 0 | Status: SHIPPED | OUTPATIENT
Start: 2024-05-22

## 2024-05-22 ASSESSMENT — FIBROSIS 4 INDEX: FIB4 SCORE: 1.4

## 2024-05-22 NOTE — LETTER
May 22, 2024         Patient: Kelly Oakley   YOB: 1960   Date of Visit: 5/22/2024           To Whom it May Concern:    Kelly Oakley was seen in my clinic on 5/22/2024. Please excuse from missed work this week through 5/24/24.     If you have any questions or concerns, please don't hesitate to call.        Sincerely,           José Miguel Wilson P.A.-C.  Electronically Signed

## 2024-05-22 NOTE — PROGRESS NOTES
Subjective:   Kelly Oakley is a 63 y.o. female who presents for Head Ache (X6 days, body aches, sinus pain) and Diarrhea (X3 days )      HPI  The patient presents to the Urgent Care with a primary complaint of diarrhea onset 3 days ago.  Started with a headache intermittently for 1 to 2 weeks.  Associated mild dull soreness to her upper abdomen.  She feels tired and having some bodyaches.  She has been taking Pepto-Bismol which causes dark stools but did not notice any bloody or black stools before hand.  Felt feverish with chills.  Nasal congestion and cough for a while due to allergies.  Some nausea.  Denies any chest pain, SOB, vomiting. Tolerating fluids well. Decreased appetite. Last night ate chicken. Yesterday ate mac n cheese. No recent traveling. No drinking from abnormal water sources. History of diverticulosis but no diverticulitis. Smokes cigarettes. History of cholecystectomy and appendectomy.         Past Medical History:   Diagnosis Date    Acute cystitis with hematuria 2/14/2022    Adrenal nodule 2012 2015 / nonfunctional / benign    Anemia     Anesthesia     Anxiety     Panic    Arrhythmia     palpatations    Arthritis     Morning stiffness    Atherosclerosis of arteries 2013    seen on abdominal CT    Briseno's  esophagus     Bronchitis 2019    Chest pain at rest 9/11/2015    Chronic obstructive pulmonary disease (HCC) 11/30/2021    Cigarette smoker one half pack a day or less 5/28/2015    Depression     Fatigue 9/11/2015    GERD (gastroesophageal reflux disease)     Headache(784.0)     sinus headache    Heart burn     Heart murmur     Dr. Krause-Stress trend    High cholesterol     History of HPV infection     HLD (hyperlipidemia) 9/11/2015    HTN (hypertension) 9/11/2015    IBD (inflammatory bowel disease)     Dr. Cunningham    Indigestion     MRSA carrier 2008    nose cellulitis    Nocturnal hypoxemia 10/15/2015    OSTEOPOROSIS     Osteopenia    Pneumonia 2005    PONV (postoperative nausea and  "vomiting)     S/P appendectomy     S/P cholecystectomy     S/P hysterectomy with oophorectomy     endometriosis    Snoring     Ulcer     Barretts esophogitis    Urinary incontinence     Urinary tract infection, site not specified      Allergies   Allergen Reactions    Macrodantin [Nitrofurantoin Macrocrystal] Anaphylaxis     Prescription > 10 years ago    Pcn [Penicillins] Anaphylaxis     Prescription > 10 years ago    Doxycycline Itching    Bactrim Vomiting    Clarithromycin Vomiting    Hydrochlorothiazide      Hyponatremia    Omnicef Itching        Objective:     /60 (BP Location: Left arm, Patient Position: Sitting, BP Cuff Size: Adult)   Pulse 69   Temp 36.4 °C (97.6 °F) (Temporal)   Resp 16   Ht 1.613 m (5' 3.5\")   Wt 77.1 kg (170 lb)   SpO2 95%   BMI 29.64 kg/m²     Physical Exam  Vitals reviewed.   Constitutional:       General: She is not in acute distress.     Appearance: Normal appearance. She is not ill-appearing or toxic-appearing.   Eyes:      Conjunctiva/sclera: Conjunctivae normal.   Cardiovascular:      Rate and Rhythm: Normal rate and regular rhythm.      Heart sounds: Normal heart sounds.   Pulmonary:      Effort: Pulmonary effort is normal. No respiratory distress.      Breath sounds: Normal breath sounds. No wheezing, rhonchi or rales.   Abdominal:      General: Abdomen is flat. There is no distension.      Palpations: Abdomen is soft. There is no hepatomegaly, splenomegaly or mass.      Tenderness: There is generalized abdominal tenderness (mild) and tenderness in the epigastric area. There is no guarding or rebound.   Musculoskeletal:      Cervical back: Neck supple. No rigidity.   Skin:     General: Skin is warm and dry.   Neurological:      General: No focal deficit present.      Mental Status: She is alert and oriented to person, place, and time.   Psychiatric:         Mood and Affect: Mood normal.         Behavior: Behavior normal.         Diagnosis and associated orders: "     1. Diarrhea, unspecified type    2. Nausea  - ondansetron (ZOFRAN ODT) 4 MG TABLET DISPERSIBLE; Take 1 Tablet by mouth every 6 hours as needed for Nausea/Vomiting.  Dispense: 15 Tablet; Refill: 0       Comments/MDM:     The patient's presenting symptoms and exam findings are consistent with a viral gastroenteritis.  Patient overall is well-appearing in no acute distress.  Normal vital signs.  Tolerating fluids well.  I was unable to elicit significant localized abdominal tenderness on exam.  Recommend increase fluid intake such as sips of fluids and Pedialyte, bland or BRAT diet and increase as tolerated. Avoid dairy of fatty foods for now. If no improvement in 5 to 7 days or any worsening, recommend returning to the urgent care or following up with PCP for re-evaluation.  Patient understands to present immediately to the ER if any severe abdominal pain, unable to tolerate fluids, or any other concerns       I personally reviewed prior external notes and test results pertinent to today's visit. Pathogenesis of diagnosis discussed including typical length and natural progression. Supportive care, natural history, differential diagnoses, and indications for immediate follow-up discussed. Patient expresses understanding and agrees to plan. Patient denies any other questions or concerns.     Follow-up with the primary care physician for recheck, reevaluation, and consideration of further management.    Please note that this dictation was created using voice recognition software. I have made a reasonable attempt to correct obvious errors, but I expect that there are errors of grammar and possibly content that I did not discover before finalizing the note.    This note was electronically signed by José Miguel Wilson PA-C

## 2024-05-24 ENCOUNTER — APPOINTMENT (OUTPATIENT)
Dept: MEDICAL GROUP | Facility: PHYSICIAN GROUP | Age: 64
End: 2024-05-24
Payer: COMMERCIAL

## 2024-05-28 ENCOUNTER — OFFICE VISIT (OUTPATIENT)
Dept: MEDICAL GROUP | Facility: PHYSICIAN GROUP | Age: 64
End: 2024-05-28
Payer: COMMERCIAL

## 2024-05-28 VITALS
DIASTOLIC BLOOD PRESSURE: 60 MMHG | WEIGHT: 169.8 LBS | TEMPERATURE: 97.5 F | RESPIRATION RATE: 20 BRPM | SYSTOLIC BLOOD PRESSURE: 110 MMHG | OXYGEN SATURATION: 95 % | HEIGHT: 64 IN | BODY MASS INDEX: 28.99 KG/M2 | HEART RATE: 67 BPM

## 2024-05-28 DIAGNOSIS — Z12.31 ENCOUNTER FOR SCREENING MAMMOGRAM FOR BREAST CANCER: ICD-10-CM

## 2024-05-28 DIAGNOSIS — R19.7 DIARRHEA, UNSPECIFIED TYPE: ICD-10-CM

## 2024-05-28 DIAGNOSIS — R19.7 DIARRHEA OF PRESUMED INFECTIOUS ORIGIN: ICD-10-CM

## 2024-05-28 DIAGNOSIS — J30.9 ALLERGIC SINUSITIS: ICD-10-CM

## 2024-05-28 DIAGNOSIS — Z91.09 ALLERGY TO ENVIRONMENTAL FACTORS: ICD-10-CM

## 2024-05-28 DIAGNOSIS — R10.84 GENERALIZED ABDOMINAL PAIN: ICD-10-CM

## 2024-05-28 DIAGNOSIS — J32.4 CHRONIC PANSINUSITIS: ICD-10-CM

## 2024-05-28 DIAGNOSIS — K21.9 GASTROESOPHAGEAL REFLUX DISEASE WITHOUT ESOPHAGITIS: ICD-10-CM

## 2024-05-28 PROCEDURE — 3074F SYST BP LT 130 MM HG: CPT

## 2024-05-28 PROCEDURE — 99214 OFFICE O/P EST MOD 30 MIN: CPT

## 2024-05-28 PROCEDURE — 3078F DIAST BP <80 MM HG: CPT

## 2024-05-28 RX ORDER — DICYCLOMINE HYDROCHLORIDE 10 MG/1
10 CAPSULE ORAL
Qty: 120 CAPSULE | Refills: 1 | Status: SHIPPED | OUTPATIENT
Start: 2024-05-28

## 2024-05-28 RX ORDER — AZITHROMYCIN 500 MG/1
500 TABLET, FILM COATED ORAL DAILY
Qty: 5 TABLET | Refills: 0 | Status: SHIPPED | OUTPATIENT
Start: 2024-05-28 | End: 2024-06-02

## 2024-05-28 RX ORDER — SIMETHICONE 125 MG
125 TABLET,CHEWABLE ORAL EVERY 6 HOURS PRN
COMMUNITY

## 2024-05-28 ASSESSMENT — FIBROSIS 4 INDEX: FIB4 SCORE: 1.4

## 2024-05-28 ASSESSMENT — ENCOUNTER SYMPTOMS
CONSTIPATION: 0
ABDOMINAL PAIN: 1
CHILLS: 1
DIARRHEA: 1
WEAKNESS: 1
BLOOD IN STOOL: 0

## 2024-05-28 NOTE — ASSESSMENT & PLAN NOTE
Acute flare. Reports prior episode had resolved. Has been taking pepto bismol, kaopectate, electrolyte Pedialyte, water.   Symptoms associated with frequent liquid stools, abdominal pain, bloating, worse after eating, also intermittent nausea.   Has had antibiotics in the past for diverticulitis, and was unable to tolerate due to nausea.     Will try treatment for IBS given frequency of recurrence of diarrhea  Last colonoscopy 6/21/23- one adenoma, 9 polyps removed, diverticulosis

## 2024-05-28 NOTE — LETTER
[facility]  [address]  [facility] [facility] [facility] [id number]     May 28, 2024    Patient: Kelly Oakley   YOB: 1960   Date of Visit: 5/28/2024       To Whom It May Concern:    Kelly Oakley was seen and treated in our department on 5/28/2024.     Please excuse her for missing work for illness. She may return to work without restrictions 5/30/24.     Sincerely,     POPPY Duncan.

## 2024-05-28 NOTE — ASSESSMENT & PLAN NOTE
Chronic, unstable. Reports runny nose, cough. Taking otc loratadine daily, Flonase. Continues to have ongoing symptoms.

## 2024-05-28 NOTE — PROGRESS NOTES
"Subjective:     CC: Diagnoses of Gastroesophageal reflux disease without esophagitis, Generalized abdominal pain, Diarrhea of presumed infectious origin, Encounter for screening mammogram for breast cancer, Allergy to environmental factors, Diarrhea, unspecified type, Allergic sinusitis, and Chronic pansinusitis were pertinent to this visit.    Pt presents today feeling poorly. Reports ongoing diarrhea for the past 2 weeks, as well as abdominal pain, bloating. Feeling tired, fatigued. Has had chills. Feeling like she is getting another sinus infection/having sinus congestion recurring. Reports the last time I gave her azithromycin her symptoms were resolved and she felt better temporarily, but this has been an ongoing issue for years.    HPI:   Kelly presents today with    Problem   Generalized Abdominal Pain   Allergy to Environmental Factors   Allergic Sinusitis   Diarrhea   Gerd (Gastroesophageal Reflux Disease)     ROS:  Review of Systems   Constitutional:  Positive for chills and malaise/fatigue.   Gastrointestinal:  Positive for abdominal pain and diarrhea. Negative for blood in stool and constipation.   Neurological:  Positive for weakness.   All other systems reviewed and are negative.      Objective:     Exam:  /60 (BP Location: Right arm, Patient Position: Sitting, BP Cuff Size: Adult)   Pulse 67   Temp 36.4 °C (97.5 °F) (Temporal)   Resp 20   Ht 1.613 m (5' 3.5\")   Wt 77 kg (169 lb 12.8 oz)   SpO2 95%   BMI 29.61 kg/m²  Body mass index is 29.61 kg/m².    Physical Exam  Vitals reviewed.   Constitutional:       General: She is in acute distress.      Appearance: Normal appearance. She is not ill-appearing.   HENT:      Head: Normocephalic and atraumatic.      Nose:      Right Sinus: Maxillary sinus tenderness and frontal sinus tenderness present.      Left Sinus: Maxillary sinus tenderness and frontal sinus tenderness present.   Cardiovascular:      Rate and Rhythm: Normal rate.      Pulses: " Normal pulses.   Pulmonary:      Effort: Pulmonary effort is normal. No respiratory distress.   Skin:     General: Skin is warm and dry.      Findings: No rash.   Neurological:      General: No focal deficit present.      Mental Status: She is alert and oriented to person, place, and time.   Psychiatric:         Mood and Affect: Mood normal.         Behavior: Behavior normal.       Assessment & Plan:     63 y.o. female with the following -     Problem List Items Addressed This Visit       GERD (gastroesophageal reflux disease)     Chronic, ongoing. Reports taking 20 mg esomeprazole BID.   Generally well managed. Currently increased bloating, abdominal pain, diarrhea         Relevant Medications    simethicone (MYLICON) 125 MG chewable tablet    dicyclomine (BENTYL) 10 MG Cap    Diarrhea     Acute flare. Reports prior episode had resolved. Has been taking pepto bismol, kaopectate, electrolyte Pedialyte, water.   Symptoms associated with frequent liquid stools, abdominal pain, bloating, worse after eating, also intermittent nausea.   Has had antibiotics in the past for diverticulitis, and was unable to tolerate due to nausea.     Will try treatment for IBS given frequency of recurrence of diarrhea  Last colonoscopy 6/21/23- one adenoma, 9 polyps removed, diverticulosis            Relevant Medications    dicyclomine (BENTYL) 10 MG Cap    Other Relevant Orders    SIBO BREATH TEST    Allergic sinusitis     Chronic, ongoing. Current flare. Recurrent infections.         Relevant Orders    Referral to ENT    Generalized abdominal pain    Relevant Orders    SIBO BREATH TEST    Allergy to environmental factors     Chronic, unstable. Reports runny nose, cough. Taking otc loratadine daily, Flonase. Continues to have ongoing symptoms.           Other Visit Diagnoses       Encounter for screening mammogram for breast cancer        Relevant Orders    MA-SCREENING MAMMO BILAT W/TOMOSYNTHESIS W/CAD    Chronic pansinusitis         Relevant Medications    azithromycin (ZITHROMAX) 500 MG tablet          Patient was educated in proper administration of medication(s) ordered today including safety, possible SE, risks, benefits, rationale and alternatives to therapy.   Supportive care, differential diagnoses, and indications for immediate follow-up discussed with patient.    Pathogenesis of diagnosis discussed including typical length and natural progression.    Instructed to return to clinic or nearest emergency department for any change in condition, further concerns, or worsening of symptoms.  Patient states understanding of the plan of care and discharge instructions.    Return in about 2 months (around 7/28/2024), or if symptoms worsen or fail to improve.    Please note that this dictation was created using voice recognition software. I have made every reasonable attempt to correct obvious errors, but I expect that there are errors of grammar and possibly content that I did not discover before finalizing the note.

## 2024-05-28 NOTE — ASSESSMENT & PLAN NOTE
Chronic, ongoing. Reports taking 20 mg esomeprazole BID.   Generally well managed. Currently increased bloating, abdominal pain, diarrhea

## 2024-06-03 DIAGNOSIS — I10 PRIMARY HYPERTENSION: ICD-10-CM

## 2024-06-03 DIAGNOSIS — F41.9 ANXIETY: ICD-10-CM

## 2024-06-07 RX ORDER — AMLODIPINE BESYLATE 5 MG/1
5 TABLET ORAL DAILY
Qty: 90 TABLET | Refills: 3 | Status: SHIPPED | OUTPATIENT
Start: 2024-06-07

## 2024-06-07 RX ORDER — BUSPIRONE HYDROCHLORIDE 15 MG/1
15 TABLET ORAL 2 TIMES DAILY
Qty: 180 TABLET | Refills: 3 | Status: SHIPPED | OUTPATIENT
Start: 2024-06-07

## 2024-06-07 RX ORDER — SPIRONOLACTONE 50 MG/1
50 TABLET, FILM COATED ORAL DAILY
Qty: 90 TABLET | Refills: 3 | Status: SHIPPED | OUTPATIENT
Start: 2024-06-07

## 2024-06-07 NOTE — TELEPHONE ENCOUNTER
Received request via: Patient    Was the patient seen in the last year in this department? Yes    Does the patient have an active prescription (recently filled or refills available) for medication(s) requested? No    Pharmacy Name: Pharmacy:   [facility]/Pharmacy #[id number] - [place], [place] - [address]     Does the patient have retirement Plus and need 100 day supply (blood pressure, diabetes and cholesterol meds only)? Patient does not have SCP

## 2024-06-11 DIAGNOSIS — R19.7 DIARRHEA, UNSPECIFIED TYPE: ICD-10-CM

## 2024-06-12 RX ORDER — DICYCLOMINE HYDROCHLORIDE 10 MG/1
10 CAPSULE ORAL
Qty: 360 CAPSULE | Refills: 3 | Status: SHIPPED | OUTPATIENT
Start: 2024-06-12

## 2024-06-12 NOTE — TELEPHONE ENCOUNTER
Received request via: Patient    Was the patient seen in the last year in this department? Yes    Does the patient have an active prescription (recently filled or refills available) for medication(s) requested? No    Pharmacy Name: Pharmacy:   [facility]/Pharmacy #[id number] - [place], [place] - [address]     Does the patient have intermediate Plus and need 100 day supply (blood pressure, diabetes and cholesterol meds only)? Patient does not have SCP

## 2024-06-18 ENCOUNTER — HOSPITAL ENCOUNTER (OUTPATIENT)
Dept: LAB | Facility: MEDICAL CENTER | Age: 64
End: 2024-06-18
Payer: COMMERCIAL

## 2024-06-18 DIAGNOSIS — R19.7 DIARRHEA OF PRESUMED INFECTIOUS ORIGIN: ICD-10-CM

## 2024-06-18 DIAGNOSIS — R10.84 GENERALIZED ABDOMINAL PAIN: ICD-10-CM

## 2024-06-18 PROCEDURE — 91065 BREATH HYDROGEN/METHANE TEST: CPT

## 2024-07-15 LAB — TEST NAME 95000: NORMAL

## 2024-07-17 ENCOUNTER — APPOINTMENT (OUTPATIENT)
Dept: URGENT CARE | Facility: PHYSICIAN GROUP | Age: 64
End: 2024-07-17
Payer: COMMERCIAL

## 2024-07-17 ENCOUNTER — OFFICE VISIT (OUTPATIENT)
Dept: URGENT CARE | Facility: PHYSICIAN GROUP | Age: 64
End: 2024-07-17
Payer: COMMERCIAL

## 2024-07-17 VITALS
BODY MASS INDEX: 28.85 KG/M2 | RESPIRATION RATE: 16 BRPM | HEIGHT: 64 IN | DIASTOLIC BLOOD PRESSURE: 70 MMHG | SYSTOLIC BLOOD PRESSURE: 122 MMHG | OXYGEN SATURATION: 94 % | HEART RATE: 67 BPM | WEIGHT: 169 LBS | TEMPERATURE: 96.3 F

## 2024-07-17 DIAGNOSIS — R06.2 WHEEZING: ICD-10-CM

## 2024-07-17 DIAGNOSIS — R69 MULTIPLE COMORBID CONDITIONS: ICD-10-CM

## 2024-07-17 DIAGNOSIS — Z79.899 POLYPHARMACY: ICD-10-CM

## 2024-07-17 DIAGNOSIS — R05.1 ACUTE COUGH: ICD-10-CM

## 2024-07-17 PROCEDURE — 3074F SYST BP LT 130 MM HG: CPT | Performed by: REGISTERED NURSE

## 2024-07-17 PROCEDURE — 99214 OFFICE O/P EST MOD 30 MIN: CPT | Performed by: REGISTERED NURSE

## 2024-07-17 PROCEDURE — 3078F DIAST BP <80 MM HG: CPT | Performed by: REGISTERED NURSE

## 2024-07-17 RX ORDER — BENZONATATE 100 MG/1
100 CAPSULE ORAL 3 TIMES DAILY PRN
Qty: 30 CAPSULE | Refills: 0 | Status: SHIPPED | OUTPATIENT
Start: 2024-07-17 | End: 2024-07-27

## 2024-07-17 RX ORDER — AZITHROMYCIN 500 MG/1
500 TABLET, FILM COATED ORAL
COMMUNITY
Start: 2024-06-15 | End: 2024-07-17

## 2024-07-17 RX ORDER — PREDNISONE 10 MG/1
40 TABLET ORAL DAILY
Qty: 20 TABLET | Refills: 0 | Status: SHIPPED | OUTPATIENT
Start: 2024-07-17 | End: 2024-07-22

## 2024-07-17 ASSESSMENT — ENCOUNTER SYMPTOMS
DIZZINESS: 0
SHORTNESS OF BREATH: 0
ABDOMINAL PAIN: 0
FEVER: 0

## 2024-07-17 ASSESSMENT — FIBROSIS 4 INDEX: FIB4 SCORE: 1.4

## 2024-08-15 ENCOUNTER — APPOINTMENT (OUTPATIENT)
Dept: MEDICAL GROUP | Facility: PHYSICIAN GROUP | Age: 64
End: 2024-08-15
Payer: COMMERCIAL

## 2024-08-15 VITALS
TEMPERATURE: 97.7 F | RESPIRATION RATE: 14 BRPM | OXYGEN SATURATION: 95 % | HEIGHT: 64 IN | HEART RATE: 60 BPM | DIASTOLIC BLOOD PRESSURE: 80 MMHG | BODY MASS INDEX: 28.68 KG/M2 | SYSTOLIC BLOOD PRESSURE: 118 MMHG | WEIGHT: 168 LBS

## 2024-08-15 DIAGNOSIS — Z13.0 SCREENING FOR DEFICIENCY ANEMIA: ICD-10-CM

## 2024-08-15 DIAGNOSIS — J20.9 ACUTE BRONCHITIS, UNSPECIFIED ORGANISM: ICD-10-CM

## 2024-08-15 DIAGNOSIS — R05.1 ACUTE COUGH: ICD-10-CM

## 2024-08-15 DIAGNOSIS — E78.2 MIXED HYPERLIPIDEMIA: ICD-10-CM

## 2024-08-15 DIAGNOSIS — J02.9 ACUTE PHARYNGITIS, UNSPECIFIED ETIOLOGY: ICD-10-CM

## 2024-08-15 DIAGNOSIS — Z13.29 THYROID DISORDER SCREEN: ICD-10-CM

## 2024-08-15 DIAGNOSIS — K63.8219 SMALL INTESTINAL BACTERIAL OVERGROWTH (SIBO): ICD-10-CM

## 2024-08-15 DIAGNOSIS — R53.83 FATIGUE, UNSPECIFIED TYPE: ICD-10-CM

## 2024-08-15 DIAGNOSIS — R73.03 PREDIABETES: ICD-10-CM

## 2024-08-15 PROCEDURE — 3074F SYST BP LT 130 MM HG: CPT

## 2024-08-15 PROCEDURE — 3079F DIAST BP 80-89 MM HG: CPT

## 2024-08-15 PROCEDURE — 99214 OFFICE O/P EST MOD 30 MIN: CPT

## 2024-08-15 RX ORDER — ALBUTEROL SULFATE 90 UG/1
2 AEROSOL, METERED RESPIRATORY (INHALATION) EVERY 4 HOURS PRN
Qty: 8.5 G | Refills: 0 | Status: SHIPPED | OUTPATIENT
Start: 2024-08-15

## 2024-08-15 RX ORDER — NEOMYCIN SULFATE 500 MG/1
500 TABLET ORAL 2 TIMES DAILY
Qty: 28 TABLET | Refills: 0 | Status: SHIPPED | OUTPATIENT
Start: 2024-08-15 | End: 2024-08-29

## 2024-08-15 ASSESSMENT — FIBROSIS 4 INDEX: FIB4 SCORE: 1.4

## 2024-08-15 NOTE — PROGRESS NOTES
Chief Complaint   Patient presents with    Results     SIBO     Cough     X 1 WEEK, SWOLLEN GLANDS         HPI: Patient is a 63 y.o. female complaining of 7 days of illness including: productive of white, yellow sputum cough, facial pain, nasal congestion, rhinorrhea, sore throat, wheezing.   Mucus is: thick.  Similarly ill exposures: yes. At work  Treatments tried: otc allergy/nasal steroid, cough drops  She  reports that she has been smoking cigarettes. She has a 20 pack-year smoking history. She has never used smokeless tobacco..     ROS:   Mild subjective fever/chills, worsening cough, ongoing GI issues, no skin rash.      I reviewed the patient's medications, allergies and medical history:  Current Outpatient Medications   Medication Sig Dispense Refill    riFAXIMin (XIFAXAN) 550 MG Tab tablet Take 1 Tablet by mouth 2 times a day for 14 days. 28 Tablet 0    neomycin (MYCIFRADIN) 500 MG Tab Take 1 Tablet by mouth 2 times a day for 14 days. 28 Tablet 0    albuterol (PROAIR HFA) 108 (90 Base) MCG/ACT Aero Soln inhalation aerosol Inhale 2 Puffs every four hours as needed for Shortness of Breath. 8.5 g 0    dicyclomine (BENTYL) 10 MG Cap TAKE 1 CAPSULE BY MOUTH 4 TIMES A DAY,BEFORE MEALS AND AT BEDTIME 360 Capsule 3    amLODIPine (NORVASC) 5 MG Tab TAKE 1 TABLET BY MOUTH EVERY DAY 90 Tablet 3    spironolactone (ALDACTONE) 50 MG Tab TAKE 1 TABLET BY MOUTH EVERY DAY 90 Tablet 3    busPIRone (BUSPAR) 15 MG tablet TAKE 1 TABLET BY MOUTH TWICE A  Tablet 3    simethicone (MYLICON) 125 MG chewable tablet Chew 125 mg every 6 hours as needed for Flatulence.      ondansetron (ZOFRAN ODT) 4 MG TABLET DISPERSIBLE Take 1 Tablet by mouth every 6 hours as needed for Nausea/Vomiting. 15 Tablet 0    fluticasone (FLONASE) 50 MCG/ACT nasal spray ADMINISTER 2 SPRAYS INTO AFFECTED NOSTRIL(S) AT BEDTIME. 48 mL 2    FLUoxetine (PROZAC) 10 MG Cap Take 1 Capsule by mouth every day. 90 Capsule 3    hydrOXYzine HCl (ATARAX) 25 MG Tab  Take 1 Tablet by mouth 2 times a day as needed for Anxiety. 180 Tablet 3    esomeprazole (NEXIUM) 40 MG delayed-release capsule Take 1 Capsule by mouth every morning before breakfast. 90 Capsule 3    triamcinolone acetonide (KENALOG) 0.1 % Ointment Apply 1 Application  topically 2 times a day. 30 g 1    propranolol (INDERAL) 10 MG Tab TAKE 1 TABLET BY MOUTH TWICE A  Tablet 3    estradiol (YUVAFEM) 10 MCG Tab INSERT 1 TABLET INTO THE VAGINA TWO TIMES A WEEK. 24 Tablet 3    pravastatin (PRAVACHOL) 20 MG Tab TAKE 1 TABLET BY MOUTH EVERY DAY 90 Tablet 3    irbesartan (AVAPRO) 300 MG Tab TAKE 1 TABLET BY MOUTH EVERY DAY 90 Tablet 3    sodium chloride ([employer] SALINE NASAL SPRAY) 0.65 % Solution ADMINISTER 2 SPRAYS INTO AFFECTED NOSTRIL(S) EVERY 2 HOURS AS NEEDED FOR CONGESTION. 30 mL 3    valacyclovir (VALTREX) 1 GM Tab Take 1 Tablet by mouth every day. Take 1 g daily for 5 days at onset of prodrome 20 Tablet 1    albuterol 108 (90 Base) MCG/ACT Aero Soln inhalation aerosol Inhale 2 Puffs every four hours as needed for Shortness of Breath. 1 Each 3    hydrALAZINE (APRESOLINE) 25 MG Tab Take 1 Tablet by mouth 2 times a day as needed (only if bp is 160 or above). 60 Tablet 3    cetirizine (ZYRTEC) 10 MG Tab Take 1 Tablet by mouth every evening. 90 Tablet 1    acetaminophen (TYLENOL) 325 MG Tab Take 650 mg by mouth every 6 hours as needed.       No current facility-administered medications for this visit.     Macrodantin [nitrofurantoin macrocrystal], Pcn [penicillins], Doxycycline, Bactrim, Clarithromycin, Hydrochlorothiazide, and Omnicef  Past Medical History:   Diagnosis Date    Acute cystitis with hematuria 2/14/2022    Adrenal nodule 2012 2015 / nonfunctional / benign    Anemia     Anesthesia     Anxiety     Panic    Arrhythmia     palpatations    Arthritis     Morning stiffness    Atherosclerosis of arteries 2013    seen on abdominal CT    Briseno's  esophagus     Bronchitis 2019    Chest pain at rest 9/11/2015  "   Chronic obstructive pulmonary disease (HCC) 11/30/2021    Cigarette smoker one half pack a day or less 5/28/2015    Depression     Fatigue 9/11/2015    GERD (gastroesophageal reflux disease)     Headache(784.0)     sinus headache    Heart burn     Heart murmur     Dr. Krause-Stress trend    High cholesterol     History of HPV infection     HLD (hyperlipidemia) 9/11/2015    HTN (hypertension) 9/11/2015    IBD (inflammatory bowel disease)     Dr. Cunningham    Indigestion     MRSA carrier 2008    nose cellulitis    Nocturnal hypoxemia 10/15/2015    OSTEOPOROSIS     Osteopenia    Pneumonia 2005    PONV (postoperative nausea and vomiting)     S/P appendectomy     S/P cholecystectomy     S/P hysterectomy with oophorectomy     endometriosis    Snoring     Ulcer     Barretts esophogitis    Urinary incontinence     Urinary tract infection, site not specified         EXAM:  /80 (BP Location: Right arm, Patient Position: Sitting, BP Cuff Size: Adult)   Pulse 60   Temp 36.5 °C (97.7 °F) (Temporal)   Resp 14   Ht 1.613 m (5' 3.5\")   Wt 76.2 kg (168 lb)   SpO2 95%   General: Alert, no conversational dyspnea or audible wheeze, non-toxic appearance.  Eyes: PERRL, conjunctiva slightly injected, no eye discharge.  Ears: Normal pinnae,TM's normal bilaterally.  Nares: Patent with thin mucus.  Sinuses: tender over maxillary / frontal sinuses.  Throat: Erythematous injection without exudate.   Neck: Supple, with tender mildly enlarged left tonsillar lymph node.  Lungs: Clear to auscultation bilaterally, faint scattered wheezes, no crackles or rhonchi.   Heart: Regular rate without murmur.  Skin: Warm and dry without rash.     ASSESSMENT:   1. Small intestinal bacterial overgrowth (SIBO)    2. Prediabetes    3. Mixed hyperlipidemia    4. Screening for deficiency anemia    5. Thyroid disorder screen    6. Fatigue, unspecified type    7. Acute bronchitis, unspecified organism    8. Acute cough    9. Acute pharyngitis, unspecified " etiology         Problem List Items Addressed This Visit       HLD (hyperlipidemia)    Relevant Orders    Lipid Profile    Prediabetes    Relevant Orders    HEMOGLOBIN A1C    Small intestinal bacterial overgrowth (SIBO)     Chronic, ongoing. Positive test in media, test date 6/18/24. Has ongoing gas pain, bloating, diarrhea. Symptoms are ongoing constantly despite food choices.   Will provide oral antibiotics per UpToDate recommendation for efficacy. Noted to be costly, consider alternates if cost prohibitive. (History of multiple antibiotic allergy- consider tetracycline or Cipro per UTD recommendation)           Relevant Medications    riFAXIMin (XIFAXAN) 550 MG Tab tablet    neomycin (MYCIFRADIN) 500 MG Tab    Other Relevant Orders    VITAMIN B12    IRON/TOTAL IRON BIND    FERRITIN    VITAMIN D,25 HYDROXY (DEFICIENCY)    VITAMIN E    VITAMIN A    VITAMIN B6    Comp Metabolic Panel    CBC WITHOUT DIFFERENTIAL     Other Visit Diagnoses       Screening for deficiency anemia        Relevant Orders    CBC WITHOUT DIFFERENTIAL    Thyroid disorder screen        Relevant Orders    TSH    Fatigue, unspecified type        Relevant Orders    TSH    Acute bronchitis, unspecified organism        Acute cough        Relevant Medications    albuterol (PROAIR HFA) 108 (90 Base) MCG/ACT Aero Soln inhalation aerosol    Acute pharyngitis, unspecified etiology                   PLAN:  1. Educated patient that majority of upper respiratory infections are viral and do not need antibiotics. As symptoms have been worsening over the last week, will treat with antibiotics- she reports she was seen in UC a few weeks ago, provided abx and steroids, which she has not started using. Recommended.  2. Twice daily use of nasal saline rinse or Neti-Pot.  3. OTC anti-pyretics and decongestants as needed.  4. Follow-up in office or urgent care for worsening symptoms, difficulty breathing, lack of expected recovery, or should new symptoms or problems  arise.

## 2024-08-15 NOTE — ASSESSMENT & PLAN NOTE
Chronic, ongoing. Positive test in media, test date 6/18/24. Has ongoing gas pain, bloating, diarrhea. Symptoms are ongoing constantly despite food choices.   Will provide oral antibiotics per UpToDate recommendation for efficacy. Noted to be costly, consider alternates if cost prohibitive. (History of multiple antibiotic allergy- consider tetracycline or Cipro per UTD recommendation)

## 2024-08-15 NOTE — PATIENT INSTRUCTIONS
Viral Symptom Relief for Adults  You have been diagnosed with a viral illness.  Antibiotics do not cure viral infections. [facility] is committed to treating your illness in the best way possible and that includes avoiding antibiotics when they are likely to do more harm than good. The treatments recommended below will help you feel better while your body’s own defenses are fighting the virus    General instructions:  Stay hydrated  Use a cool mist vaporizer to relieve congestion    Specific medications:   Cough   Suppressant: Dextromethorphan hydrobromide liquid  7.5 mg/5 mL (Triaminic Long Acting Cough, Robitussin Lingering LA Cough)    Expectorant: Guaifenesin extended release 600 mg tablets  (Mucinex, Mucus Relief)       Headache; ear, throat, muscle, or joint pain; or fever   Alternate acetaminophen and ibuprofen every 4 hours      Sore throat   Menthol lozenges (Cepacol, Vicks VapoDrops)     Nasal congestion   Nasal Spray: Sodium chloride (saline) nasal spray (Ocean, Simply Saline)    By mouth: None      Runny nose, itchy and watery eyes, and sneezing   Loratadine 10 mg tablets (Claritin)      Important Medication Instructions   Use medicines according to package instructions or as directed by your healthcare provider.    Stop the medications when symptoms improve.    Brand names are listed convenience; any brand may be utilized as long as it has the same active ingredient     Follow-up:   If not improved in 7 days, new symptoms occur, or you have other concerns please call or return for a recheck.    Cough, Adult  Coughing is a reflex that clears your throat and your airways (respiratory system). Coughing helps to heal and protect your lungs. It is normal to cough occasionally, but a cough that happens with other symptoms or lasts a long time may be a sign of a condition that needs treatment. An acute cough may only last 2-3 weeks, while a chronic cough may last 8 or more weeks.  Coughing is commonly caused  by:  Infection of the respiratory systemby viruses or bacteria.  Breathing in substances that irritate your lungs.  Allergies.  Asthma.  Mucus that runs down the back of your throat (postnasal drip).  Smoking.  Acid backing up from the stomach into the esophagus (gastroesophageal reflux).  Certain medicines.  Chronic lung problems.  Other medical conditions such as heart failure or a blood clot in the lung (pulmonary embolism).  Follow these instructions at home:  Medicines  Take over-the-counter and prescription medicines only as told by your health care provider.  Talk with your health care provider before you take a cough suppressant medicine.  Lifestyle    Avoid cigarette smoke. Do not use any products that contain nicotine or tobacco, such as cigarettes, e-cigarettes, and chewing tobacco. If you need help quitting, ask your health care provider.  Drink enough fluid to keep your urine pale yellow.  Avoid caffeine.  Do not drink alcohol if your health care provider tells you not to drink.  General instructions    Pay close attention to changes in your cough. Tell your health care provider about them.  Always cover your mouth when you cough.  Avoid things that make you cough, such as perfume, candles, cleaning products, or campfire or tobacco smoke.  If the air is dry, use a cool mist vaporizer or humidifier in your bedroom or your home to help loosen secretions.  If your cough is worse at night, try to sleep in a semi-upright position.  Rest as needed.  Keep all follow-up visits as told by your health care provider. This is important.  Contact a health care provider if you:  Have new symptoms.  Cough up pus.  Have a cough that does not get better after 2-3 weeks or gets worse.  Cannot control your cough with cough suppressant medicines and you are losing sleep.  Have pain that gets worse or pain that is not helped with medicine.  Have a fever.  Have unexplained weight loss.  Have night sweats.  Get help right  away if:  You cough up blood.  You have difficulty breathing.  Your heartbeat is very fast.  These symptoms may represent a serious problem that is an emergency. Do not wait to see if the symptoms will go away. Get medical help right away. Call your local emergency services (911 in the [place] Do not drive yourself to the hospital.  Summary  Coughing is a reflex that clears your throat and your airways. It is normal to cough occasionally, but a cough that happens with other symptoms or lasts a long time may be a sign of a condition that needs treatment.  Take over-the-counter and prescription medicines only as told by your health care provider.  Always cover your mouth when you cough.  Contact a health care provider if you have new symptoms or a cough that does not get better after 2-3 weeks or gets worse.  This information is not intended to replace advice given to you by your health care provider. Make sure you discuss any questions you have with your health care provider.  Document Revised: 01/06/2020 Document Reviewed: 01/06/2020  Elsevier Patient Education © 2023 [employer]

## 2024-08-21 ENCOUNTER — OFFICE VISIT (OUTPATIENT)
Dept: URGENT CARE | Facility: PHYSICIAN GROUP | Age: 64
End: 2024-08-21
Payer: COMMERCIAL

## 2024-08-21 VITALS
HEIGHT: 64 IN | RESPIRATION RATE: 16 BRPM | OXYGEN SATURATION: 94 % | WEIGHT: 168 LBS | BODY MASS INDEX: 28.68 KG/M2 | DIASTOLIC BLOOD PRESSURE: 64 MMHG | SYSTOLIC BLOOD PRESSURE: 126 MMHG | HEART RATE: 66 BPM | TEMPERATURE: 96.8 F

## 2024-08-21 DIAGNOSIS — R19.7 DIARRHEA, UNSPECIFIED TYPE: ICD-10-CM

## 2024-08-21 DIAGNOSIS — J06.9 VIRAL URI: ICD-10-CM

## 2024-08-21 PROCEDURE — 3078F DIAST BP <80 MM HG: CPT | Performed by: STUDENT IN AN ORGANIZED HEALTH CARE EDUCATION/TRAINING PROGRAM

## 2024-08-21 PROCEDURE — 0241U POCT CEPHEID COV-2, FLU A/B, RSV - PCR: CPT | Performed by: STUDENT IN AN ORGANIZED HEALTH CARE EDUCATION/TRAINING PROGRAM

## 2024-08-21 PROCEDURE — 3074F SYST BP LT 130 MM HG: CPT | Performed by: STUDENT IN AN ORGANIZED HEALTH CARE EDUCATION/TRAINING PROGRAM

## 2024-08-21 PROCEDURE — 99214 OFFICE O/P EST MOD 30 MIN: CPT | Performed by: STUDENT IN AN ORGANIZED HEALTH CARE EDUCATION/TRAINING PROGRAM

## 2024-08-21 ASSESSMENT — ENCOUNTER SYMPTOMS
BLOOD IN STOOL: 0
SORE THROAT: 0
ABDOMINAL PAIN: 0
PALPITATIONS: 0
WHEEZING: 0
CHILLS: 1
FEVER: 0
CONSTIPATION: 0
NAUSEA: 0
VOMITING: 0
DIARRHEA: 1
COUGH: 1
SHORTNESS OF BREATH: 0

## 2024-08-21 ASSESSMENT — FIBROSIS 4 INDEX: FIB4 SCORE: 1.4

## 2024-08-21 NOTE — PROGRESS NOTES
"Subjective     Kelly Oakley is a 63 y.o. female who presents with Diarrhea (Headache, cough, fatigue, runny nose x3 days)            Kelly is a 63 y.o. female ho presents to urgent care with cough, runny nose, nasal congestion, fatigue and diarrhea.  Symptoms started 3 days ago.  Patient denies fever.  She does report chills.  Patient concerns for COVID/flu and requesting viral testing in clinic today.  Patient reports approximately 6 episodes of watery diarrhea per day.  Patient recently completed antibiotics to treat sinus infection.  Sinus pain/pressure has improved after completion of antibiotics and patient plans to follow-up with ENT regarding recurrent sinusitis. No blood in stool. No melena. No nausea/vomiting. She does states she recently tested positive for SIBO which her PCP started her on treatment for. Patient requesting work note.        Review of Systems   Constitutional:  Positive for chills and malaise/fatigue. Negative for fever.   HENT:  Positive for congestion. Negative for ear pain and sore throat.    Respiratory:  Positive for cough. Negative for shortness of breath and wheezing.    Cardiovascular:  Negative for chest pain and palpitations.   Gastrointestinal:  Positive for diarrhea. Negative for abdominal pain, blood in stool, constipation, melena, nausea and vomiting.   All other systems reviewed and are negative.             Objective     /64 (BP Location: Right arm, Patient Position: Sitting, BP Cuff Size: Adult)   Pulse 66   Temp 36 °C (96.8 °F) (Temporal)   Resp 16   Ht 1.613 m (5' 3.5\")   Wt 76.2 kg (168 lb)   SpO2 94%   BMI 29.29 kg/m²      Physical Exam  Vitals reviewed.   Constitutional:       General: She is not in acute distress.     Appearance: Normal appearance. She is not ill-appearing or toxic-appearing.   HENT:      Head: Normocephalic and atraumatic.      Right Ear: Tympanic membrane, ear canal and external ear normal.      Left Ear: Tympanic membrane, ear " canal and external ear normal.      Nose: Nose normal.      Mouth/Throat:      Mouth: Mucous membranes are moist.      Pharynx: Oropharynx is clear.   Eyes:      Extraocular Movements: Extraocular movements intact.      Conjunctiva/sclera: Conjunctivae normal.      Pupils: Pupils are equal, round, and reactive to light.   Cardiovascular:      Rate and Rhythm: Normal rate and regular rhythm.   Pulmonary:      Effort: Pulmonary effort is normal.      Breath sounds: Normal breath sounds.   Abdominal:      General: Abdomen is flat. Bowel sounds are normal. There is no distension.      Palpations: Abdomen is soft.      Tenderness: There is generalized abdominal tenderness. There is no guarding or rebound.   Skin:     General: Skin is warm and dry.   Neurological:      General: No focal deficit present.      Mental Status: She is alert and oriented to person, place, and time.                             Assessment & Plan        Assessment & Plan  Viral URI  - Sx of chills, fatigue, body aches, cough, congestion and diarrhea for 3 days. Concerned for COVID/FLU an requesting  viral testing in clinic.    Orders:    POCT CoV-2, Flu A/B, RSV by PCR    Diarrhea, unspecified type  - Acute on chronic. Hx of SIBO. Managed by PCP.   - Reports watery diarrhea approx 6 episodes per day.  - Patient does report recent antibiotic use for sinus infection. C.Diff stool study ordered and patient given supplies to return sample back to lab.    Orders:    C Diff by PCR rflx Toxin; Future         Differential diagnoses, supportive care measures ( rest, importance of oral hydration, bland/brat diet, OTC tylenol, nasal saline rinses, humidified air) and indications for immediate follow-up discussed with patient. Pathogenesis of diagnosis discussed including typical length and natural progression. Follow up with PCP.     Instructed to return to urgent care or nearest emergency department if symptoms fail to improve, for any change in condition,  further concerns, or new concerning symptoms.     Patient states understanding and agrees with the plan of care and discharge instructions.                    My total time spent caring for the patient on the day of the encounter was 31 minutes obtaining patient history, physical exam, discussing differential diagnosis, plan of care, supportive care measures, appropriate follow-up indications for immediate follow-up. This does not include time spent on separately billable procedures/tests.

## 2024-08-21 NOTE — ASSESSMENT & PLAN NOTE
- Acute on chronic. Hx of SIBO. Managed by PCP.   - Reports watery diarrhea approx 6 episodes per day.  - Patient does report recent antibiotic use for sinus infection. C.Diff stool study ordered and patient given supplies to return sample back to lab.    Orders:    C Diff by PCR rflx Toxin; Future

## 2024-08-21 NOTE — LETTER
August 21, 2024    To Whom It May Concern:         This is confirmation that Kelly Oakley attended her scheduled appointment with Ros Montaño P.A.-C. on 8/21/24. Please excuse work absences through 8/24/24 for medical reasons. Kelly can return to work without restrictions on 8/25/24 or earlier as long as symptoms have improved/resolved and there has been no fever for 24 hours.        Sincerely,    Ros Montaño P.A.-C.  [phone]

## 2024-08-22 ENCOUNTER — HOSPITAL ENCOUNTER (OUTPATIENT)
Facility: MEDICAL CENTER | Age: 64
End: 2024-08-22
Attending: STUDENT IN AN ORGANIZED HEALTH CARE EDUCATION/TRAINING PROGRAM
Payer: COMMERCIAL

## 2024-08-22 DIAGNOSIS — R19.7 DIARRHEA, UNSPECIFIED TYPE: ICD-10-CM

## 2024-08-22 LAB
C DIFF DNA SPEC QL NAA+PROBE: NEGATIVE
C DIFF TOX GENS STL QL NAA+PROBE: NEGATIVE

## 2024-08-22 PROCEDURE — 87493 C DIFF AMPLIFIED PROBE: CPT

## 2024-08-23 LAB — TEST NAME 95000: NORMAL

## 2024-10-11 ENCOUNTER — OFFICE VISIT (OUTPATIENT)
Dept: MEDICAL GROUP | Facility: PHYSICIAN GROUP | Age: 64
End: 2024-10-11
Payer: COMMERCIAL

## 2024-10-11 VITALS
OXYGEN SATURATION: 93 % | WEIGHT: 174 LBS | SYSTOLIC BLOOD PRESSURE: 112 MMHG | TEMPERATURE: 97.7 F | HEIGHT: 63 IN | HEART RATE: 65 BPM | DIASTOLIC BLOOD PRESSURE: 78 MMHG | BODY MASS INDEX: 30.83 KG/M2

## 2024-10-11 DIAGNOSIS — L98.9 LESION OF SKIN OF FACE: ICD-10-CM

## 2024-10-11 DIAGNOSIS — N95.1 MENOPAUSAL STATE: ICD-10-CM

## 2024-10-11 DIAGNOSIS — Z78.0 POSTMENOPAUSAL STATUS (AGE-RELATED) (NATURAL): ICD-10-CM

## 2024-10-11 DIAGNOSIS — R10.84 GENERALIZED ABDOMINAL PAIN: ICD-10-CM

## 2024-10-11 DIAGNOSIS — I10 PRIMARY HYPERTENSION: ICD-10-CM

## 2024-10-11 PROCEDURE — 3078F DIAST BP <80 MM HG: CPT

## 2024-10-11 PROCEDURE — 99214 OFFICE O/P EST MOD 30 MIN: CPT

## 2024-10-11 PROCEDURE — 3074F SYST BP LT 130 MM HG: CPT

## 2024-10-11 RX ORDER — MUPIROCIN 20 MG/G
1 OINTMENT TOPICAL 2 TIMES DAILY
Qty: 22 G | Refills: 0 | Status: SHIPPED | OUTPATIENT
Start: 2024-10-11

## 2024-10-11 ASSESSMENT — FIBROSIS 4 INDEX: FIB4 SCORE: 1.4

## 2024-10-11 ASSESSMENT — ENCOUNTER SYMPTOMS: ROS SKIN COMMENTS: LESION ON FACE

## 2024-11-03 ENCOUNTER — OFFICE VISIT (OUTPATIENT)
Dept: URGENT CARE | Facility: PHYSICIAN GROUP | Age: 64
End: 2024-11-03
Payer: COMMERCIAL

## 2024-11-03 ENCOUNTER — HOSPITAL ENCOUNTER (OUTPATIENT)
Dept: RADIOLOGY | Facility: MEDICAL CENTER | Age: 64
End: 2024-11-03
Attending: PHYSICIAN ASSISTANT
Payer: COMMERCIAL

## 2024-11-03 VITALS
RESPIRATION RATE: 16 BRPM | OXYGEN SATURATION: 94 % | TEMPERATURE: 96.5 F | HEART RATE: 70 BPM | HEIGHT: 64 IN | SYSTOLIC BLOOD PRESSURE: 118 MMHG | DIASTOLIC BLOOD PRESSURE: 80 MMHG | BODY MASS INDEX: 29.71 KG/M2 | WEIGHT: 174 LBS

## 2024-11-03 DIAGNOSIS — L43.9 LICHEN PLANUS: ICD-10-CM

## 2024-11-03 DIAGNOSIS — L53.9 ERYTHEMA OF SKIN: ICD-10-CM

## 2024-11-03 DIAGNOSIS — R60.0 EDEMA OF LEFT LOWER EXTREMITY: ICD-10-CM

## 2024-11-03 DIAGNOSIS — L98.9 SKIN LESION OF LEFT LOWER LIMB: ICD-10-CM

## 2024-11-03 DIAGNOSIS — L03.116 CELLULITIS OF LEFT LOWER EXTREMITY: ICD-10-CM

## 2024-11-03 PROCEDURE — 3074F SYST BP LT 130 MM HG: CPT | Performed by: PHYSICIAN ASSISTANT

## 2024-11-03 PROCEDURE — 3079F DIAST BP 80-89 MM HG: CPT | Performed by: PHYSICIAN ASSISTANT

## 2024-11-03 PROCEDURE — 99214 OFFICE O/P EST MOD 30 MIN: CPT | Performed by: PHYSICIAN ASSISTANT

## 2024-11-03 PROCEDURE — 93971 EXTREMITY STUDY: CPT | Mod: LT

## 2024-11-03 RX ORDER — CLINDAMYCIN HYDROCHLORIDE 300 MG/1
300 CAPSULE ORAL 3 TIMES DAILY
Qty: 15 CAPSULE | Refills: 0 | Status: SHIPPED | OUTPATIENT
Start: 2024-11-03 | End: 2024-11-05 | Stop reason: SDUPTHER

## 2024-11-03 ASSESSMENT — FIBROSIS 4 INDEX: FIB4 SCORE: 1.42

## 2024-11-03 NOTE — LETTER
Isiah Gar P.A.-C.  [facility]   [address] - [place] [address]  Phone: [phone]  Fax: [phone]              November 3, 2024         Provider: Isiah Gar P.A.-C.  Location of Care: [facility] [address]  [place] [address]      Patient:   Kelly Oakley   MR Number: [id number]   YOB: 1960          Please excuse her from work for the next 4 days.  She can return sooner if feeling better.    Electronically sign by sIiah Gar P.A.-C. on 11/03/24

## 2024-11-03 NOTE — PROGRESS NOTES
Chief Complaint   Patient presents with    Leg Swelling     L leg swelling, redness, onset yesterday        HISTORY OF PRESENT ILLNESS: Patient is a 64 y.o. female who presents today because left lower leg edema and redness started yesterday.  She states that she was worried all night long but did not sleep well.  She denies  pruritus in the area.  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  She was wearing low-cut socks and initially thought she had bug bites but there has been no itching or skin barrier breakdown.  She describes redness and heat and pain that radiates up to the back of her knee.  She denies a history of DVT.  She states that she is not anticoagulated.  She also volunteers she has lichen planus with several purple lesions on the lateral aspect of her lower extremity but are typically pruritic and scaly.  She does not feel that her current lesion/erythema is similar.    Patient Active Problem List    Diagnosis Date Noted    Lesion of skin of face 10/11/2024    Small intestinal bacterial overgrowth (SIBO) 08/15/2024    Generalized abdominal pain 05/28/2024    Allergy to environmental factors 05/28/2024    Prediabetes 01/10/2024    Panlobular emphysema (HCC) 10/26/2023    Ruptured varicose vein 10/26/2023    Varicose veins of both lower extremities with pain 10/26/2023    Herpes simplex 03/28/2023    Nicotine dependence, uncomplicated (Current Smoker) 02/27/2023    Obesity due to excess calories with serious comorbidity 02/27/2023    Postmenopausal atrophic vaginitis 10/20/2022    Obesity (BMI 30-39.9) 10/20/2022    Allergic sinusitis 12/30/2021    Anxiety 11/30/2021    Diarrhea 09/24/2020    GERD (gastroesophageal reflux disease) 05/05/2017    Osteopenia 11/20/2015    Nocturnal hypoxemia 10/15/2015    Adrenal adenoma     Primary hypertension 09/11/2015    HLD (hyperlipidemia) 09/11/2015    Cigarette smoker one half pack a day  or less 05/28/2015    Atherosclerosis of arteries     Snoring 08/02/2013    Lichen planus 11/02/2012    Briseno's esophagus 11/02/2012       Allergies:Macrodantin [nitrofurantoin macrocrystal], Pcn [penicillins], Doxycycline, Bactrim, Clarithromycin, Hydrochlorothiazide, and Omnicef    Current Outpatient Medications Ordered in Epic   Medication Sig Dispense Refill    mupirocin (BACTROBAN) 2 % Ointment Apply 1 Application topically 2 times a day. 22 g 0    albuterol (PROAIR HFA) 108 (90 Base) MCG/ACT Aero Soln inhalation aerosol Inhale 2 Puffs every four hours as needed for Shortness of Breath. 8.5 g 0    dicyclomine (BENTYL) 10 MG Cap TAKE 1 CAPSULE BY MOUTH 4 TIMES A DAY,BEFORE MEALS AND AT BEDTIME 360 Capsule 3    amLODIPine (NORVASC) 5 MG Tab TAKE 1 TABLET BY MOUTH EVERY DAY 90 Tablet 3    spironolactone (ALDACTONE) 50 MG Tab TAKE 1 TABLET BY MOUTH EVERY DAY 90 Tablet 3    busPIRone (BUSPAR) 15 MG tablet TAKE 1 TABLET BY MOUTH TWICE A  Tablet 3    simethicone (MYLICON) 125 MG chewable tablet Chew 125 mg every 6 hours as needed for Flatulence.      ondansetron (ZOFRAN ODT) 4 MG TABLET DISPERSIBLE Take 1 Tablet by mouth every 6 hours as needed for Nausea/Vomiting. 15 Tablet 0    fluticasone (FLONASE) 50 MCG/ACT nasal spray ADMINISTER 2 SPRAYS INTO AFFECTED NOSTRIL(S) AT BEDTIME. 48 mL 2    FLUoxetine (PROZAC) 10 MG Cap Take 1 Capsule by mouth every day. 90 Capsule 3    hydrOXYzine HCl (ATARAX) 25 MG Tab Take 1 Tablet by mouth 2 times a day as needed for Anxiety. 180 Tablet 3    esomeprazole (NEXIUM) 40 MG delayed-release capsule Take 1 Capsule by mouth every morning before breakfast. 90 Capsule 3    triamcinolone acetonide (KENALOG) 0.1 % Ointment Apply 1 Application  topically 2 times a day. 30 g 1    propranolol (INDERAL) 10 MG Tab TAKE 1 TABLET BY MOUTH TWICE A  Tablet 3    estradiol (YUVAFEM) 10 MCG Tab INSERT 1 TABLET INTO THE VAGINA TWO TIMES A WEEK. 24 Tablet 3    pravastatin (PRAVACHOL) 20 MG  Tab TAKE 1 TABLET BY MOUTH EVERY DAY 90 Tablet 3    irbesartan (AVAPRO) 300 MG Tab TAKE 1 TABLET BY MOUTH EVERY DAY 90 Tablet 3    sodium chloride ([employer] SALINE NASAL SPRAY) 0.65 % Solution ADMINISTER 2 SPRAYS INTO AFFECTED NOSTRIL(S) EVERY 2 HOURS AS NEEDED FOR CONGESTION. 30 mL 3    valacyclovir (VALTREX) 1 GM Tab Take 1 Tablet by mouth every day. Take 1 g daily for 5 days at onset of prodrome 20 Tablet 1    albuterol 108 (90 Base) MCG/ACT Aero Soln inhalation aerosol Inhale 2 Puffs every four hours as needed for Shortness of Breath. 1 Each 3    hydrALAZINE (APRESOLINE) 25 MG Tab Take 1 Tablet by mouth 2 times a day as needed (only if bp is 160 or above). 60 Tablet 3    cetirizine (ZYRTEC) 10 MG Tab Take 1 Tablet by mouth every evening. 90 Tablet 1    acetaminophen (TYLENOL) 325 MG Tab Take 650 mg by mouth every 6 hours as needed.       No current Epic-ordered facility-administered medications on file.       Past Medical History:   Diagnosis Date    Acute cystitis with hematuria 2/14/2022    Adrenal nodule 2012 2015 / nonfunctional / benign    Anemia     Anesthesia     Anxiety     Panic    Arrhythmia     palpatations    Arthritis     Morning stiffness    Atherosclerosis of arteries 2013    seen on abdominal CT    Briseno's  esophagus     Bronchitis 2019    Chest pain at rest 9/11/2015    Chronic obstructive pulmonary disease (HCC) 11/30/2021    Cigarette smoker one half pack a day or less 5/28/2015    Depression     Fatigue 9/11/2015    GERD (gastroesophageal reflux disease)     Headache(784.0)     sinus headache    Heart burn     Heart murmur     Dr. Krause-Stress trend    High cholesterol     History of HPV infection     HLD (hyperlipidemia) 9/11/2015    HTN (hypertension) 9/11/2015    IBD (inflammatory bowel disease)     Dr. Cunningham    Indigestion     MRSA carrier 2008    nose cellulitis    Nocturnal hypoxemia 10/15/2015    OSTEOPOROSIS     Osteopenia    Pneumonia 2005    PONV (postoperative nausea and vomiting)      S/P appendectomy     S/P cholecystectomy     S/P hysterectomy with oophorectomy     endometriosis    Snoring     Ulcer     Barretts esophogitis    Urinary incontinence     Urinary tract infection, site not specified        Social History     Tobacco Use    Smoking status: Some Days     Current packs/day: 0.50     Average packs/day: 0.5 packs/day for 40.0 years (20.0 ttl pk-yrs)     Types: Cigarettes    Smokeless tobacco: Never   Vaping Use    Vaping status: Never Used   Substance Use Topics    Alcohol use: Yes     Alcohol/week: 0.6 oz     Types: 1 Standard drinks or equivalent per week     Comment: occ    Drug use: No       Family Status   Relation Name Status    Mo      Fa      Sis  Alive    MAunt      PAunt      PUnc      MGMo      MGFa      PGMo      PGFa     No partnership data on file     Family History   Problem Relation Age of Onset    Cancer Mother         Breast/Liver    Diabetes Mother     Hypertension Mother     Hyperlipidemia Mother     Heart Disease Mother     Lung Disease Father         Emphysema    Psychiatric Illness Father         Paranoid schitzophenia    Psychiatric Illness Sister         Paronoid Schitzo-disorder, Bipolar    Arthritis Sister         RA, Fibromyalgia    Cancer Maternal Aunt         Breast    Cancer Paternal Aunt         Bone    Arthritis Paternal Aunt     Genetic Disorder Paternal Aunt         SLE    Heart Disease Maternal Grandmother     Hypertension Maternal Grandmother     Hyperlipidemia Maternal Grandmother     Genetic Disorder Maternal Grandmother         Brights disease    Stroke Maternal Grandmother     Heart Disease Maternal Grandfather     Hypertension Maternal Grandfather     Hyperlipidemia Maternal Grandfather     Stroke Paternal Grandmother     Hyperlipidemia Paternal Grandmother     Hypertension Paternal Grandmother     Heart Disease Paternal Grandfather     Hypertension Paternal Grandfather   "   Hyperlipidemia Paternal Grandfather        Review of Systems   Skin:  Positive for itching and rash.   All other systems reviewed and are negative.       Exam:  /80 (BP Location: Right arm, Patient Position: Sitting, BP Cuff Size: Adult)   Pulse 70   Temp 35.8 °C (96.5 °F) (Temporal)   Resp 16   Ht 1.613 m (5' 3.5\")   Wt 78.9 kg (174 lb)   SpO2 94%     Constitutional:   Appropriately groomed, pleasant affect, well nourished, in NAD.    Head:   Normocephalic, atraumatic.    Eyes:   EOM's full, sclera white, conjunctiva not erythematous, and medial canthus without exudate bilaterally.    Throat:  Dentition wnl, mucosa moist without lesions.      Lungs:  Respiratory effort not labored without accessory muscle use.      Abdominal:  Abdomen soft to palpation with mild suprapubic ttp.  No masses or hepatosplenomegaly.  No CVA tenderness bilaterally to percussion.    Musculoskeletal:  Gait nonantalgic with a narrow base.    Derm:  Skin with well demarcated area of erythema and warmth to lower extremity medial aspect approximately 4 to 5 cm in diameter above the medial malleolus by about 6 cm.  Area is edematous  with no superficial skin breakdown or sign of infection.  She does have several areas on the lateral aspect with lichen planus-like lesions which are scaly and slightly raised and purpleish.  No palpable rope like vasculature.  There is tenderness to palpation along the greater saphenous distribution and into the posterior knee.      Psychiatric:  Mood, affect, and judgement appropriate.      11/3/2024 12:48 PM  HISTORY/REASON FOR EXAM:  Swelling of Limb; r/o DVT.  LEFT ankle swelling and redness.  Prior greater saphenous vein ablation.  TECHNIQUE/EXAM DESCRIPTION:  Using both color and pulsed-wave Doppler imaging, multiple images were obtained of the left lower extremity from the common femoral vein origin distally through the popliteal trifurcation.  Graded compression was used to demonstrate a " patent lumen.  COMPARISON:  None.  FINDINGS:  REAL-TIME GRAY-SCALE IMAGING:  Real-time gray-scale imaging reveals no evidence of focal wall thickening.  COLOR AND DUPLEX DOPPLER IMAGING:  No evidence for clot in the deep venous system.  Peroneal vein is suboptimally visualized.  IMPRESSION:  Limited exam showing no LEFT lower extremity DVT.    Please note that this dictation was created using voice recognition software. I have made every reasonable attempt to correct obvious errors, but I expect that there are errors of grammar and possibly content that I did not discover before finalizing the note.    Assessment/Plan:  1. Edema of left lower extremity  US-EXTREMITY VENOUS LOWER UNILAT LEFT    CBC WITH DIFFERENTIAL    Comp Metabolic Panel    CRP HIGH SENSITIVE    Prothrombin Time    CHAPO W/REFLEX IF POSITIVE      2. Erythema of skin  US-EXTREMITY VENOUS LOWER UNILAT LEFT    CBC WITH DIFFERENTIAL    Comp Metabolic Panel    CRP HIGH SENSITIVE    Prothrombin Time    CHAPO W/REFLEX IF POSITIVE      3. Lichen planus        4. Skin lesion of left lower limb          We discussed proceeding with ultrasound for thrombosis rule out.  Given the erythema and edema there is some concern for infection versus vasculitis versus autoimmune and I would like her to complete labs as well.  Work note provided.  Ultrasound resulted 2 PM did not demonstrate DVT and patient was informed via phone message.  I left a detailed message as she did self identified voicemail.  Given the erythema and discomfort and pain plan to treat with clindamycin as she does have MRSA carrier status and is allergic to doxycycline and Bactrim and penicillin.  Plan to treat with shortest effective duration advised her to follow-up in 24 to 48 hours if not improving at either urgent care or emergency room.    Instructed to return to Urgent Care or nearest Emergency Department if symptoms fail to improve, for any change in condition, further concerns, or new  concerning symptoms. Patient states understanding of the plan of care and discharge instructions.    Isiah Gar PA-C

## 2024-11-05 ENCOUNTER — HOSPITAL ENCOUNTER (OUTPATIENT)
Dept: LAB | Facility: MEDICAL CENTER | Age: 64
End: 2024-11-05
Attending: PHYSICIAN ASSISTANT
Payer: COMMERCIAL

## 2024-11-05 ENCOUNTER — OFFICE VISIT (OUTPATIENT)
Dept: MEDICAL GROUP | Facility: PHYSICIAN GROUP | Age: 64
End: 2024-11-05
Payer: COMMERCIAL

## 2024-11-05 VITALS
SYSTOLIC BLOOD PRESSURE: 134 MMHG | BODY MASS INDEX: 29.71 KG/M2 | HEART RATE: 63 BPM | WEIGHT: 174 LBS | TEMPERATURE: 96.8 F | HEIGHT: 64 IN | DIASTOLIC BLOOD PRESSURE: 70 MMHG | OXYGEN SATURATION: 97 %

## 2024-11-05 DIAGNOSIS — L98.9 SKIN LESION OF LEFT LOWER LIMB: ICD-10-CM

## 2024-11-05 DIAGNOSIS — M79.662 PAIN AND SWELLING OF LOWER LEG, LEFT: ICD-10-CM

## 2024-11-05 DIAGNOSIS — R60.0 EDEMA OF LEFT LOWER EXTREMITY: ICD-10-CM

## 2024-11-05 DIAGNOSIS — B37.9 CANDIDA INFECTION: ICD-10-CM

## 2024-11-05 DIAGNOSIS — L53.9 ERYTHEMA OF SKIN: ICD-10-CM

## 2024-11-05 DIAGNOSIS — M79.89 PAIN AND SWELLING OF LOWER LEG, LEFT: ICD-10-CM

## 2024-11-05 DIAGNOSIS — L03.116 CELLULITIS OF LEFT LOWER EXTREMITY: ICD-10-CM

## 2024-11-05 LAB
ALBUMIN SERPL BCP-MCNC: 4.3 G/DL (ref 3.2–4.9)
ALBUMIN/GLOB SERPL: 1.3 G/DL
ALP SERPL-CCNC: 84 U/L (ref 30–99)
ALT SERPL-CCNC: 20 U/L (ref 2–50)
ANION GAP SERPL CALC-SCNC: 12 MMOL/L (ref 7–16)
AST SERPL-CCNC: 24 U/L (ref 12–45)
BASOPHILS # BLD AUTO: 1.1 % (ref 0–1.8)
BASOPHILS # BLD: 0.06 K/UL (ref 0–0.12)
BILIRUB SERPL-MCNC: 0.3 MG/DL (ref 0.1–1.5)
BUN SERPL-MCNC: 12 MG/DL (ref 8–22)
CALCIUM ALBUM COR SERPL-MCNC: 9.7 MG/DL (ref 8.5–10.5)
CALCIUM SERPL-MCNC: 9.9 MG/DL (ref 8.5–10.5)
CHLORIDE SERPL-SCNC: 95 MMOL/L (ref 96–112)
CO2 SERPL-SCNC: 22 MMOL/L (ref 20–33)
CREAT SERPL-MCNC: 0.85 MG/DL (ref 0.5–1.4)
CRP SERPL HS-MCNC: 2.8 MG/L (ref 0–3)
EOSINOPHIL # BLD AUTO: 0.31 K/UL (ref 0–0.51)
EOSINOPHIL NFR BLD: 5.6 % (ref 0–6.9)
ERYTHROCYTE [DISTWIDTH] IN BLOOD BY AUTOMATED COUNT: 40.2 FL (ref 35.9–50)
GFR SERPLBLD CREATININE-BSD FMLA CKD-EPI: 76 ML/MIN/1.73 M 2
GLOBULIN SER CALC-MCNC: 3.3 G/DL (ref 1.9–3.5)
GLUCOSE SERPL-MCNC: 83 MG/DL (ref 65–99)
HCT VFR BLD AUTO: 39.9 % (ref 37–47)
HGB BLD-MCNC: 13.5 G/DL (ref 12–16)
IMM GRANULOCYTES # BLD AUTO: 0.03 K/UL (ref 0–0.11)
IMM GRANULOCYTES NFR BLD AUTO: 0.5 % (ref 0–0.9)
INR PPP: 1.09 (ref 0.87–1.13)
LYMPHOCYTES # BLD AUTO: 1.82 K/UL (ref 1–4.8)
LYMPHOCYTES NFR BLD: 32.7 % (ref 22–41)
MCH RBC QN AUTO: 29.5 PG (ref 27–33)
MCHC RBC AUTO-ENTMCNC: 33.8 G/DL (ref 32.2–35.5)
MCV RBC AUTO: 87.3 FL (ref 81.4–97.8)
MONOCYTES # BLD AUTO: 0.62 K/UL (ref 0–0.85)
MONOCYTES NFR BLD AUTO: 11.1 % (ref 0–13.4)
NEUTROPHILS # BLD AUTO: 2.73 K/UL (ref 1.82–7.42)
NEUTROPHILS NFR BLD: 49 % (ref 44–72)
NRBC # BLD AUTO: 0 K/UL
NRBC BLD-RTO: 0 /100 WBC (ref 0–0.2)
PLATELET # BLD AUTO: 215 K/UL (ref 164–446)
PMV BLD AUTO: 10.2 FL (ref 9–12.9)
POTASSIUM SERPL-SCNC: 4.7 MMOL/L (ref 3.6–5.5)
PROT SERPL-MCNC: 7.6 G/DL (ref 6–8.2)
PROTHROMBIN TIME: 14.1 SEC (ref 12–14.6)
RBC # BLD AUTO: 4.57 M/UL (ref 4.2–5.4)
SODIUM SERPL-SCNC: 129 MMOL/L (ref 135–145)
WBC # BLD AUTO: 5.6 K/UL (ref 4.8–10.8)

## 2024-11-05 PROCEDURE — 85610 PROTHROMBIN TIME: CPT

## 2024-11-05 PROCEDURE — 80053 COMPREHEN METABOLIC PANEL: CPT

## 2024-11-05 PROCEDURE — 3078F DIAST BP <80 MM HG: CPT

## 2024-11-05 PROCEDURE — 36415 COLL VENOUS BLD VENIPUNCTURE: CPT

## 2024-11-05 PROCEDURE — 86141 C-REACTIVE PROTEIN HS: CPT

## 2024-11-05 PROCEDURE — 85025 COMPLETE CBC W/AUTO DIFF WBC: CPT

## 2024-11-05 PROCEDURE — 86038 ANTINUCLEAR ANTIBODIES: CPT

## 2024-11-05 PROCEDURE — 99214 OFFICE O/P EST MOD 30 MIN: CPT

## 2024-11-05 PROCEDURE — 3075F SYST BP GE 130 - 139MM HG: CPT

## 2024-11-05 RX ORDER — FLUCONAZOLE 150 MG/1
150 TABLET ORAL ONCE
Qty: 2 TABLET | Refills: 1 | Status: SHIPPED | OUTPATIENT
Start: 2024-11-05 | End: 2024-11-05

## 2024-11-05 RX ORDER — CLINDAMYCIN HYDROCHLORIDE 300 MG/1
300 CAPSULE ORAL 3 TIMES DAILY
Qty: 27 CAPSULE | Refills: 0 | Status: SHIPPED | OUTPATIENT
Start: 2024-11-05 | End: 2024-11-14

## 2024-11-05 ASSESSMENT — FIBROSIS 4 INDEX: FIB4 SCORE: 1.42

## 2024-11-05 NOTE — ASSESSMENT & PLAN NOTE
"Acute, ongoing. Was seen in  for this 11/3/24. US negative for DVT 11/3. Skin is burning and tender, swelling with 2+edema to LE, erythema in localized patches, specifically superior to medial malleolus. (Images in media). Reports burning pain to skin. Attempted to apply topical steroid which caused increased redness and burning sensation.    Continue clindamycin TID until completion of therapy, will provide an additional 9 days of therapy for slow clinical response, history of MRSA infection (2008). Measured proximally from anterior ankle skin flexure of LLE 11\" to area of most swelling, provided patient measuring tape to continue to monitor at home. recommend epsom salt soaks 2-3 times daily, application of emmollient such as petroleum or Aquaphor.  Follow up with dermatology as scheduled.  Continue to wear compression socks.   Return if symptoms worsen or do not improve.          "

## 2024-11-05 NOTE — PROGRESS NOTES
"Subjective:     CC: Diagnoses of Pain and swelling of lower leg, left, Edema of left lower extremity, Erythema of skin, Skin lesion of left lower limb, Cellulitis of left lower extremity, and Candida infection were pertinent to this visit.      HPI:   Kelly presents today with    Problem   Pain and Swelling of Lower Leg, Left     ROS:  Review of Systems   Skin:  Positive for rash.   All other systems reviewed and are negative.      Objective:     Exam:  /70 (BP Location: Left arm, Patient Position: Sitting, BP Cuff Size: Large adult)   Pulse 63   Temp 36 °C (96.8 °F) (Temporal)   Ht 1.613 m (5' 3.5\")   Wt 78.9 kg (174 lb)   SpO2 97%   BMI 30.34 kg/m²  Body mass index is 30.34 kg/m².    Physical Exam  Vitals reviewed.   Constitutional:       General: She is not in acute distress.     Appearance: Normal appearance. She is not ill-appearing.   HENT:      Head: Normocephalic and atraumatic.   Cardiovascular:      Rate and Rhythm: Normal rate.      Pulses: Normal pulses.   Pulmonary:      Effort: Pulmonary effort is normal. No respiratory distress.   Musculoskeletal:      Left lower le+ Edema present.   Skin:     General: Skin is warm and dry.      Findings: Erythema and rash present.          Neurological:      General: No focal deficit present.      Mental Status: She is alert and oriented to person, place, and time.   Psychiatric:         Mood and Affect: Mood normal.         Behavior: Behavior normal.        24 13:20   US-EXTREMITY VENOUS LOWER UNILAT Rpt   Rpt: View report in Results Review for more information  Labs: encouraged to complete as ordered 11/3/24    Assessment & Plan:     64 y.o. female with the following -     Assessment & Plan  Pain and swelling of lower leg, left  Acute, ongoing. Was seen in  for this 11/3/24. US negative for DVT 11/3. Skin is burning and tender, swelling with 2+edema to LE, erythema in localized patches, specifically superior to medial malleolus. (Images in " "media). Reports burning pain to skin. Attempted to apply topical steroid which caused increased redness and burning sensation.    Continue clindamycin TID until completion of therapy, will provide an additional 9 days of therapy for slow clinical response, history of MRSA infection (2008). Measured proximally from anterior ankle skin flexure of LLE 11\" to area of most swelling, provided patient measuring tape to continue to monitor at home. recommend epsom salt soaks 2-3 times daily, application of emmollient such as petroleum or Aquaphor.  Follow up with dermatology as scheduled.  Continue to wear compression socks.   Return if symptoms worsen or do not improve.          Edema of left lower extremity    Orders:    clindamycin (CLEOCIN) 300 MG Cap; Take 1 Capsule by mouth 3 times a day for 9 days. Continue current antibiotic therapy for up to 14 days    Erythema of skin    Orders:    clindamycin (CLEOCIN) 300 MG Cap; Take 1 Capsule by mouth 3 times a day for 9 days. Continue current antibiotic therapy for up to 14 days    Skin lesion of left lower limb    Orders:    clindamycin (CLEOCIN) 300 MG Cap; Take 1 Capsule by mouth 3 times a day for 9 days. Continue current antibiotic therapy for up to 14 days    Cellulitis of left lower extremity    Orders:    clindamycin (CLEOCIN) 300 MG Cap; Take 1 Capsule by mouth 3 times a day for 9 days. Continue current antibiotic therapy for up to 14 days    Candida infection  Prophylaxis given extended antibiotic therapy  Orders:    fluconazole (DIFLUCAN) 150 MG tablet; Take 1 Tablet by mouth one time for 1 dose. Repeat dose in 72 hours if symptoms have not resolved.       Patient was educated in proper administration of medication(s) ordered today including safety, possible SE, risks, benefits, rationale and alternatives to therapy.   Supportive care, differential diagnoses, and indications for immediate follow-up discussed with patient.    Pathogenesis of diagnosis discussed " including typical length and natural progression.    Instructed to return to clinic or nearest emergency department for any change in condition, further concerns, or worsening of symptoms.  Patient states understanding of the plan of care and discharge instructions.    Return if symptoms worsen or fail to improve.    Please note that this dictation was created using voice recognition software. I have made every reasonable attempt to correct obvious errors, but I expect that there are errors of grammar and possibly content that I did not discover before finalizing the note.

## 2024-11-07 DIAGNOSIS — E87.1 HYPONATREMIA: ICD-10-CM

## 2024-11-07 LAB — NUCLEAR IGG SER QL IA: NORMAL

## 2024-11-08 ENCOUNTER — HOSPITAL ENCOUNTER (OUTPATIENT)
Dept: LAB | Facility: MEDICAL CENTER | Age: 64
End: 2024-11-08
Payer: COMMERCIAL

## 2024-11-08 DIAGNOSIS — R53.83 FATIGUE, UNSPECIFIED TYPE: ICD-10-CM

## 2024-11-08 DIAGNOSIS — Z13.29 THYROID DISORDER SCREEN: ICD-10-CM

## 2024-11-08 DIAGNOSIS — Z13.0 SCREENING FOR DEFICIENCY ANEMIA: ICD-10-CM

## 2024-11-08 DIAGNOSIS — R73.03 PREDIABETES: ICD-10-CM

## 2024-11-08 DIAGNOSIS — K63.8219 SMALL INTESTINAL BACTERIAL OVERGROWTH (SIBO): ICD-10-CM

## 2024-11-08 DIAGNOSIS — E87.1 HYPONATREMIA: ICD-10-CM

## 2024-11-08 DIAGNOSIS — E78.2 MIXED HYPERLIPIDEMIA: ICD-10-CM

## 2024-11-08 LAB
25(OH)D3 SERPL-MCNC: 27 NG/ML (ref 30–100)
ALBUMIN SERPL BCP-MCNC: 4.4 G/DL (ref 3.2–4.9)
ALBUMIN/GLOB SERPL: 1.4 G/DL
ALP SERPL-CCNC: 80 U/L (ref 30–99)
ALT SERPL-CCNC: 23 U/L (ref 2–50)
ANION GAP SERPL CALC-SCNC: 10 MMOL/L (ref 7–16)
AST SERPL-CCNC: 26 U/L (ref 12–45)
BILIRUB SERPL-MCNC: 0.4 MG/DL (ref 0.1–1.5)
BUN SERPL-MCNC: 15 MG/DL (ref 8–22)
CALCIUM ALBUM COR SERPL-MCNC: 9.2 MG/DL (ref 8.5–10.5)
CALCIUM SERPL-MCNC: 9.5 MG/DL (ref 8.5–10.5)
CHLORIDE SERPL-SCNC: 98 MMOL/L (ref 96–112)
CHOLEST SERPL-MCNC: 150 MG/DL (ref 100–199)
CO2 SERPL-SCNC: 21 MMOL/L (ref 20–33)
CREAT SERPL-MCNC: 1.04 MG/DL (ref 0.5–1.4)
ERYTHROCYTE [DISTWIDTH] IN BLOOD BY AUTOMATED COUNT: 40.9 FL (ref 35.9–50)
EST. AVERAGE GLUCOSE BLD GHB EST-MCNC: 143 MG/DL
FERRITIN SERPL-MCNC: 529 NG/ML (ref 10–291)
GFR SERPLBLD CREATININE-BSD FMLA CKD-EPI: 60 ML/MIN/1.73 M 2
GLOBULIN SER CALC-MCNC: 3.1 G/DL (ref 1.9–3.5)
GLUCOSE SERPL-MCNC: 109 MG/DL (ref 65–99)
HBA1C MFR BLD: 6.6 % (ref 4–5.6)
HCT VFR BLD AUTO: 40.9 % (ref 37–47)
HDLC SERPL-MCNC: 39 MG/DL
HGB BLD-MCNC: 14.2 G/DL (ref 12–16)
IRON SATN MFR SERPL: 33 % (ref 15–55)
IRON SERPL-MCNC: 88 UG/DL (ref 40–170)
LDLC SERPL CALC-MCNC: 89 MG/DL
MCH RBC QN AUTO: 30.2 PG (ref 27–33)
MCHC RBC AUTO-ENTMCNC: 34.7 G/DL (ref 32.2–35.5)
MCV RBC AUTO: 87 FL (ref 81.4–97.8)
OSMOLALITY UR: 449 MOSM/KG H2O (ref 300–900)
PLATELET # BLD AUTO: 235 K/UL (ref 164–446)
PMV BLD AUTO: 10.2 FL (ref 9–12.9)
POTASSIUM SERPL-SCNC: 5 MMOL/L (ref 3.6–5.5)
PROT SERPL-MCNC: 7.5 G/DL (ref 6–8.2)
RBC # BLD AUTO: 4.7 M/UL (ref 4.2–5.4)
SODIUM SERPL-SCNC: 129 MMOL/L (ref 135–145)
SODIUM UR-SCNC: 61 MMOL/L
TIBC SERPL-MCNC: 269 UG/DL (ref 250–450)
TRIGL SERPL-MCNC: 112 MG/DL (ref 0–149)
TSH SERPL-ACNC: 1.56 UIU/ML (ref 0.35–5.5)
UIBC SERPL-MCNC: 181 UG/DL (ref 110–370)
VIT B12 SERPL-MCNC: 604 PG/ML (ref 211–911)
WBC # BLD AUTO: 6.6 K/UL (ref 4.8–10.8)

## 2024-11-08 PROCEDURE — 83935 ASSAY OF URINE OSMOLALITY: CPT

## 2024-11-08 PROCEDURE — 36415 COLL VENOUS BLD VENIPUNCTURE: CPT

## 2024-11-08 PROCEDURE — 80053 COMPREHEN METABOLIC PANEL: CPT

## 2024-11-08 PROCEDURE — 82306 VITAMIN D 25 HYDROXY: CPT

## 2024-11-08 PROCEDURE — 80061 LIPID PANEL: CPT

## 2024-11-08 PROCEDURE — 82607 VITAMIN B-12: CPT

## 2024-11-08 PROCEDURE — 84300 ASSAY OF URINE SODIUM: CPT

## 2024-11-08 PROCEDURE — 83036 HEMOGLOBIN GLYCOSYLATED A1C: CPT

## 2024-11-08 PROCEDURE — 82728 ASSAY OF FERRITIN: CPT

## 2024-11-08 PROCEDURE — 84590 ASSAY OF VITAMIN A: CPT

## 2024-11-08 PROCEDURE — 84443 ASSAY THYROID STIM HORMONE: CPT

## 2024-11-08 PROCEDURE — 84446 ASSAY OF VITAMIN E: CPT

## 2024-11-08 PROCEDURE — 83540 ASSAY OF IRON: CPT

## 2024-11-08 PROCEDURE — 85027 COMPLETE CBC AUTOMATED: CPT

## 2024-11-08 PROCEDURE — 84207 ASSAY OF VITAMIN B-6: CPT

## 2024-11-08 PROCEDURE — 83550 IRON BINDING TEST: CPT

## 2024-11-12 LAB
A-TOCOPHEROL VIT E SERPL-MCNC: 6.5 MG/L (ref 5.5–18)
BETA+GAMMA TOCOPHEROL SERPL-MCNC: 0.8 MG/L (ref 0–6)

## 2024-11-13 LAB
ANNOTATION COMMENT IMP: NORMAL
RETINYL PALMITATE SERPL-MCNC: <0.02 MG/L (ref 0–0.1)
VIT A SERPL-MCNC: 0.46 MG/L (ref 0.3–1.2)
VIT B6 SERPL-MCNC: 113.8 NMOL/L (ref 20–125)

## 2024-11-18 ENCOUNTER — HOSPITAL ENCOUNTER (OUTPATIENT)
Dept: RADIOLOGY | Facility: MEDICAL CENTER | Age: 64
End: 2024-11-18
Payer: COMMERCIAL

## 2024-11-18 ENCOUNTER — APPOINTMENT (OUTPATIENT)
Dept: DERMATOLOGY | Facility: IMAGING CENTER | Age: 64
End: 2024-11-18
Payer: COMMERCIAL

## 2024-11-18 DIAGNOSIS — Z12.83 SKIN CANCER SCREENING: ICD-10-CM

## 2024-11-18 DIAGNOSIS — L82.1 SK (SEBORRHEIC KERATOSIS): ICD-10-CM

## 2024-11-18 DIAGNOSIS — Z12.31 ENCOUNTER FOR SCREENING MAMMOGRAM FOR BREAST CANCER: ICD-10-CM

## 2024-11-18 DIAGNOSIS — L81.4 LENTIGINES: ICD-10-CM

## 2024-11-18 DIAGNOSIS — D18.01 CHERRY ANGIOMA: ICD-10-CM

## 2024-11-18 DIAGNOSIS — N95.1 MENOPAUSAL STATE: ICD-10-CM

## 2024-11-18 DIAGNOSIS — L30.9 DERMATITIS: ICD-10-CM

## 2024-11-18 DIAGNOSIS — D22.9 MULTIPLE NEVI: ICD-10-CM

## 2024-11-18 DIAGNOSIS — Z78.0 POSTMENOPAUSAL STATUS (AGE-RELATED) (NATURAL): ICD-10-CM

## 2024-11-18 PROCEDURE — 77067 SCR MAMMO BI INCL CAD: CPT

## 2024-11-18 PROCEDURE — 99213 OFFICE O/P EST LOW 20 MIN: CPT | Performed by: NURSE PRACTITIONER

## 2024-11-18 PROCEDURE — 77080 DXA BONE DENSITY AXIAL: CPT

## 2024-11-18 RX ORDER — FLUOCINONIDE 0.5 MG/G
CREAM TOPICAL
Qty: 120 G | Refills: 3 | Status: SHIPPED | OUTPATIENT
Start: 2024-11-18

## 2024-11-18 RX ORDER — TACROLIMUS 1 MG/G
OINTMENT TOPICAL
Qty: 30 G | Refills: 3 | Status: SHIPPED | OUTPATIENT
Start: 2024-11-18

## 2024-11-18 NOTE — PROGRESS NOTES
DERMATOLOGY NOTE  NEW VISIT       Chief complaint: Rash     HPI/location: lesion rt side on nose   Time present: 1 mth   Painful lesion: No  Itching lesion: No  Enlarging lesion: No  Anything make it better or worse?      History of skin cancer: No  History of precancers/actinic keratoses: No  History of biopsies:Yes, Details: neg   History of blistering/severe sunburns:No  Family history of skin cancer:No  Family history of atypical moles:No    Allergies   Allergen Reactions    Macrodantin [Nitrofurantoin Macrocrystal] Anaphylaxis     Prescription > 10 years ago    Pcn [Penicillins] Anaphylaxis     Prescription > 10 years ago    Doxycycline Itching    Bactrim Vomiting    Clarithromycin Vomiting    Hydrochlorothiazide      Hyponatremia    Omnicef Itching        MEDICATIONS:  Medications relevant to specialty reviewed.     REVIEW OF SYSTEMS:   Positive for skin (see HPI)  Negative for fevers and chills       EXAM:  There were no vitals taken for this visit.  Constitutional: Well-developed, well-nourished, and in no distress.     A total body skin exam was performed excluding the genitals per patient preference and including the following areas: head (including face), neck, chest, abdomen, groin/buttocks, back, bilateral upper extremities, and bilateral lower extremities with the following pertinent findings listed below and/or in assessment/plan.     -sun exposed skin of trunk and b/l upper, lower extremities and face with scattered clinically benign light brown reticulated macules all of which were morphologically similar and none of which were suspicious for skin cancer today on exam  Several scattered 1-3mm bright red macules and thin papules on the trunk and extremities  Few tan light brown stuck-on waxy papules scattered on the trunk and extremities  Multiple light brown medium brown skin-colored macules papules scattered over the trunk >> extremities--All with benign-appearing pigment network patterns on  dermoscopy  Scattered eczematous papules to BLEs  Few scattered pink, non-scaly papules of forehead and R infraorbital region, no concerning features seen on dermosopy    IMPRESSION / PLAN:    1. Dermatitis, unspecified cause--asteatotic dermatitis vs ACD vs venous stasis dermatitis (favored) vs PSO (low suspicion)  Pt does report hx of lichen planus, not currently using TCSs  Stressed importance of emollient use  Will trial Rxs below with close follow up 3-4 weeks  - fluocinonide (LIDEX) 0.05 % Cream; AAA twice a day for 2-3 weeks at a time with 1-2 week break in between  Dispense: 120 g; Refill: 3  - tacrolimus (PROTOPIC) 0.1 % Ointment; AAA twice a day  Dispense: 30 g; Refill: 3    2. Lentigines  - Benign-appearing nature of lesions discussed during exam.   - Advised to continue to monitor for any return to clinic for new or concerning changes.      3. Cherry angioma  - Benign-appearing nature of lesions discussed during exam.   - Advised to continue to monitor for any return to clinic for new or concerning changes.      4. SK (seborrheic keratosis)  - Benign-appearing nature of lesions discussed during exam.   - Advised to continue to monitor for any return to clinic for new or concerning changes.      5. Multiple nevi  - Benign-appearing nature of lesions discussed during exam.   - Advised to continue to monitor for any return to clinic for new or concerning changes.  - ABCDE's of melanoma discussed/handout given      6. Skin cancer screening  Skin cancer education  discussed importance of sun protective clothing, eyewear in addition to the use of broad spectrum sunscreen with SPF 30 or greater, as well as need for reapplication ~every 2 hours when exposed to UVR/handout given  discussed importance following up for any new or changing lesions as noted in handout given, but every 12 months exams in clinic in the setting of dermatologic history  ABCDE's of melanoma discussed/handout given          Please note  that this dictation was created using voice recognition software. I have made every reasonable attempt to correct obvious errors, but I expect that there are errors of grammar and possibly content that I did not discover before finalizing the note.      Return to clinic in: Return in about 1 year (around 11/18/2025) for KANDICE, 3-4 weeks for rash follow up. and as needed for any new or changing skin lesions.

## 2024-11-19 ENCOUNTER — HOSPITAL ENCOUNTER (OUTPATIENT)
Facility: MEDICAL CENTER | Age: 64
End: 2024-11-19
Payer: COMMERCIAL

## 2024-11-19 ENCOUNTER — RESEARCH ENCOUNTER (OUTPATIENT)
Dept: RESEARCH | Facility: MEDICAL CENTER | Age: 64
End: 2024-11-19

## 2024-11-19 ENCOUNTER — OFFICE VISIT (OUTPATIENT)
Dept: MEDICAL GROUP | Facility: PHYSICIAN GROUP | Age: 64
End: 2024-11-19
Payer: COMMERCIAL

## 2024-11-19 VITALS
HEART RATE: 76 BPM | TEMPERATURE: 97.2 F | DIASTOLIC BLOOD PRESSURE: 74 MMHG | OXYGEN SATURATION: 95 % | WEIGHT: 170.8 LBS | RESPIRATION RATE: 18 BRPM | SYSTOLIC BLOOD PRESSURE: 124 MMHG | BODY MASS INDEX: 30.26 KG/M2 | HEIGHT: 63 IN

## 2024-11-19 DIAGNOSIS — E11.65 TYPE 2 DIABETES MELLITUS WITH HYPERGLYCEMIA, WITHOUT LONG-TERM CURRENT USE OF INSULIN (HCC): ICD-10-CM

## 2024-11-19 DIAGNOSIS — F17.210 CIGARETTE SMOKER ONE HALF PACK A DAY OR LESS: ICD-10-CM

## 2024-11-19 DIAGNOSIS — I10 PRIMARY HYPERTENSION: ICD-10-CM

## 2024-11-19 DIAGNOSIS — Z13.79 GENETIC SCREENING: ICD-10-CM

## 2024-11-19 DIAGNOSIS — M85.89 OSTEOPENIA OF MULTIPLE SITES: ICD-10-CM

## 2024-11-19 DIAGNOSIS — R79.89 ELEVATED FERRITIN: ICD-10-CM

## 2024-11-19 DIAGNOSIS — M79.662 PAIN AND SWELLING OF LOWER LEG, LEFT: ICD-10-CM

## 2024-11-19 DIAGNOSIS — M79.89 PAIN AND SWELLING OF LOWER LEG, LEFT: ICD-10-CM

## 2024-11-19 DIAGNOSIS — J43.1 PANLOBULAR EMPHYSEMA (HCC): ICD-10-CM

## 2024-11-19 PROCEDURE — 92250 FUNDUS PHOTOGRAPHY W/I&R: CPT | Mod: 26

## 2024-11-19 PROCEDURE — 99214 OFFICE O/P EST MOD 30 MIN: CPT

## 2024-11-19 PROCEDURE — 3074F SYST BP LT 130 MM HG: CPT

## 2024-11-19 PROCEDURE — 3078F DIAST BP <80 MM HG: CPT

## 2024-11-19 PROCEDURE — 82043 UR ALBUMIN QUANTITATIVE: CPT

## 2024-11-19 PROCEDURE — 82570 ASSAY OF URINE CREATININE: CPT

## 2024-11-19 ASSESSMENT — FIBROSIS 4 INDEX: FIB4 SCORE: 1.48

## 2024-11-19 NOTE — ASSESSMENT & PLAN NOTE
Chronic, ongoing  11/18/24 DEXA FINDINGS:  The lumbar spine has a mean bone mineral density of 0.971 g/cm2, with a T score of -1.7 and a Z score of -0.6.     The proximal left femur has a mean bone mineral density of 0.800 g/cm2, with a T score of -1.6 and a Z score of -0.7.     When compared with the most recent study dated 9/5/2019, there has been a 0.9% increase in the bone mineral density of the lumbar spine and a 6.4% decrease in the bone mineral density of the proximal left femur.     IMPRESSION:     According to the World Health Organization classification, bone mineral density of this patient is osteopenic in the spine and osteopenic in the proximal left femur.     10-year Probability of Fracture:  Major Osteoporotic     18.6%  Hip     4.7%  Population      [place] ()     Based on left femur neck BMD    Recommended vit d and calcium. Consider prolia.

## 2024-11-19 NOTE — RESEARCH NOTE
Patient has been referred by POPPY Duncan. The initial referral follow-up message, which includes the consent form link, has been sent to the patient.    Eligible Studies: HNP

## 2024-11-19 NOTE — ASSESSMENT & PLAN NOTE
Chronic, unstable. Discussed healthy lifestyle recommendations.   Will monitor closely, re check 3 months, if remaining elevated start metformin. She declines medication today, intends to continue to work on nutrition and exercise.     Uacr, retinal exam, monofilament  Monofilament testing with a 10 gram force: sensation intact: intact bilaterally  Visual Inspection: Feet without maceration, ulcers, fissures.  Pedal pulses: intact bilaterally      Orders:    MICROALBUMIN CREAT RATIO URINE; Future    POCT Retinal Eye Exam

## 2024-11-19 NOTE — PROGRESS NOTES
"Subjective:     CC: Diagnoses of Type 2 diabetes mellitus with hyperglycemia, without long-term current use of insulin (HCC), Pain and swelling of lower leg, left, Primary hypertension, Osteopenia of multiple sites, Cigarette smoker one half pack a day or less, Panlobular emphysema (HCC), Genetic screening, and Elevated ferritin were pertinent to this visit.    Pt presents today to review labs, dexa, mammogram results. She is worried about some of her lab values.      HPI:   Kelly presents today with    Problem   Pain and Swelling of Lower Leg, Left   Type 2 Diabetes Mellitus With Hyperglycemia, Without Long-Term Current Use of Insulin (Hcc)    This is a chronic condition.  Current medications:  Insulin:   Biguanide:   GLP1-RA:    SGLT-2i:      DPP4-I:   TZD:   Yanna:  Sulfonyluria:     Last A1c: 6.6% 11/8/24  Last Microalb/Cr ratio: 11/19/24  Fasting sugars:  Last diabetic foot exam: 11/19/24  Last retinal eye exam: 11/19/24  ACEi/ARB? Irbesartan 300  Statin? Pravastatin 20 mg daily  Aspirin?   Concomitant HTN? Amlodipine 5 mg daily, hydralazine 25 mg bid prn, spironolactone 50 mg, propranolol 10 mg BID.  Nightly foot checks?       Panlobular Emphysema (Hcc)   Osteopenia   Primary Hypertension   Cigarette Smoker One Half Pack A Day Or Less     ROS:  Review of Systems   Skin:         Improving rash   All other systems reviewed and are negative.      Objective:     Exam:  /74 (BP Location: Right arm, Patient Position: Sitting, BP Cuff Size: Adult)   Pulse 76   Temp 36.2 °C (97.2 °F) (Temporal)   Resp 18   Ht 1.6 m (5' 3\")   Wt 77.5 kg (170 lb 12.8 oz)   SpO2 95%   BMI 30.26 kg/m²  Body mass index is 30.26 kg/m².    Physical Exam  Vitals reviewed.   Constitutional:       General: She is not in acute distress.     Appearance: Normal appearance. She is not ill-appearing.   HENT:      Head: Normocephalic and atraumatic.   Cardiovascular:      Rate and Rhythm: Normal rate.      Pulses: Normal pulses. "   Pulmonary:      Effort: Pulmonary effort is normal. No respiratory distress.   Skin:     General: Skin is warm and dry.      Findings: No rash.   Neurological:      General: No focal deficit present.      Mental Status: She is alert and oriented to person, place, and time.   Psychiatric:         Mood and Affect: Mood normal.         Behavior: Behavior normal.         Labs:    Latest Reference Range & Units 11/08/24 07:11 11/18/24 12:54 11/18/24 13:18   WBC 4.8 - 10.8 K/uL 6.6     RBC 4.20 - 5.40 M/uL 4.70     Hemoglobin 12.0 - 16.0 g/dL 14.2     Hematocrit 37.0 - 47.0 % 40.9     MCV 81.4 - 97.8 fL 87.0     MCH 27.0 - 33.0 pg 30.2     MCHC 32.2 - 35.5 g/dL 34.7     RDW 35.9 - 50.0 fL 40.9     Platelet Count 164 - 446 K/uL 235     MPV 9.0 - 12.9 fL 10.2     Sodium 135 - 145 mmol/L 129 (L)     Potassium 3.6 - 5.5 mmol/L 5.0     Chloride 96 - 112 mmol/L 98     Co2 20 - 33 mmol/L 21     Anion Gap 7.0 - 16.0  10.0     Glucose 65 - 99 mg/dL 109 (H)     Bun 8 - 22 mg/dL 15     Creatinine 0.50 - 1.40 mg/dL 1.04     GFR (CKD-EPI) >60 mL/min/1.73 m 2 60     Calcium 8.5 - 10.5 mg/dL 9.5     Correct Calcium 8.5 - 10.5 mg/dL 9.2     AST(SGOT) 12 - 45 U/L 26     ALT(SGPT) 2 - 50 U/L 23     Alkaline Phosphatase 30 - 99 U/L 80     Total Bilirubin 0.1 - 1.5 mg/dL 0.4     Albumin 3.2 - 4.9 g/dL 4.4     Total Protein 6.0 - 8.2 g/dL 7.5     Globulin 1.9 - 3.5 g/dL 3.1     A-G Ratio g/dL 1.4     Iron 40 - 170 ug/dL 88     Total Iron Binding 250 - 450 ug/dL 269     % Saturation 15 - 55 % 33     Unsat Iron Binding 110 - 370 ug/dL 181     Glycohemoglobin 4.0 - 5.6 % 6.6 (H)     Estim. Avg Glu mg/dL 143     Cholesterol,Tot 100 - 199 mg/dL 150     Triglycerides 0 - 149 mg/dL 112     HDL >=40 mg/dL 39 !     LDL <100 mg/dL 89     Sodium, Urine -per volume mmol/L 61     Osmolality Urine 300 - 900 mOsm/kg H2O 449     25-Hydroxy   Vitamin D 25 30 - 100 ng/mL 27 (L)     Alpha-Tocopherol (Vit E) 5.5 - 18.0 mg/L 6.5     Ferritin 10.0 - 291.0 ng/mL  529.0 (H)     Gamma-Tocopherol (Vit E) 0.0 - 6.0 mg/L 0.8     Retinal Interp  Normal     Retinol Palm 0.00 - 0.10 mg/L <0.02     Vitamin A 0.30 - 1.20 mg/L 0.46     Vitamin B12 -True Cobalamin 211 - 911 pg/mL 604     Vitamin B6 20.0 - 125.0 nmol/L 113.8     DS-BONE DENSITY STUDY (DEXA)    Rpt   MA-SCREENING MAMMO BILAT W/TOMOSYNTHESIS W/CAD   Rpt    TSH 0.350 - 5.500 uIU/mL 1.560     (L): Data is abnormally low  (H): Data is abnormally high  !: Data is abnormal  Rpt: View report in Results Review for more information    Assessment & Plan:     64 y.o. female with the following -     Assessment & Plan  Type 2 diabetes mellitus with hyperglycemia, without long-term current use of insulin (HCC)  Chronic, unstable. Discussed healthy lifestyle recommendations.   Will monitor closely, re check 3 months, if remaining elevated start metformin. She declines medication today, intends to continue to work on nutrition and exercise.     Uacr, retinal exam, monofilament  Monofilament testing with a 10 gram force: sensation intact: intact bilaterally  Visual Inspection: Feet without maceration, ulcers, fissures.  Pedal pulses: intact bilaterally      Orders:    MICROALBUMIN CREAT RATIO URINE; Future    POCT Retinal Eye Exam    Pain and swelling of lower leg, left  Improved, has seen dermatology. Using tacrolimus and topical fluocinonide with reduced symptoms          Primary hypertension  Chronic. Hyponatremia noted in lab work. Discussed titration off propranolol 1- mg BID bp in clinic today 124/74.           Osteopenia of multiple sites  Chronic, ongoing  11/18/24 DEXA FINDINGS:  The lumbar spine has a mean bone mineral density of 0.971 g/cm2, with a T score of -1.7 and a Z score of -0.6.     The proximal left femur has a mean bone mineral density of 0.800 g/cm2, with a T score of -1.6 and a Z score of -0.7.     When compared with the most recent study dated 9/5/2019, there has been a 0.9% increase in the bone mineral density of  the lumbar spine and a 6.4% decrease in the bone mineral density of the proximal left femur.     IMPRESSION:     According to the World Health Organization classification, bone mineral density of this patient is osteopenic in the spine and osteopenic in the proximal left femur.     10-year Probability of Fracture:  Major Osteoporotic     18.6%  Hip     4.7%  Population      [place] ()     Based on left femur neck BMD    Recommended vit d and calcium. Consider prolia.         Cigarette smoker one half pack a day or less    Orders:    REFERRAL TO LUNG CANCER SCREENING PROGRAM    Panlobular emphysema (HCC)    Orders:    PULMONARY FUNCTION TESTS -Test requested: Complete Pulmonary Function Test; Future    Genetic screening    Orders:    Referral to Genetic Research Studies    Elevated ferritin            Patient was educated in proper administration of medication(s) ordered today including safety, possible SE, risks, benefits, rationale and alternatives to therapy.   Supportive care, differential diagnoses, and indications for immediate follow-up discussed with patient.    Pathogenesis of diagnosis discussed including typical length and natural progression.    Instructed to return to clinic or nearest emergency department for any change in condition, further concerns, or worsening of symptoms.  Patient states understanding of the plan of care and discharge instructions.    Return in about 3 months (around 2/19/2025), or if symptoms worsen or fail to improve.    Please note that this dictation was created using voice recognition software. I have made every reasonable attempt to correct obvious errors, but I expect that there are errors of grammar and possibly content that I did not discover before finalizing the note.

## 2024-11-19 NOTE — ASSESSMENT & PLAN NOTE
Chronic. Hyponatremia noted in lab work. Discussed titration off propranolol 1- mg BID bp in clinic today 124/74.

## 2024-11-19 NOTE — ASSESSMENT & PLAN NOTE
Improved, has seen dermatology. Using tacrolimus and topical fluocinonide with reduced symptoms

## 2024-11-20 LAB
CREAT UR-MCNC: 68.79 MG/DL
MICROALBUMIN UR-MCNC: <1.2 MG/DL
MICROALBUMIN/CREAT UR: NORMAL MG/G (ref 0–30)
RETINAL SCREEN: POSITIVE

## 2024-12-04 ENCOUNTER — OFFICE VISIT (OUTPATIENT)
Dept: MEDICAL GROUP | Facility: PHYSICIAN GROUP | Age: 64
End: 2024-12-04
Payer: COMMERCIAL

## 2024-12-04 ENCOUNTER — HOSPITAL ENCOUNTER (OUTPATIENT)
Dept: LAB | Facility: MEDICAL CENTER | Age: 64
End: 2024-12-04
Payer: COMMERCIAL

## 2024-12-04 ENCOUNTER — APPOINTMENT (OUTPATIENT)
Dept: URGENT CARE | Facility: PHYSICIAN GROUP | Age: 64
End: 2024-12-04
Payer: COMMERCIAL

## 2024-12-04 ENCOUNTER — TELEPHONE (OUTPATIENT)
Dept: HEALTH INFORMATION MANAGEMENT | Facility: OTHER | Age: 64
End: 2024-12-04

## 2024-12-04 VITALS
WEIGHT: 172 LBS | DIASTOLIC BLOOD PRESSURE: 70 MMHG | RESPIRATION RATE: 18 BRPM | TEMPERATURE: 97.1 F | HEIGHT: 64 IN | SYSTOLIC BLOOD PRESSURE: 110 MMHG | HEART RATE: 67 BPM | BODY MASS INDEX: 29.37 KG/M2 | OXYGEN SATURATION: 96 %

## 2024-12-04 DIAGNOSIS — E87.1 HYPONATREMIA: ICD-10-CM

## 2024-12-04 DIAGNOSIS — J43.1 PANLOBULAR EMPHYSEMA (HCC): ICD-10-CM

## 2024-12-04 DIAGNOSIS — J06.9 ACUTE URI: ICD-10-CM

## 2024-12-04 LAB
FLUAV RNA SPEC QL NAA+PROBE: NEGATIVE
FLUBV RNA SPEC QL NAA+PROBE: NEGATIVE
RSV RNA SPEC QL NAA+PROBE: NEGATIVE
S PYO DNA SPEC NAA+PROBE: NOT DETECTED
SARS-COV-2 RNA RESP QL NAA+PROBE: NEGATIVE

## 2024-12-04 PROCEDURE — 87651 STREP A DNA AMP PROBE: CPT

## 2024-12-04 PROCEDURE — 99214 OFFICE O/P EST MOD 30 MIN: CPT

## 2024-12-04 PROCEDURE — 80048 BASIC METABOLIC PNL TOTAL CA: CPT

## 2024-12-04 PROCEDURE — 3078F DIAST BP <80 MM HG: CPT

## 2024-12-04 PROCEDURE — 3074F SYST BP LT 130 MM HG: CPT

## 2024-12-04 PROCEDURE — 0241U POCT CEPHEID COV-2, FLU A/B, RSV - PCR: CPT

## 2024-12-04 PROCEDURE — 36415 COLL VENOUS BLD VENIPUNCTURE: CPT

## 2024-12-04 ASSESSMENT — FIBROSIS 4 INDEX: FIB4 SCORE: 1.48

## 2024-12-04 NOTE — TELEPHONE ENCOUNTER
Called to verify program eligibility/LVM with Direct line for call back/Warm Transfer to Patient Outreach [phone]

## 2024-12-04 NOTE — PROGRESS NOTES
Chief Complaint   Patient presents with    Active Symptoms     Sore throat, fever off and on, sore glands    URI     Pt presents today concerned about URI symptoms.     HPI: Patient is a 64 y.o. female complaining of 5 days of illness including: ear pain, fever, sore throat, fatigue .   Mucus is: thin.  Similarly ill exposures: yes.  Treatments tried: nothing  She  reports that she has been smoking cigarettes. She has a 20 pack-year smoking history. She has never used smokeless tobacco..     ROS:  No nausea, changes in bowel movements or skin rash. Reports mild subjective fevers, malaise, sore throat.     I reviewed the patient's medications, allergies and medical history:  Current Outpatient Medications   Medication Sig Dispense Refill    fluocinonide (LIDEX) 0.05 % Cream AAA twice a day for 2-3 weeks at a time with 1-2 week break in between 120 g 3    tacrolimus (PROTOPIC) 0.1 % Ointment AAA twice a day 30 g 3    mupirocin (BACTROBAN) 2 % Ointment Apply 1 Application topically 2 times a day. 22 g 0    albuterol (PROAIR HFA) 108 (90 Base) MCG/ACT Aero Soln inhalation aerosol Inhale 2 Puffs every four hours as needed for Shortness of Breath. 8.5 g 0    amLODIPine (NORVASC) 5 MG Tab TAKE 1 TABLET BY MOUTH EVERY DAY 90 Tablet 3    spironolactone (ALDACTONE) 50 MG Tab TAKE 1 TABLET BY MOUTH EVERY DAY 90 Tablet 3    busPIRone (BUSPAR) 15 MG tablet TAKE 1 TABLET BY MOUTH TWICE A  Tablet 3    simethicone (MYLICON) 125 MG chewable tablet Chew 125 mg every 6 hours as needed for Flatulence.      fluticasone (FLONASE) 50 MCG/ACT nasal spray ADMINISTER 2 SPRAYS INTO AFFECTED NOSTRIL(S) AT BEDTIME. 48 mL 2    FLUoxetine (PROZAC) 10 MG Cap Take 1 Capsule by mouth every day. 90 Capsule 3    hydrOXYzine HCl (ATARAX) 25 MG Tab Take 1 Tablet by mouth 2 times a day as needed for Anxiety. 180 Tablet 3    esomeprazole (NEXIUM) 40 MG delayed-release capsule Take 1 Capsule by mouth every morning before breakfast. 90 Capsule 3     triamcinolone acetonide (KENALOG) 0.1 % Ointment Apply 1 Application  topically 2 times a day. 30 g 1    propranolol (INDERAL) 10 MG Tab TAKE 1 TABLET BY MOUTH TWICE A  Tablet 3    estradiol (YUVAFEM) 10 MCG Tab INSERT 1 TABLET INTO THE VAGINA TWO TIMES A WEEK. 24 Tablet 3    pravastatin (PRAVACHOL) 20 MG Tab TAKE 1 TABLET BY MOUTH EVERY DAY 90 Tablet 3    irbesartan (AVAPRO) 300 MG Tab TAKE 1 TABLET BY MOUTH EVERY DAY 90 Tablet 3    sodium chloride ([facility] SALINE NASAL SPRAY) 0.65 % Solution ADMINISTER 2 SPRAYS INTO AFFECTED NOSTRIL(S) EVERY 2 HOURS AS NEEDED FOR CONGESTION. 30 mL 3    valacyclovir (VALTREX) 1 GM Tab Take 1 Tablet by mouth every day. Take 1 g daily for 5 days at onset of prodrome 20 Tablet 1    albuterol 108 (90 Base) MCG/ACT Aero Soln inhalation aerosol Inhale 2 Puffs every four hours as needed for Shortness of Breath. 1 Each 3    hydrALAZINE (APRESOLINE) 25 MG Tab Take 1 Tablet by mouth 2 times a day as needed (only if bp is 160 or above). 60 Tablet 3    cetirizine (ZYRTEC) 10 MG Tab Take 1 Tablet by mouth every evening. 90 Tablet 1    acetaminophen (TYLENOL) 325 MG Tab Take 650 mg by mouth every 6 hours as needed.       No current facility-administered medications for this visit.     Macrodantin [nitrofurantoin macrocrystal], Pcn [penicillins], Doxycycline, Bactrim, Clarithromycin, Hydrochlorothiazide, and Omnicef  Past Medical History:   Diagnosis Date    Acute cystitis with hematuria 2/14/2022    Adrenal nodule 2012 2015 / nonfunctional / benign    Anemia     Anesthesia     Anxiety     Panic    Arrhythmia     palpatations    Arthritis     Morning stiffness    Atherosclerosis of arteries 2013    seen on abdominal CT    Briseno's  esophagus     Bronchitis 2019    Chest pain at rest 9/11/2015    Chronic obstructive pulmonary disease (HCC) 11/30/2021    Cigarette smoker one half pack a day or less 5/28/2015    Depression     Fatigue 9/11/2015    GERD (gastroesophageal reflux disease)      "Headache(784.0)     sinus headache    Heart burn     Heart murmur     Dr. Krause-Stress trend    High cholesterol     History of HPV infection     HLD (hyperlipidemia) 9/11/2015    HTN (hypertension) 9/11/2015    IBD (inflammatory bowel disease)     Dr. Cunningham    Indigestion     MRSA carrier 2008    nose cellulitis    Nocturnal hypoxemia 10/15/2015    OSTEOPOROSIS     Osteopenia    Pneumonia 2005    PONV (postoperative nausea and vomiting)     S/P appendectomy     S/P cholecystectomy     S/P hysterectomy with oophorectomy     endometriosis    Snoring     Ulcer     Barretts esophogitis    Urinary incontinence     Urinary tract infection, site not specified         EXAM:  /70 (BP Location: Left arm, Patient Position: Sitting, BP Cuff Size: Adult)   Pulse 67   Temp 36.2 °C (97.1 °F) (Temporal)   Resp 18   Ht 1.613 m (5' 3.5\")   Wt 78 kg (172 lb)   SpO2 96%   General: Alert, no conversational dyspnea or audible wheeze, non-toxic appearance.  Eyes: PERRL, conjunctiva slightly injected, no eye discharge.  Ears: Normal pinnae,TM's normal bilaterally.  Nares: Patent with thin mucus.  Sinuses: tender over maxillary / frontal sinuses.  Throat: Erythematous injection without exudate.   Neck: Supple, with mildly enlarged cervical nodes.  Lungs: Clear to auscultation bilaterally, no wheeze, crackles or rhonchi.   Heart: Regular rate without murmur.  Skin: Warm and dry without rash.        Latest Reference Range & Units 12/04/24 09:52   Influenza virus A RNA Negative, Invalid  Negative   Influenza virus B, PCR Negative, Invalid  Negative   RSV, PCR Negative, Invalid  Negative   SARS-CoV-2 by PCR Negative, Invalid  Negative   POC Group A Strep, PCR Not Detected, Invalid  Not Detected     ASSESSMENT:   1. Hyponatremia    2. Acute URI      Problem List Items Addressed This Visit       Panlobular emphysema (HCC)     Chronic, stable. Denies increased symptoms with current URI. Continue albuterol as needed, fluticasone nasal " spray          Other Visit Diagnoses       Hyponatremia        Relevant Orders    Basic Metabolic Panel    Acute URI        Relevant Orders    POCT CEPHEID COV-2, FLU A/B, RSV - PCR    POCT CEPHEID GROUP A STREP - PCR               PLAN:  1. Educated patient that majority of upper respiratory infections are viral and do not need antibiotics.   2. Twice daily use of nasal saline rinse or Neti-Pot.  3. OTC anti-pyretics and decongestants as needed.  4. Follow-up in office or urgent care for worsening symptoms, difficulty breathing, lack of expected recovery, or should new symptoms or problems arise.

## 2024-12-04 NOTE — LETTER
[facility]  [address]  [place] [address]     December 4, 2024    Patient: Kelly Oakley   YOB: 1960   Date of Visit: 12/4/2024       To Whom It May Concern:    Kelly Oakley was seen and treated in our department on 12/4/2024.     Please excuse missed days due to illness 12/2/24-12/4/24. She may return to work when she has gone 24 hours without fever.    Sincerely,             POPPY Duncan.

## 2024-12-04 NOTE — ASSESSMENT & PLAN NOTE
Chronic, stable. Denies increased symptoms with current URI. Continue albuterol as needed, fluticasone nasal spray

## 2024-12-05 LAB
ANION GAP SERPL CALC-SCNC: 13 MMOL/L (ref 7–16)
BUN SERPL-MCNC: 21 MG/DL (ref 8–22)
CALCIUM SERPL-MCNC: 9.4 MG/DL (ref 8.5–10.5)
CHLORIDE SERPL-SCNC: 101 MMOL/L (ref 96–112)
CO2 SERPL-SCNC: 20 MMOL/L (ref 20–33)
CREAT SERPL-MCNC: 0.89 MG/DL (ref 0.5–1.4)
GFR SERPLBLD CREATININE-BSD FMLA CKD-EPI: 72 ML/MIN/1.73 M 2
GLUCOSE SERPL-MCNC: 103 MG/DL (ref 65–99)
POTASSIUM SERPL-SCNC: 4.8 MMOL/L (ref 3.6–5.5)
SODIUM SERPL-SCNC: 134 MMOL/L (ref 135–145)

## 2024-12-06 DIAGNOSIS — I10 PRIMARY HYPERTENSION: ICD-10-CM

## 2024-12-06 RX ORDER — IRBESARTAN 300 MG/1
300 TABLET ORAL DAILY
Qty: 90 TABLET | Refills: 3 | Status: SHIPPED | OUTPATIENT
Start: 2024-12-06

## 2024-12-06 NOTE — TELEPHONE ENCOUNTER
Received request via: Pharmacy    Was the patient seen in the last year in this department? Yes    Does the patient have an active prescription (recently filled or refills available) for medication(s) requested? No    Pharmacy Name: [facility]     Does the patient have [facility] and need 100-day supply? (This applies to ALL medications) Patient does not have SCP

## 2024-12-24 ENCOUNTER — APPOINTMENT (OUTPATIENT)
Dept: PULMONOLOGY | Facility: MEDICAL CENTER | Age: 64
End: 2024-12-24
Payer: COMMERCIAL

## 2024-12-31 ENCOUNTER — APPOINTMENT (OUTPATIENT)
Dept: RADIOLOGY | Facility: IMAGING CENTER | Age: 64
End: 2024-12-31
Attending: PHYSICIAN ASSISTANT
Payer: COMMERCIAL

## 2024-12-31 ENCOUNTER — HOSPITAL ENCOUNTER (INPATIENT)
Facility: MEDICAL CENTER | Age: 64
LOS: 5 days | DRG: 193 | End: 2025-01-05
Attending: EMERGENCY MEDICINE | Admitting: HOSPITALIST
Payer: COMMERCIAL

## 2024-12-31 ENCOUNTER — APPOINTMENT (OUTPATIENT)
Dept: RADIOLOGY | Facility: MEDICAL CENTER | Age: 64
DRG: 193 | End: 2024-12-31
Payer: COMMERCIAL

## 2024-12-31 ENCOUNTER — OFFICE VISIT (OUTPATIENT)
Dept: URGENT CARE | Facility: PHYSICIAN GROUP | Age: 64
End: 2024-12-31
Payer: COMMERCIAL

## 2024-12-31 VITALS
HEIGHT: 64 IN | OXYGEN SATURATION: 87 % | SYSTOLIC BLOOD PRESSURE: 140 MMHG | BODY MASS INDEX: 28.79 KG/M2 | RESPIRATION RATE: 20 BRPM | DIASTOLIC BLOOD PRESSURE: 60 MMHG | WEIGHT: 168.6 LBS | TEMPERATURE: 98.5 F | HEART RATE: 78 BPM

## 2024-12-31 DIAGNOSIS — R06.00 DYSPNEA, UNSPECIFIED TYPE: ICD-10-CM

## 2024-12-31 DIAGNOSIS — F17.210 NICOTINE DEPENDENCE, CIGARETTES, UNCOMPLICATED: ICD-10-CM

## 2024-12-31 DIAGNOSIS — R09.02 HYPOXIA: ICD-10-CM

## 2024-12-31 DIAGNOSIS — E87.1 HYPONATREMIA: ICD-10-CM

## 2024-12-31 DIAGNOSIS — J96.01 ACUTE RESPIRATORY FAILURE WITH HYPOXIA (HCC): ICD-10-CM

## 2024-12-31 DIAGNOSIS — J10.1 INFLUENZA A: ICD-10-CM

## 2024-12-31 DIAGNOSIS — F17.210 CIGARETTE SMOKER ONE HALF PACK A DAY OR LESS: ICD-10-CM

## 2024-12-31 DIAGNOSIS — J96.01 ACUTE HYPOXEMIC RESPIRATORY FAILURE (HCC): ICD-10-CM

## 2024-12-31 DIAGNOSIS — R05.1 ACUTE COUGH: ICD-10-CM

## 2024-12-31 DIAGNOSIS — R79.89 ELEVATED TROPONIN: ICD-10-CM

## 2024-12-31 PROBLEM — Z72.0 TOBACCO ABUSE: Status: ACTIVE | Noted: 2024-12-31

## 2024-12-31 LAB
ALBUMIN SERPL BCP-MCNC: 4.4 G/DL (ref 3.2–4.9)
ALBUMIN/GLOB SERPL: 1.4 G/DL
ALP SERPL-CCNC: 67 U/L (ref 30–99)
ALT SERPL-CCNC: 36 U/L (ref 2–50)
ANION GAP SERPL CALC-SCNC: 16 MMOL/L (ref 7–16)
AST SERPL-CCNC: 71 U/L (ref 12–45)
BASOPHILS # BLD AUTO: 0.1 % (ref 0–1.8)
BASOPHILS # BLD: 0.01 K/UL (ref 0–0.12)
BILIRUB SERPL-MCNC: 0.4 MG/DL (ref 0.1–1.5)
BUN SERPL-MCNC: 13 MG/DL (ref 8–22)
CALCIUM ALBUM COR SERPL-MCNC: 8.6 MG/DL (ref 8.5–10.5)
CALCIUM SERPL-MCNC: 8.9 MG/DL (ref 8.5–10.5)
CHLORIDE SERPL-SCNC: 87 MMOL/L (ref 96–112)
CO2 SERPL-SCNC: 19 MMOL/L (ref 20–33)
CREAT SERPL-MCNC: 0.77 MG/DL (ref 0.5–1.4)
EKG IMPRESSION: NORMAL
EOSINOPHIL # BLD AUTO: 0.09 K/UL (ref 0–0.51)
EOSINOPHIL NFR BLD: 0.7 % (ref 0–6.9)
ERYTHROCYTE [DISTWIDTH] IN BLOOD BY AUTOMATED COUNT: 37.7 FL (ref 35.9–50)
FLUAV RNA SPEC QL NAA+PROBE: POSITIVE
FLUBV RNA SPEC QL NAA+PROBE: NEGATIVE
GFR SERPLBLD CREATININE-BSD FMLA CKD-EPI: 86 ML/MIN/1.73 M 2
GLOBULIN SER CALC-MCNC: 3.1 G/DL (ref 1.9–3.5)
GLUCOSE SERPL-MCNC: 119 MG/DL (ref 65–99)
HCT VFR BLD AUTO: 37.8 % (ref 37–47)
HGB BLD-MCNC: 13.4 G/DL (ref 12–16)
IMM GRANULOCYTES # BLD AUTO: 0.05 K/UL (ref 0–0.11)
IMM GRANULOCYTES NFR BLD AUTO: 0.4 % (ref 0–0.9)
LYMPHOCYTES # BLD AUTO: 1.04 K/UL (ref 1–4.8)
LYMPHOCYTES NFR BLD: 7.7 % (ref 22–41)
MCH RBC QN AUTO: 29.8 PG (ref 27–33)
MCHC RBC AUTO-ENTMCNC: 35.4 G/DL (ref 32.2–35.5)
MCV RBC AUTO: 84 FL (ref 81.4–97.8)
MONOCYTES # BLD AUTO: 1.29 K/UL (ref 0–0.85)
MONOCYTES NFR BLD AUTO: 9.6 % (ref 0–13.4)
NEUTROPHILS # BLD AUTO: 10.99 K/UL (ref 1.82–7.42)
NEUTROPHILS NFR BLD: 81.5 % (ref 44–72)
NRBC # BLD AUTO: 0 K/UL
NRBC BLD-RTO: 0 /100 WBC (ref 0–0.2)
NT-PROBNP SERPL IA-MCNC: 836 PG/ML (ref 0–125)
PLATELET # BLD AUTO: 176 K/UL (ref 164–446)
PMV BLD AUTO: 9.5 FL (ref 9–12.9)
POTASSIUM SERPL-SCNC: 4.3 MMOL/L (ref 3.6–5.5)
PROT SERPL-MCNC: 7.5 G/DL (ref 6–8.2)
RBC # BLD AUTO: 4.5 M/UL (ref 4.2–5.4)
RSV RNA SPEC QL NAA+PROBE: NEGATIVE
SARS-COV-2 RNA RESP QL NAA+PROBE: NOTDETECTED
SODIUM SERPL-SCNC: 122 MMOL/L (ref 135–145)
TROPONIN T SERPL-MCNC: 19 NG/L (ref 6–19)
TROPONIN T SERPL-MCNC: 24 NG/L (ref 6–19)
WBC # BLD AUTO: 13.5 K/UL (ref 4.8–10.8)

## 2024-12-31 PROCEDURE — 84484 ASSAY OF TROPONIN QUANT: CPT

## 2024-12-31 PROCEDURE — 0241U HCHG SARS-COV-2 COVID-19 NFCT DS RESP RNA 4 TRGT ED POC: CPT

## 2024-12-31 PROCEDURE — 85025 COMPLETE CBC W/AUTO DIFF WBC: CPT

## 2024-12-31 PROCEDURE — 99285 EMERGENCY DEPT VISIT HI MDM: CPT

## 2024-12-31 PROCEDURE — 80053 COMPREHEN METABOLIC PANEL: CPT

## 2024-12-31 PROCEDURE — 700111 HCHG RX REV CODE 636 W/ 250 OVERRIDE (IP): Mod: JZ | Performed by: HOSPITALIST

## 2024-12-31 PROCEDURE — 99223 1ST HOSP IP/OBS HIGH 75: CPT | Performed by: HOSPITALIST

## 2024-12-31 PROCEDURE — 36415 COLL VENOUS BLD VENIPUNCTURE: CPT

## 2024-12-31 PROCEDURE — 770020 HCHG ROOM/CARE - TELE (206)

## 2024-12-31 PROCEDURE — A9270 NON-COVERED ITEM OR SERVICE: HCPCS | Performed by: EMERGENCY MEDICINE

## 2024-12-31 PROCEDURE — 83880 ASSAY OF NATRIURETIC PEPTIDE: CPT

## 2024-12-31 PROCEDURE — 71046 X-RAY EXAM CHEST 2 VIEWS: CPT | Mod: TC | Performed by: RADIOLOGY

## 2024-12-31 PROCEDURE — 93005 ELECTROCARDIOGRAM TRACING: CPT | Mod: TC | Performed by: EMERGENCY MEDICINE

## 2024-12-31 PROCEDURE — A9270 NON-COVERED ITEM OR SERVICE: HCPCS | Performed by: HOSPITALIST

## 2024-12-31 PROCEDURE — 700102 HCHG RX REV CODE 250 W/ 637 OVERRIDE(OP): Performed by: EMERGENCY MEDICINE

## 2024-12-31 PROCEDURE — 93005 ELECTROCARDIOGRAM TRACING: CPT | Mod: TC

## 2024-12-31 PROCEDURE — 96374 THER/PROPH/DIAG INJ IV PUSH: CPT

## 2024-12-31 PROCEDURE — 700102 HCHG RX REV CODE 250 W/ 637 OVERRIDE(OP): Performed by: HOSPITALIST

## 2024-12-31 PROCEDURE — 700105 HCHG RX REV CODE 258: Performed by: HOSPITALIST

## 2024-12-31 RX ORDER — OXYCODONE HYDROCHLORIDE 5 MG/1
5-10 TABLET ORAL
Status: DISCONTINUED | OUTPATIENT
Start: 2024-12-31 | End: 2025-01-05 | Stop reason: HOSPADM

## 2024-12-31 RX ORDER — ASPIRIN 81 MG/1
324 TABLET, CHEWABLE ORAL DAILY
Status: DISCONTINUED | OUTPATIENT
Start: 2025-01-01 | End: 2025-01-03

## 2024-12-31 RX ORDER — PROCHLORPERAZINE EDISYLATE 5 MG/ML
5-10 INJECTION INTRAMUSCULAR; INTRAVENOUS EVERY 4 HOURS PRN
Status: DISCONTINUED | OUTPATIENT
Start: 2024-12-31 | End: 2025-01-05 | Stop reason: HOSPADM

## 2024-12-31 RX ORDER — PROPRANOLOL HYDROCHLORIDE 10 MG/1
10 TABLET ORAL 2 TIMES DAILY
Status: DISCONTINUED | OUTPATIENT
Start: 2024-12-31 | End: 2025-01-05 | Stop reason: HOSPADM

## 2024-12-31 RX ORDER — OSELTAMIVIR PHOSPHATE 75 MG/1
75 CAPSULE ORAL ONCE
Status: COMPLETED | OUTPATIENT
Start: 2024-12-31 | End: 2024-12-31

## 2024-12-31 RX ORDER — POLYETHYLENE GLYCOL 3350 17 G/17G
1 POWDER, FOR SOLUTION ORAL
Status: DISCONTINUED | OUTPATIENT
Start: 2024-12-31 | End: 2025-01-05 | Stop reason: HOSPADM

## 2024-12-31 RX ORDER — AMOXICILLIN 250 MG
2 CAPSULE ORAL EVERY EVENING
Status: DISCONTINUED | OUTPATIENT
Start: 2025-01-01 | End: 2025-01-05 | Stop reason: HOSPADM

## 2024-12-31 RX ORDER — PROMETHAZINE HYDROCHLORIDE 25 MG/1
12.5-25 SUPPOSITORY RECTAL EVERY 4 HOURS PRN
Status: DISCONTINUED | OUTPATIENT
Start: 2024-12-31 | End: 2025-01-05 | Stop reason: HOSPADM

## 2024-12-31 RX ORDER — PROMETHAZINE HYDROCHLORIDE 25 MG/1
12.5-25 TABLET ORAL EVERY 4 HOURS PRN
Status: DISCONTINUED | OUTPATIENT
Start: 2024-12-31 | End: 2025-01-05 | Stop reason: HOSPADM

## 2024-12-31 RX ORDER — OSELTAMIVIR PHOSPHATE 75 MG/1
75 CAPSULE ORAL 2 TIMES DAILY
Status: DISCONTINUED | OUTPATIENT
Start: 2025-01-01 | End: 2025-01-05 | Stop reason: HOSPADM

## 2024-12-31 RX ORDER — SODIUM CHLORIDE 9 MG/ML
INJECTION, SOLUTION INTRAVENOUS CONTINUOUS
Status: DISCONTINUED | OUTPATIENT
Start: 2024-12-31 | End: 2025-01-03

## 2024-12-31 RX ORDER — AMLODIPINE BESYLATE 5 MG/1
5 TABLET ORAL DAILY
Status: DISCONTINUED | OUTPATIENT
Start: 2025-01-01 | End: 2025-01-05 | Stop reason: HOSPADM

## 2024-12-31 RX ORDER — ONDANSETRON 2 MG/ML
4 INJECTION INTRAMUSCULAR; INTRAVENOUS EVERY 4 HOURS PRN
Status: DISCONTINUED | OUTPATIENT
Start: 2024-12-31 | End: 2025-01-05 | Stop reason: HOSPADM

## 2024-12-31 RX ORDER — ALBUTEROL SULFATE 0.83 MG/ML
2.5 SOLUTION RESPIRATORY (INHALATION) ONCE
Status: COMPLETED | OUTPATIENT
Start: 2024-12-31 | End: 2024-12-31

## 2024-12-31 RX ORDER — BUSPIRONE HYDROCHLORIDE 10 MG/1
15 TABLET ORAL
Status: DISCONTINUED | OUTPATIENT
Start: 2024-12-31 | End: 2025-01-05 | Stop reason: HOSPADM

## 2024-12-31 RX ORDER — ONDANSETRON 4 MG/1
4 TABLET, ORALLY DISINTEGRATING ORAL EVERY 4 HOURS PRN
Status: DISCONTINUED | OUTPATIENT
Start: 2024-12-31 | End: 2025-01-05 | Stop reason: HOSPADM

## 2024-12-31 RX ORDER — NICOTINE 21 MG/24HR
21 PATCH, TRANSDERMAL 24 HOURS TRANSDERMAL
Status: DISCONTINUED | OUTPATIENT
Start: 2025-01-01 | End: 2025-01-05 | Stop reason: HOSPADM

## 2024-12-31 RX ORDER — ACETAMINOPHEN 325 MG/1
650 TABLET ORAL EVERY 6 HOURS PRN
Status: DISCONTINUED | OUTPATIENT
Start: 2024-12-31 | End: 2025-01-05 | Stop reason: HOSPADM

## 2024-12-31 RX ORDER — IRBESARTAN 150 MG/1
300 TABLET ORAL
Status: DISCONTINUED | OUTPATIENT
Start: 2024-12-31 | End: 2025-01-05 | Stop reason: HOSPADM

## 2024-12-31 RX ADMIN — ONDANSETRON 4 MG: 2 INJECTION INTRAMUSCULAR; INTRAVENOUS at 22:01

## 2024-12-31 RX ADMIN — SODIUM CHLORIDE: 9 INJECTION, SOLUTION INTRAVENOUS at 21:19

## 2024-12-31 RX ADMIN — PROPRANOLOL HYDROCHLORIDE 10 MG: 10 TABLET ORAL at 23:40

## 2024-12-31 RX ADMIN — ALBUTEROL SULFATE 2.5 MG: 0.83 SOLUTION RESPIRATORY (INHALATION) at 17:27

## 2024-12-31 RX ADMIN — ACETAMINOPHEN 650 MG: 325 TABLET ORAL at 21:54

## 2024-12-31 RX ADMIN — OSELTAMIVIR PHOSPHATE 75 MG: 75 CAPSULE ORAL at 20:42

## 2024-12-31 ASSESSMENT — ENCOUNTER SYMPTOMS
SHORTNESS OF BREATH: 1
FEVER: 1
NAUSEA: 1
WHEEZING: 0
CHILLS: 1
COUGH: 1
HEADACHES: 1
MYALGIAS: 1

## 2024-12-31 ASSESSMENT — PAIN DESCRIPTION - PAIN TYPE
TYPE: ACUTE PAIN

## 2024-12-31 ASSESSMENT — FIBROSIS 4 INDEX
FIB4 SCORE: 1.48
FIB4 SCORE: 1.48

## 2025-01-01 LAB
SODIUM SERPL-SCNC: 121 MMOL/L (ref 135–145)
SODIUM SERPL-SCNC: 123 MMOL/L (ref 135–145)
SODIUM SERPL-SCNC: 124 MMOL/L (ref 135–145)
SODIUM SERPL-SCNC: 126 MMOL/L (ref 135–145)

## 2025-01-01 PROCEDURE — 84295 ASSAY OF SERUM SODIUM: CPT

## 2025-01-01 PROCEDURE — 36415 COLL VENOUS BLD VENIPUNCTURE: CPT

## 2025-01-01 PROCEDURE — 99233 SBSQ HOSP IP/OBS HIGH 50: CPT | Performed by: STUDENT IN AN ORGANIZED HEALTH CARE EDUCATION/TRAINING PROGRAM

## 2025-01-01 PROCEDURE — A9270 NON-COVERED ITEM OR SERVICE: HCPCS | Performed by: STUDENT IN AN ORGANIZED HEALTH CARE EDUCATION/TRAINING PROGRAM

## 2025-01-01 PROCEDURE — 700102 HCHG RX REV CODE 250 W/ 637 OVERRIDE(OP): Performed by: STUDENT IN AN ORGANIZED HEALTH CARE EDUCATION/TRAINING PROGRAM

## 2025-01-01 PROCEDURE — 700105 HCHG RX REV CODE 258: Performed by: HOSPITALIST

## 2025-01-01 PROCEDURE — 770020 HCHG ROOM/CARE - TELE (206)

## 2025-01-01 PROCEDURE — 700102 HCHG RX REV CODE 250 W/ 637 OVERRIDE(OP): Performed by: EMERGENCY MEDICINE

## 2025-01-01 PROCEDURE — A9270 NON-COVERED ITEM OR SERVICE: HCPCS | Performed by: HOSPITALIST

## 2025-01-01 PROCEDURE — A9270 NON-COVERED ITEM OR SERVICE: HCPCS | Performed by: EMERGENCY MEDICINE

## 2025-01-01 PROCEDURE — A9270 NON-COVERED ITEM OR SERVICE: HCPCS | Performed by: NURSE PRACTITIONER

## 2025-01-01 PROCEDURE — 700102 HCHG RX REV CODE 250 W/ 637 OVERRIDE(OP): Performed by: HOSPITALIST

## 2025-01-01 PROCEDURE — 700102 HCHG RX REV CODE 250 W/ 637 OVERRIDE(OP): Performed by: NURSE PRACTITIONER

## 2025-01-01 RX ORDER — FLUOXETINE 10 MG/1
10 CAPSULE ORAL DAILY
Status: DISCONTINUED | OUTPATIENT
Start: 2025-01-02 | End: 2025-01-05 | Stop reason: HOSPADM

## 2025-01-01 RX ORDER — HYDROXYZINE HYDROCHLORIDE 50 MG/1
25 TABLET, FILM COATED ORAL 2 TIMES DAILY PRN
Status: DISCONTINUED | OUTPATIENT
Start: 2025-01-01 | End: 2025-01-05 | Stop reason: HOSPADM

## 2025-01-01 RX ORDER — GUAIFENESIN/DEXTROMETHORPHAN 100-10MG/5
5 SYRUP ORAL EVERY 6 HOURS PRN
Status: DISCONTINUED | OUTPATIENT
Start: 2025-01-01 | End: 2025-01-05 | Stop reason: HOSPADM

## 2025-01-01 RX ADMIN — IRBESARTAN 300 MG: 150 TABLET ORAL at 21:24

## 2025-01-01 RX ADMIN — IRBESARTAN 300 MG: 150 TABLET ORAL at 01:18

## 2025-01-01 RX ADMIN — GUAIFENESIN SYRUP AND DEXTROMETHORPHAN 5 ML: 100; 10 SYRUP ORAL at 21:46

## 2025-01-01 RX ADMIN — RIVAROXABAN 10 MG: 10 TABLET, FILM COATED ORAL at 17:18

## 2025-01-01 RX ADMIN — ASPIRIN 324 MG: 81 TABLET, CHEWABLE ORAL at 06:22

## 2025-01-01 RX ADMIN — PROPRANOLOL HYDROCHLORIDE 10 MG: 10 TABLET ORAL at 06:21

## 2025-01-01 RX ADMIN — GUAIFENESIN SYRUP AND DEXTROMETHORPHAN 5 ML: 100; 10 SYRUP ORAL at 15:28

## 2025-01-01 RX ADMIN — NICOTINE TRANSDERMAL SYSTEM 21 MG: 21 PATCH, EXTENDED RELEASE TRANSDERMAL at 06:20

## 2025-01-01 RX ADMIN — PROPRANOLOL HYDROCHLORIDE 10 MG: 10 TABLET ORAL at 17:18

## 2025-01-01 RX ADMIN — BUSPIRONE HYDROCHLORIDE 15 MG: 10 TABLET ORAL at 01:18

## 2025-01-01 RX ADMIN — SODIUM CHLORIDE: 9 INJECTION, SOLUTION INTRAVENOUS at 01:24

## 2025-01-01 RX ADMIN — ACETAMINOPHEN 650 MG: 325 TABLET ORAL at 10:12

## 2025-01-01 RX ADMIN — HYDROXYZINE HYDROCHLORIDE 25 MG: 25 TABLET ORAL at 21:24

## 2025-01-01 RX ADMIN — OSELTAMIVIR PHOSPHATE 75 MG: 75 CAPSULE ORAL at 17:18

## 2025-01-01 RX ADMIN — SODIUM CHLORIDE: 9 INJECTION, SOLUTION INTRAVENOUS at 17:22

## 2025-01-01 RX ADMIN — OSELTAMIVIR PHOSPHATE 75 MG: 75 CAPSULE ORAL at 06:23

## 2025-01-01 RX ADMIN — BUSPIRONE HYDROCHLORIDE 15 MG: 10 TABLET ORAL at 21:25

## 2025-01-01 RX ADMIN — AMLODIPINE BESYLATE 5 MG: 5 TABLET ORAL at 06:21

## 2025-01-01 SDOH — ECONOMIC STABILITY: TRANSPORTATION INSECURITY
IN THE PAST 12 MONTHS, HAS LACK OF RELIABLE TRANSPORTATION KEPT YOU FROM MEDICAL APPOINTMENTS, MEETINGS, WORK OR FROM GETTING THINGS NEEDED FOR DAILY LIVING?: NO

## 2025-01-01 ASSESSMENT — SOCIAL DETERMINANTS OF HEALTH (SDOH)
IN THE PAST 12 MONTHS, HAS THE ELECTRIC, GAS, OIL, OR WATER COMPANY THREATENED TO SHUT OFF SERVICE IN YOUR HOME?: NO
WITHIN THE PAST 12 MONTHS, YOU WORRIED THAT YOUR FOOD WOULD RUN OUT BEFORE YOU GOT THE MONEY TO BUY MORE: NEVER TRUE
WITHIN THE LAST YEAR, HAVE YOU BEEN KICKED, HIT, SLAPPED, OR OTHERWISE PHYSICALLY HURT BY YOUR PARTNER OR EX-PARTNER?: NO
WITHIN THE LAST YEAR, HAVE YOU BEEN HUMILIATED OR EMOTIONALLY ABUSED IN OTHER WAYS BY YOUR PARTNER OR EX-PARTNER?: NO
WITHIN THE LAST YEAR, HAVE YOU BEEN AFRAID OF YOUR PARTNER OR EX-PARTNER?: NO
WITHIN THE PAST 12 MONTHS, THE FOOD YOU BOUGHT JUST DIDN'T LAST AND YOU DIDN'T HAVE MONEY TO GET MORE: NEVER TRUE
WITHIN THE LAST YEAR, HAVE TO BEEN RAPED OR FORCED TO HAVE ANY KIND OF SEXUAL ACTIVITY BY YOUR PARTNER OR EX-PARTNER?: NO

## 2025-01-01 ASSESSMENT — LIFESTYLE VARIABLES
AVERAGE NUMBER OF DAYS PER WEEK YOU HAVE A DRINK CONTAINING ALCOHOL: 0
ALCOHOL_USE: NO
HAVE YOU EVER FELT YOU SHOULD CUT DOWN ON YOUR DRINKING: NO
EVER FELT BAD OR GUILTY ABOUT YOUR DRINKING: NO
CONSUMPTION TOTAL: NEGATIVE
TOTAL SCORE: 0
TOTAL SCORE: 0
ON A TYPICAL DAY WHEN YOU DRINK ALCOHOL HOW MANY DRINKS DO YOU HAVE: 0
TOTAL SCORE: 0
EVER HAD A DRINK FIRST THING IN THE MORNING TO STEADY YOUR NERVES TO GET RID OF A HANGOVER: NO
DOES PATIENT WANT TO STOP DRINKING: NO
HAVE PEOPLE ANNOYED YOU BY CRITICIZING YOUR DRINKING: NO
HOW MANY TIMES IN THE PAST YEAR HAVE YOU HAD 5 OR MORE DRINKS IN A DAY: 0

## 2025-01-01 ASSESSMENT — COGNITIVE AND FUNCTIONAL STATUS - GENERAL
SUGGESTED CMS G CODE MODIFIER DAILY ACTIVITY: CH
MOBILITY SCORE: 24
SUGGESTED CMS G CODE MODIFIER MOBILITY: CH
DAILY ACTIVITIY SCORE: 24

## 2025-01-01 ASSESSMENT — PAIN DESCRIPTION - PAIN TYPE
TYPE: ACUTE PAIN

## 2025-01-01 ASSESSMENT — FIBROSIS 4 INDEX
FIB4 SCORE: 4.3
FIB4 SCORE: 4.3

## 2025-01-01 ASSESSMENT — ENCOUNTER SYMPTOMS
FEVER: 1
COUGH: 1
WEAKNESS: 1
CHILLS: 1
SHORTNESS OF BREATH: 1

## 2025-01-01 ASSESSMENT — PATIENT HEALTH QUESTIONNAIRE - PHQ9
SUM OF ALL RESPONSES TO PHQ9 QUESTIONS 1 AND 2: 0
2. FEELING DOWN, DEPRESSED, IRRITABLE, OR HOPELESS: NOT AT ALL
1. LITTLE INTEREST OR PLEASURE IN DOING THINGS: NOT AT ALL

## 2025-01-01 NOTE — ED NOTES
Chief Complaint   Patient presents with    Shortness of Breath     x4days    Chest Pain    Cough    Chills     Pt bib ems from Urgent care, initial spo2 87% , she was given breathing tx and placed on 4L nc.

## 2025-01-01 NOTE — ASSESSMENT & PLAN NOTE
Hypovolemic hyponatremia with sodium 122  In the setting of influenza and very poor oral intake  Her sodium has been slowly improving, ~130  131-129 today, will repeat this afternoon   lab results

## 2025-01-01 NOTE — H&P
Hospital Medicine History & Physical Note    Date of Service  12/31/2024    Primary Care Physician  POPPY Duncan.    Consultants  none    Code Status  Full Code    Chief Complaint  Chief Complaint   Patient presents with    Shortness of Breath     x4days    Chest Pain    Cough    Chills       History of Presenting Illness  Kelly Oakley is a 64 y.o. female who presented 12/31/2024 with cough, fever and chills, shortness of breath.  Ms. Oakley has a past medical history of tobacco dependence, diabetes not on medications, hypertension that presented to urgent care today with about 3 days of bodyaches and the after mentioned complaints.  She is found to have oxygen saturation of 87% on room air.  Chest x-ray two-view was unremarkable.  She was transferred here for higher level of care.  She is found to have influenza A with sats down to 86 range.  Blood work is consistent with hyponatremia sodium of 122.  She states she has barely been taking any oral intake due to nausea and some dry heaves.  She will be admitted to telemetry for supplemental oxygen, Tamiflu, IV normal saline with serial sodium levels every 6 hours.    I discussed the plan of care with Dr. Hernandez.    Review of Systems  Review of Systems   Constitutional:  Positive for chills and fever.   Respiratory:  Positive for cough and shortness of breath. Negative for wheezing.    Cardiovascular:  Negative for chest pain.   Gastrointestinal:  Positive for nausea.   Musculoskeletal:  Positive for myalgias.   Neurological:  Positive for headaches.       Past Medical History   has a past medical history of Acute cystitis with hematuria (2/14/2022), Adrenal nodule (2012), Anemia, Anesthesia, Anxiety, Arrhythmia, Arthritis, Atherosclerosis of arteries (2013), Briseno's  esophagus, Bronchitis (2019), Chest pain at rest (9/11/2015), Chronic obstructive pulmonary disease (HCC) (11/30/2021), Cigarette smoker one half pack a day or less (5/28/2015),  Depression, Fatigue (9/11/2015), GERD (gastroesophageal reflux disease), Headache(784.0), Heart burn, Heart murmur, High cholesterol, History of HPV infection, HLD (hyperlipidemia) (9/11/2015), HTN (hypertension) (9/11/2015), IBD (inflammatory bowel disease), Indigestion, 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    Surgical History   has a past surgical history that includes abdominal hysterectomy total; endometrial ablation; cholecystectomy; appendectomy; tonsillectomy; primary c section; tubal coagulation laparoscopic bilateral; colon resection; breast biopsy (1980's); us-needle core bx-breast panel; pr knee scope,diagnostic (Right, 2/3/2021); meniscectomy, knee, medial (Right, 2/3/2021); and chondroplasty (Right, 2/3/2021).     Family History  family history includes Arthritis in her paternal aunt and sister; Cancer in her maternal aunt, mother, and paternal aunt; Diabetes in her mother; Genetic Disorder in her maternal grandmother and paternal aunt; Heart Disease in her maternal grandfather, maternal grandmother, mother, and paternal grandfather; Hyperlipidemia in her maternal grandfather, maternal grandmother, mother, paternal grandfather, and paternal grandmother; Hypertension in her maternal grandfather, maternal grandmother, mother, paternal grandfather, and paternal grandmother; Lung Disease in her father; Psychiatric Illness in her father and sister; Stroke in her maternal grandmother and paternal grandmother.   Family history reviewed with patient. There is no family history that is pertinent to the chief complaint.     Social History   reports that she has been smoking cigarettes. She has a 20 pack-year smoking history. She has never used smokeless tobacco.  She reports that she does not currently use alcohol after a past usage of about 0.6 oz of alcohol per week. She reports that she does not use drugs.    Allergies  Allergies   Allergen Reactions    Macrodantin [Nitrofurantoin Macrocrystal] Anaphylaxis     Prescription > 10 years ago    Pcn [Penicillins] Anaphylaxis     Prescription > 10 years ago    Doxycycline Itching    Bactrim Vomiting    Clarithromycin Vomiting    Hydrochlorothiazide      Hyponatremia    Omnicef Itching       Medications  Prior to Admission Medications   Prescriptions Last Dose Informant Patient Reported? Taking?   FLUoxetine (PROZAC) 10 MG Cap   No No   Sig: Take 1 Capsule by mouth every day.   acetaminophen (TYLENOL) 325 MG Tab   Yes No   Sig: Take 650 mg by mouth every 6 hours as needed.   amLODIPine (NORVASC) 5 MG Tab   No No   Sig: TAKE 1 TABLET BY MOUTH EVERY DAY   busPIRone (BUSPAR) 15 MG tablet   No No   Sig: TAKE 1 TABLET BY MOUTH TWICE A DAY   esomeprazole (NEXIUM) 40 MG delayed-release capsule   No No   Sig: Take 1 Capsule by mouth every morning before breakfast.   estradiol (YUVAFEM) 10 MCG Tab   No No   Sig: INSERT 1 TABLET INTO THE VAGINA TWO TIMES A WEEK.   fluocinonide (LIDEX) 0.05 % Cream   No No   Sig: AAA twice a day for 2-3 weeks at a time with 1-2 week break in between   fluticasone (FLONASE) 50 MCG/ACT nasal spray   No No   Sig: ADMINISTER 2 SPRAYS INTO AFFECTED NOSTRIL(S) AT BEDTIME.   hydrALAZINE (APRESOLINE) 25 MG Tab   No No   Sig: Take 1 Tablet by mouth 2 times a day as needed (only if bp is 160 or above).   hydrOXYzine HCl (ATARAX) 25 MG Tab   No No   Sig: Take 1 Tablet by mouth 2 times a day as needed for Anxiety.   irbesartan (AVAPRO) 300 MG Tab   No No   Sig: TAKE 1 TABLET BY MOUTH EVERY DAY   mupirocin (BACTROBAN) 2 % Ointment   No No   Sig: Apply 1 Application topically 2 times a day.   pravastatin (PRAVACHOL) 20 MG Tab   No No   Sig: TAKE 1 TABLET BY MOUTH EVERY DAY   propranolol (INDERAL) 10 MG Tab   No No    Sig: TAKE 1 TABLET BY MOUTH TWICE A DAY   simethicone (MYLICON) 125 MG chewable tablet   Yes No   Sig: Chew 125 mg every 6 hours as needed for Flatulence.   sodium chloride ([employer] SALINE NASAL SPRAY) 0.65 % Solution   No No   Sig: ADMINISTER 2 SPRAYS INTO AFFECTED NOSTRIL(S) EVERY 2 HOURS AS NEEDED FOR CONGESTION.   spironolactone (ALDACTONE) 50 MG Tab   No No   Sig: TAKE 1 TABLET BY MOUTH EVERY DAY   tacrolimus (PROTOPIC) 0.1 % Ointment   No No   Sig: AAA twice a day   triamcinolone acetonide (KENALOG) 0.1 % Ointment   No No   Sig: Apply 1 Application  topically 2 times a day.      Facility-Administered Medications: None       Physical Exam  Temp:  [36.3 °C (97.4 °F)-36.9 °C (98.5 °F)] 36.3 °C (97.4 °F)  Pulse:  [74-88] 74  Resp:  [18-20] 18  BP: (102-141)/(60-66) 141/64  SpO2:  [87 %-95 %] 94 %  Blood Pressure: (!) 141/64   Temperature: 36.3 °C (97.4 °F)   Pulse: 74   Respiration: 18   Pulse Oximetry: 94 %       Physical Exam  Vitals and nursing note reviewed.   Constitutional:       Appearance: She is ill-appearing.   Cardiovascular:      Rate and Rhythm: Normal rate and regular rhythm.   Pulmonary:      Effort: Pulmonary effort is normal.      Breath sounds: Normal breath sounds. No wheezing or rales.      Comments: Nasal cannula oxygen  Abdominal:      General: There is no distension.      Tenderness: There is no abdominal tenderness.   Musculoskeletal:      Right lower leg: No edema.      Left lower leg: No edema.   Neurological:      Mental Status: She is alert and oriented to person, place, and time.   Psychiatric:         Mood and Affect: Mood normal.         Behavior: Behavior normal.         Laboratory:  Recent Labs     12/31/24  1841   WBC 13.5*   RBC 4.50   HEMOGLOBIN 13.4   HEMATOCRIT 37.8   MCV 84.0   MCH 29.8   MCHC 35.4   RDW 37.7   PLATELETCT 176   MPV 9.5     Recent Labs     12/31/24  1841   SODIUM 122*   POTASSIUM 4.3   CHLORIDE 87*   CO2 19*   GLUCOSE 119*   BUN 13   CREATININE 0.77   CALCIUM  8.9     Recent Labs     12/31/24  1841   ALTSGPT 36   ASTSGOT 71*   ALKPHOSPHAT 67   TBILIRUBIN 0.4   GLUCOSE 119*         Recent Labs     12/31/24  1841   NTPROBNP 836*         Recent Labs     12/31/24  1841   TROPONINT 24*       Imaging:  No orders to display       EKG: Sinus rhythm, inverted T wave in V1 and V2 compared to previous there was an inverted T wave in V1 thus query lead placement    Assessment/Plan:  Justification for Admission Status  I anticipate this patient will require at least two midnights for appropriate medical management, necessitating inpatient admission because supplemental oxygen due to hypoxia and gradual increase in her sodium with IV fluids    Patient will need a Telemetry bed on MEDICAL service .  The need is secondary to severe hyponatremia.    * Hyponatremia- (present on admission)  Assessment & Plan  Hypovolemic hyponatremia with sodium 122  In the setting of influenza and very poor oral intake  Normal saline will be given and will have every 6 hour sodium checks with goal not to raise sodium more than 8 mmol/L in 24 hours  She has a propensity for hyponatremia and was admitted 2020 with a sodium of 112    Acute respiratory failure with hypoxia (HCC)- (present on admission)  Assessment & Plan  Oxygen saturation 86% room air  Secondary to influenza and possible underlying emphysema though she does not have any wheezing though has a long history of smoking  No evidence of volume overload  Supplemental oxygen and she may need oxygen upon discharge      Influenza A- (present on admission)  Assessment & Plan  With hypoxia  Isolation precautions  Tamiflu    Tobacco abuse- (present on admission)  Assessment & Plan  Ongoing use she is hoping to quit   cessation encouraged  Nicotine patch offered    Type 2 diabetes mellitus with hyperglycemia, without long-term current use of insulin (HCC)- (present on admission)  Assessment & Plan  Not on any medications  Hemoglobin A1c 6.6 last month  She  was hoping to refrain from medications and do this with diet which I think is not unreasonable      Primary hypertension- (present on admission)  Assessment & Plan  Continue outpatient regimen with holding parameters though refrain from Aldactone due to its diuretic properties        VTE prophylaxis: Xarelto 10 mg daily as prophylaxis

## 2025-01-01 NOTE — ASSESSMENT & PLAN NOTE
Oxygen saturation 86% room air  Secondary to influenza and possible underlying COPD. She had no formal diagnosis of COPD, but has a long history of smoking  No evidence of volume overload  Supplemental oxygen and she may need oxygen upon discharge  Dulera and Spiriva   Continue to wean oxygen  Encourage I-S and mobilization  Home oxygen evaluation has been ordered  Start prednisone, ongoing wheezing and o2 needs

## 2025-01-01 NOTE — ED NOTES
Break RN: Pt to T807/02 via [facility]. Pt with all belongings in possession. Pt is on cardiac monitoring and 3L for oxygen NC. Report has been given.

## 2025-01-01 NOTE — ED PROVIDER NOTES
ED Provider Note    CHIEF COMPLAINT  Chief Complaint   Patient presents with    Shortness of Breath     x4days    Chest Pain    Cough    Chills       EXTERNAL RECORDS REVIEWED  Outpatient Notes Urgent care office visit note from earlier today    HPI/ROS  LIMITATION TO HISTORY   Select: : None  OUTSIDE HISTORIAN(S):  None    Kelly Oakley is a 64 y.o. female who presents to the emergency department for evaluation of shortness of breath, chest pain, cough, and chills.  The patient states that she has been sick for the last 4 to 5 days.  She states that she has had a cough, pain in her chest when she coughs and she feels short of breath.  She has had chills but no documented fevers.  She has had nausea but no vomiting.  He states that she went to an urgent care earlier today and was noted to be hypoxic in the office.  She was placed on supplemental oxygen and transferred here.  She states that she is not typically on oxygen at home.  She does smoke.  She has a history of hypertension and hyperlipidemia as well as borderline diabetes.  He has no known history of coronary artery disease.  He states that she does have family history of coronary artery disease.  She has not had any syncope or seizure-like activity.    PAST MEDICAL HISTORY   has a past medical history of Acute cystitis with hematuria (2/14/2022), Adrenal nodule (2012), Anemia, Anesthesia, Anxiety, Arrhythmia, Arthritis, Atherosclerosis of arteries (2013), Briseno's  esophagus, Bronchitis (2019), Chest pain at rest (9/11/2015), Chronic obstructive pulmonary disease (HCC) (11/30/2021), Cigarette smoker one half pack a day or less (5/28/2015), Depression, Fatigue (9/11/2015), GERD (gastroesophageal reflux disease), Headache(784.0), Heart burn, Heart murmur, High cholesterol, History of HPV infection, HLD (hyperlipidemia) (9/11/2015), HTN (hypertension) (9/11/2015), IBD (inflammatory bowel disease), Indigestion, 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    SURGICAL HISTORY   has a past surgical history that includes abdominal hysterectomy total; endometrial ablation; cholecystectomy; appendectomy; tonsillectomy; primary c section; tubal coagulation laparoscopic bilateral; colon resection; breast biopsy (1980's); us-needle core bx-breast panel; knee scope,diagnostic (Right, 2/3/2021); meniscectomy, knee, medial (Right, 2/3/2021); and chondroplasty (Right, 2/3/2021).    FAMILY HISTORY  Family History   Problem Relation Age of Onset    Cancer Mother         Breast/Liver    Diabetes Mother     Hypertension Mother     Hyperlipidemia Mother     Heart Disease Mother     Lung Disease Father         Emphysema    Psychiatric Illness Father         Paranoid schitzophenia    Psychiatric Illness Sister         Paronoid Schitzo-disorder, Bipolar    Arthritis Sister         RA, Fibromyalgia    Cancer Maternal Aunt         Breast    Cancer Paternal Aunt         Bone    Arthritis Paternal Aunt     Genetic Disorder Paternal Aunt         SLE    Heart Disease Maternal Grandmother     Hypertension Maternal Grandmother     Hyperlipidemia Maternal Grandmother     Genetic Disorder Maternal Grandmother         Brights disease    Stroke Maternal Grandmother     Heart Disease Maternal Grandfather     Hypertension Maternal Grandfather     Hyperlipidemia Maternal Grandfather     Stroke Paternal Grandmother     Hyperlipidemia Paternal Grandmother     Hypertension Paternal Grandmother     Heart Disease Paternal Grandfather     Hypertension Paternal Grandfather     Hyperlipidemia Paternal Grandfather        SOCIAL HISTORY  Social History     Tobacco Use    Smoking status: Some Days     Current packs/day: 0.50     Average packs/day: 0.5 packs/day for  "40.0 years (20.0 ttl pk-yrs)     Types: Cigarettes    Smokeless tobacco: Never   Vaping Use    Vaping status: Never Used   Substance and Sexual Activity    Alcohol use: Not Currently     Alcohol/week: 0.6 oz     Types: 1 Standard drinks or equivalent per week    Drug use: No    Sexual activity: Not Currently     Comment:        CURRENT MEDICATIONS  Home Medications    **Home medications have not yet been reviewed for this encounter**       Audit from Redirected Encounters    **Home medications have not yet been reviewed for this encounter**         ALLERGIES  Allergies   Allergen Reactions    Macrodantin [Nitrofurantoin Macrocrystal] Anaphylaxis     Prescription > 10 years ago    Pcn [Penicillins] Anaphylaxis     Prescription > 10 years ago    Doxycycline Itching    Bactrim Vomiting    Clarithromycin Vomiting    Hydrochlorothiazide      Hyponatremia    Omnicef Itching       PHYSICAL EXAM  VITAL SIGNS: BP (!) 141/64   Pulse 74   Temp 36.3 °C (97.4 °F) (Temporal)   Resp 18   Ht 1.6 m (5' 3\")   Wt 76.2 kg (168 lb)   SpO2 94%   BMI 29.76 kg/m²   Constitutional: Alert and in no apparent distress.  HENT: Normocephalic atraumatic. Bilateral external ears normal. Nose normal. Mucous membranes are moist.  Eyes: Pupils are equal and reactive. Conjunctiva normal. Non-icteric sclera.   Neck: Normal range of motion without tenderness. Supple. No meningeal signs.  Cardiovascular: Regular rate and rhythm. No murmurs, gallops or rubs.  Thorax & Lungs: No increased work of breathing. Breath sounds are diminished to auscultation bilaterally.   Abdomen: Soft, nontender and nondistended.   Skin: Warm and dry. No rashes are noted.  Extremities: 2+ peripheral pulses. Cap refill is less than 2 seconds. No edema, cyanosis, or clubbing.  Musculoskeletal: Good range of motion in all major joints. No tenderness to palpation or major deformities noted.   Neurologic: Alert and appropriate for age. The patient moves all 4 " extremities without obvious deficits.    EKG/LABS  Results for orders placed or performed during the hospital encounter of 12/31/24   CBC with Differential    Collection Time: 12/31/24  6:41 PM   Result Value Ref Range    WBC 13.5 (H) 4.8 - 10.8 K/uL    RBC 4.50 4.20 - 5.40 M/uL    Hemoglobin 13.4 12.0 - 16.0 g/dL    Hematocrit 37.8 37.0 - 47.0 %    MCV 84.0 81.4 - 97.8 fL    MCH 29.8 27.0 - 33.0 pg    MCHC 35.4 32.2 - 35.5 g/dL    RDW 37.7 35.9 - 50.0 fL    Platelet Count 176 164 - 446 K/uL    MPV 9.5 9.0 - 12.9 fL    Neutrophils-Polys 81.50 (H) 44.00 - 72.00 %    Lymphocytes 7.70 (L) 22.00 - 41.00 %    Monocytes 9.60 0.00 - 13.40 %    Eosinophils 0.70 0.00 - 6.90 %    Basophils 0.10 0.00 - 1.80 %    Immature Granulocytes 0.40 0.00 - 0.90 %    Nucleated RBC 0.00 0.00 - 0.20 /100 WBC    Neutrophils (Absolute) 10.99 (H) 1.82 - 7.42 K/uL    Lymphs (Absolute) 1.04 1.00 - 4.80 K/uL    Monos (Absolute) 1.29 (H) 0.00 - 0.85 K/uL    Eos (Absolute) 0.09 0.00 - 0.51 K/uL    Baso (Absolute) 0.01 0.00 - 0.12 K/uL    Immature Granulocytes (abs) 0.05 0.00 - 0.11 K/uL    NRBC (Absolute) 0.00 K/uL   Complete Metabolic Panel (CMP)    Collection Time: 12/31/24  6:41 PM   Result Value Ref Range    Sodium 122 (L) 135 - 145 mmol/L    Potassium 4.3 3.6 - 5.5 mmol/L    Chloride 87 (L) 96 - 112 mmol/L    Co2 19 (L) 20 - 33 mmol/L    Anion Gap 16.0 7.0 - 16.0    Glucose 119 (H) 65 - 99 mg/dL    Bun 13 8 - 22 mg/dL    Creatinine 0.77 0.50 - 1.40 mg/dL    Calcium 8.9 8.5 - 10.5 mg/dL    Correct Calcium 8.6 8.5 - 10.5 mg/dL    AST(SGOT) 71 (H) 12 - 45 U/L    ALT(SGPT) 36 2 - 50 U/L    Alkaline Phosphatase 67 30 - 99 U/L    Total Bilirubin 0.4 0.1 - 1.5 mg/dL    Albumin 4.4 3.2 - 4.9 g/dL    Total Protein 7.5 6.0 - 8.2 g/dL    Globulin 3.1 1.9 - 3.5 g/dL    A-G Ratio 1.4 g/dL   proBrain Natriuretic Peptide, NT (BNP)    Collection Time: 12/31/24  6:41 PM   Result Value Ref Range    NT-proBNP 836 (H) 0 - 125 pg/mL   Troponins NOW    Collection  Time: 24  6:41 PM   Result Value Ref Range    Troponin T 24 (H) 6 - 19 ng/L   ESTIMATED GFR    Collection Time: 24  6:41 PM   Result Value Ref Range    GFR (CKD-EPI) 86 >60 mL/min/1.73 m 2   POC CoV-2, FLU A/B, RSV by PCR    Collection Time: 24  6:48 PM   Result Value Ref Range    POC Influenza A RNA, PCR POSITIVE (A) Negative    POC Influenza B RNA, PCR Negative Negative    POC RSV, by PCR Negative Negative    POC SARS-CoV-2, PCR NotDetected NotDetected   EKG    Collection Time: 24  8:29 PM   Result Value Ref Range    Report       [facility] Emergency Dept.    Test Date:  2024  Pt Name:    KIRK BARBA                   Department: ER  MRN:        [id number]                      Room:  Gender:     F                            Technician: [id number]  :        1960                   Requested By:ER TRIAGE PROTOCOL  Order #:    [id number]                    Reading MD: Lori Hernandez    Measurements  Intervals                                Axis  Rate:       74                           P:          -32  PA:         140                          QRS:        78  QRSD:       87                           T:          78  QT:         377  QTc:        419    Interpretive Statements  Sinus rhythm  Anteroseptal infarct, age indeterminate  Compared to ECG 10/12/2020 15:48:57  Myocardial infarct finding now present  Sinus bradycardia no longer present  Electronically Signed On 2024 20:29:49 PST by Lori Hernandez       *Note: Due to a large number of results and/or encounters for the requested time period, some results have not been displayed. A complete set of results can be found in Results Review.     I have independently interpreted this EKG    COURSE & MEDICAL DECISION MAKING    ASSESSMENT, COURSE AND PLAN  Care Narrative: This is a 64-year-old female presenting to the emergency department for evaluation of shortness of breath, chest pain, cough and chills.  On initial evaluation,  the patient did not appear to be in any acute distress.  Her vital signs were reassuring, but she was on supplemental oxygen via nasal cannula.    EKG did not show any evidence of acute ischemia.  Her troponin was 24.  Her heart score is 5.  She is given an aspirin here in the ED.    I reviewed the chest x-ray that was performed at urgent care from earlier today and there is no evidence of focal opacity concerning for bacterial pneumonia.  No pleural effusions or pulmonary edema were noted.    White count was elevated at 13.5 with a neutrophil predominance.    Electrolytes were notable for sodium of 122.  Chloride was 87 and bicarb was 19.    She was noted to be influenza A positive.  She was started on Tamiflu given her oxygen requirement and need for hospitalization.    8:46 PM - I discussed the case with Dr Hartley, hospitalist.  He agreed with the plan and accepted the patient.     ADDITIONAL PROBLEMS MANAGED  Influenza A, elevated troponin, hyponatremia    DISPOSITION AND DISCUSSIONS  I have discussed management of the patient with the following physicians and SENTHIL's: Dr. Hartley, hospitalist    Discussion of management with other [facility] or appropriate source(s): None     FINAL IMPRESSION  1. Acute hypoxemic respiratory failure (HCC)    2. Influenza A    3. Elevated troponin    4. Hyponatremia      -ADMIT-    Electronically signed by: Lori Hernandez D.O., 12/31/2024 8:17 PM

## 2025-01-01 NOTE — PROGRESS NOTES
Subjective:   Kelly Oakley is a 64 y.o. female who presents for Headache (Chest pain, SOB, cough, fever, dizzy, x4 days )      HPI  The patient is a 64-year-old female with a history of diabetes and atherosclerosis who presents to the Urgent Care with complaints of cough, shortness of breath, chest pain onset 3 days ago.  Family members are sick as well.  Associated body aches, headache, diarrhea, decreased appetite.  Chest pain is to the mid chest that is worse with deep breaths and coughing.  She is tolerating some fluids.  Some nasal congestion or runny nose.  Shortness of breath is worse with laying down.  Some nausea. Denies any hemoptysis, vomiting. History of pneumonia many years ago. Smokes cigarettes but not recently. No history of DVT. No history of MI. History of COVID without complications.       Past Medical History:   Diagnosis Date    Acute cystitis with hematuria 2/14/2022    Adrenal nodule 2012 2015 / nonfunctional / benign    Anemia     Anesthesia     Anxiety     Panic    Arrhythmia     palpatations    Arthritis     Morning stiffness    Atherosclerosis of arteries 2013    seen on abdominal CT    Briseno's  esophagus     Bronchitis 2019    Chest pain at rest 9/11/2015    Chronic obstructive pulmonary disease (HCC) 11/30/2021    Cigarette smoker one half pack a day or less 5/28/2015    Depression     Fatigue 9/11/2015    GERD (gastroesophageal reflux disease)     Headache(784.0)     sinus headache    Heart burn     Heart murmur     Dr. Krause-Stress trend    High cholesterol     History of HPV infection     HLD (hyperlipidemia) 9/11/2015    HTN (hypertension) 9/11/2015    IBD (inflammatory bowel disease)     Dr. Cunningham    Indigestion     MRSA carrier 2008    nose cellulitis    Nocturnal hypoxemia 10/15/2015    OSTEOPOROSIS     Osteopenia    Pneumonia 2005    PONV (postoperative nausea and vomiting)     S/P appendectomy     S/P cholecystectomy     S/P hysterectomy with oophorectomy      "endometriosis    Snoring     Ulcer     Barretts esophogitis    Urinary incontinence     Urinary tract infection, site not specified      Allergies   Allergen Reactions    Macrodantin [Nitrofurantoin Macrocrystal] Anaphylaxis     Prescription > 10 years ago    Pcn [Penicillins] Anaphylaxis     Prescription > 10 years ago    Doxycycline Itching    Bactrim Vomiting    Clarithromycin Vomiting    Hydrochlorothiazide      Hyponatremia    Omnicef Itching        Objective:     BP (!) 140/60 (BP Location: Right arm, Patient Position: Sitting, BP Cuff Size: Adult)   Pulse 78   Temp 36.9 °C (98.5 °F) (Temporal)   Resp 20   Ht 1.613 m (5' 3.5\")   Wt 76.5 kg (168 lb 9.6 oz)   SpO2 (!) 87%   BMI 29.40 kg/m²     Physical Exam  Vitals reviewed.   Constitutional:       General: She is not in acute distress.     Appearance: Normal appearance. She is not ill-appearing or toxic-appearing.   HENT:      Mouth/Throat:      Mouth: Mucous membranes are moist.      Pharynx: Oropharynx is clear.   Eyes:      Conjunctiva/sclera: Conjunctivae normal.   Cardiovascular:      Rate and Rhythm: Normal rate and regular rhythm.      Heart sounds: Normal heart sounds.   Pulmonary:      Effort: Pulmonary effort is normal.      Breath sounds: Decreased breath sounds present. No wheezing, rhonchi or rales.      Comments: Mild increased respiratory effort   Musculoskeletal:      Cervical back: Neck supple. No rigidity.      Right lower leg: No edema.      Left lower leg: No edema.   Skin:     General: Skin is warm and dry.   Neurological:      General: No focal deficit present.      Mental Status: She is alert and oriented to person, place, and time.   Psychiatric:         Mood and Affect: Mood normal.         Behavior: Behavior normal.       RADIOLOGY RESULTS   DX-CHEST-2 VIEWS    Result Date: 12/31/2024 12/31/2024 4:53 PM HISTORY/REASON FOR EXAM:  Cough; cough, dyspnea, hypoxia TECHNIQUE/EXAM DESCRIPTION: PA and lateral views of the chest. " COMPARISON:  Chest x-ray 12/13/2022 FINDINGS: The lungs are clear. The cardiac silhouette is normal in size. No effusions or pneumothoraces are present. There are no significant osseous abnormalities. The visualized portions of the upper abdomen are within normal limits.     NEGATIVE TWO VIEWS OF THE CHEST.         Diagnosis and associated orders:     1. Acute cough  - DX-CHEST-2 VIEWS; Future  - albuterol (Proventil) 2.5mg/3ml nebulizer solution 2.5 mg    2. Dyspnea, unspecified type  - DX-CHEST-2 VIEWS; Future  - albuterol (Proventil) 2.5mg/3ml nebulizer solution 2.5 mg    3. Hypoxia  - DX-CHEST-2 VIEWS; Future  - albuterol (Proventil) 2.5mg/3ml nebulizer solution 2.5 mg       Comments/MDM:     This is a 64-year-old female who presents with flulike symptoms, cough, chest pain, and shortness of breath 3 days ago.  Gradually worsening.  She does smoke cigarettes and denies any formal diagnosis of COPD.  Slight dizziness and nausea.  Patient's initial SpO2 87% RA.  She does not need supplemental O2 at home.  Patient placed on supplemental O2 2 L NC and oxygen remaining stable around 92%. X-ray results per radiologist interpretation above. I personally reviewed images and radiologist report which was negative.  Treated with albuterol nebulizer solution with somewhat improvement of her breathing.  Repeat SpO2 88% RA.  Patient placed back on 2 L NC supplemental O2 and oxygen remaining stable around 92%.  I do have concern about patient's symptoms and hypoxia.  Discussed differential diagnosis.  Patient requires higher level of evaluation and care in the emergency room.  Therefore, it is highly recommended patient present immediately to the ER via ambulance.  Patient verbalized understanding and agreed to plan.  EMS called.  Report given to EMS.  I called transfer center and report given awaiting patient arrival at [facility] ER.       1 acute issue with unknown prognosis that poses a threat to bodily function or  life.    Please note that this dictation was created using voice recognition software. I have made a reasonable attempt to correct obvious errors, but I expect that there are errors of grammar and possibly content that I did not discover before finalizing the note.    This note was electronically signed by José Miguel Wilson PA-C

## 2025-01-01 NOTE — ASSESSMENT & PLAN NOTE
Not on any medications  Hemoglobin A1c 6.6 last month  She was hoping to refrain from medications and do this with diet which I think is not unreasonable

## 2025-01-01 NOTE — ED NOTES
Bedside report received from off going RN/tech: Yue OLIVA, assumed care of patient.  POC discussed with patient. Call light within reach, all needs addressed at this time.       Fall risk interventions in place: Move the patient closer to the nurse's station, Patient's personal possessions are with in their safe reach, Place socks on patient, Place fall risk sign on patient's door, and Keep floor surfaces clean and dry (all applicable per [facility] Fall risk assessment)   Continuous monitoring: Cardiac Leads, Pulse Ox, or Blood Pressure  IVF/IV medications: Infusion per MAR (List Med(s)) NS  Oxygen: How many liters 3L  Bedside sitter: Not Applicable   Isolation: Isolation precautions for droplet (Isolation order)

## 2025-01-01 NOTE — ED NOTES
PT [id number]. Apologized for wait times and thanked her for her patience. No new changes in condition. Vitals stable at this time. Assisted patient from the wheelchair to the bench and warm blankets provided.

## 2025-01-01 NOTE — ASSESSMENT & PLAN NOTE
With hypoxia  Isolation precautions  Tamiflu  Continue to wean oxygen, home oxygen evaluations been ordered  Supportive care

## 2025-01-01 NOTE — ED NOTES
Med rec updated and complete . Allergies reviewed.    Pt  confirmed name and date of birth.      Pt denies antibiotic  use in last 30 days.      No anticoagulant  or antiplatelet medications.      Preferred pharmacy  [facility] = [phone]

## 2025-01-01 NOTE — PROGRESS NOTES
Hospital Medicine Daily Progress Note    Date of Service  1/1/2025    Chief Complaint  Kelly Oakley is a 64 y.o. female admitted 12/31/2024 with coughing and sob    Hospital Course  64 y.o. female who presented 12/31/2024 with cough, fever and chills, shortness of breath.  Ms. Oakley has a past medical history of tobacco dependence, diabetes not on medications, hypertension that presented to urgent care today with about 3 days of bodyaches and sob.  She is found to have oxygen saturation of 87% on room air.  Chest x-ray two-view was unremarkable.  She was transferred here for higher level of care.  She is found to have influenza A with sats down to 86 range.  Blood work remarkable for hyponatremia sodium of 122.  She states she has barely been taking any oral intake due to nausea and some dry heaves.  She will be admitted to telemetry for supplemental oxygen, Tamiflu, IV normal saline with serial sodium levels every 6 hours.    Interval Problem Update  -Notes were extensively reviewed from primary team, radiology, ED, surgery, specialists, etc.   -Reviewed labs to date, microbiology for current admit and prior  -Imaging was independently reviewed and interpreted    Seen patient at bedside  On 3.5L O2  Continue IV NS  Trending Na q6h  Continue tamiflu  I have reviewed CXR independently, negative for intrathoracic abnormalities.    I have discussed this patient's plan of care and discharge plan at IDT rounds today with Case Management, Nursing, Nursing leadership, and other members of the IDT team.    Consultants/Specialty  na    Code Status  Full Code    Disposition  The patient is not medically cleared for discharge to home or a post-acute facility.      I have placed the appropriate orders for post-discharge needs.    Review of Systems  Review of Systems   Constitutional:  Positive for chills, fever and malaise/fatigue.   Respiratory:  Positive for cough and shortness of breath.    Neurological:  Positive for  weakness.   All other systems reviewed and are negative.       Physical Exam  Temp:  [36.3 °C (97.4 °F)-36.9 °C (98.5 °F)] 36.6 °C (97.9 °F)  Pulse:  [54-88] 54  Resp:  [16-20] 16  BP: (102-148)/() 130/64  SpO2:  [87 %-97 %] 96 %    Physical Exam  Vitals and nursing note reviewed.   Constitutional:       Appearance: Normal appearance.   HENT:      Head: Normocephalic and atraumatic.      Nose: Nose normal.      Mouth/Throat:      Pharynx: Oropharynx is clear.   Eyes:      Extraocular Movements: Extraocular movements intact.      Conjunctiva/sclera: Conjunctivae normal.      Pupils: Pupils are equal, round, and reactive to light.   Cardiovascular:      Rate and Rhythm: Normal rate and regular rhythm.      Pulses: Normal pulses.      Heart sounds: Normal heart sounds.   Pulmonary:      Effort: Pulmonary effort is normal.      Breath sounds: Wheezing present.   Abdominal:      General: Abdomen is flat. Bowel sounds are normal.      Palpations: Abdomen is soft.   Musculoskeletal:         General: Normal range of motion.      Cervical back: Normal range of motion and neck supple.   Skin:     General: Skin is warm and dry.   Neurological:      General: No focal deficit present.      Mental Status: She is alert and oriented to person, place, and time. Mental status is at baseline.   Psychiatric:         Mood and Affect: Mood normal.         Behavior: Behavior normal.         Fluids    Intake/Output Summary (Last 24 hours) at 1/1/2025 1536  Last data filed at 1/1/2025 1154  Gross per 24 hour   Intake 920 ml   Output 100 ml   Net 820 ml        Laboratory  Recent Labs     12/31/24  1841   WBC 13.5*   RBC 4.50   HEMOGLOBIN 13.4   HEMATOCRIT 37.8   MCV 84.0   MCH 29.8   MCHC 35.4   RDW 37.7   PLATELETCT 176   MPV 9.5     Recent Labs     12/31/24  1841 01/01/25  0052 01/01/25  0611 01/01/25  1220   SODIUM 122* 121* 123* 124*   POTASSIUM 4.3  --   --   --    CHLORIDE 87*  --   --   --    CO2 19*  --   --   --    GLUCOSE  119*  --   --   --    BUN 13  --   --   --    CREATININE 0.77  --   --   --    CALCIUM 8.9  --   --   --                    Imaging  No orders to display        Assessment/Plan  * Hyponatremia- (present on admission)  Assessment & Plan  Hypovolemic hyponatremia with sodium 122  In the setting of influenza and very poor oral intake  Normal saline will be given and will have every 6 hour sodium checks with goal not to raise sodium more than 8 mmol/L in 24 hours  She has a propensity for hyponatremia and was admitted 2020 with a sodium of 112    Tobacco abuse- (present on admission)  Assessment & Plan  Ongoing use she is hoping to quit   cessation encouraged  Nicotine patch offered    Influenza A- (present on admission)  Assessment & Plan  With hypoxia  Isolation precautions  Tamiflu    Acute respiratory failure with hypoxia (HCC)- (present on admission)  Assessment & Plan  Oxygen saturation 86% room air  Secondary to influenza and possible underlying emphysema though she does not have any wheezing though has a long history of smoking  No evidence of volume overload  Supplemental oxygen and she may need oxygen upon discharge      Type 2 diabetes mellitus with hyperglycemia, without long-term current use of insulin (HCC)- (present on admission)  Assessment & Plan  Not on any medications  Hemoglobin A1c 6.6 last month  She was hoping to refrain from medications and do this with diet which I think is not unreasonable      Primary hypertension- (present on admission)  Assessment & Plan  Continue outpatient regimen with holding parameters though refrain from Aldactone due to its diuretic properties         VTE prophylaxis: xarelto 10mg  I have performed a physical exam and reviewed and updated ROS and Plan today (1/1/2025). In review of yesterday's note (12/31/2024), there are no changes except as documented above.    Patient is has a high medical complexity, complex decision making and is at high risk for complication,  morbidity, and mortality.  I spent 51 minutes, reviewing the chart, obtaining and/or reviewing separately obtained history. Performing a medically appropriate examination and evaluation.  Counseling and educating the patient. Ordering and reviewing medications, tests, or procedures.  Documenting clinical information in EPIC. Independently interpreting results and communicating results to patient. Discussing future disposition of care with patient, RN and case management.  This does not include time spent on separately billable procedures/tests.

## 2025-01-01 NOTE — ED NOTES
Pt ambulated to bathroom with tech and then to room, fall precautions in pace and pt updated on POC. Call light in reach

## 2025-01-01 NOTE — ASSESSMENT & PLAN NOTE
Continue outpatient regimen with holding parameters though refrain from Aldactone due to its diuretic properties

## 2025-01-01 NOTE — PROGRESS NOTES
4 Eyes Skin Assessment Completed by PJ Emmanuel and PJ Kovacs.    Head Blanching and Redness  Ears Redness and Blanching  Nose WDL  Mouth WDL  Neck WDL  Breast/Chest WDL  Shoulder Blades Redness and Blanching  Spine Redness and Blanching  (R) Arm/Elbow/Hand Redness and Blanching, dry flaky  (L) Arm/Elbow/Hand Redness and Blanching, dry flakey  Abdomen WDL  Groin WDL  Scrotum/Coccyx/Buttocks Redness and Blanching  (R) Leg Redness and Blanching, discoloration  (L) Leg Redness and Blanching,  discoloration  (R) Heel/Foot/Toe Redness and Blanching, scab  (L) Heel/Foot/Toe Redness and Blanching          Devices In Places Tele Box, Blood Pressure Cuff, Pulse Ox, and Nasal Cannula      Interventions In Place NC W/Ear Foams, Pillows, Barrier Cream, and Heels Loaded W/Pillows    Possible Skin Injury No    Pictures Uploaded Into Epic N/A  Wound Consult Placed N/A  RN Wound Prevention Protocol Ordered No

## 2025-01-02 LAB
ANION GAP SERPL CALC-SCNC: 11 MMOL/L (ref 7–16)
BUN SERPL-MCNC: 14 MG/DL (ref 8–22)
CALCIUM SERPL-MCNC: 8 MG/DL (ref 8.5–10.5)
CHLORIDE SERPL-SCNC: 95 MMOL/L (ref 96–112)
CO2 SERPL-SCNC: 24 MMOL/L (ref 20–33)
CREAT SERPL-MCNC: 0.85 MG/DL (ref 0.5–1.4)
ERYTHROCYTE [DISTWIDTH] IN BLOOD BY AUTOMATED COUNT: 41 FL (ref 35.9–50)
GFR SERPLBLD CREATININE-BSD FMLA CKD-EPI: 76 ML/MIN/1.73 M 2
GLUCOSE SERPL-MCNC: 99 MG/DL (ref 65–99)
HCT VFR BLD AUTO: 33.5 % (ref 37–47)
HGB BLD-MCNC: 11.7 G/DL (ref 12–16)
MAGNESIUM SERPL-MCNC: 1.7 MG/DL (ref 1.5–2.5)
MCH RBC QN AUTO: 30.5 PG (ref 27–33)
MCHC RBC AUTO-ENTMCNC: 34.9 G/DL (ref 32.2–35.5)
MCV RBC AUTO: 87.5 FL (ref 81.4–97.8)
PHOSPHATE SERPL-MCNC: 2.9 MG/DL (ref 2.5–4.5)
PLATELET # BLD AUTO: 153 K/UL (ref 164–446)
PMV BLD AUTO: 9.6 FL (ref 9–12.9)
POTASSIUM SERPL-SCNC: 4.2 MMOL/L (ref 3.6–5.5)
RBC # BLD AUTO: 3.83 M/UL (ref 4.2–5.4)
SODIUM SERPL-SCNC: 126 MMOL/L (ref 135–145)
SODIUM SERPL-SCNC: 126 MMOL/L (ref 135–145)
SODIUM SERPL-SCNC: 127 MMOL/L (ref 135–145)
SODIUM SERPL-SCNC: 130 MMOL/L (ref 135–145)
WBC # BLD AUTO: 7.3 K/UL (ref 4.8–10.8)

## 2025-01-02 PROCEDURE — 700102 HCHG RX REV CODE 250 W/ 637 OVERRIDE(OP): Performed by: HOSPITALIST

## 2025-01-02 PROCEDURE — 85027 COMPLETE CBC AUTOMATED: CPT

## 2025-01-02 PROCEDURE — A9270 NON-COVERED ITEM OR SERVICE: HCPCS | Performed by: HOSPITALIST

## 2025-01-02 PROCEDURE — A9270 NON-COVERED ITEM OR SERVICE: HCPCS | Performed by: NURSE PRACTITIONER

## 2025-01-02 PROCEDURE — 700102 HCHG RX REV CODE 250 W/ 637 OVERRIDE(OP): Performed by: NURSE PRACTITIONER

## 2025-01-02 PROCEDURE — 99406 BEHAV CHNG SMOKING 3-10 MIN: CPT

## 2025-01-02 PROCEDURE — 83735 ASSAY OF MAGNESIUM: CPT

## 2025-01-02 PROCEDURE — 36415 COLL VENOUS BLD VENIPUNCTURE: CPT

## 2025-01-02 PROCEDURE — 700102 HCHG RX REV CODE 250 W/ 637 OVERRIDE(OP): Performed by: EMERGENCY MEDICINE

## 2025-01-02 PROCEDURE — 770006 HCHG ROOM/CARE - MED/SURG/GYN SEMI*

## 2025-01-02 PROCEDURE — 80048 BASIC METABOLIC PNL TOTAL CA: CPT

## 2025-01-02 PROCEDURE — 99233 SBSQ HOSP IP/OBS HIGH 50: CPT | Performed by: STUDENT IN AN ORGANIZED HEALTH CARE EDUCATION/TRAINING PROGRAM

## 2025-01-02 PROCEDURE — 700102 HCHG RX REV CODE 250 W/ 637 OVERRIDE(OP): Performed by: STUDENT IN AN ORGANIZED HEALTH CARE EDUCATION/TRAINING PROGRAM

## 2025-01-02 PROCEDURE — A9270 NON-COVERED ITEM OR SERVICE: HCPCS | Performed by: EMERGENCY MEDICINE

## 2025-01-02 PROCEDURE — 84295 ASSAY OF SERUM SODIUM: CPT | Mod: 91

## 2025-01-02 PROCEDURE — 84100 ASSAY OF PHOSPHORUS: CPT

## 2025-01-02 PROCEDURE — A9270 NON-COVERED ITEM OR SERVICE: HCPCS | Performed by: STUDENT IN AN ORGANIZED HEALTH CARE EDUCATION/TRAINING PROGRAM

## 2025-01-02 RX ORDER — FLUTICASONE PROPIONATE 50 MCG
2 SPRAY, SUSPENSION (ML) NASAL
Status: DISCONTINUED | OUTPATIENT
Start: 2025-01-02 | End: 2025-01-02

## 2025-01-02 RX ORDER — PRAVASTATIN SODIUM 20 MG
20 TABLET ORAL DAILY
Status: DISCONTINUED | OUTPATIENT
Start: 2025-01-02 | End: 2025-01-05 | Stop reason: HOSPADM

## 2025-01-02 RX ADMIN — RIVAROXABAN 10 MG: 10 TABLET, FILM COATED ORAL at 16:50

## 2025-01-02 RX ADMIN — ASPIRIN 324 MG: 81 TABLET, CHEWABLE ORAL at 05:33

## 2025-01-02 RX ADMIN — AMLODIPINE BESYLATE 5 MG: 5 TABLET ORAL at 05:33

## 2025-01-02 RX ADMIN — OMEPRAZOLE 20 MG: 20 CAPSULE, DELAYED RELEASE ORAL at 09:22

## 2025-01-02 RX ADMIN — IRBESARTAN 300 MG: 150 TABLET ORAL at 21:16

## 2025-01-02 RX ADMIN — MOMETASONE FUROATE AND FORMOTEROL FUMARATE DIHYDRATE 2 PUFF: 200; 5 AEROSOL RESPIRATORY (INHALATION) at 12:29

## 2025-01-02 RX ADMIN — GUAIFENESIN SYRUP AND DEXTROMETHORPHAN 5 ML: 100; 10 SYRUP ORAL at 09:23

## 2025-01-02 RX ADMIN — OSELTAMIVIR PHOSPHATE 75 MG: 75 CAPSULE ORAL at 05:33

## 2025-01-02 RX ADMIN — PRAVASTATIN SODIUM 20 MG: 20 TABLET ORAL at 09:22

## 2025-01-02 RX ADMIN — SENNOSIDES AND DOCUSATE SODIUM 2 TABLET: 50; 8.6 TABLET ORAL at 16:50

## 2025-01-02 RX ADMIN — OSELTAMIVIR PHOSPHATE 75 MG: 75 CAPSULE ORAL at 16:50

## 2025-01-02 RX ADMIN — PROPRANOLOL HYDROCHLORIDE 10 MG: 10 TABLET ORAL at 16:50

## 2025-01-02 RX ADMIN — HYDROXYZINE HYDROCHLORIDE 25 MG: 25 TABLET ORAL at 21:22

## 2025-01-02 RX ADMIN — MOMETASONE FUROATE AND FORMOTEROL FUMARATE DIHYDRATE 2 PUFF: 200; 5 AEROSOL RESPIRATORY (INHALATION) at 21:22

## 2025-01-02 RX ADMIN — PROPRANOLOL HYDROCHLORIDE 10 MG: 10 TABLET ORAL at 05:33

## 2025-01-02 RX ADMIN — TIOTROPIUM BROMIDE INHALATION SPRAY 5 MCG: 3.12 SPRAY, METERED RESPIRATORY (INHALATION) at 12:36

## 2025-01-02 RX ADMIN — BUSPIRONE HYDROCHLORIDE 15 MG: 10 TABLET ORAL at 20:15

## 2025-01-02 RX ADMIN — ACETAMINOPHEN 650 MG: 325 TABLET ORAL at 17:00

## 2025-01-02 RX ADMIN — HYDROXYZINE HYDROCHLORIDE 25 MG: 25 TABLET ORAL at 05:33

## 2025-01-02 RX ADMIN — FLUOXETINE HYDROCHLORIDE 10 MG: 10 CAPSULE ORAL at 05:33

## 2025-01-02 RX ADMIN — NICOTINE TRANSDERMAL SYSTEM 21 MG: 21 PATCH, EXTENDED RELEASE TRANSDERMAL at 05:33

## 2025-01-02 ASSESSMENT — PAIN DESCRIPTION - PAIN TYPE
TYPE: ACUTE PAIN

## 2025-01-02 ASSESSMENT — ENCOUNTER SYMPTOMS
FEVER: 1
CHILLS: 1
COUGH: 1
WEAKNESS: 1
SHORTNESS OF BREATH: 1

## 2025-01-02 ASSESSMENT — FIBROSIS 4 INDEX
FIB4 SCORE: 4.95
FIB4 SCORE: 4.95

## 2025-01-02 NOTE — PROGRESS NOTES
Received report from previous shift RN, assumed care of patient. Patient is A&Ox4, vital signs are stable, on 2L O2. Patient denies pain at this time, no signs of distress or discomfort. Sitting up in bed for breakfast. Call light and belongings within reach. Bed in lowest/locked position.       Fall Risk Score: LOW RISK  Fall risk interventions in place: Provide patient/family education based on risk assessment and Educate patient/family to call staff for assistance when getting out of bed  Bed type: Regular (Norberto Score less than 17 interventions in place)  Patient on cardiac monitor: Yes  IVF/IV medications: Infusion per MAR (List Med(s)) NS  Oxygen: How many liters 2L  Bedside sitter: Not Applicable   Isolation: Isolation precautions in place

## 2025-01-02 NOTE — CARE PLAN
The patient is Stable - Low risk of patient condition declining or worsening    Shift Goals  Clinical Goals: Wean oxygen  Patient Goals: Rest  Family Goals: LLOYD    Progress made toward(s) clinical / shift goals:    Problem: Pain - Standard  Goal: Alleviation of pain or a reduction in pain to the patient’s comfort goal  Outcome: Progressing     Problem: Knowledge Deficit - Standard  Goal: Patient and family/care givers will demonstrate understanding of plan of care, disease process/condition, diagnostic tests and medications  Outcome: Progressing       Patient is not progressing towards the following goals:

## 2025-01-02 NOTE — PROGRESS NOTES
Bedside report received from off going RN/tech: Cholo, assumed care of patient.     Fall Risk Score: NO RISK  Fall risk interventions in place: Provide patient/family education based on risk assessment, Educate patient/family to call staff for assistance when getting out of bed, and Place fall precaution signage outside patient door  Bed type: Regular (Norberto Score less than 17 interventions in place)  Patient on cardiac monitor: Yes  IVF/IV medications: Infusion per MAR (List Med(s)) IV fluids  Oxygen: How many liters 3L  Bedside sitter: Not Applicable   Isolation: Isolation precautions in place

## 2025-01-02 NOTE — CARE PLAN
The patient is Stable - Low risk of patient condition declining or worsening    Shift Goals  Clinical Goals: wean o2, monitor  Patient Goals: rest  Family Goals: LLOYD    Progress made toward(s) clinical / shift goals:  Progressing      Problem: Pain - Standard  Goal: Alleviation of pain or a reduction in pain to the patient’s comfort goal  Outcome: Progressing  Note: Pt denies pain at this time     Problem: Knowledge Deficit - Standard  Goal: Patient and family/care givers will demonstrate understanding of plan of care, disease process/condition, diagnostic tests and medications  Outcome: Progressing  Note: Pt verbalizes understanding of plan of care

## 2025-01-02 NOTE — RESPIRATORY CARE
" SMOKING CESSATION EDUCATION by COPD CLINICAL EDUCATOR  1/2/2025 at 1:06 PM by Chen Madison, RRT     Smoking Cessation Intervention and education completed, 4 minutes spent on smoking cessation education with patient.  Provided smoking cessation packet with \"Tips to Quit\" and brochure for \"Free  Virtual Smoking Cessation Classes\".   "

## 2025-01-02 NOTE — PROGRESS NOTES
Hospital Medicine Daily Progress Note    Date of Service  1/2/2025    Chief Complaint  Kelly Oakley is a 64 y.o. female admitted 12/31/2024 with coughing and sob    Hospital Course  64 y.o. female who presented 12/31/2024 with cough, fever and chills, shortness of breath.  Ms. Oakley has a past medical history of tobacco dependence, diabetes not on medications, hypertension that presented to urgent care today with about 3 days of bodyaches and sob.  She is found to have oxygen saturation of 87% on room air.  Chest x-ray two-view was unremarkable.  She was transferred here for higher level of care.  She is found to have influenza A with sats down to 86 range.  Blood work remarkable for hyponatremia sodium of 122.  She states she has barely been taking any oral intake due to nausea and some dry heaves.  She will be admitted to telemetry for supplemental oxygen, Tamiflu, IV normal saline with serial sodium levels every 6 hours.    Interval Problem Update  -Notes were extensively reviewed from primary team, radiology, ED, surgery, specialists, etc.   -Reviewed labs to date, microbiology for current admit and prior  -Imaging was independently reviewed and interpreted    Seen patient at bedside  On 3.5L O2  Continue IV NS  Na 126 this am  Trending Na, continue IV NS  Continue tamiflu  Weaning O2   Noted wheezing, start prednisone, dulera and Spiriva.     I have discussed this patient's plan of care and discharge plan at IDT rounds today with Case Management, Nursing, Nursing leadership, and other members of the IDT team.    Consultants/Specialty  na    Code Status  Full Code    Disposition  The patient is not medically cleared for discharge to home or a post-acute facility.      I have placed the appropriate orders for post-discharge needs.    Review of Systems  Review of Systems   Constitutional:  Positive for chills, fever and malaise/fatigue.   Respiratory:  Positive for cough and shortness of breath.    Neurological:   Positive for weakness.   All other systems reviewed and are negative.       Physical Exam  Temp:  [36.3 °C (97.4 °F)-36.7 °C (98.1 °F)] 36.3 °C (97.4 °F)  Pulse:  [56-71] 61  Resp:  [16-18] 17  BP: (116-139)/(46-67) 130/46  SpO2:  [91 %-98 %] 93 %    Physical Exam  Vitals and nursing note reviewed.   Constitutional:       Appearance: Normal appearance.   HENT:      Head: Normocephalic and atraumatic.      Nose: Nose normal.      Mouth/Throat:      Pharynx: Oropharynx is clear.   Eyes:      Extraocular Movements: Extraocular movements intact.      Conjunctiva/sclera: Conjunctivae normal.      Pupils: Pupils are equal, round, and reactive to light.   Cardiovascular:      Rate and Rhythm: Normal rate and regular rhythm.      Pulses: Normal pulses.      Heart sounds: Normal heart sounds.   Pulmonary:      Effort: Pulmonary effort is normal.      Breath sounds: Wheezing present.   Abdominal:      General: Abdomen is flat. Bowel sounds are normal.      Palpations: Abdomen is soft.   Musculoskeletal:         General: Normal range of motion.      Cervical back: Normal range of motion and neck supple.   Skin:     General: Skin is warm and dry.   Neurological:      General: No focal deficit present.      Mental Status: She is alert and oriented to person, place, and time. Mental status is at baseline.   Psychiatric:         Mood and Affect: Mood normal.         Behavior: Behavior normal.         Fluids    Intake/Output Summary (Last 24 hours) at 1/2/2025 1525  Last data filed at 1/2/2025 1100  Gross per 24 hour   Intake 540 ml   Output 0 ml   Net 540 ml        Laboratory  Recent Labs     12/31/24 1841 01/02/25  0040   WBC 13.5* 7.3   RBC 4.50 3.83*   HEMOGLOBIN 13.4 11.7*   HEMATOCRIT 37.8 33.5*   MCV 84.0 87.5   MCH 29.8 30.5   MCHC 35.4 34.9   RDW 37.7 41.0   PLATELETCT 176 153*   MPV 9.5 9.6     Recent Labs     12/31/24  1841 01/01/25  0052 01/02/25  0040 01/02/25  0618 01/02/25  1141   SODIUM 122*   < > 130* 126* 127*    POTASSIUM 4.3  --  4.2  --   --    CHLORIDE 87*  --  95*  --   --    CO2 19*  --  24  --   --    GLUCOSE 119*  --  99  --   --    BUN 13  --  14  --   --    CREATININE 0.77  --  0.85  --   --    CALCIUM 8.9  --  8.0*  --   --     < > = values in this interval not displayed.                   Imaging  No orders to display        Assessment/Plan  * Hyponatremia- (present on admission)  Assessment & Plan  Hypovolemic hyponatremia with sodium 122  In the setting of influenza and very poor oral intake  Normal saline will be given and will have every 6 hour sodium checks with goal not to raise sodium more than 8 mmol/L in 24 hours  She has a propensity for hyponatremia and was admitted 2020 with a sodium of 112    Tobacco abuse- (present on admission)  Assessment & Plan  Ongoing use she is hoping to quit   cessation encouraged  Nicotine patch offered    Influenza A- (present on admission)  Assessment & Plan  With hypoxia  Isolation precautions  Tamiflu    Acute respiratory failure with hypoxia (HCC)- (present on admission)  Assessment & Plan  Oxygen saturation 86% room air  Secondary to influenza and possible underlying COPD. She had no formal diagnosis of COPD, but has a long history of smoking  No evidence of volume overload  Supplemental oxygen and she may need oxygen upon discharge  Dulera and Spiriva       Type 2 diabetes mellitus with hyperglycemia, without long-term current use of insulin (HCC)- (present on admission)  Assessment & Plan  Not on any medications  Hemoglobin A1c 6.6 last month  She was hoping to refrain from medications and do this with diet which I think is not unreasonable      Primary hypertension- (present on admission)  Assessment & Plan  Continue outpatient regimen with holding parameters though refrain from Aldactone due to its diuretic properties         VTE prophylaxis: xarelto 10mg  I have performed a physical exam and reviewed and updated ROS and Plan today (1/2/2025). In review of  yesterday's note (1/1/2025), there are no changes except as documented above.    Patient is has a high medical complexity, complex decision making and is at high risk for complication, morbidity, and mortality.  I spent 52 minutes, reviewing the chart, obtaining and/or reviewing separately obtained history. Performing a medically appropriate examination and evaluation.  Counseling and educating the patient. Ordering and reviewing medications, tests, or procedures.  Documenting clinical information in EPIC. Independently interpreting results and communicating results to patient. Discussing future disposition of care with patient, RN and case management.  This does not include time spent on separately billable procedures/tests.

## 2025-01-03 LAB
ANION GAP SERPL CALC-SCNC: 8 MMOL/L (ref 7–16)
BUN SERPL-MCNC: 10 MG/DL (ref 8–22)
CALCIUM SERPL-MCNC: 7.9 MG/DL (ref 8.5–10.5)
CHLORIDE SERPL-SCNC: 96 MMOL/L (ref 96–112)
CO2 SERPL-SCNC: 23 MMOL/L (ref 20–33)
CREAT SERPL-MCNC: 0.62 MG/DL (ref 0.5–1.4)
ERYTHROCYTE [DISTWIDTH] IN BLOOD BY AUTOMATED COUNT: 41.4 FL (ref 35.9–50)
GFR SERPLBLD CREATININE-BSD FMLA CKD-EPI: 99 ML/MIN/1.73 M 2
GLUCOSE SERPL-MCNC: 100 MG/DL (ref 65–99)
HCT VFR BLD AUTO: 31.3 % (ref 37–47)
HGB BLD-MCNC: 10.9 G/DL (ref 12–16)
MAGNESIUM SERPL-MCNC: 1.7 MG/DL (ref 1.5–2.5)
MCH RBC QN AUTO: 30.3 PG (ref 27–33)
MCHC RBC AUTO-ENTMCNC: 34.8 G/DL (ref 32.2–35.5)
MCV RBC AUTO: 86.9 FL (ref 81.4–97.8)
PHOSPHATE SERPL-MCNC: 2.5 MG/DL (ref 2.5–4.5)
PLATELET # BLD AUTO: 143 K/UL (ref 164–446)
PMV BLD AUTO: 10.2 FL (ref 9–12.9)
POTASSIUM SERPL-SCNC: 4 MMOL/L (ref 3.6–5.5)
RBC # BLD AUTO: 3.6 M/UL (ref 4.2–5.4)
SODIUM SERPL-SCNC: 127 MMOL/L (ref 135–145)
SODIUM SERPL-SCNC: 130 MMOL/L (ref 135–145)
SODIUM SERPL-SCNC: 131 MMOL/L (ref 135–145)
WBC # BLD AUTO: 7.1 K/UL (ref 4.8–10.8)

## 2025-01-03 PROCEDURE — 700102 HCHG RX REV CODE 250 W/ 637 OVERRIDE(OP): Performed by: STUDENT IN AN ORGANIZED HEALTH CARE EDUCATION/TRAINING PROGRAM

## 2025-01-03 PROCEDURE — 770006 HCHG ROOM/CARE - MED/SURG/GYN SEMI*

## 2025-01-03 PROCEDURE — A9270 NON-COVERED ITEM OR SERVICE: HCPCS | Performed by: EMERGENCY MEDICINE

## 2025-01-03 PROCEDURE — 700102 HCHG RX REV CODE 250 W/ 637 OVERRIDE(OP): Performed by: EMERGENCY MEDICINE

## 2025-01-03 PROCEDURE — 83735 ASSAY OF MAGNESIUM: CPT

## 2025-01-03 PROCEDURE — 700102 HCHG RX REV CODE 250 W/ 637 OVERRIDE(OP): Performed by: NURSE PRACTITIONER

## 2025-01-03 PROCEDURE — 700102 HCHG RX REV CODE 250 W/ 637 OVERRIDE(OP): Performed by: HOSPITALIST

## 2025-01-03 PROCEDURE — 36415 COLL VENOUS BLD VENIPUNCTURE: CPT

## 2025-01-03 PROCEDURE — A9270 NON-COVERED ITEM OR SERVICE: HCPCS | Performed by: STUDENT IN AN ORGANIZED HEALTH CARE EDUCATION/TRAINING PROGRAM

## 2025-01-03 PROCEDURE — A9270 NON-COVERED ITEM OR SERVICE: HCPCS | Performed by: NURSE PRACTITIONER

## 2025-01-03 PROCEDURE — 84100 ASSAY OF PHOSPHORUS: CPT

## 2025-01-03 PROCEDURE — 99233 SBSQ HOSP IP/OBS HIGH 50: CPT | Performed by: STUDENT IN AN ORGANIZED HEALTH CARE EDUCATION/TRAINING PROGRAM

## 2025-01-03 PROCEDURE — 85027 COMPLETE CBC AUTOMATED: CPT

## 2025-01-03 PROCEDURE — 80048 BASIC METABOLIC PNL TOTAL CA: CPT

## 2025-01-03 PROCEDURE — 84295 ASSAY OF SERUM SODIUM: CPT

## 2025-01-03 PROCEDURE — A9270 NON-COVERED ITEM OR SERVICE: HCPCS | Performed by: HOSPITALIST

## 2025-01-03 PROCEDURE — 700105 HCHG RX REV CODE 258: Performed by: HOSPITALIST

## 2025-01-03 RX ADMIN — FLUOXETINE HYDROCHLORIDE 10 MG: 10 CAPSULE ORAL at 05:55

## 2025-01-03 RX ADMIN — SODIUM CHLORIDE: 9 INJECTION, SOLUTION INTRAVENOUS at 03:24

## 2025-01-03 RX ADMIN — OMEPRAZOLE 20 MG: 20 CAPSULE, DELAYED RELEASE ORAL at 07:57

## 2025-01-03 RX ADMIN — MOMETASONE FUROATE AND FORMOTEROL FUMARATE DIHYDRATE 2 PUFF: 200; 5 AEROSOL RESPIRATORY (INHALATION) at 18:19

## 2025-01-03 RX ADMIN — OSELTAMIVIR PHOSPHATE 75 MG: 75 CAPSULE ORAL at 19:05

## 2025-01-03 RX ADMIN — GUAIFENESIN SYRUP AND DEXTROMETHORPHAN 5 ML: 100; 10 SYRUP ORAL at 09:38

## 2025-01-03 RX ADMIN — NICOTINE TRANSDERMAL SYSTEM 21 MG: 21 PATCH, EXTENDED RELEASE TRANSDERMAL at 05:55

## 2025-01-03 RX ADMIN — OMEPRAZOLE 20 MG: 20 CAPSULE, DELAYED RELEASE ORAL at 19:05

## 2025-01-03 RX ADMIN — TIOTROPIUM BROMIDE INHALATION SPRAY 5 MCG: 3.12 SPRAY, METERED RESPIRATORY (INHALATION) at 09:33

## 2025-01-03 RX ADMIN — AMLODIPINE BESYLATE 5 MG: 5 TABLET ORAL at 05:56

## 2025-01-03 RX ADMIN — SENNOSIDES AND DOCUSATE SODIUM 2 TABLET: 50; 8.6 TABLET ORAL at 18:19

## 2025-01-03 RX ADMIN — POLYETHYLENE GLYCOL 3350 1 PACKET: 17 POWDER, FOR SOLUTION ORAL at 10:54

## 2025-01-03 RX ADMIN — GUAIFENESIN SYRUP AND DEXTROMETHORPHAN 5 ML: 100; 10 SYRUP ORAL at 18:19

## 2025-01-03 RX ADMIN — PROPRANOLOL HYDROCHLORIDE 10 MG: 10 TABLET ORAL at 18:19

## 2025-01-03 RX ADMIN — RIVAROXABAN 10 MG: 10 TABLET, FILM COATED ORAL at 18:19

## 2025-01-03 RX ADMIN — PRAVASTATIN SODIUM 20 MG: 20 TABLET ORAL at 05:56

## 2025-01-03 RX ADMIN — OSELTAMIVIR PHOSPHATE 75 MG: 75 CAPSULE ORAL at 05:56

## 2025-01-03 RX ADMIN — IRBESARTAN 300 MG: 150 TABLET ORAL at 20:17

## 2025-01-03 RX ADMIN — BUSPIRONE HYDROCHLORIDE 15 MG: 10 TABLET ORAL at 20:17

## 2025-01-03 RX ADMIN — MOMETASONE FUROATE AND FORMOTEROL FUMARATE DIHYDRATE 2 PUFF: 200; 5 AEROSOL RESPIRATORY (INHALATION) at 09:32

## 2025-01-03 RX ADMIN — ASPIRIN 324 MG: 81 TABLET, CHEWABLE ORAL at 05:55

## 2025-01-03 ASSESSMENT — COGNITIVE AND FUNCTIONAL STATUS - GENERAL
MOBILITY SCORE: 23
SUGGESTED CMS G CODE MODIFIER DAILY ACTIVITY: CH
DAILY ACTIVITIY SCORE: 24
SUGGESTED CMS G CODE MODIFIER MOBILITY: CI
CLIMB 3 TO 5 STEPS WITH RAILING: A LITTLE

## 2025-01-03 ASSESSMENT — PAIN DESCRIPTION - PAIN TYPE
TYPE: ACUTE PAIN

## 2025-01-03 ASSESSMENT — PATIENT HEALTH QUESTIONNAIRE - PHQ9
SUM OF ALL RESPONSES TO PHQ9 QUESTIONS 1 AND 2: 0
2. FEELING DOWN, DEPRESSED, IRRITABLE, OR HOPELESS: NOT AT ALL
1. LITTLE INTEREST OR PLEASURE IN DOING THINGS: NOT AT ALL
2. FEELING DOWN, DEPRESSED, IRRITABLE, OR HOPELESS: NOT AT ALL
1. LITTLE INTEREST OR PLEASURE IN DOING THINGS: NOT AT ALL
SUM OF ALL RESPONSES TO PHQ9 QUESTIONS 1 AND 2: 0

## 2025-01-03 ASSESSMENT — ENCOUNTER SYMPTOMS
COUGH: 1
SHORTNESS OF BREATH: 1
CHILLS: 0
FEVER: 0
WEAKNESS: 1

## 2025-01-03 NOTE — PROGRESS NOTES
Hospital Medicine Daily Progress Note    Date of Service  1/3/2025    Chief Complaint  Kelly Oakley is a 64 y.o. female admitted 12/31/2024 with coughing and sob    Hospital Course  64 y.o. female who presented 12/31/2024 with cough, fever and chills, shortness of breath.  Ms. Oakley has a past medical history of tobacco dependence, diabetes not on medications, hypertension that presented to urgent care today with about 3 days of bodyaches and sob.  She is found to have oxygen saturation of 87% on room air.  Chest x-ray two-view was unremarkable.  She was transferred here for higher level of care.  She is found to have influenza A with sats down to 86 range.  Blood work remarkable for hyponatremia sodium of 122.  She states she has barely been taking any oral intake due to nausea and some dry heaves.  She will be admitted to telemetry for supplemental oxygen, Tamiflu, IV normal saline with serial sodium levels every 6 hours. Her sodium has been improving, continues with UR symptoms and oxygen needs.     Interval Problem Update    Patient seen and examined at bedside she continues to have cough and shortness of breath overall feels unwell but is improving.  Her appetite is improving as well she feels that she is hydrating well  -Most recent sodium was 131 will discontinue IV fluids and monitor repeat electrolytes in the morning  -Continue Tamiflu  -Home oxygen evaluation        I have discussed this patient's plan of care and discharge plan at IDT rounds today with Case Management, Nursing, Nursing leadership, and other members of the IDT team.    Consultants/Specialty  na    Code Status  Full Code    Disposition  The patient is not medically cleared for discharge to home or a post-acute facility.      I have placed the appropriate orders for post-discharge needs.    Review of Systems  Review of Systems   Constitutional:  Positive for malaise/fatigue. Negative for chills and fever.   Respiratory:  Positive for cough  and shortness of breath.    Neurological:  Positive for weakness.   All other systems reviewed and are negative.       Physical Exam  Temp:  [36.3 °C (97.4 °F)-36.9 °C (98.4 °F)] 36.7 °C (98 °F)  Pulse:  [53-69] 69  Resp:  [18] 18  BP: (111-158)/(45-71) 158/71  SpO2:  [91 %-96 %] 95 %    Physical Exam  Vitals and nursing note reviewed.   Constitutional:       Appearance: Normal appearance.   HENT:      Head: Normocephalic and atraumatic.      Nose: Nose normal.      Mouth/Throat:      Pharynx: Oropharynx is clear.   Eyes:      Extraocular Movements: Extraocular movements intact.      Conjunctiva/sclera: Conjunctivae normal.      Pupils: Pupils are equal, round, and reactive to light.   Cardiovascular:      Rate and Rhythm: Normal rate and regular rhythm.      Pulses: Normal pulses.      Heart sounds: Normal heart sounds.   Pulmonary:      Effort: Pulmonary effort is normal.      Breath sounds: Wheezing present.   Abdominal:      General: Abdomen is flat. Bowel sounds are normal.      Palpations: Abdomen is soft.   Musculoskeletal:         General: Normal range of motion.      Cervical back: Normal range of motion and neck supple.   Skin:     General: Skin is warm and dry.   Neurological:      General: No focal deficit present.      Mental Status: She is alert and oriented to person, place, and time. Mental status is at baseline.   Psychiatric:         Mood and Affect: Mood normal.         Behavior: Behavior normal.         Fluids    Intake/Output Summary (Last 24 hours) at 1/3/2025 1538  Last data filed at 1/3/2025 0900  Gross per 24 hour   Intake 120 ml   Output --   Net 120 ml        Laboratory  Recent Labs     12/31/24  1841 01/02/25  0040 01/03/25  0008   WBC 13.5* 7.3 7.1   RBC 4.50 3.83* 3.60*   HEMOGLOBIN 13.4 11.7* 10.9*   HEMATOCRIT 37.8 33.5* 31.3*   MCV 84.0 87.5 86.9   MCH 29.8 30.5 30.3   MCHC 35.4 34.9 34.8   RDW 37.7 41.0 41.4   PLATELETCT 176 153* 143*   MPV 9.5 9.6 10.2     Recent Labs      12/31/24  1841 01/01/25  0052 01/02/25  0040 01/02/25  0618 01/03/25  0008 01/03/25  0624 01/03/25  1128   SODIUM 122*   < > 130*   < > 127* 130* 131*   POTASSIUM 4.3  --  4.2  --  4.0  --   --    CHLORIDE 87*  --  95*  --  96  --   --    CO2 19*  --  24  --  23  --   --    GLUCOSE 119*  --  99  --  100*  --   --    BUN 13  --  14  --  10  --   --    CREATININE 0.77  --  0.85  --  0.62  --   --    CALCIUM 8.9  --  8.0*  --  7.9*  --   --     < > = values in this interval not displayed.                   Imaging  No orders to display        Assessment/Plan  * Hyponatremia- (present on admission)  Assessment & Plan  Hypovolemic hyponatremia with sodium 122  In the setting of influenza and very poor oral intake  Her sodium has been slowly uptrending and is 131 today.  She has improved oral intake and is hydrating well with discontinue IV fluids and repeat labs in the morning    Tobacco abuse- (present on admission)  Assessment & Plan  Ongoing use she is hoping to quit   cessation encouraged  Nicotine patch offered    Influenza A- (present on admission)  Assessment & Plan  With hypoxia  Isolation precautions  Tamiflu  Continue to wean oxygen, home oxygen evaluations been ordered  Supportive care    Acute respiratory failure with hypoxia (HCC)- (present on admission)  Assessment & Plan  Oxygen saturation 86% room air  Secondary to influenza and possible underlying COPD. She had no formal diagnosis of COPD, but has a long history of smoking  No evidence of volume overload  Supplemental oxygen and she may need oxygen upon discharge  Dulera and Spiriva   Continue to wean oxygen  Encourage I-S and mobilization  Home oxygen evaluation has been ordered      Type 2 diabetes mellitus with hyperglycemia, without long-term current use of insulin (HCC)- (present on admission)  Assessment & Plan  Not on any medications  Hemoglobin A1c 6.6 last month  She was hoping to refrain from medications and do this with diet which I think is  not unreasonable      Primary hypertension- (present on admission)  Assessment & Plan  Continue outpatient regimen with holding parameters though refrain from Aldactone due to its diuretic properties         VTE prophylaxis: xarelto 10mg  I have performed a physical exam and reviewed and updated ROS and Plan today (1/3/2025). In review of yesterday's note (1/2/2025), there are no changes except as documented above.

## 2025-01-03 NOTE — DISCHARGE PLANNING
Care Transition Team Assessment    CM RN met with pt at the bedside to complete discharge assessment and chart review was completed to obtain the information used in this assessment. MAGALI RN Introduced self and department roles. Pt is A/Ox4 and agreeable to assessment. Pt verified information on face sheet.     - Prior to this admission, pt lived with her spouse, son, daughter in law, and her 2 grandchildren in a 2 story house. She confirmed the address on the face sheet is her current home residence and correct discharge address.  - Pt stated she was fully independent with ADL/IADLs with no use of DME at baseline prior to this admission.    - Pt states she is an active  with access to a car.  - Pt owns a cane and FWW to be used as needed d/t knee pain.   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   - Pt is employed full time at [employer] and is insured through [employer] .  - PCP is Dr. Madelin Duong.   - Preferred pharmacy is the [facility] in [place].   - No AD paperwork on file.     Pt plans to return home with her family upon medical clearance and stated she will have means of transportation upon discharge.     Social Determinants of Health Community Resources provided on the pt's AVS.     Information Source  Information Given By: Patient  Who is responsible for making decisions for patient? : Patient    Readmission Evaluation  Is this a readmission?: No    Elopement Risk  Legal Hold: No  Ambulatory or Self Mobile in Wheelchair: Yes  Disoriented: No  Psychiatric Symptoms: None  History of Wandering: No  Elopement this Admit: No  Vocalizing Wanting to Leave: No  Displays Behaviors, Body Language Wanting to Leave: No-Not at Risk for Elopement  Elopement Risk: Not at Risk for Elopement    Interdisciplinary Discharge Planning  Lives with - Patient's Self Care Capacity: Adult Children, Spouse, Child Less than 18 Years of Age  Patient or legal guardian wants to  designate a caregiver: No  Support Systems: Family Member(s)  Housing / Facility: 2 Story House    Discharge Preparedness  What is your plan after discharge?: Home with help  What are your discharge supports?: Child, Spouse  Prior Functional Level: Ambulatory, Drives Self, Independent with Activities of Daily Living, Independent with Medication Management, Uses Cane, Uses Walker    Functional Assesment  Prior Functional Level: Ambulatory, Drives Self, Independent with Activities of Daily Living, Independent with Medication Management, Uses Cane, Uses Walker    Finances  Financial Barriers to Discharge: No  Prescription Coverage: Yes    Vision / Hearing Impairment  Vision Impairment : Yes  Right Eye Vision: Impaired, Wears Glasses  Left Eye Vision: Impaired, Wears Glasses  Hearing Impairment : No    Advance Directive  Advance Directive?: None    Domestic Abuse  Have you ever been the victim of abuse or violence?: No  Possible Abuse/Neglect Reported to:: Not Applicable    Discharge Risks or Barriers  Patient risk factors: Complex medical needs    Anticipated Discharge Information  Discharge Disposition: Discharged to home/self care (01)

## 2025-01-03 NOTE — CARE PLAN
The patient is Stable - Low risk of patient condition declining or worsening    Shift Goals  Clinical Goals: reduce oxygen dependence  Patient Goals: Rest  Family Goals: LLOYD    Progress made toward(s) clinical / shift goals:    Problem: Knowledge Deficit - Standard  Goal: Patient and family/care givers will demonstrate understanding of plan of care, disease process/condition, diagnostic tests and medications  Outcome: Progressing     Problem: Pain - Standard  Goal: Alleviation of pain or a reduction in pain to the patient’s comfort goal  Outcome: Progressing     NOTE: patient understands Tx plan and is self managing pain     Patient is not progressing towards the following goals:

## 2025-01-03 NOTE — DISCHARGE PLANNING
Case Management Discharge Planning    Admission Date: 12/31/2024  GMLOS: 4  ALOS: 3    6-Clicks ADL Score: 24  6-Clicks Mobility Score: 23    Anticipated Discharge Dispo: Discharge Disposition: Discharged to home/self care (01)    DME Needed: No    Action(s) Taken:   Pt was discussed during IDT rounds. Per MD, pt is not medically cleared.   Plan is to evaluate overnight and evaluate for home O2 needs tomorrow.     MAGALI RN met with the pt at the bedside. Pt plans to return home with her family upon medical clearance. 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 Choice form was faxed to the DPA to be saved in the media, pending walking O2 evaluation and orders if home O2 need is indicated.     Escalations Completed: None    Medically Clear: No    Next Steps:  MAGALI RN to follow up with medical team to discuss discharge barriers or needs.     Barriers to Discharge: Medical clearance and Home O2 evaluation

## 2025-01-03 NOTE — PROGRESS NOTES
Report received from NOC RN and assumed patient care at 0700. Patient is A&Ox 4, on 2L NC, and bed alarm is off at this time. Pt is steady on feet . Patient reports pain when coughing. Pt denies coughing up sputum. Pt denies SOB, pt states she always feels lightheaded even before admission. Pt denies any other symptoms at this time.  Call light within reach and bed in lowest position. Reinforced the need to call for assistance. Plan of care discussed and patient does not have any further needs at this time.

## 2025-01-03 NOTE — PROGRESS NOTES
Pt transported to [facility] with transport. Telebox removed. Report given to PJ Nguyen.

## 2025-01-03 NOTE — CARE PLAN
The patient is Stable - Low risk of patient condition declining or worsening    Shift Goals  Clinical Goals: wean O2, pt will at least remain greater than 90% on 2 L NC  Patient Goals: rest  Family Goals: LLOYD    Progress made toward(s) clinical / shift goals:    Problem: Pain - Standard  Goal: Alleviation of pain or a reduction in pain to the patient’s comfort goal  Outcome: Progressing     Problem: Knowledge Deficit - Standard  Goal: Patient and family/care givers will demonstrate understanding of plan of care, disease process/condition, diagnostic tests and medications  Outcome: Progressing       Patient is not progressing towards the following goals:

## 2025-01-04 LAB
ANION GAP SERPL CALC-SCNC: 9 MMOL/L (ref 7–16)
BUN SERPL-MCNC: 10 MG/DL (ref 8–22)
CALCIUM SERPL-MCNC: 8.2 MG/DL (ref 8.5–10.5)
CHLORIDE SERPL-SCNC: 95 MMOL/L (ref 96–112)
CO2 SERPL-SCNC: 25 MMOL/L (ref 20–33)
CREAT SERPL-MCNC: 0.66 MG/DL (ref 0.5–1.4)
GFR SERPLBLD CREATININE-BSD FMLA CKD-EPI: 98 ML/MIN/1.73 M 2
GLUCOSE SERPL-MCNC: 114 MG/DL (ref 65–99)
POTASSIUM SERPL-SCNC: 3.9 MMOL/L (ref 3.6–5.5)
SODIUM SERPL-SCNC: 126 MMOL/L (ref 135–145)
SODIUM SERPL-SCNC: 129 MMOL/L (ref 135–145)

## 2025-01-04 PROCEDURE — 99233 SBSQ HOSP IP/OBS HIGH 50: CPT | Performed by: STUDENT IN AN ORGANIZED HEALTH CARE EDUCATION/TRAINING PROGRAM

## 2025-01-04 PROCEDURE — A9270 NON-COVERED ITEM OR SERVICE: HCPCS | Performed by: HOSPITALIST

## 2025-01-04 PROCEDURE — 700102 HCHG RX REV CODE 250 W/ 637 OVERRIDE(OP): Performed by: NURSE PRACTITIONER

## 2025-01-04 PROCEDURE — 84295 ASSAY OF SERUM SODIUM: CPT

## 2025-01-04 PROCEDURE — A9270 NON-COVERED ITEM OR SERVICE: HCPCS | Performed by: STUDENT IN AN ORGANIZED HEALTH CARE EDUCATION/TRAINING PROGRAM

## 2025-01-04 PROCEDURE — 770006 HCHG ROOM/CARE - MED/SURG/GYN SEMI*

## 2025-01-04 PROCEDURE — A9270 NON-COVERED ITEM OR SERVICE: HCPCS | Performed by: NURSE PRACTITIONER

## 2025-01-04 PROCEDURE — 700111 HCHG RX REV CODE 636 W/ 250 OVERRIDE (IP): Performed by: STUDENT IN AN ORGANIZED HEALTH CARE EDUCATION/TRAINING PROGRAM

## 2025-01-04 PROCEDURE — 700102 HCHG RX REV CODE 250 W/ 637 OVERRIDE(OP): Performed by: STUDENT IN AN ORGANIZED HEALTH CARE EDUCATION/TRAINING PROGRAM

## 2025-01-04 PROCEDURE — 80048 BASIC METABOLIC PNL TOTAL CA: CPT

## 2025-01-04 PROCEDURE — 36415 COLL VENOUS BLD VENIPUNCTURE: CPT

## 2025-01-04 PROCEDURE — 700102 HCHG RX REV CODE 250 W/ 637 OVERRIDE(OP): Performed by: HOSPITALIST

## 2025-01-04 RX ORDER — BISACODYL 10 MG
10 SUPPOSITORY, RECTAL RECTAL DAILY
Status: DISCONTINUED | OUTPATIENT
Start: 2025-01-04 | End: 2025-01-04

## 2025-01-04 RX ORDER — BISACODYL 10 MG
10 SUPPOSITORY, RECTAL RECTAL
Status: DISCONTINUED | OUTPATIENT
Start: 2025-01-04 | End: 2025-01-05 | Stop reason: HOSPADM

## 2025-01-04 RX ORDER — PREDNISONE 20 MG/1
40 TABLET ORAL DAILY
Status: DISCONTINUED | OUTPATIENT
Start: 2025-01-04 | End: 2025-01-05 | Stop reason: HOSPADM

## 2025-01-04 RX ADMIN — PREDNISONE 40 MG: 20 TABLET ORAL at 16:39

## 2025-01-04 RX ADMIN — FLUOXETINE HYDROCHLORIDE 10 MG: 10 CAPSULE ORAL at 06:48

## 2025-01-04 RX ADMIN — PRAVASTATIN SODIUM 20 MG: 20 TABLET ORAL at 06:49

## 2025-01-04 RX ADMIN — BUSPIRONE HYDROCHLORIDE 15 MG: 10 TABLET ORAL at 21:22

## 2025-01-04 RX ADMIN — HYDROXYZINE HYDROCHLORIDE 25 MG: 25 TABLET ORAL at 18:40

## 2025-01-04 RX ADMIN — HYDROXYZINE HYDROCHLORIDE 25 MG: 25 TABLET ORAL at 09:00

## 2025-01-04 RX ADMIN — PROPRANOLOL HYDROCHLORIDE 10 MG: 10 TABLET ORAL at 18:33

## 2025-01-04 RX ADMIN — IRBESARTAN 300 MG: 150 TABLET ORAL at 21:22

## 2025-01-04 RX ADMIN — OMEPRAZOLE 20 MG: 20 CAPSULE, DELAYED RELEASE ORAL at 18:33

## 2025-01-04 RX ADMIN — OMEPRAZOLE 20 MG: 20 CAPSULE, DELAYED RELEASE ORAL at 06:48

## 2025-01-04 RX ADMIN — OSELTAMIVIR PHOSPHATE 75 MG: 75 CAPSULE ORAL at 06:48

## 2025-01-04 RX ADMIN — PROPRANOLOL HYDROCHLORIDE 10 MG: 10 TABLET ORAL at 06:48

## 2025-01-04 RX ADMIN — AMLODIPINE BESYLATE 5 MG: 5 TABLET ORAL at 06:49

## 2025-01-04 RX ADMIN — NICOTINE TRANSDERMAL SYSTEM 21 MG: 21 PATCH, EXTENDED RELEASE TRANSDERMAL at 06:47

## 2025-01-04 RX ADMIN — RIVAROXABAN 10 MG: 10 TABLET, FILM COATED ORAL at 18:32

## 2025-01-04 RX ADMIN — OSELTAMIVIR PHOSPHATE 75 MG: 75 CAPSULE ORAL at 18:32

## 2025-01-04 RX ADMIN — MOMETASONE FUROATE AND FORMOTEROL FUMARATE DIHYDRATE 2 PUFF: 200; 5 AEROSOL RESPIRATORY (INHALATION) at 18:32

## 2025-01-04 ASSESSMENT — PAIN DESCRIPTION - PAIN TYPE
TYPE: ACUTE PAIN
TYPE: ACUTE PAIN

## 2025-01-04 ASSESSMENT — PATIENT HEALTH QUESTIONNAIRE - PHQ9
2. FEELING DOWN, DEPRESSED, IRRITABLE, OR HOPELESS: NOT AT ALL
SUM OF ALL RESPONSES TO PHQ9 QUESTIONS 1 AND 2: 0
1. LITTLE INTEREST OR PLEASURE IN DOING THINGS: NOT AT ALL
1. LITTLE INTEREST OR PLEASURE IN DOING THINGS: NOT AT ALL
2. FEELING DOWN, DEPRESSED, IRRITABLE, OR HOPELESS: NOT AT ALL
SUM OF ALL RESPONSES TO PHQ9 QUESTIONS 1 AND 2: 0

## 2025-01-04 ASSESSMENT — ENCOUNTER SYMPTOMS
WEAKNESS: 1
SHORTNESS OF BREATH: 1
CHILLS: 0
COUGH: 1
FEVER: 0

## 2025-01-04 NOTE — FACE TO FACE
"Face to Face Note  -  Durable Medical Equipment    Kathrine Latham M.D. - NPI: [id number]  I certify that this patient is under my care and that they had a durable medical equipment(DME)face to face encounter by myself that meets the physician DME face-to-face encounter requirements with this patient on:    Date of encounter:   Patient:                    MRN:                       YOB: 2025  Kelly Oakley  [id number]  1960     The encounter with the patient was in whole, or in part, for the following medical condition, which is the primary reason for durable medical equipment:  Other - hypoxic respiratory failure due to Influneza     I certify that, based on my findings, the following durable medical equipment is medically necessary:    Oxygen   HOME O2 Saturation Measurements:(Values must be present for Home Oxygen orders)  Room air sat at rest: 91  Room air sat with amb: 84  With liters of O2: 1, O2 sat at rest with O2: 94  With Liters of O2: 2, O2 sat with amb with O2 : 91  Is the patient mobile?: Yes  If patient feels more short of breath, they can go up to 6 liters per minute and contact healthcare provider.    Supporting Symptoms: The patient requires supplemental oxygen, as the following interventions have been tried with limited or no improvement: \"Bronchodilators and/or steroid inhalers, \"Oral and/or IV steroids, \"Ambulation with oximetry, and \"Incentive spirometry.    My Clinical findings support the need for the above equipment due to:  Hypoxia  "

## 2025-01-04 NOTE — PROGRESS NOTES
Report received from NOC RN and assumed patient care at 0700. Patient is A&Ox 4, on 1L NC at 94%, reduced to 0.5L NC and will continue to monitor, and bed alarm is off, pt steady on feet . Patient denies pain at this time. Pt reports breathing feels better and cough has reduced. Pt has not had a BM since 12/31 despite multiple doses of miralax and stool senna,  pt reports she has not been eating very much. Pt states she would like to try to use the bathroom after breakfast and if unsuccessful will try suppository. Pt denies any other symptoms or needs at this time. Call light within reach and bed in lowest position. Reinforced the need to call for assistance. Plan of care discussed and patient does not have any further needs at this time.

## 2025-01-04 NOTE — PROGRESS NOTES
Hospital Medicine Daily Progress Note    Date of Service  1/4/2025    Chief Complaint  Kelly Oakley is a 64 y.o. female admitted 12/31/2024 with coughing and sob    Hospital Course  64 y.o. female who presented 12/31/2024 with cough, fever and chills, shortness of breath.  Ms. Oakley has a past medical history of tobacco dependence, diabetes not on medications, hypertension that presented to urgent care today with about 3 days of bodyaches and sob.  She is found to have oxygen saturation of 87% on room air.  Chest x-ray two-view was unremarkable.  She was transferred here for higher level of care.  She is found to have influenza A with sats down to 86 range.  Blood work remarkable for hyponatremia sodium of 122.  She states she has barely been taking any oral intake due to nausea and some dry heaves.  She will be admitted to telemetry for supplemental oxygen, Tamiflu, IV normal saline with serial sodium levels every 6 hours. Her sodium has been improving, continues with UR symptoms and oxygen needs.     Interval Problem Update    Patient seen and examined at bedside she continues to have cough and shortness of breath, feels that she is slowly improving. Her cough does seem worse today though.   - her O2 needs are improving she is down to 1L, but still with hypoxia 84% with mobility. She would really like to be off oxygen on discharge.   - will start predisone 40 mg as she is quite wheezy on exam. Continue supportive care with decongestant and antitussives   - Na did come down from 131 to 129, will repeat Na levels this afternoon, if downtrending may initiate salt tabs  - I have ordered home oxygen, likely dc home tomorrow   -Continue Tamiflu          I have discussed this patient's plan of care and discharge plan at IDT rounds today with Case Management, Nursing, Nursing leadership, and other members of the IDT team.    Consultants/Specialty  na    Code Status  Full Code    Disposition  The patient is not medically  cleared for discharge to home or a post-acute facility.  Anticipate discharge to: home with close outpatient follow-up    I have placed the appropriate orders for post-discharge needs.    Review of Systems  Review of Systems   Constitutional:  Positive for malaise/fatigue. Negative for chills and fever.   Respiratory:  Positive for cough and shortness of breath.    Neurological:  Positive for weakness.   All other systems reviewed and are negative.       Physical Exam  Temp:  [36.9 °C (98.5 °F)-37.3 °C (99.1 °F)] 36.9 °C (98.5 °F)  Pulse:  [51-69] 51  Resp:  [14-18] 14  BP: (120-149)/(57-66) 120/57  SpO2:  [91 %-94 %] 94 %    Physical Exam  Vitals and nursing note reviewed.   Constitutional:       Appearance: Normal appearance.   HENT:      Head: Normocephalic and atraumatic.      Nose: Nose normal.      Mouth/Throat:      Pharynx: Oropharynx is clear.   Eyes:      Extraocular Movements: Extraocular movements intact.      Conjunctiva/sclera: Conjunctivae normal.      Pupils: Pupils are equal, round, and reactive to light.   Cardiovascular:      Rate and Rhythm: Normal rate and regular rhythm.      Pulses: Normal pulses.      Heart sounds: Normal heart sounds.   Pulmonary:      Effort: Pulmonary effort is normal.      Breath sounds: Wheezing present.   Abdominal:      General: Abdomen is flat. Bowel sounds are normal.      Palpations: Abdomen is soft.   Musculoskeletal:         General: Normal range of motion.      Cervical back: Normal range of motion and neck supple.   Skin:     General: Skin is warm and dry.   Neurological:      General: No focal deficit present.      Mental Status: She is alert and oriented to person, place, and time. Mental status is at baseline.   Psychiatric:         Mood and Affect: Mood normal.         Behavior: Behavior normal.         Fluids  No intake or output data in the 24 hours ending 01/04/25 1551       Laboratory  Recent Labs     01/02/25  0040 01/03/25  0008   WBC 7.3 7.1   RBC  3.83* 3.60*   HEMOGLOBIN 11.7* 10.9*   HEMATOCRIT 33.5* 31.3*   MCV 87.5 86.9   MCH 30.5 30.3   MCHC 34.9 34.8   RDW 41.0 41.4   PLATELETCT 153* 143*   MPV 9.6 10.2     Recent Labs     01/02/25  0040 01/02/25  0618 01/03/25  0008 01/03/25  0624 01/03/25  1128 01/04/25  0226   SODIUM 130*   < > 127* 130* 131* 129*   POTASSIUM 4.2  --  4.0  --   --  3.9   CHLORIDE 95*  --  96  --   --  95*   CO2 24  --  23  --   --  25   GLUCOSE 99  --  100*  --   --  114*   BUN 14  --  10  --   --  10   CREATININE 0.85  --  0.62  --   --  0.66   CALCIUM 8.0*  --  7.9*  --   --  8.2*    < > = values in this interval not displayed.                   Imaging  No orders to display        Assessment/Plan  * Hyponatremia- (present on admission)  Assessment & Plan  Hypovolemic hyponatremia with sodium 122  In the setting of influenza and very poor oral intake  Her sodium has been slowly improving, ~130  131-129 today, will repeat this afternoon    Tobacco abuse- (present on admission)  Assessment & Plan  Ongoing use she is hoping to quit   cessation encouraged  Nicotine patch offered    Influenza A- (present on admission)  Assessment & Plan  With hypoxia  Isolation precautions  Tamiflu  Continue to wean oxygen, home oxygen evaluations been ordered  Supportive care    Acute respiratory failure with hypoxia (HCC)- (present on admission)  Assessment & Plan  Oxygen saturation 86% room air  Secondary to influenza and possible underlying COPD. She had no formal diagnosis of COPD, but has a long history of smoking  No evidence of volume overload  Supplemental oxygen and she may need oxygen upon discharge  Dulera and Spiriva   Continue to wean oxygen  Encourage I-S and mobilization  Home oxygen evaluation has been ordered  Start prednisone, ongoing wheezing and o2 needs       Type 2 diabetes mellitus with hyperglycemia, without long-term current use of insulin (HCC)- (present on admission)  Assessment & Plan  Not on any medications  Hemoglobin A1c  6.6 last month  She was hoping to refrain from medications and do this with diet which I think is not unreasonable      Primary hypertension- (present on admission)  Assessment & Plan  Continue outpatient regimen with holding parameters though refrain from Aldactone due to its diuretic properties         VTE prophylaxis: xarelto 10mg  I have performed a physical exam and reviewed and updated ROS and Plan today (1/4/2025). In review of yesterday's note (1/3/2025), there are no changes except as documented above.

## 2025-01-04 NOTE — CARE PLAN
"The patient is Stable - Low risk of patient condition declining or worsening    Shift Goals  Clinical Goals: wean off o2 and impove sodium  Patient Goals: rest  Family Goals: LLOYD    Progress made toward(s) clinical / shift goals:    Problem: Knowledge Deficit - Standard  Goal: Patient and family/care givers will demonstrate understanding of plan of care, disease process/condition, diagnostic tests and medications  Outcome: Progressing     Problem: Pain - Standard  Goal: Alleviation of pain or a reduction in pain to the patient’s comfort goal  Outcome: Progressing     NOTE\" patient stable and understands Tx plan.  Patient also is self managing pain .     Patient is not progressing towards the following goals:      "

## 2025-01-05 VITALS
HEIGHT: 63 IN | HEART RATE: 55 BPM | DIASTOLIC BLOOD PRESSURE: 60 MMHG | RESPIRATION RATE: 17 BRPM | OXYGEN SATURATION: 93 % | BODY MASS INDEX: 31.33 KG/M2 | SYSTOLIC BLOOD PRESSURE: 141 MMHG | WEIGHT: 176.81 LBS | TEMPERATURE: 98.2 F

## 2025-01-05 LAB
ANION GAP SERPL CALC-SCNC: 14 MMOL/L (ref 7–16)
BUN SERPL-MCNC: 10 MG/DL (ref 8–22)
CALCIUM SERPL-MCNC: 8.8 MG/DL (ref 8.5–10.5)
CHLORIDE SERPL-SCNC: 95 MMOL/L (ref 96–112)
CO2 SERPL-SCNC: 23 MMOL/L (ref 20–33)
CREAT SERPL-MCNC: 0.76 MG/DL (ref 0.5–1.4)
GFR SERPLBLD CREATININE-BSD FMLA CKD-EPI: 87 ML/MIN/1.73 M 2
GLUCOSE SERPL-MCNC: 212 MG/DL (ref 65–99)
POTASSIUM SERPL-SCNC: 4 MMOL/L (ref 3.6–5.5)
SODIUM SERPL-SCNC: 132 MMOL/L (ref 135–145)

## 2025-01-05 PROCEDURE — 700111 HCHG RX REV CODE 636 W/ 250 OVERRIDE (IP): Performed by: STUDENT IN AN ORGANIZED HEALTH CARE EDUCATION/TRAINING PROGRAM

## 2025-01-05 PROCEDURE — 80048 BASIC METABOLIC PNL TOTAL CA: CPT

## 2025-01-05 PROCEDURE — 700102 HCHG RX REV CODE 250 W/ 637 OVERRIDE(OP): Performed by: STUDENT IN AN ORGANIZED HEALTH CARE EDUCATION/TRAINING PROGRAM

## 2025-01-05 PROCEDURE — A9270 NON-COVERED ITEM OR SERVICE: HCPCS | Performed by: STUDENT IN AN ORGANIZED HEALTH CARE EDUCATION/TRAINING PROGRAM

## 2025-01-05 PROCEDURE — 700102 HCHG RX REV CODE 250 W/ 637 OVERRIDE(OP): Performed by: HOSPITALIST

## 2025-01-05 PROCEDURE — A9270 NON-COVERED ITEM OR SERVICE: HCPCS | Performed by: HOSPITALIST

## 2025-01-05 PROCEDURE — 36415 COLL VENOUS BLD VENIPUNCTURE: CPT

## 2025-01-05 PROCEDURE — A9270 NON-COVERED ITEM OR SERVICE: HCPCS | Performed by: NURSE PRACTITIONER

## 2025-01-05 PROCEDURE — 99239 HOSP IP/OBS DSCHRG MGMT >30: CPT | Performed by: STUDENT IN AN ORGANIZED HEALTH CARE EDUCATION/TRAINING PROGRAM

## 2025-01-05 PROCEDURE — 700102 HCHG RX REV CODE 250 W/ 637 OVERRIDE(OP): Performed by: NURSE PRACTITIONER

## 2025-01-05 RX ORDER — NICOTINE 21 MG/24HR
1 PATCH, TRANSDERMAL 24 HOURS TRANSDERMAL EVERY 24 HOURS
Qty: 30 PATCH | Refills: 1 | Status: SHIPPED | OUTPATIENT
Start: 2025-01-05

## 2025-01-05 RX ADMIN — AMLODIPINE BESYLATE 5 MG: 5 TABLET ORAL at 04:34

## 2025-01-05 RX ADMIN — MOMETASONE FUROATE AND FORMOTEROL FUMARATE DIHYDRATE 2 PUFF: 200; 5 AEROSOL RESPIRATORY (INHALATION) at 04:40

## 2025-01-05 RX ADMIN — OSELTAMIVIR PHOSPHATE 75 MG: 75 CAPSULE ORAL at 04:35

## 2025-01-05 RX ADMIN — OMEPRAZOLE 20 MG: 20 CAPSULE, DELAYED RELEASE ORAL at 04:34

## 2025-01-05 RX ADMIN — PREDNISONE 40 MG: 20 TABLET ORAL at 04:37

## 2025-01-05 RX ADMIN — PRAVASTATIN SODIUM 20 MG: 20 TABLET ORAL at 04:35

## 2025-01-05 RX ADMIN — NICOTINE TRANSDERMAL SYSTEM 21 MG: 21 PATCH, EXTENDED RELEASE TRANSDERMAL at 04:38

## 2025-01-05 RX ADMIN — PROPRANOLOL HYDROCHLORIDE 10 MG: 10 TABLET ORAL at 04:38

## 2025-01-05 RX ADMIN — FLUOXETINE HYDROCHLORIDE 10 MG: 10 CAPSULE ORAL at 04:35

## 2025-01-05 RX ADMIN — TIOTROPIUM BROMIDE INHALATION SPRAY 5 MCG: 3.12 SPRAY, METERED RESPIRATORY (INHALATION) at 04:41

## 2025-01-05 RX ADMIN — GUAIFENESIN SYRUP AND DEXTROMETHORPHAN 5 ML: 100; 10 SYRUP ORAL at 08:49

## 2025-01-05 ASSESSMENT — PATIENT HEALTH QUESTIONNAIRE - PHQ9
SUM OF ALL RESPONSES TO PHQ9 QUESTIONS 1 AND 2: 0
1. LITTLE INTEREST OR PLEASURE IN DOING THINGS: NOT AT ALL
2. FEELING DOWN, DEPRESSED, IRRITABLE, OR HOPELESS: NOT AT ALL

## 2025-01-05 ASSESSMENT — PAIN DESCRIPTION - PAIN TYPE
TYPE: ACUTE PAIN
TYPE: ACUTE PAIN

## 2025-01-05 NOTE — CARE PLAN
The patient is Stable - Low risk of patient condition declining or worsening    Shift Goals  Clinical Goals: pulse ox geater than 91%, DC patient, pain management  Patient Goals: rest  Family Goals: NA    Problem: Pain - Standard  Goal: Alleviation of pain or a reduction in pain to the patient’s comfort goal  Description: Target End Date:  Prior to discharge or change in level of care    Document on Vitals flowsheet    1.  Document pain using the appropriate pain scale per order or unit policy  2.  Educate and implement non-pharmacologic comfort measures (i.e. relaxation, distraction, massage, cold/heat therapy, etc.)  3.  Pain management medications as ordered  4.  Reassess pain after pain med administration per policy  5.  If opiods administered assess patient's response to pain medication is appropriate per POSS sedation scale  6.  Follow pain management plan developed in collaboration with patient and interdisciplinary team (including palliative care or pain specialists if applicable)  Outcome: Progressing     Problem: Respiratory:  Goal: Respiratory status will improve  Outcome: Progressing

## 2025-01-05 NOTE — CARE PLAN
The patient is Stable - Low risk of patient condition declining or worsening    Shift Goals  Clinical Goals: pt will remain greater than 90% on 1L, increase activity  Patient Goals: discharge  Family Goals: LLOYD    Progress made toward(s) clinical / shift goals:  pt respiratory status improving  Problem: Pain - Standard  Goal: Alleviation of pain or a reduction in pain to the patient’s comfort goal  Outcome: Progressing     Problem: Knowledge Deficit - Standard  Goal: Patient and family/care givers will demonstrate understanding of plan of care, disease process/condition, diagnostic tests and medications  Outcome: Progressing     Problem: Respiratory:  Goal: Respiratory status will improve  Outcome: Progressing       Patient is not progressing towards the following goals:

## 2025-01-05 NOTE — PROGRESS NOTES
Bedside report received, Assume care.     A&O x 4, Able to make needs known.  Pain Assessment: 0/10 .     Bed in low position and locked, pt resting comfortably now, call light with in reach, all needs met at this time. Interventions will be executed per plan of care.

## 2025-01-05 NOTE — DISCHARGE PLANNING
Case Management Discharge Planning    Admission Date: 12/31/2024  GMLOS: 4  ALOS: 5    6-Clicks ADL Score: 24  6-Clicks Mobility Score: 23      Anticipated Discharge Dispo: Discharge Disposition: Discharged to home/self care (01)    DME Needed: Yes    DME Ordered: Yes    Action(s) Taken: Referral(s) sent and DME Face to Face     Escalations Completed: None    Medically Clear: Yes    Next Steps: Choice was collected previously. RNCM sent O2 referral to [facility]. RNCM will follow up with [facility] for ETA on delivery.     Barriers to Discharge: DME and Oxygen Delivery    Is the patient up for discharge tomorrow: Patient will DC when O2 is delivered.     1138 RNCM placed call to [facility]. RNCM was told they were still working on it. RNCM asked if it will be delivered today and was told yes if it does not require a prior auth. RNCM asked if she should call back for an ETA and was told it will take 2-4 hours. RNCM updated bedside RN and will place follow up phone call for ETA.    1509  RNCM placed call to [facility] to follow up on ETA for O2. RNCM was informed O2 was delivered. RNCM sent voalte message to bedside RN to verify.

## 2025-01-05 NOTE — DISCHARGE INSTRUCTIONS
You have been diagnosed with influenza and secondary to this have decreased oxygen you are currently requiring oxygen  Use as directed  I recommend that you get a pulse oximeter which measures your oxygen level your goal oxygen saturation should be greater than 88%.  Currently your requiring 1 to 2 L/min.  I recommend that you check your oxygen saturation several times a day when at rest and up moving around and you can titrate down your oxygen as long as you are greater than 88%.  If you find that you are consistently greater than 88% while off of it then you can discontinue if you have questions or concerns regarding this I recommend that you follow-up with your primary care doctor call her office for further recommendation  Continue your blood pressure medications.  I recommend follow-up with your primary care doctor in the next 1 to 2 weeks did not only monitor your oxygen but also your sodium level to ensure that it has remained stable he may need medication adjustment if it continues to trend down.  If you develop any worsening shortness of breath, chest pain or other concerning symptoms seek medical attention  Placed a referral to pulmonary as you may have underlying lung disease and would benefit from further evaluation regarding this.

## 2025-01-05 NOTE — DISCHARGE SUMMARY
Discharge Summary    CHIEF COMPLAINT ON ADMISSION  Chief Complaint   Patient presents with    Shortness of Breath     x4days    Chest Pain    Cough    Chills       Reason for Admission  SOB/Sent by      Admission Date  12/31/2024    CODE STATUS  Full Code    HPI & HOSPITAL COURSE  Mrs. Kelly Oakley is a very pleasant 64 y.o. female with a past medical history of tobacco dependence, diabetes not on medications, hypertension that presented to urgent care intially with 3 days of bodyaches and shortness of breath.  She is found to have oxygen saturation of 87% on room air.  Chest x-ray two-view was unremarkable.  She was transferred to [facility] for respiratory distress and hypoxia. She is found to have influenza A with sats down to 86 range.  Blood work remarkable for hyponatremia sodium of 122.  She states she has barely been taking any oral intake due to nausea and some dry heaves.  She will be admitted to telemetry for supplemental oxygen, Tamiflu, IV normal saline with serial sodium levels every 6 hours. Her sodium has been improving and IV fluids were discontinued.  She is continue to require oxygen although her needs have improved.  She overall is feeling better but continues to require oxygen thus this was ordered on discharge.      Therefore, she is discharged in fair and stable condition to home with close outpatient follow-up.    The patient met 2-midnight criteria for an inpatient stay at the time of discharge.    Discharge Date  1/5/2025    FOLLOW UP ITEMS POST DISCHARGE  Weaning of oxygen  Blood pressure  Sodium    DISCHARGE DIAGNOSES  Principal Problem:    Hyponatremia (POA: Yes)  Active Problems:    Primary hypertension (POA: Yes)    Type 2 diabetes mellitus with hyperglycemia, without long-term current use of insulin (HCC) (POA: Yes)      Overview: This is a chronic condition.      Current medications:      Insulin:       Biguanide:       GLP1-RA:        SGLT-2i:          DPP4-I:       TZD:       Yanna:       Sulfonyluria:             Last A1c: 6.6% 11/8/24      Last Microalb/Cr ratio: 11/19/24      Fasting sugars:      Last diabetic foot exam: 11/19/24      Last retinal eye exam: 11/19/24      ACEi/ARB? Irbesartan 300      Statin? Pravastatin 20 mg daily      Aspirin?       Concomitant HTN? Amlodipine 5 mg daily, hydralazine 25 mg bid prn,       spironolactone 50 mg, propranolol 10 mg BID.      Nightly foot checks?          Acute respiratory failure with hypoxia (HCC) (POA: Yes)    Influenza A (POA: Yes)    Tobacco abuse (POA: Yes)  Resolved Problems:    * No resolved hospital problems. *      FOLLOW UP  Future Appointments   Date Time Provider Department Center   2/21/2025  3:40 PM EUGENIE Duncan [facility]     EUGENIE Duncan  [facility]  [facility] [address]  [phone]    Schedule an appointment as soon as possible for a visit in 1 week(s)  For hospital follow-up      MEDICATIONS ON DISCHARGE     Medication List        START taking these medications        Instructions   mometasone-formoterol 200-5 MCG/ACT Aero  Commonly known as: Dulera   Inhale 2 Puffs 2 times a day.  Dose: 2 Puff     tiotropium 2.5 mcg/Act Aers  Start taking on: January 6, 2025  Commonly known as: Spiriva Respimat   Inhale 2 Inhalations every day.  Dose: 5 mcg            CONTINUE taking these medications        Instructions   acetaminophen 325 MG Tabs  Commonly known as: Tylenol   Take 650 mg by mouth every 6 hours as needed for Mild Pain or Fever.  Dose: 650 mg     amLODIPine 5 MG Tabs  Commonly known as: Norvasc   TAKE 1 TABLET BY MOUTH EVERY DAY  Dose: 5 mg     busPIRone 15 MG tablet  Commonly known as: Buspar   TAKE 1 TABLET BY MOUTH TWICE A DAY  Dose: 15 mg     [employer] Saline Nasal Spray 0.65 % Soln  Generic drug: sodium chloride   ADMINISTER 2 SPRAYS INTO AFFECTED NOSTRIL(S) EVERY 2 HOURS AS NEEDED FOR CONGESTION.     esomeprazole 40 MG delayed-release capsule  Commonly known as: NexIUM    Take 1 Capsule by mouth every morning before breakfast.  Dose: 40 mg     fluocinonide 0.05 % Crea  Commonly known as: Lidex   AAA twice a day for 2-3 weeks at a time with 1-2 week break in between     FLUoxetine 10 MG Caps  Commonly known as: PROzac   Take 1 Capsule by mouth every day.  Dose: 10 mg     fluticasone 50 MCG/ACT nasal spray  Commonly known as: Flonase   ADMINISTER 2 SPRAYS INTO AFFECTED NOSTRIL(S) AT BEDTIME.  Dose: 2 Spray     hydrALAZINE 25 MG Tabs  Commonly known as: Apresoline   Take 1 Tablet by mouth 2 times a day as needed (only if bp is 160 or above).  Dose: 25 mg     hydrOXYzine HCl 25 MG Tabs  Commonly known as: Atarax   Take 1 Tablet by mouth 2 times a day as needed for Anxiety.  Dose: 25 mg     irbesartan 300 MG Tabs  Commonly known as: Avapro   TAKE 1 TABLET BY MOUTH EVERY DAY  Dose: 300 mg     pravastatin 20 MG Tabs  Commonly known as: Pravachol   TAKE 1 TABLET BY MOUTH EVERY DAY  Dose: 20 mg     propranolol 10 MG Tabs  Commonly known as: Inderal   TAKE 1 TABLET BY MOUTH TWICE A DAY  Dose: 10 mg     spironolactone 50 MG Tabs  Commonly known as: Aldactone   TAKE 1 TABLET BY MOUTH EVERY DAY  Dose: 50 mg     tacrolimus 0.1 % Oint  Commonly known as: Protopic   AAA twice a day     triamcinolone acetonide 0.1 % Oint  Commonly known as: Kenalog   Apply 1 Application  topically 2 times a day.  Dose: 1 Application      Yuvafem 10 MCG Tabs  Generic drug: estradiol   INSERT 1 TABLET INTO THE VAGINA TWO TIMES A WEEK.              Allergies  Allergies   Allergen Reactions    Macrodantin [Nitrofurantoin Macrocrystal] Anaphylaxis     Prescription > 10 years ago    Pcn [Penicillins] Anaphylaxis     Prescription > 10 years ago    Doxycycline Itching    Bactrim Vomiting    Clarithromycin Vomiting    Hydrochlorothiazide      Hyponatremia    Omnicef Itching       DIET  Orders Placed This Encounter   Procedures    Diet Order Diet: Regular; Fluid modifications: (optional): Free Water Restrictions Only      Standing Status:   Standing     Number of Occurrences:   1     Order Specific Question:   Diet:     Answer:   Regular [1]     Order Specific Question:   Fluid modifications: (optional)     Answer:   Free Water Restrictions Only [12]    Discontinue Diet Tray     Standing Status:   Standing     Number of Occurrences:   1       ACTIVITY  As tolerated.      CONSULTATIONS  NA    PROCEDURES  NA    LABORATORY  Lab Results   Component Value Date    SODIUM 132 (L) 01/05/2025    POTASSIUM 4.0 01/05/2025    CHLORIDE 95 (L) 01/05/2025    CO2 23 01/05/2025    GLUCOSE 212 (H) 01/05/2025    BUN 10 01/05/2025    CREATININE 0.76 01/05/2025        Lab Results   Component Value Date    WBC 7.1 01/03/2025    HEMOGLOBIN 10.9 (L) 01/03/2025    HEMATOCRIT 31.3 (L) 01/03/2025    PLATELETCT 143 (L) 01/03/2025        Total time of the discharge process exceeds 33 minutes.

## 2025-01-05 NOTE — PROGRESS NOTES
Report received from NOC RN and assumed patient care at 0700. Patient is A&Ox 4, on 1 L NC, pt currently 88%, increased to 1.5L NC, and bed alarm is off pt is steady on feet. . Patient denies pain at this time.Pt states she had a headache last night but it has resolved. Pt reports cough is improving and denies lightheadedness, dizziness, or SOB. Pt denies any other symptoms or needs at this time. Call light within reach and bed in lowest position. Reinforced the need to call for assistance. Plan of care discussed and patient does not have any further needs at this time.

## 2025-01-06 NOTE — PROGRESS NOTES
Pt d/c off of unit via w/c, RN and nursing student escorted pt off of unit. All belongings including O2 supplies with pt.

## 2025-01-13 ENCOUNTER — APPOINTMENT (OUTPATIENT)
Dept: MEDICAL GROUP | Facility: PHYSICIAN GROUP | Age: 65
End: 2025-01-13
Payer: COMMERCIAL

## 2025-01-14 ENCOUNTER — OFFICE VISIT (OUTPATIENT)
Dept: MEDICAL GROUP | Facility: PHYSICIAN GROUP | Age: 65
End: 2025-01-14
Payer: COMMERCIAL

## 2025-01-14 VITALS
RESPIRATION RATE: 16 BRPM | BODY MASS INDEX: 28.53 KG/M2 | HEIGHT: 63 IN | HEART RATE: 60 BPM | WEIGHT: 161 LBS | DIASTOLIC BLOOD PRESSURE: 62 MMHG | TEMPERATURE: 96.5 F | OXYGEN SATURATION: 97 % | SYSTOLIC BLOOD PRESSURE: 118 MMHG

## 2025-01-14 DIAGNOSIS — J43.1 PANLOBULAR EMPHYSEMA (HCC): ICD-10-CM

## 2025-01-14 DIAGNOSIS — J10.1 INFLUENZA A: ICD-10-CM

## 2025-01-14 DIAGNOSIS — F17.210 NICOTINE DEPENDENCE, CIGARETTES, UNCOMPLICATED: ICD-10-CM

## 2025-01-14 PROCEDURE — 3074F SYST BP LT 130 MM HG: CPT

## 2025-01-14 PROCEDURE — 3078F DIAST BP <80 MM HG: CPT

## 2025-01-14 PROCEDURE — 99214 OFFICE O/P EST MOD 30 MIN: CPT

## 2025-01-14 ASSESSMENT — FIBROSIS 4 INDEX: FIB4 SCORE: 5.3

## 2025-01-14 NOTE — ASSESSMENT & PLAN NOTE
Hospitalized for 5 days 12/31/24-1/5/25. Reports continues to feel fatigued, malaise. Reports RA saturations 94-95%.

## 2025-01-14 NOTE — PROGRESS NOTES
Verbal consent was acquired by the patient to use [employer] ambient listening note generation during this visit     Subjective:     CC: Diagnoses of Influenza A, Nicotine dependence, uncomplicated (Current Smoker), and Panlobular emphysema (HCC) were pertinent to this visit.    HPI:   Kelly presents today with    History of Present Illness  The patient presents for evaluation of fatigue, smoking cessation, and health maintenance.    She reports persistent fatigue and a lack of appetite following a recent hospitalization, which she attributes to influenza. She was discharged on home oxygen therapy due to low oxygen levels, which have since improved. She has been using the oxygen at night but does not believe it is necessary. She experiences rhinorrhea with thick, greenish nasal discharge, which she suspects may be related to the oxygen therapy. She also reports weight loss and a decrease in her sense of smell. She has been unable to return to work due to her illness and is seeking a note for her employer. Despite her fatigue, she attempts to engage in household activities such as laundry and walking. Her pulse oximetry readings have been consistently between 94 and 95. She was prescribed two inhalers, Dulera and Spiriva, but is uncertain if she should continue their use. She was provided with an incentive spirometer during her hospital stay but has not used it recently.    She was provided with nicotine patches to aid in smoking cessation, which she reports have been beneficial. However, she continues to experience cravings, particularly upon waking, and admits to having smoked a few puffs this morning. She is committed to quitting smoking but finds it challenging due to stress. She has not previously used Wellbutrin for smoking cessation but recalls a past prescription for depression, which she discontinued.    She has received her pneumonia and shingles vaccines. She has a referral for lung cancer screening and  needs to call them.    SOCIAL HISTORY  The patient is currently using nicotine patches to quit smoking but had a couple of puffs of a cigarette this morning due to stress.    MEDICATIONS  Current: Dulera, Spiriva, nicotine patches    IMMUNIZATIONS  The patient has received the pneumonia and shingles vaccines.    Current Outpatient Medications   Medication Sig Dispense Refill    mometasone-formoterol (DULERA) 200-5 MCG/ACT Aerosol Inhale 2 Puffs 2 times a day. 8.8 g 0    tiotropium (SPIRIVA RESPIMAT) 2.5 mcg/Act Aero Soln Inhale 2 Inhalations every day. 4 g 0    nicotine (NICODERM) 14 MG/24HR PATCH 24 HR Place 1 Patch on the skin every 24 hours. 30 Patch 1    irbesartan (AVAPRO) 300 MG Tab TAKE 1 TABLET BY MOUTH EVERY DAY 90 Tablet 3    fluocinonide (LIDEX) 0.05 % Cream AAA twice a day for 2-3 weeks at a time with 1-2 week break in between 120 g 3    tacrolimus (PROTOPIC) 0.1 % Ointment AAA twice a day 30 g 3    amLODIPine (NORVASC) 5 MG Tab TAKE 1 TABLET BY MOUTH EVERY DAY 90 Tablet 3    spironolactone (ALDACTONE) 50 MG Tab TAKE 1 TABLET BY MOUTH EVERY DAY 90 Tablet 3    busPIRone (BUSPAR) 15 MG tablet TAKE 1 TABLET BY MOUTH TWICE A DAY (Patient taking differently: Take 15 mg by mouth at bedtime.) 180 Tablet 3    fluticasone (FLONASE) 50 MCG/ACT nasal spray ADMINISTER 2 SPRAYS INTO AFFECTED NOSTRIL(S) AT BEDTIME. 48 mL 2    FLUoxetine (PROZAC) 10 MG Cap Take 1 Capsule by mouth every day. 90 Capsule 3    hydrOXYzine HCl (ATARAX) 25 MG Tab Take 1 Tablet by mouth 2 times a day as needed for Anxiety. (Patient taking differently: Take 25 mg by mouth 2 times a day.) 180 Tablet 3    esomeprazole (NEXIUM) 40 MG delayed-release capsule Take 1 Capsule by mouth every morning before breakfast. 90 Capsule 3    triamcinolone acetonide (KENALOG) 0.1 % Ointment Apply 1 Application  topically 2 times a day. 30 g 1    propranolol (INDERAL) 10 MG Tab TAKE 1 TABLET BY MOUTH TWICE A  Tablet 3    estradiol (YUVAFEM) 10 MCG Tab  "INSERT 1 TABLET INTO THE VAGINA TWO TIMES A WEEK. 24 Tablet 3    pravastatin (PRAVACHOL) 20 MG Tab TAKE 1 TABLET BY MOUTH EVERY DAY 90 Tablet 3    sodium chloride ([facility] SALINE NASAL SPRAY) 0.65 % Solution ADMINISTER 2 SPRAYS INTO AFFECTED NOSTRIL(S) EVERY 2 HOURS AS NEEDED FOR CONGESTION. 30 mL 3    hydrALAZINE (APRESOLINE) 25 MG Tab Take 1 Tablet by mouth 2 times a day as needed (only if bp is 160 or above). 60 Tablet 3    acetaminophen (TYLENOL) 325 MG Tab Take 650 mg by mouth every 6 hours as needed for Mild Pain or Fever.       No current facility-administered medications for this visit.       Problem   Influenza A   Panlobular Emphysema (Hcc)   Nicotine dependence, uncomplicated (Current Smoker)     ROS:  Review of Systems   Constitutional:  Positive for malaise/fatigue.   HENT:          Rhinorrhea   All other systems reviewed and are negative.      Objective:     Exam:  /62 (BP Location: Left arm, Patient Position: Sitting)   Pulse 60   Temp 35.8 °C (96.5 °F) (Temporal)   Resp 16   Ht 1.6 m (5' 3\")   Wt 73 kg (161 lb)   SpO2 97%   BMI 28.52 kg/m²  Body mass index is 28.52 kg/m².    Physical Exam  Vitals reviewed.   Constitutional:       General: She is not in acute distress.     Appearance: Normal appearance. She is not ill-appearing.   HENT:      Head: Normocephalic and atraumatic.   Cardiovascular:      Rate and Rhythm: Normal rate.      Pulses: Normal pulses.   Pulmonary:      Effort: Pulmonary effort is normal. No respiratory distress.   Skin:     General: Skin is warm and dry.      Findings: No rash.   Neurological:      General: No focal deficit present.      Mental Status: She is alert and oriented to person, place, and time.   Psychiatric:         Mood and Affect: Mood normal.         Behavior: Behavior normal.         Labs: reviewed from 12/31/25-1/5/25    Assessment & Plan:     64 y.o. female with the following -     Assessment & Plan  1. Fatigue.  She was hospitalized from 12/31/2024 to " 01/05/2025 for acute severe symptoms and continues to recover. Her room air saturation is 94 to 95%, and her blood pressure is within normal range. She reports feeling rundown and experiencing thick, greenish nasal discharge, likely due to the recent illness and oxygen use. She is advised to continue using her incentive spirometer and to stay active to help clear her lungs. She is currently using Dulera and Spiriva inhalers, which should be continued to help keep her airways open and reduce mucus production. A work note will be provided, indicating that she can return to work without restrictions on 07/20/2025.    2. Smoking cessation.  She is using nicotine patches and reports some success but still experiences urges, especially in the morning. The use of Wellbutrin for smoking cessation was discussed, but she has not used it for this purpose before. She is encouraged to continue using the nicotine patches and to consider the Register My InfoÂ® jb for additional support. She is advised to continue her efforts to quit smoking, as it is crucial for her recovery and overall health.    3. Health maintenance.  She is up-to-date on her pneumonia and shingles vaccines. The importance of annual influenza vaccination was emphasized, and she is advised to receive the COVID-19 and hepatitis A vaccines. She has a referral for lung cancer screening and is reminded to schedule this.    Follow-up  The patient will follow up in March 2025.    Problem List Items Addressed This Visit       Nicotine dependence, uncomplicated (Current Smoker)     Chronic, reports desire to quit. Has been using nicotine patches with benefit. Reports continues to experience cravings intermittently.   Discussed lung cancer screening, will evaluate for this upon recovering from acute illness.           Panlobular emphysema (HCC)     Chronic, ongoing. Currently recovering from acute illness, exacerbated symptoms. Continue dulera 200-5 2 puffs/bid, spiriva 5 mcg daily.           Influenza A     Hospitalized for 5 days 12/31/24-1/5/25. Reports continues to feel fatigued, malaise. Reports RA saturations 94-95%.               Patient was educated in proper administration of medication(s) ordered today including safety, possible SE, risks, benefits, rationale and alternatives to therapy.   Supportive care, differential diagnoses, and indications for immediate follow-up discussed with patient.    Pathogenesis of diagnosis discussed including typical length and natural progression.    Instructed to return to clinic or nearest emergency department for any change in condition, further concerns, or worsening of symptoms.  Patient states understanding of the plan of care and discharge instructions.    Return in about 4 weeks (around 2/11/2025), or if symptoms worsen or fail to improve.    Please note that this dictation was created using voice recognition software. I have made every reasonable attempt to correct obvious errors, but I expect that there are errors of grammar and possibly content that I did not discover before finalizing the note.

## 2025-01-14 NOTE — ASSESSMENT & PLAN NOTE
Chronic, reports desire to quit. Has been using nicotine patches with benefit. Reports continues to experience cravings intermittently.   Discussed lung cancer screening, will evaluate for this upon recovering from acute illness.

## 2025-01-14 NOTE — LETTER
[facility]  [address]  [place] [address]     January 14, 2025    Patient: Kelly Oakley   YOB: 1960   Date of Visit: 1/14/2025       To Whom It May Concern:    Kelly Oakley was seen and treated in our department on 1/14/2025.     She was hospitalized for acute, severe symptoms 12/31/24-1/5/25. She is continuing to recover from illness.  Please excuse her for missing work, I expect she can return to work without restrictions 1/20/25.      Sincerely,     POPPY Duncan.

## 2025-01-14 NOTE — ASSESSMENT & PLAN NOTE
Chronic, ongoing. Currently recovering from acute illness, exacerbated symptoms. Continue dulera 200-5 2 puffs/bid, spiriva 5 mcg daily.

## 2025-01-16 DIAGNOSIS — F41.9 ANXIETY: ICD-10-CM

## 2025-01-16 DIAGNOSIS — Z72.0 TOBACCO ABUSE: ICD-10-CM

## 2025-01-16 RX ORDER — BUPROPION HYDROCHLORIDE 150 MG/1
150 TABLET, EXTENDED RELEASE ORAL 2 TIMES DAILY
Qty: 60 TABLET | Refills: 1 | Status: SHIPPED | OUTPATIENT
Start: 2025-01-16

## 2025-02-11 ENCOUNTER — PATIENT MESSAGE (OUTPATIENT)
Dept: MEDICAL GROUP | Facility: PHYSICIAN GROUP | Age: 65
End: 2025-02-11
Payer: COMMERCIAL

## 2025-02-11 DIAGNOSIS — J96.01 ACUTE RESPIRATORY FAILURE WITH HYPOXIA (HCC): ICD-10-CM

## 2025-02-11 DIAGNOSIS — F17.210 CIGARETTE SMOKER ONE HALF PACK A DAY OR LESS: ICD-10-CM

## 2025-02-12 DIAGNOSIS — J96.01 ACUTE RESPIRATORY FAILURE WITH HYPOXIA (HCC): ICD-10-CM

## 2025-02-12 DIAGNOSIS — F17.210 CIGARETTE SMOKER ONE HALF PACK A DAY OR LESS: ICD-10-CM

## 2025-02-12 NOTE — PATIENT COMMUNICATION
Received request via: Patient    Was the patient seen in the last year in this department? Yes    Does the patient have an active prescription (recently filled or refills available) for medication(s) requested? No    Pharmacy Name:   [facility]/pharmacy #[id number] - [place] - [address]  [address] [place] [address]  Phone: [phone] Fax: [phone]       Does the patient have intermediate Plus and need 100-day supply? (This applies to ALL medications) Patient does not have SCP

## 2025-02-27 DIAGNOSIS — J96.01 ACUTE RESPIRATORY FAILURE WITH HYPOXIA (HCC): ICD-10-CM

## 2025-02-27 DIAGNOSIS — F17.210 CIGARETTE SMOKER ONE HALF PACK A DAY OR LESS: ICD-10-CM

## 2025-02-27 NOTE — TELEPHONE ENCOUNTER
Received request via: Pharmacy    Was the patient seen in the last year in this department? Yes    Does the patient have an active prescription (recently filled or refills available) for medication(s) requested? Yes.    [facility]    Does the patient have [facility] Plus and need 100-day supply? (This applies to ALL medications) Patient does not have SCP

## 2025-02-28 RX ORDER — TIOTROPIUM BROMIDE INHALATION SPRAY 3.12 UG/1
SPRAY, METERED RESPIRATORY (INHALATION)
Qty: 4 G | Refills: 3 | Status: SHIPPED | OUTPATIENT
Start: 2025-02-28

## 2025-03-07 DIAGNOSIS — E78.2 MIXED HYPERLIPIDEMIA: ICD-10-CM

## 2025-03-07 DIAGNOSIS — F41.9 ANXIETY: ICD-10-CM

## 2025-03-07 DIAGNOSIS — I10 PRIMARY HYPERTENSION: ICD-10-CM

## 2025-03-07 RX ORDER — PROPRANOLOL HYDROCHLORIDE 10 MG/1
10 TABLET ORAL 2 TIMES DAILY
Qty: 180 TABLET | Refills: 3 | Status: SHIPPED | OUTPATIENT
Start: 2025-03-07

## 2025-03-07 RX ORDER — PRAVASTATIN SODIUM 20 MG
20 TABLET ORAL DAILY
Qty: 90 TABLET | Refills: 3 | Status: SHIPPED | OUTPATIENT
Start: 2025-03-07

## 2025-03-07 RX ORDER — HYDROXYZINE HYDROCHLORIDE 25 MG/1
25 TABLET, FILM COATED ORAL 2 TIMES DAILY PRN
Qty: 180 TABLET | Refills: 3 | Status: SHIPPED | OUTPATIENT
Start: 2025-03-07

## 2025-03-07 NOTE — TELEPHONE ENCOUNTER
Received request via: Pharmacy    Was the patient seen in the last year in this department? Yes    Does the patient have an active prescription (recently filled or refills available) for medication(s) requested? No    Pharmacy Name:   [facility]/pharmacy #[id number] - [place] - [address]  [address] [place] [address]  Phone: [phone] Fax: [phone]       Does the patient have residential Plus and need 100-day supply? (This applies to ALL medications) Patient does not have SCP

## 2025-03-18 ENCOUNTER — APPOINTMENT (OUTPATIENT)
Dept: MEDICAL GROUP | Facility: PHYSICIAN GROUP | Age: 65
End: 2025-03-18
Payer: COMMERCIAL

## 2025-03-20 ENCOUNTER — OFFICE VISIT (OUTPATIENT)
Dept: MEDICAL GROUP | Facility: PHYSICIAN GROUP | Age: 65
End: 2025-03-20
Payer: COMMERCIAL

## 2025-03-20 VITALS
WEIGHT: 170 LBS | HEIGHT: 63 IN | SYSTOLIC BLOOD PRESSURE: 108 MMHG | HEART RATE: 68 BPM | BODY MASS INDEX: 30.12 KG/M2 | TEMPERATURE: 97.8 F | DIASTOLIC BLOOD PRESSURE: 62 MMHG | OXYGEN SATURATION: 96 % | RESPIRATION RATE: 20 BRPM

## 2025-03-20 DIAGNOSIS — Z72.0 TOBACCO ABUSE: ICD-10-CM

## 2025-03-20 DIAGNOSIS — Z13.0 SCREENING FOR DEFICIENCY ANEMIA: ICD-10-CM

## 2025-03-20 DIAGNOSIS — G47.34 NOCTURNAL HYPOXEMIA: ICD-10-CM

## 2025-03-20 DIAGNOSIS — R06.83 SNORING: ICD-10-CM

## 2025-03-20 DIAGNOSIS — G25.81 RESTLESS LEGS: ICD-10-CM

## 2025-03-20 DIAGNOSIS — I15.2 HYPERTENSION ASSOCIATED WITH TYPE 2 DIABETES MELLITUS (HCC): ICD-10-CM

## 2025-03-20 DIAGNOSIS — E87.1 HYPONATREMIA: ICD-10-CM

## 2025-03-20 DIAGNOSIS — E11.65 TYPE 2 DIABETES MELLITUS WITH HYPERGLYCEMIA, WITHOUT LONG-TERM CURRENT USE OF INSULIN (HCC): ICD-10-CM

## 2025-03-20 DIAGNOSIS — E11.59 HYPERTENSION ASSOCIATED WITH TYPE 2 DIABETES MELLITUS (HCC): ICD-10-CM

## 2025-03-20 DIAGNOSIS — J43.1 PANLOBULAR EMPHYSEMA (HCC): ICD-10-CM

## 2025-03-20 DIAGNOSIS — E78.5 HYPERLIPIDEMIA ASSOCIATED WITH TYPE 2 DIABETES MELLITUS (HCC): ICD-10-CM

## 2025-03-20 DIAGNOSIS — E11.69 HYPERLIPIDEMIA ASSOCIATED WITH TYPE 2 DIABETES MELLITUS (HCC): ICD-10-CM

## 2025-03-20 PROBLEM — J96.01 ACUTE RESPIRATORY FAILURE WITH HYPOXIA (HCC): Status: RESOLVED | Noted: 2024-12-31 | Resolved: 2025-03-20

## 2025-03-20 PROCEDURE — 99214 OFFICE O/P EST MOD 30 MIN: CPT

## 2025-03-20 PROCEDURE — 3074F SYST BP LT 130 MM HG: CPT

## 2025-03-20 PROCEDURE — 3078F DIAST BP <80 MM HG: CPT

## 2025-03-20 ASSESSMENT — FIBROSIS 4 INDEX: FIB4 SCORE: 5.3

## 2025-03-20 NOTE — PROGRESS NOTES
Verbal consent was acquired by the patient to use [employer] ambient listening note generation during this visit     Subjective:     CC: Diagnoses of Tobacco abuse, Screening for deficiency anemia, Restless legs, Type 2 diabetes mellitus with hyperglycemia, without long-term current use of insulin (HCC), Hyponatremia, Snoring, Hypertension associated with type 2 diabetes mellitus (HCC), Hyperlipidemia associated with type 2 diabetes mellitus (HCC), Nocturnal hypoxemia, and Panlobular emphysema (HCC) were pertinent to this visit.    HPI:   Kelly presents today with    History of Present Illness  The patient is a 64-year-old female who presents for evaluation of smoking cessation, restless legs, snoring, and medication management.    She is currently in the process of discontinuing smoking, with a current consumption of 4 to 5 cigarettes daily. She has not yet initiated Wellbutrin therapy due to concerns about potential side effects impacting her ability to drive or work. She was provided with a pamphlet on smoking cessation during her hospital stay, which resulted in a charge of approximately $ 300, contributing to her financial stress. She has been off work for nearly a month and is attempting to manage her credit card debt.    She reports experiencing leg discomfort at night, which she attributes to her footwear. She has not had recent laboratory tests and has been consuming [employer] drinks containing electrolytes. She has been incorporating bananas into her diet to alleviate leg cramps.    She has been informed of her snoring habit and experiences daytime fatigue. She is uncertain about episodes of apnea. She has been sleeping separately from her  and has been sleeping well. She has not been monitoring her oxygen saturation levels at night. She is still waiting for someone to come and  the oxygen equipment. She does not use it at night or anything. She called them 2 months ago and they had an  appointment with her and they never showed up or called her back. She called them again, but they never responded. She is not paying them any money and wants them to come and get it.    She continues to use the Dulera inhaler, but her insurance does not cover Spiriva. [facility] to [facility] because they were supposed to give it to her. She has not used any inhalers in the past few days.    She has been making dietary modifications, including reducing carbohydrate and sugar intake. She is not currently on any medication for blood glucose control.    She has hypertension, which is well-managed with medication.    She has completed her mammogram and bone scan.    SOCIAL HISTORY  The patient is still working on quitting smoking but currently smokes four or five cigarettes a day.    MEDICATIONS  Current: Dulera inhaler  Discontinued: Spiriva    Current Outpatient Medications   Medication Sig Dispense Refill    pravastatin (PRAVACHOL) 20 MG Tab TAKE 1 TABLET BY MOUTH EVERY DAY 90 Tablet 3    propranolol (INDERAL) 10 MG Tab TAKE 1 TABLET BY MOUTH TWICE A  Tablet 3    hydrOXYzine HCl (ATARAX) 25 MG Tab TAKE 1 TABLET BY MOUTH TWICE A DAY AS NEEDED FOR ANXIETY 180 Tablet 3    tiotropium (SPIRIVA RESPIMAT) 2.5 mcg/Act Aero Soln INHALE 2 INHALATIONS EVERY DAY. 4 g 3    buPROPion SR (WELLBUTRIN-SR) 150 MG TABLET SR 12 HR sustained-release tablet Take 1 Tablet by mouth 2 times a day. 60 Tablet 1    mometasone-formoterol (DULERA) 200-5 MCG/ACT Aerosol Inhale 2 Puffs 2 times a day. 8.8 g 0    nicotine (NICODERM) 14 MG/24HR PATCH 24 HR Place 1 Patch on the skin every 24 hours. 30 Patch 1    irbesartan (AVAPRO) 300 MG Tab TAKE 1 TABLET BY MOUTH EVERY DAY 90 Tablet 3    fluocinonide (LIDEX) 0.05 % Cream AAA twice a day for 2-3 weeks at a time with 1-2 week break in between 120 g 3    tacrolimus (PROTOPIC) 0.1 % Ointment AAA twice a day 30 g 3    amLODIPine (NORVASC) 5 MG Tab TAKE 1 TABLET BY MOUTH EVERY DAY 90 Tablet 3     spironolactone (ALDACTONE) 50 MG Tab TAKE 1 TABLET BY MOUTH EVERY DAY 90 Tablet 3    busPIRone (BUSPAR) 15 MG tablet TAKE 1 TABLET BY MOUTH TWICE A DAY (Patient taking differently: Take 15 mg by mouth at bedtime.) 180 Tablet 3    fluticasone (FLONASE) 50 MCG/ACT nasal spray ADMINISTER 2 SPRAYS INTO AFFECTED NOSTRIL(S) AT BEDTIME. 48 mL 2    FLUoxetine (PROZAC) 10 MG Cap Take 1 Capsule by mouth every day. 90 Capsule 3    esomeprazole (NEXIUM) 40 MG delayed-release capsule Take 1 Capsule by mouth every morning before breakfast. 90 Capsule 3    triamcinolone acetonide (KENALOG) 0.1 % Ointment Apply 1 Application  topically 2 times a day. 30 g 1    estradiol (YUVAFEM) 10 MCG Tab INSERT 1 TABLET INTO THE VAGINA TWO TIMES A WEEK. 24 Tablet 3    sodium chloride ([facility]S SALINE NASAL SPRAY) 0.65 % Solution ADMINISTER 2 SPRAYS INTO AFFECTED NOSTRIL(S) EVERY 2 HOURS AS NEEDED FOR CONGESTION. 30 mL 3    hydrALAZINE (APRESOLINE) 25 MG Tab Take 1 Tablet by mouth 2 times a day as needed (only if bp is 160 or above). 60 Tablet 3    acetaminophen (TYLENOL) 325 MG Tab Take 650 mg by mouth every 6 hours as needed for Mild Pain or Fever.       No current facility-administered medications for this visit.       Problem   Tobacco Abuse   Type 2 Diabetes Mellitus With Hyperglycemia, Without Long-Term Current Use of Insulin ([facility])    This is a chronic condition.  Current medications:  Insulin:   Biguanide:   GLP1-RA:    SGLT-2i:      DPP4-I:   TZD:   Yanna:  Sulfonyluria:     Last A1c: 6.6% 11/8/24  Last Microalb/Cr ratio: 11/19/24  Fasting sugars:  Last diabetic foot exam: 11/19/24  Last retinal eye exam: 11/19/24  ACEi/ARB? Irbesartan 300  Statin? Pravastatin 20 mg daily  Aspirin?   Concomitant HTN? Amlodipine 5 mg daily, hydralazine 25 mg bid prn, spironolactone 50 mg, propranolol 10 mg BID.  Nightly foot checks?       Panlobular Emphysema (Hcc)   Nocturnal Hypoxemia   Hypertension Associated With Type 2 Diabetes Mellitus (Hcc)  "  Hyperlipidemia Associated With Type 2 Diabetes Mellitus (Hcc)   Snoring   Acute Respiratory Failure With Hypoxia (Hcc) (Resolved)     ROS:  Review of Systems   Constitutional:  Positive for malaise/fatigue.   All other systems reviewed and are negative.      Objective:     Exam:  /62 (BP Location: Right arm, Patient Position: Sitting, BP Cuff Size: Adult)   Pulse 68   Temp 36.6 °C (97.8 °F) (Temporal)   Resp 20   Ht 1.6 m (5' 3\")   Wt 77.1 kg (170 lb)   SpO2 96%   BMI 30.11 kg/m²  Body mass index is 30.11 kg/m².    Physical Exam  Vitals reviewed.   Constitutional:       General: She is not in acute distress.     Appearance: Normal appearance. She is not ill-appearing.   HENT:      Head: Normocephalic and atraumatic.   Cardiovascular:      Rate and Rhythm: Normal rate.      Pulses: Normal pulses.   Pulmonary:      Effort: Pulmonary effort is normal. No respiratory distress.   Skin:     General: Skin is warm and dry.      Findings: No rash.   Neurological:      General: No focal deficit present.      Mental Status: She is alert and oriented to person, place, and time.   Psychiatric:         Mood and Affect: Mood normal.         Behavior: Behavior normal.       Assessment & Plan:     64 y.o. female with the following -     Assessment & Plan  1. Smoking cessation.  She is still working on quitting smoking but has reduced her intake to four or five cigarettes a day. She has not started Wellbutrin yet but plans to start it on a weekend to monitor its effects. She is advised to continue her efforts to quit smoking and to start Wellbutrin as planned.    2. Restless legs.  She reports experiencing achy legs, which may be related to her heavy shoes. It was explained that restless legs can sometimes be due to iron or ferritin deficiency. Her blood counts were slightly low, so these will be checked during her lab work.    3. Snoring.  She reports snoring and feeling tired during the day. A home sleep study will " be ordered to evaluate for sleep apnea.    4. Medication management.  She is currently using the Dulera inhaler, which is effective for her. Her insurance will not cover Spiriva, and she has not used it recently. Trelegy Ellipta, which contains three medications in one inhaler, will be considered as an alternative if needed.    5. Diabetes mellitus.  Her A1c levels were in the diabetic range in November. She is advised to limit her carbohydrate and sugar intake. A repeat A1c test will be conducted along with a recheck of her electrolyte levels.    6. Hypertension.  She is currently on medication for high blood pressure, which is well-controlled.    7. Health maintenance.  She has completed her mammogram and bone scan. A lung cancer screening will be ordered.    Follow-up  The patient will follow up in 3 months.    Problem List Items Addressed This Visit       Snoring    Elena 4 (3/20/2025  1:33 PM)     Home sleep study ordered         Relevant Orders    Overnight Home Sleep Study    Hypertension associated with type 2 diabetes mellitus (HCC)    Chronic, stable. 108/62. Continue amlodipine 5 mg daily, irbestartan 300 mg daily, propranolol 10 mg BID, spironolactone 50 mg daily         Hyperlipidemia associated with type 2 diabetes mellitus (HCC)    Chronic, stable. Continue pravastatin 20 mg daily         Nocturnal hypoxemia    Not using home oxygen any more.         Panlobular emphysema (HCC)    Chronic, ongoing. Using dulera BID, has not received spiriva inhaler due to insurance coverage.  Continue ics/laba/lama          Relevant Orders    PULMONARY FUNCTION TESTS -Test requested: Complete Pulmonary Function Test    Type 2 diabetes mellitus with hyperglycemia, without long-term current use of insulin (HCC)    Chrnoic, stable. Not taking anything for this presently, will monitor A1C q6 months. Discussed healthy lifestyle recommendations.          Relevant Orders    HEMOGLOBIN A1C    Hyponatremia    Relevant Orders     Basic Metabolic Panel    Tobacco abuse    Chronic, ongoing. Down to 5 cig/day. Has not started using Wellbutrin for assistance with cessation.         Relevant Orders    REFERRAL TO LUNG CANCER SCREENING PROGRAM     Other Visit Diagnoses         Screening for deficiency anemia        Relevant Orders    CBC WITH DIFFERENTIAL      Restless legs        Relevant Orders    CBC WITH DIFFERENTIAL    IRON/TOTAL IRON BIND    FERRITIN          Patient was educated in proper administration of medication(s) ordered today including safety, possible SE, risks, benefits, rationale and alternatives to therapy.   Supportive care, differential diagnoses, and indications for immediate follow-up discussed with patient.    Pathogenesis of diagnosis discussed including typical length and natural progression.    Instructed to return to clinic or nearest emergency department for any change in condition, further concerns, or worsening of symptoms.  Patient states understanding of the plan of care and discharge instructions.    Return in about 3 months (around 6/20/2025), or if symptoms worsen or fail to improve, for Labs.    Please note that this dictation was created using voice recognition software. I have made every reasonable attempt to correct obvious errors, but I expect that there are errors of grammar and possibly content that I did not discover before finalizing the note.

## 2025-03-20 NOTE — ASSESSMENT & PLAN NOTE
Chronic, stable. 108/62. Continue amlodipine 5 mg daily, irbestartan 300 mg daily, propranolol 10 mg BID, spironolactone 50 mg daily

## 2025-03-20 NOTE — ASSESSMENT & PLAN NOTE
Chronic, ongoing. Down to 5 cig/day. Has not started using Wellbutrin for assistance with cessation.

## 2025-03-20 NOTE — ASSESSMENT & PLAN NOTE
Teresa, stable. Not taking anything for this presently, will monitor A1C q6 months. Discussed healthy lifestyle recommendations.

## 2025-03-20 NOTE — ASSESSMENT & PLAN NOTE
Chronic, ongoing. Using dulera BID, has not received spiriva inhaler due to insurance coverage.  Continue ics/laba/lama

## 2025-03-24 NOTE — Clinical Note
REFERRAL APPROVAL NOTICE         Sent on March 24, 2025                   Kelly Oakley  [address] [place] [address]                   Dear MsAndres Adin,    After a careful review of the medical information and benefit coverage, [facility] has processed your referral. See below for additional details.    If applicable, you must be actively enrolled with your insurance for coverage of the authorized service. If you have any questions regarding your coverage, please contact your insurance directly.    REFERRAL INFORMATION   Referral #:  [id number]  Referred-To Department    Referred-By Provider:  Pulmonary and Sleep Medicine    EUGENIE Duncan   [facility] [place] [facility]      [address]  [address]  [place] [address]  [phone] [address] [facility] [address] [facility]  [place] [address]  [phone]    Referral Start Date:  03/20/2025  Referral End Date:   03/20/2026             SCHEDULING  If you do not already have an appointment, please call [phone] to make an appointment.     MORE INFORMATION  If you do not already have a [employer] account, sign up at: [email or web address]  You can access your medical information, make appointments, see lab results, billing information, and more.  If you have questions regarding this referral, please contact  the [facility] Referrals department at:             [phone]. Monday - Friday 8:00AM - 5:00PM.     Sincerely,    [facility]

## 2025-03-24 NOTE — Clinical Note
REFERRAL APPROVAL NOTICE         Sent on March 24, 2025                   Kelly Oakley  [address] [place] [address]                   Dear MsAndres Adin,    After a careful review of the medical information and benefit coverage, [facility] has processed your referral. See below for additional details.    If applicable, you must be actively enrolled with your insurance for coverage of the authorized service. If you have any questions regarding your coverage, please contact your insurance directly.    REFERRAL INFORMATION   Referral #:  [id number]  Referred-To Department    Referred-By Provider:  Pulmonary and Sleep Medicine    EUGENIE Duncan   [facility]      [address] [facility]  [address]  [place] [address]  [phone] [address] [facility] [address]  [place] [id number] [phone]    Referral Start Date:  03/20/2025  Referral End Date:   03/20/2026             SCHEDULING  If you do not already have an appointment, please call [phone] to make an appointment.     MORE INFORMATION  If you do not already have a [employer] account, sign up at: [email or web address]  You can access your medical information, make appointments, see lab results, billing information, and more.  If you have questions regarding this referral, please contact  the [facility] Referrals department at:             [phone]. Monday - Friday 8:00AM - 5:00PM.     Sincerely,    [facility]

## 2025-03-25 ENCOUNTER — TELEPHONE (OUTPATIENT)
Dept: HEALTH INFORMATION MANAGEMENT | Facility: OTHER | Age: 65
End: 2025-03-25

## 2025-03-25 NOTE — TELEPHONE ENCOUNTER
Called to verify program eligibility/LVM with Direct line for call back/Warm Transfer to Patient Outreach [phone]

## 2025-03-31 ENCOUNTER — TELEPHONE (OUTPATIENT)
Dept: HEALTH INFORMATION MANAGEMENT | Facility: OTHER | Age: 65
End: 2025-03-31

## 2025-04-07 ENCOUNTER — OFFICE VISIT (OUTPATIENT)
Dept: URGENT CARE | Facility: PHYSICIAN GROUP | Age: 65
End: 2025-04-07
Payer: COMMERCIAL

## 2025-04-07 ENCOUNTER — HOSPITAL ENCOUNTER (OUTPATIENT)
Facility: MEDICAL CENTER | Age: 65
End: 2025-04-07
Payer: COMMERCIAL

## 2025-04-07 ENCOUNTER — PATIENT MESSAGE (OUTPATIENT)
Dept: MEDICAL GROUP | Facility: PHYSICIAN GROUP | Age: 65
End: 2025-04-07
Payer: COMMERCIAL

## 2025-04-07 VITALS
OXYGEN SATURATION: 94 % | WEIGHT: 175.9 LBS | DIASTOLIC BLOOD PRESSURE: 72 MMHG | TEMPERATURE: 96.9 F | HEIGHT: 63 IN | SYSTOLIC BLOOD PRESSURE: 126 MMHG | HEART RATE: 60 BPM | RESPIRATION RATE: 18 BRPM | BODY MASS INDEX: 31.17 KG/M2

## 2025-04-07 DIAGNOSIS — R30.0 DYSURIA: ICD-10-CM

## 2025-04-07 LAB
APPEARANCE UR: CLEAR
BILIRUB UR STRIP-MCNC: NEGATIVE MG/DL
COLOR UR AUTO: YELLOW
GLUCOSE UR STRIP.AUTO-MCNC: NEGATIVE MG/DL
KETONES UR STRIP.AUTO-MCNC: NEGATIVE MG/DL
LEUKOCYTE ESTERASE UR QL STRIP.AUTO: NEGATIVE
NITRITE UR QL STRIP.AUTO: NEGATIVE
PH UR STRIP.AUTO: 5.5 [PH] (ref 5–8)
PROT UR QL STRIP: NEGATIVE MG/DL
RBC UR QL AUTO: NEGATIVE
SP GR UR STRIP.AUTO: 1.02
UROBILINOGEN UR STRIP-MCNC: 0.2 MG/DL

## 2025-04-07 PROCEDURE — 81002 URINALYSIS NONAUTO W/O SCOPE: CPT

## 2025-04-07 PROCEDURE — 99214 OFFICE O/P EST MOD 30 MIN: CPT

## 2025-04-07 PROCEDURE — 3078F DIAST BP <80 MM HG: CPT

## 2025-04-07 PROCEDURE — 87086 URINE CULTURE/COLONY COUNT: CPT

## 2025-04-07 PROCEDURE — 3074F SYST BP LT 130 MM HG: CPT

## 2025-04-07 RX ORDER — CIPROFLOXACIN 500 MG/1
500 TABLET, FILM COATED ORAL 2 TIMES DAILY
Qty: 14 TABLET | Refills: 0 | Status: SHIPPED | OUTPATIENT
Start: 2025-04-07 | End: 2025-04-14

## 2025-04-07 RX ORDER — PHENAZOPYRIDINE HYDROCHLORIDE 100 MG/1
100 TABLET, FILM COATED ORAL 3 TIMES DAILY PRN
Qty: 6 TABLET | Refills: 0 | Status: SHIPPED | OUTPATIENT
Start: 2025-04-07

## 2025-04-07 ASSESSMENT — ENCOUNTER SYMPTOMS
MYALGIAS: 0
VOMITING: 0
SORE THROAT: 0
ABDOMINAL PAIN: 0
CHILLS: 0
COUGH: 0
FEVER: 0
NAUSEA: 0
SHORTNESS OF BREATH: 0
DIARRHEA: 0
HEADACHES: 0

## 2025-04-07 ASSESSMENT — FIBROSIS 4 INDEX: FIB4 SCORE: 5.3

## 2025-04-08 NOTE — PROGRESS NOTES
Subjective:   Kelly Oakley is a 64 y.o. female who presents for UTI (Frequent urination,burning,painful,x1 week)      UTI  This is a new problem. The current episode started 1 to 4 weeks ago. The problem has been gradually worsening. Associated symptoms include urinary symptoms. Pertinent negatives include no abdominal pain, chest pain, chills, congestion, coughing, fever, headaches, myalgias, nausea, rash, sore throat or vomiting. She has tried drinking for the symptoms. The treatment provided no relief.       Review of Systems   Constitutional:  Negative for chills, fever and malaise/fatigue.   HENT:  Negative for congestion, ear pain, hearing loss and sore throat.    Respiratory:  Negative for cough and shortness of breath.    Cardiovascular:  Negative for chest pain.   Gastrointestinal:  Negative for abdominal pain, diarrhea, nausea and vomiting.   Genitourinary:  Positive for dysuria, frequency and urgency.   Musculoskeletal:  Negative for myalgias.   Skin:  Negative for rash.   Neurological:  Negative for headaches.       Allergies   Allergen Reactions    Macrodantin [Nitrofurantoin Macrocrystal] Anaphylaxis     Prescription > 10 years ago    Pcn [Penicillins] Anaphylaxis     Prescription > 10 years ago    Doxycycline Itching    Bactrim Vomiting    Clarithromycin Vomiting    Hydrochlorothiazide      Hyponatremia    Omnicef Itching       Patient Active Problem List    Diagnosis Date Noted    Hyponatremia 12/31/2024    Influenza A 12/31/2024    Tobacco abuse 12/31/2024    Pain and swelling of lower leg, left 11/05/2024    Lesion of skin of face 10/11/2024    Small intestinal bacterial overgrowth (SIBO) 08/15/2024    Generalized abdominal pain 05/28/2024    Allergy to environmental factors 05/28/2024    Type 2 diabetes mellitus with hyperglycemia, without long-term current use of insulin (HCC) 01/10/2024    Panlobular emphysema (HCC) 10/26/2023    Ruptured varicose vein 10/26/2023    Varicose veins of both  lower extremities with pain 10/26/2023    Herpes simplex 03/28/2023    Nicotine dependence, uncomplicated (Current Smoker) 02/27/2023    Obesity due to excess calories with serious comorbidity 02/27/2023    Postmenopausal atrophic vaginitis 10/20/2022    Obesity (BMI 30-39.9) 10/20/2022    Allergic sinusitis 12/30/2021    Anxiety 11/30/2021    Diarrhea 09/24/2020    GERD (gastroesophageal reflux disease) 05/05/2017    Osteopenia 11/20/2015    Nocturnal hypoxemia 10/15/2015    Adrenal adenoma     Hypertension associated with type 2 diabetes mellitus (HCC) 09/11/2015    Hyperlipidemia associated with type 2 diabetes mellitus (HCC) 09/11/2015    Cigarette smoker one half pack a day or less 05/28/2015    Atherosclerosis of arteries     Snoring 08/02/2013    Lichen planus 11/02/2012    Briseno's esophagus 11/02/2012       Current Outpatient Medications Ordered in Epic   Medication Sig Dispense Refill    ciprofloxacin (CIPRO) 500 MG Tab Take 1 Tablet by mouth 2 times a day for 7 days. 14 Tablet 0    phenazopyridine (PYRIDIUM) 100 MG Tab Take 1 Tablet by mouth 3 times a day as needed for Moderate Pain. 6 Tablet 0    pravastatin (PRAVACHOL) 20 MG Tab TAKE 1 TABLET BY MOUTH EVERY DAY 90 Tablet 3    propranolol (INDERAL) 10 MG Tab TAKE 1 TABLET BY MOUTH TWICE A  Tablet 3    hydrOXYzine HCl (ATARAX) 25 MG Tab TAKE 1 TABLET BY MOUTH TWICE A DAY AS NEEDED FOR ANXIETY 180 Tablet 3    tiotropium (SPIRIVA RESPIMAT) 2.5 mcg/Act Aero Soln INHALE 2 INHALATIONS EVERY DAY. 4 g 3    buPROPion SR (WELLBUTRIN-SR) 150 MG TABLET SR 12 HR sustained-release tablet Take 1 Tablet by mouth 2 times a day. 60 Tablet 1    mometasone-formoterol (DULERA) 200-5 MCG/ACT Aerosol Inhale 2 Puffs 2 times a day. 8.8 g 0    nicotine (NICODERM) 14 MG/24HR PATCH 24 HR Place 1 Patch on the skin every 24 hours. 30 Patch 1    irbesartan (AVAPRO) 300 MG Tab TAKE 1 TABLET BY MOUTH EVERY DAY 90 Tablet 3    fluocinonide (LIDEX) 0.05 % Cream AAA twice a day for  2-3 weeks at a time with 1-2 week break in between 120 g 3    tacrolimus (PROTOPIC) 0.1 % Ointment AAA twice a day 30 g 3    amLODIPine (NORVASC) 5 MG Tab TAKE 1 TABLET BY MOUTH EVERY DAY 90 Tablet 3    spironolactone (ALDACTONE) 50 MG Tab TAKE 1 TABLET BY MOUTH EVERY DAY 90 Tablet 3    busPIRone (BUSPAR) 15 MG tablet TAKE 1 TABLET BY MOUTH TWICE A DAY (Patient taking differently: Take 15 mg by mouth at bedtime.) 180 Tablet 3    fluticasone (FLONASE) 50 MCG/ACT nasal spray ADMINISTER 2 SPRAYS INTO AFFECTED NOSTRIL(S) AT BEDTIME. 48 mL 2    FLUoxetine (PROZAC) 10 MG Cap Take 1 Capsule by mouth every day. 90 Capsule 3    esomeprazole (NEXIUM) 40 MG delayed-release capsule Take 1 Capsule by mouth every morning before breakfast. 90 Capsule 3    triamcinolone acetonide (KENALOG) 0.1 % Ointment Apply 1 Application  topically 2 times a day. 30 g 1    estradiol (YUVAFEM) 10 MCG Tab INSERT 1 TABLET INTO THE VAGINA TWO TIMES A WEEK. 24 Tablet 3    sodium chloride ([employer]S SALINE NASAL SPRAY) 0.65 % Solution ADMINISTER 2 SPRAYS INTO AFFECTED NOSTRIL(S) EVERY 2 HOURS AS NEEDED FOR CONGESTION. 30 mL 3    hydrALAZINE (APRESOLINE) 25 MG Tab Take 1 Tablet by mouth 2 times a day as needed (only if bp is 160 or above). 60 Tablet 3    acetaminophen (TYLENOL) 325 MG Tab Take 650 mg by mouth every 6 hours as needed for Mild Pain or Fever.       No current Epic-ordered facility-administered medications on file.       Past Surgical History:   Procedure Laterality Date    PB KNEE SCOPE,DIAGNOSTIC Right 2/3/2021    Procedure: ARTHROSCOPY, KNEE;  Surgeon: Guillermo Wyman M.D.;  Location: [facility];  Service: Orthopedics    MENISCECTOMY, KNEE, MEDIAL Right 2/3/2021    Procedure: MENISCECTOMY, KNEE, MEDIAL - FOR PARTIAL LATERAL;  Surgeon: Guillermo Wyman M.D.;  Location: [facility];  Service: Orthopedics    CHONDROPLASTY Right 2/3/2021    Procedure: CHONDROPLASTY - AND WILBURN;  Surgeon: Guillermo Wyman M.D.;  [facility];  Service: Orthopedics    BREAST BIOPSY  1980's    benign left breast 35 years ago    ABDOMINAL HYSTERECTOMY TOTAL      mennorrogia    APPENDECTOMY      CHOLECYSTECTOMY      COLON RESECTION      Polyp removal    ENDOMETRIAL ABLATION      PRIMARY C SECTION      TONSILLECTOMY      TUBAL COAGULATION LAPAROSCOPIC BILATERAL      US-NEEDLE CORE BX-BREAST PANEL         Social History     Tobacco Use    Smoking status: Some Days     Current packs/day: 0.50     Average packs/day: 0.5 packs/day for 40.0 years (20.0 ttl pk-yrs)     Types: Cigarettes    Smokeless tobacco: Never   Vaping Use    Vaping status: Never Used   Substance Use Topics    Alcohol use: Not Currently     Alcohol/week: 0.6 oz     Types: 1 Standard drinks or equivalent per week    Drug use: No       family history includes Arthritis in her paternal aunt and sister; Cancer in her maternal aunt, mother, and paternal aunt; Diabetes in her mother; Genetic Disorder in her maternal grandmother and paternal aunt; Heart Disease in her maternal grandfather, maternal grandmother, mother, and paternal grandfather; Hyperlipidemia in her maternal grandfather, maternal grandmother, mother, paternal grandfather, and paternal grandmother; Hypertension in her maternal grandfather, maternal grandmother, mother, paternal grandfather, and paternal grandmother; Lung Disease in her father; Psychiatric Illness in her father and sister; Stroke in her maternal grandmother and paternal grandmother.     Problem list, medications, and allergies reviewed by myself today in Epic.     Objective:   /72   Pulse 60   Temp 36.1 °C (96.9 °F) (Temporal)   Resp 18   Ht 1.6 m (5' 3\")   Wt 79.8 kg (175 lb 14.4 oz)   SpO2 94%   BMI 31.16 kg/m²     Physical Exam  Vitals and nursing note reviewed.   Constitutional:       General: She is not in acute distress.     Appearance: Normal appearance. She is not ill-appearing.   HENT:      Head: Normocephalic and atraumatic.      " Right Ear: Tympanic membrane normal.      Left Ear: Tympanic membrane normal.      Nose: Nose normal.      Mouth/Throat:      Mouth: Mucous membranes are moist.   Eyes:      Conjunctiva/sclera: Conjunctivae normal.   Cardiovascular:      Rate and Rhythm: Normal rate.      Heart sounds: Normal heart sounds.   Pulmonary:      Effort: Pulmonary effort is normal.   Abdominal:      General: Abdomen is flat.      Palpations: Abdomen is soft.      Tenderness: There is no abdominal tenderness. There is no right CVA tenderness, left CVA tenderness or guarding.   Genitourinary:     Vagina: No vaginal discharge.   Musculoskeletal:         General: Normal range of motion.      Cervical back: Normal range of motion.   Skin:     General: Skin is warm and dry.      Capillary Refill: Capillary refill takes less than 2 seconds.   Neurological:      Mental Status: She is alert and oriented to person, place, and time.   Psychiatric:         Mood and Affect: Mood normal.         Behavior: Behavior normal.       Results for orders placed or performed in visit on 04/07/25   POCT Urinalysis    Collection Time: 04/07/25  5:56 PM   Result Value Ref Range    POC Color yellow Negative    POC Appearance clear Negative    POC Glucose negative Negative mg/dL    POC Bilirubin negative Negative mg/dL    POC Ketones negative Negative mg/dL    POC Specific Gravity 1.020 <1.005 - >1.030    POC Blood negative Negative    POC Urine PH 5.5 5.0 - 8.0    POC Protein negative Negative mg/dL    POC Urobiligen 0.2 Negative (0.2) mg/dL    POC Nitrites negative Negative    POC Leukocyte Esterase negative Negative     *Note: Due to a large number of results and/or encounters for the requested time period, some results have not been displayed. A complete set of results can be found in Results Review.       Assessment/Plan:     1. Dysuria  POCT Urinalysis    URINE CULTURE(NEW)    ciprofloxacin (CIPRO) 500 MG Tab    phenazopyridine (PYRIDIUM) 100 MG Tab         After assessment patient's symptoms do correlate with possible UTI.  We did perform a UA in office which showed no significant abnormalities.  I will send urine for culture at this time.  Patient will be contacted about culture results.  Patient reports that she has had UTIs in the past and the symptoms all feel very similar and at this time I did provide patient prescriptions for ciprofloxacin and Pyridium.  Patient instructed to take these as prescribed and continued increased fluids at this time.  Patient instructed to monitor for any worsening signs and symptoms of any other concerns patient was instructed to return to urgent care for reevaluation.    Differential diagnosis, natural history, and supportive care discussed. We also reviewed side effects of medication including allergic response, GI upset, tendon injury, rash, sedation etc. Patient and/or guardian voices understanding.      Advised the patient to follow-up with the primary care physician for recheck, reevaluation, and consideration of further management.    Please note that this dictation was created using voice recognition software. I have made every reasonable attempt to correct obvious errors, but I expect that there are errors of grammar and possibly content that I did not discover before finalizing the note.    This note was electronically signed by EUGENIE Bansal

## 2025-04-09 LAB
BACTERIA UR CULT: NORMAL
SIGNIFICANT IND 70042: NORMAL
SITE SITE: NORMAL
SOURCE SOURCE: NORMAL

## 2025-04-10 ENCOUNTER — RESULTS FOLLOW-UP (OUTPATIENT)
Dept: URGENT CARE | Facility: PHYSICIAN GROUP | Age: 65
End: 2025-04-10

## 2025-05-09 NOTE — TELEPHONE ENCOUNTER
Called to verify eligibility program/LVM with Direct line for call back/Warm Transfer to [facility] outbound [phone]

## 2025-05-14 ENCOUNTER — HOSPITAL ENCOUNTER (OUTPATIENT)
Facility: MEDICAL CENTER | Age: 65
End: 2025-05-14
Payer: COMMERCIAL

## 2025-05-14 ENCOUNTER — OFFICE VISIT (OUTPATIENT)
Dept: MEDICAL GROUP | Facility: PHYSICIAN GROUP | Age: 65
End: 2025-05-14
Payer: COMMERCIAL

## 2025-05-14 VITALS
WEIGHT: 173 LBS | HEART RATE: 62 BPM | DIASTOLIC BLOOD PRESSURE: 64 MMHG | TEMPERATURE: 97.4 F | HEIGHT: 64 IN | OXYGEN SATURATION: 96 % | RESPIRATION RATE: 20 BRPM | BODY MASS INDEX: 29.53 KG/M2 | SYSTOLIC BLOOD PRESSURE: 118 MMHG

## 2025-05-14 DIAGNOSIS — E11.65 TYPE 2 DIABETES MELLITUS WITH HYPERGLYCEMIA, WITHOUT LONG-TERM CURRENT USE OF INSULIN (HCC): ICD-10-CM

## 2025-05-14 DIAGNOSIS — R30.0 DYSURIA: ICD-10-CM

## 2025-05-14 DIAGNOSIS — N95.2 VAGINAL ATROPHY: ICD-10-CM

## 2025-05-14 DIAGNOSIS — M79.89 PAIN AND SWELLING OF LOWER LEG, LEFT: Primary | ICD-10-CM

## 2025-05-14 DIAGNOSIS — I15.2 HYPERTENSION ASSOCIATED WITH TYPE 2 DIABETES MELLITUS (HCC): ICD-10-CM

## 2025-05-14 DIAGNOSIS — M79.604 PAIN IN BOTH LOWER EXTREMITIES: ICD-10-CM

## 2025-05-14 DIAGNOSIS — E11.59 HYPERTENSION ASSOCIATED WITH TYPE 2 DIABETES MELLITUS (HCC): ICD-10-CM

## 2025-05-14 DIAGNOSIS — M79.662 PAIN AND SWELLING OF LOWER LEG, LEFT: Primary | ICD-10-CM

## 2025-05-14 DIAGNOSIS — M79.605 PAIN IN BOTH LOWER EXTREMITIES: ICD-10-CM

## 2025-05-14 LAB
APPEARANCE UR: CLEAR
BILIRUB UR STRIP-MCNC: NEGATIVE MG/DL
COLOR UR AUTO: YELLOW
GLUCOSE UR STRIP.AUTO-MCNC: NEGATIVE MG/DL
KETONES UR STRIP.AUTO-MCNC: NEGATIVE MG/DL
LEUKOCYTE ESTERASE UR QL STRIP.AUTO: NEGATIVE
NITRITE UR QL STRIP.AUTO: NEGATIVE
PH UR STRIP.AUTO: 5.5 [PH] (ref 5–8)
PROT UR QL STRIP: NEGATIVE MG/DL
RBC UR QL AUTO: NEGATIVE
SP GR UR STRIP.AUTO: 1.01
UROBILINOGEN UR STRIP-MCNC: 0.2 MG/DL

## 2025-05-14 PROCEDURE — 87086 URINE CULTURE/COLONY COUNT: CPT

## 2025-05-14 PROCEDURE — 81002 URINALYSIS NONAUTO W/O SCOPE: CPT

## 2025-05-14 PROCEDURE — 99214 OFFICE O/P EST MOD 30 MIN: CPT

## 2025-05-14 PROCEDURE — 87480 CANDIDA DNA DIR PROBE: CPT

## 2025-05-14 PROCEDURE — 3078F DIAST BP <80 MM HG: CPT

## 2025-05-14 PROCEDURE — 87660 TRICHOMONAS VAGIN DIR PROBE: CPT

## 2025-05-14 PROCEDURE — 3074F SYST BP LT 130 MM HG: CPT

## 2025-05-14 PROCEDURE — 87510 GARDNER VAG DNA DIR PROBE: CPT

## 2025-05-14 ASSESSMENT — FIBROSIS 4 INDEX: FIB4 SCORE: 5.3

## 2025-05-14 NOTE — PROGRESS NOTES
Chief Complaint   Patient presents with    Leg Pain     Left; Onset 3x months    UTI     Frequency, pressure; 1x month- UC/FV        HPI:  Symptom onset: >1 months ago   Current symptoms: Painful, urgent, frequent voids. No blood noted in urine.  Since onset symptoms are: Unchanged  Treatments tried: OTC antispasm med.  Associated symptoms: Negative for fever, flank pain, nausea and vomiting, vaginal discharge, pelvic pain.  History is positive for frequent UTI.     History of Present Illness  The patient presents for evaluation of leg swelling, urinary symptoms, and blood pressure management.    Leg Swelling  - Reports leg discomfort with increased swelling and cramp-like pain for over a month, worsening at night and disrupting sleep.  - Right leg swelling has started.  - Pain rated 11/10 at worst.  - Managed with Advil and compression socks.  - No history of blood clots; ultrasound in 11/2024 was negative.  - Suspects amlodipine may contribute to swelling.  - History of vein closure procedure.  - Physical activities like climbing ladders and lifting exacerbate leg pain.  - Diagnosed with Baker's cyst.    Urinary Symptoms  - Intermittent bladder discomfort since 04/07/2025, suspected bladder infection.  - Urgent care test was negative.  - Experiences burning and pain during urination intermittently.  - Not taking medication for symptoms; prescribed Pyridium but did not take it.  - Frequently changes pad.  - Describes sensation similar to soap exposure to vagina.  - Abstained from sexual activity and not using estrogen cream.  - Prescribed antibiotics at urgent care but did not take them due to negative test results.    Supplemental information: Currently on amlodipine 5 mg and BuSpar at night. Continues to smoke, attempting to quit, typically smokes a few cigarettes in the morning. Feeling down recently due to leg concerns and other stressors. Considering snf but hesitant due to health issues.    SOCIAL  "HISTORY  Continues to smoke, attempting to quit, typically smokes a few cigarettes in the morning.      ROS:  Denies fever, chills, vomiting or abdominal pain.     OBJECTIVE:  /64 (BP Location: Right arm, Patient Position: Sitting, BP Cuff Size: Adult)   Pulse 62   Temp 36.3 °C (97.4 °F) (Temporal)   Resp 20   Ht 1.613 m (5' 3.5\")   Wt 78.5 kg (173 lb)   SpO2 96%   Gen: Alert, NAD.  Chest: Lungs clear to auscultation, CV RRR.  Abdomen: Soft, tender in suprapubic region. No CVAT. Normal bowel sounds.     Lab Results   Component Value Date    POCCOLOR yellow 04/07/2025    POCAPPEAR clear 04/07/2025    POCLEUKEST negative 04/07/2025    POCNITRITE negative 04/07/2025    POCUROBILIGE 0.2 04/07/2025    POCPROTEIN negative 04/07/2025    POCURPH 5.5 04/07/2025    POCBLOOD negative 04/07/2025    POCSPGRV 1.020 04/07/2025    POCKETONES negative 04/07/2025    POCBILIRUBIN negative 04/07/2025    POCGLUCUA negative 04/07/2025          ASSESSMENT/PLAN:     1. Pain and swelling of lower leg, left    2. Pain in both lower extremities    3. Type 2 diabetes mellitus with hyperglycemia, without long-term current use of insulin (HCC)    4. Dysuria    5. Vaginal atrophy    6. Hypertension associated with type 2 diabetes mellitus (HCC)       Problem List Items Addressed This Visit       Hypertension associated with type 2 diabetes mellitus (HCC)    Chronic, stable. 118/64 in clinic. Noticing swelling to LLE, trace RLE  Will hold amlodipine to evaluate swelling  Wearing compression socks daily.  Continue spiroloactione, propranolol 10 mg bid, irbesartan 300 mg daily, hold amlodipine 5 mg daily  Continue home bp evaluation         Type 2 diabetes mellitus with hyperglycemia, without long-term current use of insulin (HCC)    Pain and swelling of lower leg, left - Primary    Pain, swelling LLE, erythema to lower calf. Wearing compression socks. Reports occasionally pain 11/10.  Trace right LE swelling.  Discussed holding " amlodipine due to swelling.         Relevant Orders    US-EXTREMITY VENOUS LOWER UNILAT LEFT     Other Visit Diagnoses         Pain in both lower extremities        Relevant Orders    US-EXTREMITY VENOUS LOWER UNILAT LEFT    US-EXTREMITY ARTERY LOWER BILAT W/GERA (COMBO)      Dysuria        Relevant Orders    POCT Urinalysis    Urine Culture    VAGINAL PATHOGENS DNA PANEL      Vaginal atrophy        Relevant Orders    VAGINAL PATHOGENS DNA PANEL             1. Abnormal urine dipstick in office. Urine sent for culture. Start antibiotics.  2. Provided education to drink plenty of fluids, wipe front to back every void and bowel movement.   3. Return to clinic if symptoms not improving within 3-4 days or in case of vomiting, fever, increasing pain.    Assessment & Plan  1. Leg swelling: Chronic.  - Suspected amlodipine-related. Blood pressure normal today.  - Continue compression socks and physical activity.  - Discontinue amlodipine, monitor improvement.  - Order leg ultrasound and GERA test.    2. Urinary symptoms: Acute.  - Possible bacterial infection, yeast overgrowth, or menopause-related vaginal dryness.  - Perform vaginal swab and urine sample analysis.    3. Blood pressure management: Stable.  - Discontinue amlodipine, monitor improvement.  - Regular blood pressure checks advised.    4. Smoking cessation.  - Encourage continued efforts to quit smoking.  - Provide support and resources.    Follow-up  - Perform lab work including A1c and sodium levels on Saturday.

## 2025-05-14 NOTE — ASSESSMENT & PLAN NOTE
Chronic, stable. 118/64 in clinic. Noticing swelling to LLE, trace RLE  Will hold amlodipine to evaluate swelling  Wearing compression socks daily.  Continue spiroloactione, propranolol 10 mg bid, irbesartan 300 mg daily, hold amlodipine 5 mg daily  Continue home bp evaluation

## 2025-05-14 NOTE — ASSESSMENT & PLAN NOTE
Pain, swelling LLE, erythema to lower calf. Wearing compression socks. Reports occasionally pain 11/10.  Trace right LE swelling.  Discussed holding amlodipine due to swelling.

## 2025-05-15 ENCOUNTER — RESULTS FOLLOW-UP (OUTPATIENT)
Dept: MEDICAL GROUP | Facility: PHYSICIAN GROUP | Age: 65
End: 2025-05-15

## 2025-05-15 DIAGNOSIS — N76.0 BV (BACTERIAL VAGINOSIS): Primary | ICD-10-CM

## 2025-05-15 DIAGNOSIS — B96.89 BV (BACTERIAL VAGINOSIS): Primary | ICD-10-CM

## 2025-05-15 LAB
CANDIDA DNA VAG QL PROBE+SIG AMP: NEGATIVE
G VAGINALIS DNA VAG QL PROBE+SIG AMP: POSITIVE
T VAGINALIS DNA VAG QL PROBE+SIG AMP: NEGATIVE

## 2025-05-15 RX ORDER — METRONIDAZOLE 500 MG/1
500 TABLET ORAL 2 TIMES DAILY
Qty: 14 TABLET | Refills: 0 | Status: SHIPPED | OUTPATIENT
Start: 2025-05-15 | End: 2025-05-22

## 2025-05-17 ENCOUNTER — HOSPITAL ENCOUNTER (OUTPATIENT)
Dept: LAB | Facility: MEDICAL CENTER | Age: 65
End: 2025-05-17
Payer: COMMERCIAL

## 2025-05-17 DIAGNOSIS — E87.1 HYPONATREMIA: ICD-10-CM

## 2025-05-17 DIAGNOSIS — E11.65 TYPE 2 DIABETES MELLITUS WITH HYPERGLYCEMIA, WITHOUT LONG-TERM CURRENT USE OF INSULIN (HCC): ICD-10-CM

## 2025-05-17 DIAGNOSIS — G25.81 RESTLESS LEGS: ICD-10-CM

## 2025-05-17 DIAGNOSIS — Z13.0 SCREENING FOR DEFICIENCY ANEMIA: ICD-10-CM

## 2025-05-17 LAB
BACTERIA UR CULT: NORMAL
BASOPHILS # BLD AUTO: 0.9 % (ref 0–1.8)
BASOPHILS # BLD: 0.05 K/UL (ref 0–0.12)
EOSINOPHIL # BLD AUTO: 0.2 K/UL (ref 0–0.51)
EOSINOPHIL NFR BLD: 3.5 % (ref 0–6.9)
ERYTHROCYTE [DISTWIDTH] IN BLOOD BY AUTOMATED COUNT: 43 FL (ref 35.9–50)
EST. AVERAGE GLUCOSE BLD GHB EST-MCNC: 137 MG/DL
HBA1C MFR BLD: 6.4 % (ref 4–5.6)
HCT VFR BLD AUTO: 40.6 % (ref 37–47)
HGB BLD-MCNC: 13.5 G/DL (ref 12–16)
IMM GRANULOCYTES # BLD AUTO: 0.02 K/UL (ref 0–0.11)
IMM GRANULOCYTES NFR BLD AUTO: 0.3 % (ref 0–0.9)
LYMPHOCYTES # BLD AUTO: 1.66 K/UL (ref 1–4.8)
LYMPHOCYTES NFR BLD: 28.7 % (ref 22–41)
MCH RBC QN AUTO: 29.9 PG (ref 27–33)
MCHC RBC AUTO-ENTMCNC: 33.3 G/DL (ref 32.2–35.5)
MCV RBC AUTO: 90 FL (ref 81.4–97.8)
MONOCYTES # BLD AUTO: 0.75 K/UL (ref 0–0.85)
MONOCYTES NFR BLD AUTO: 13 % (ref 0–13.4)
NEUTROPHILS # BLD AUTO: 3.11 K/UL (ref 1.82–7.42)
NEUTROPHILS NFR BLD: 53.6 % (ref 44–72)
NRBC # BLD AUTO: 0 K/UL
NRBC BLD-RTO: 0 /100 WBC (ref 0–0.2)
PLATELET # BLD AUTO: 206 K/UL (ref 164–446)
PMV BLD AUTO: 10.9 FL (ref 9–12.9)
RBC # BLD AUTO: 4.51 M/UL (ref 4.2–5.4)
SIGNIFICANT IND 70042: NORMAL
SITE SITE: NORMAL
SOURCE SOURCE: NORMAL
WBC # BLD AUTO: 5.8 K/UL (ref 4.8–10.8)

## 2025-05-17 PROCEDURE — 82728 ASSAY OF FERRITIN: CPT

## 2025-05-17 PROCEDURE — 85025 COMPLETE CBC W/AUTO DIFF WBC: CPT

## 2025-05-17 PROCEDURE — 83550 IRON BINDING TEST: CPT

## 2025-05-17 PROCEDURE — 83540 ASSAY OF IRON: CPT

## 2025-05-17 PROCEDURE — 36415 COLL VENOUS BLD VENIPUNCTURE: CPT

## 2025-05-17 PROCEDURE — 80048 BASIC METABOLIC PNL TOTAL CA: CPT

## 2025-05-17 PROCEDURE — 83036 HEMOGLOBIN GLYCOSYLATED A1C: CPT

## 2025-05-18 LAB
ANION GAP SERPL CALC-SCNC: 10 MMOL/L (ref 7–16)
BUN SERPL-MCNC: 19 MG/DL (ref 8–22)
CALCIUM SERPL-MCNC: 9.4 MG/DL (ref 8.5–10.5)
CHLORIDE SERPL-SCNC: 104 MMOL/L (ref 96–112)
CO2 SERPL-SCNC: 25 MMOL/L (ref 20–33)
CREAT SERPL-MCNC: 0.96 MG/DL (ref 0.5–1.4)
FERRITIN SERPL-MCNC: 435 NG/ML (ref 10–291)
GFR SERPLBLD CREATININE-BSD FMLA CKD-EPI: 66 ML/MIN/1.73 M 2
GLUCOSE SERPL-MCNC: 111 MG/DL (ref 65–99)
IRON SATN MFR SERPL: 35 % (ref 15–55)
IRON SERPL-MCNC: 97 UG/DL (ref 40–170)
POTASSIUM SERPL-SCNC: 4.6 MMOL/L (ref 3.6–5.5)
SODIUM SERPL-SCNC: 139 MMOL/L (ref 135–145)
TIBC SERPL-MCNC: 275 UG/DL (ref 250–450)
UIBC SERPL-MCNC: 178 UG/DL (ref 110–370)

## 2025-05-19 ENCOUNTER — RESULTS FOLLOW-UP (OUTPATIENT)
Dept: MEDICAL GROUP | Facility: PHYSICIAN GROUP | Age: 65
End: 2025-05-19

## 2025-05-19 DIAGNOSIS — R79.89 ELEVATED FERRITIN: Primary | ICD-10-CM

## 2025-05-25 ENCOUNTER — HOSPITAL ENCOUNTER (OUTPATIENT)
Dept: RADIOLOGY | Facility: MEDICAL CENTER | Age: 65
End: 2025-05-25
Payer: COMMERCIAL

## 2025-05-25 DIAGNOSIS — M79.604 PAIN IN BOTH LOWER EXTREMITIES: ICD-10-CM

## 2025-05-25 DIAGNOSIS — M79.89 PAIN AND SWELLING OF LOWER LEG, LEFT: ICD-10-CM

## 2025-05-25 DIAGNOSIS — M79.662 PAIN AND SWELLING OF LOWER LEG, LEFT: ICD-10-CM

## 2025-05-25 DIAGNOSIS — M79.605 PAIN IN BOTH LOWER EXTREMITIES: ICD-10-CM

## 2025-05-25 PROCEDURE — 93971 EXTREMITY STUDY: CPT | Mod: 26,LT | Performed by: INTERNAL MEDICINE

## 2025-05-25 PROCEDURE — 93971 EXTREMITY STUDY: CPT | Mod: LT

## 2025-05-30 NOTE — TELEPHONE ENCOUNTER
Outcome: Left Message    Please transfer to [facility] Outbound [phone] when patient returns call.    Attempt 3

## 2025-06-05 ENCOUNTER — PATIENT MESSAGE (OUTPATIENT)
Dept: MEDICAL GROUP | Facility: PHYSICIAN GROUP | Age: 65
End: 2025-06-05
Payer: COMMERCIAL

## 2025-06-09 ENCOUNTER — APPOINTMENT (OUTPATIENT)
Dept: MEDICAL GROUP | Facility: PHYSICIAN GROUP | Age: 65
End: 2025-06-09
Payer: COMMERCIAL

## 2025-06-10 DIAGNOSIS — F41.9 ANXIETY: ICD-10-CM

## 2025-06-10 DIAGNOSIS — I10 PRIMARY HYPERTENSION: ICD-10-CM

## 2025-06-10 RX ORDER — SPIRONOLACTONE 50 MG/1
50 TABLET, FILM COATED ORAL DAILY
Qty: 90 TABLET | Refills: 3 | Status: SHIPPED | OUTPATIENT
Start: 2025-06-10

## 2025-06-10 RX ORDER — AMLODIPINE BESYLATE 5 MG/1
5 TABLET ORAL DAILY
Qty: 90 TABLET | Refills: 3 | Status: SHIPPED | OUTPATIENT
Start: 2025-06-10

## 2025-06-10 RX ORDER — FLUOXETINE 10 MG/1
10 CAPSULE ORAL DAILY
Qty: 90 CAPSULE | Refills: 3 | Status: SHIPPED | OUTPATIENT
Start: 2025-06-10

## 2025-06-10 NOTE — TELEPHONE ENCOUNTER
Received request via: Pharmacy    Was the patient seen in the last year in this department? Yes    Does the patient have an active prescription (recently filled or refills available) for medication(s) requested? No    Pharmacy Name:   [facility]/pharmacy #[id number] - [place] - [address]  [address] [place] [address]  Phone: [phone] Fax: [phone]       Does the patient have prison Plus and need 100-day supply? (This applies to ALL medications) Patient does not have SCP

## 2025-06-11 ENCOUNTER — OFFICE VISIT (OUTPATIENT)
Dept: URGENT CARE | Facility: PHYSICIAN GROUP | Age: 65
End: 2025-06-11
Payer: COMMERCIAL

## 2025-06-11 VITALS
RESPIRATION RATE: 14 BRPM | HEART RATE: 80 BPM | BODY MASS INDEX: 29.37 KG/M2 | OXYGEN SATURATION: 95 % | SYSTOLIC BLOOD PRESSURE: 118 MMHG | TEMPERATURE: 96.6 F | WEIGHT: 172 LBS | HEIGHT: 64 IN | DIASTOLIC BLOOD PRESSURE: 66 MMHG

## 2025-06-11 DIAGNOSIS — R10.13 EPIGASTRIC PAIN: ICD-10-CM

## 2025-06-11 DIAGNOSIS — A08.4 VIRAL GASTROENTERITIS: ICD-10-CM

## 2025-06-11 PROCEDURE — 3078F DIAST BP <80 MM HG: CPT | Performed by: NURSE PRACTITIONER

## 2025-06-11 PROCEDURE — 3074F SYST BP LT 130 MM HG: CPT | Performed by: NURSE PRACTITIONER

## 2025-06-11 PROCEDURE — 99213 OFFICE O/P EST LOW 20 MIN: CPT | Performed by: NURSE PRACTITIONER

## 2025-06-11 RX ORDER — SUCRALFATE 1 G/1
1 TABLET ORAL
Qty: 30 TABLET | Refills: 0 | Status: SHIPPED | OUTPATIENT
Start: 2025-06-11

## 2025-06-11 RX ORDER — ONDANSETRON 4 MG/1
4 TABLET, ORALLY DISINTEGRATING ORAL EVERY 8 HOURS PRN
Qty: 10 TABLET | Refills: 0 | Status: SHIPPED | OUTPATIENT
Start: 2025-06-11

## 2025-06-11 RX ORDER — DICYCLOMINE HCL 20 MG
20 TABLET ORAL EVERY 6 HOURS PRN
Qty: 30 TABLET | Refills: 0 | Status: SHIPPED | OUTPATIENT
Start: 2025-06-11

## 2025-06-11 ASSESSMENT — ENCOUNTER SYMPTOMS
RESPIRATORY NEGATIVE: 1
CARDIOVASCULAR NEGATIVE: 1
ABDOMINAL PAIN: 1
VOMITING: 0
NAUSEA: 1
CHILLS: 1
SHORTNESS OF BREATH: 0
BLOOD IN STOOL: 0
DIARRHEA: 1
CONSTIPATION: 0

## 2025-06-11 ASSESSMENT — VISUAL ACUITY: OU: 1

## 2025-06-11 ASSESSMENT — FIBROSIS 4 INDEX: FIB4 SCORE: 3.68

## 2025-06-11 NOTE — LETTER
June 11, 2025         Patient: Kelly Oakley   YOB: 1960   Date of Visit: 6/11/2025           To Whom it May Concern:    Kelly Oakley was seen in my clinic on 6/11/2025 due to illness. Due to medical necessity, please excuse patient from work 6/9, 6/10, and 6/11 as well as 6/12 and 6/13.    If you have any questions or concerns, please don't hesitate to call.        Sincerely,         POPPY Zavala.  Electronically Signed

## 2025-06-11 NOTE — PROGRESS NOTES
Subjective:     Kelly Oakley is a 64 y.o. female who presents for GI Problem (Abd Pain, diarrhea, x3d)       GI Problem  This is a new problem. Episode onset: Monday. Associated symptoms include abdominal pain (Epigastric), chills and nausea. Pertinent negatives include no chest pain or vomiting.     Denies foreign travel or consumption of any foods or natural sources of water.    History of GERD.  Takes Nexium twice daily.    History of appendectomy hysterectomy and cholecystectomy.    Review of Systems   Constitutional:  Positive for chills.   Respiratory: Negative.  Negative for shortness of breath.    Cardiovascular: Negative.  Negative for chest pain.   Gastrointestinal:  Positive for abdominal pain (Epigastric), diarrhea and nausea. Negative for blood in stool, constipation and vomiting.   Genitourinary: Negative.    All other systems reviewed and are negative.    Refer to HPI for additional details.    During this visit, appropriate PPE was worn, and hand hygiene was performed.    PMH:  has a past medical history of Acute cystitis with hematuria (02/14/2022), Acute respiratory failure with hypoxia (HCC) (12/31/2024), Adrenal nodule (2012), Anemia, Anesthesia, Anxiety, Arrhythmia, Arthritis, Atherosclerosis of arteries (2013), Briseno's  esophagus, Bronchitis (2019), Chest pain at rest (09/11/2015), Chronic obstructive pulmonary disease (HCC) (11/30/2021), Cigarette smoker one half pack a day or less (05/28/2015), Depression, Fatigue (09/11/2015), GERD (gastroesophageal reflux disease), Headache(784.0), Heart burn, Heart murmur, High cholesterol, History of HPV infection, HLD (hyperlipidemia) (09/11/2015), HTN (hypertension) (09/11/2015), IBD (inflammatory bowel disease), Indigestion, 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    MEDS: Current Medications[1]    ALLERGIES: Allergies[2]  SURGHX: Past Surgical History[3]  SOCHX:  reports that she has been smoking cigarettes. She has a 20 pack-year smoking history. She has never used smokeless tobacco. She reports that she does not currently use alcohol after a past usage of about 0.6 oz of alcohol per week. She reports that she does not use drugs.    FH: Per HPI as applicable/pertinent.      Objective:     /66 (BP Location: Right arm, Patient Position: Sitting, BP Cuff Size: Adult)   Pulse 80   Temp 35.9 °C (96.6 °F) (Temporal)   Resp 14   Ht 1.613 m (5' 3.5\")   Wt 78 kg (172 lb)   SpO2 95%   BMI 29.99 kg/m²     Physical Exam  Nursing note reviewed.   Constitutional:       General: She is not in acute distress.     Appearance: She is well-developed. She is not ill-appearing or toxic-appearing.   Eyes:      General: Vision grossly intact.      Extraocular Movements: Extraocular movements intact.   Cardiovascular:      Rate and Rhythm: Normal rate and regular rhythm.      Heart sounds: Normal heart sounds.   Pulmonary:      Effort: Pulmonary effort is normal. No respiratory distress.      Breath sounds: Normal breath sounds. No decreased breath sounds.   Abdominal:      General: Bowel sounds are increased. There is no distension.      Palpations: Abdomen is soft.      Tenderness: There is abdominal tenderness in the epigastric area. There is no guarding or rebound. Negative signs include McBurney's sign.   Musculoskeletal:         General: No deformity. Normal range of motion.      Cervical back: Normal range of motion.   Skin:     General: Skin is warm and dry.      Coloration: Skin is not pale.   Neurological:      Mental Status: She is alert and oriented to person, place, and time.      Motor: No weakness.   Psychiatric:         Behavior: Behavior normal. Behavior is cooperative.     EKG: sinus rhythm 50, no " acute ST abnormalities, no ectopy (compared to EKG 12/31/2024: no significant change)      Assessment/Plan:     1. Viral gastroenteritis  - dicyclomine (BENTYL) 20 MG Tab; Take 1 Tablet by mouth every 6 hours as needed (abdominal cramping).  Dispense: 30 Tablet; Refill: 0  - sucralfate (CARAFATE) 1 GM Tab; Take 1 Tablet by mouth 4 Times a Day,Before Meals and at Bedtime.  Dispense: 30 Tablet; Refill: 0  - ondansetron (ZOFRAN ODT) 4 MG TABLET DISPERSIBLE; Take 1 Tablet by mouth every 8 hours as needed for Nausea/Vomiting. Dissolve in mouth.  Dispense: 10 Tablet; Refill: 0    2. Epigastric pain  - EKG    GI cocktail given. Patient reports improving pain.    EKG normal.    Discussed likely self-limiting viral etiology and expected course and duration of illness. Vital signs stable, afebrile, no acute distress at this time.    Discussed clear liquid and BRAT/bland diets and then advancing as tolerated. Small frequent sips/bites.    Rx as above sent electronically. Continue Nexium.    Monitor. Follow up in about 3 days if symptoms do not improve or sooner if symptoms change or worsen. Warning signs reviewed. Immediate return precautions advised.     Work note provided.    Discharge summary provided via [employer].    Differential diagnosis, natural history, supportive care, over-the-counter symptom management per 's instructions, close monitoring, and indications for immediate follow-up discussed.     All questions answered. Patient agrees with the plan of care.       [1]   Current Outpatient Medications:     dicyclomine (BENTYL) 20 MG Tab, Take 1 Tablet by mouth every 6 hours as needed (abdominal cramping)., Disp: 30 Tablet, Rfl: 0    sucralfate (CARAFATE) 1 GM Tab, Take 1 Tablet by mouth 4 Times a Day,Before Meals and at Bedtime., Disp: 30 Tablet, Rfl: 0    ondansetron (ZOFRAN ODT) 4 MG TABLET DISPERSIBLE, Take 1 Tablet by mouth every 8 hours as needed for Nausea/Vomiting. Dissolve in mouth., Disp: 10 Tablet,  Rfl: 0    FLUoxetine (PROZAC) 10 MG Cap, TAKE 1 CAPSULE BY MOUTH EVERY DAY, Disp: 90 Capsule, Rfl: 3    amLODIPine (NORVASC) 5 MG Tab, TAKE 1 TABLET BY MOUTH EVERY DAY, Disp: 90 Tablet, Rfl: 3    spironolactone (ALDACTONE) 50 MG Tab, TAKE 1 TABLET BY MOUTH EVERY DAY, Disp: 90 Tablet, Rfl: 3    pravastatin (PRAVACHOL) 20 MG Tab, TAKE 1 TABLET BY MOUTH EVERY DAY, Disp: 90 Tablet, Rfl: 3    propranolol (INDERAL) 10 MG Tab, TAKE 1 TABLET BY MOUTH TWICE A DAY, Disp: 180 Tablet, Rfl: 3    hydrOXYzine HCl (ATARAX) 25 MG Tab, TAKE 1 TABLET BY MOUTH TWICE A DAY AS NEEDED FOR ANXIETY, Disp: 180 Tablet, Rfl: 3    tiotropium (SPIRIVA RESPIMAT) 2.5 mcg/Act Aero Soln, INHALE 2 INHALATIONS EVERY DAY., Disp: 4 g, Rfl: 3    buPROPion SR (WELLBUTRIN-SR) 150 MG TABLET SR 12 HR sustained-release tablet, Take 1 Tablet by mouth 2 times a day., Disp: 60 Tablet, Rfl: 1    mometasone-formoterol (DULERA) 200-5 MCG/ACT Aerosol, Inhale 2 Puffs 2 times a day., Disp: 8.8 g, Rfl: 0    nicotine (NICODERM) 14 MG/24HR PATCH 24 HR, Place 1 Patch on the skin every 24 hours., Disp: 30 Patch, Rfl: 1    irbesartan (AVAPRO) 300 MG Tab, TAKE 1 TABLET BY MOUTH EVERY DAY, Disp: 90 Tablet, Rfl: 3    fluocinonide (LIDEX) 0.05 % Cream, AAA twice a day for 2-3 weeks at a time with 1-2 week break in between, Disp: 120 g, Rfl: 3    tacrolimus (PROTOPIC) 0.1 % Ointment, AAA twice a day, Disp: 30 g, Rfl: 3    busPIRone (BUSPAR) 15 MG tablet, TAKE 1 TABLET BY MOUTH TWICE A DAY (Patient taking differently: Take 15 mg by mouth at bedtime.), Disp: 180 Tablet, Rfl: 3    fluticasone (FLONASE) 50 MCG/ACT nasal spray, ADMINISTER 2 SPRAYS INTO AFFECTED NOSTRIL(S) AT BEDTIME., Disp: 48 mL, Rfl: 2    esomeprazole (NEXIUM) 40 MG delayed-release capsule, Take 1 Capsule by mouth every morning before breakfast., Disp: 90 Capsule, Rfl: 3    triamcinolone acetonide (KENALOG) 0.1 % Ointment, Apply 1 Application  topically 2 times a day., Disp: 30 g, Rfl: 1    estradiol (YUVAFEM) 10  MCG Tab, INSERT 1 TABLET INTO THE VAGINA TWO TIMES A WEEK., Disp: 24 Tablet, Rfl: 3    hydrALAZINE (APRESOLINE) 25 MG Tab, Take 1 Tablet by mouth 2 times a day as needed (only if bp is 160 or above)., Disp: 60 Tablet, Rfl: 3    acetaminophen (TYLENOL) 325 MG Tab, Take 650 mg by mouth every 6 hours as needed for Mild Pain or Fever., Disp: , Rfl:   [2]   Allergies  Allergen Reactions    Macrodantin [Nitrofurantoin Macrocrystal] Anaphylaxis     Prescription > 10 years ago    Pcn [Penicillins] Anaphylaxis     Prescription > 10 years ago    Doxycycline Itching    Bactrim Vomiting    Clarithromycin Vomiting    Hydrochlorothiazide      Hyponatremia    Omnicef Itching   [3]   Past Surgical History:  Procedure Laterality Date    PB KNEE SCOPE,DIAGNOSTIC Right 2/3/2021    Procedure: ARTHROSCOPY, KNEE;  Surgeon: Guillermo Wyman M.D.;  Location: [facility];  Service: Orthopedics    MENISCECTOMY, KNEE, MEDIAL Right 2/3/2021    Procedure: MENISCECTOMY, KNEE, MEDIAL - FOR PARTIAL LATERAL;  Surgeon: Guillermo Wyman M.D.;  Location: [facility];  Service: Orthopedics    CHONDROPLASTY Right 2/3/2021    Procedure: CHONDROPLASTY - AND WILBURN;  Surgeon: Guillermo Wyman M.D.;  Location: [facility];  Service: Orthopedics    BREAST BIOPSY  1980's    benign left breast 35 years ago    ABDOMINAL HYSTERECTOMY TOTAL      mennorrogia    APPENDECTOMY      CHOLECYSTECTOMY      COLON RESECTION      Polyp removal    ENDOMETRIAL ABLATION      PRIMARY C SECTION      TONSILLECTOMY      TUBAL COAGULATION LAPAROSCOPIC BILATERAL      US-NEEDLE CORE BX-BREAST PANEL

## 2025-06-12 ENCOUNTER — HOSPITAL ENCOUNTER (OUTPATIENT)
Dept: RADIOLOGY | Facility: MEDICAL CENTER | Age: 65
End: 2025-06-12
Payer: COMMERCIAL

## 2025-06-12 DIAGNOSIS — M79.604 PAIN IN BOTH LOWER EXTREMITIES: ICD-10-CM

## 2025-06-12 DIAGNOSIS — F41.9 ANXIETY: ICD-10-CM

## 2025-06-12 DIAGNOSIS — M79.605 PAIN IN BOTH LOWER EXTREMITIES: ICD-10-CM

## 2025-06-12 PROCEDURE — 93926 LOWER EXTREMITY STUDY: CPT | Mod: 26,LT | Performed by: INTERNAL MEDICINE

## 2025-06-12 PROCEDURE — 93922 UPR/L XTREMITY ART 2 LEVELS: CPT

## 2025-06-12 PROCEDURE — 93922 UPR/L XTREMITY ART 2 LEVELS: CPT | Mod: 26 | Performed by: INTERNAL MEDICINE

## 2025-06-12 PROCEDURE — 93926 LOWER EXTREMITY STUDY: CPT | Mod: LT

## 2025-06-12 RX ORDER — HYDRALAZINE HYDROCHLORIDE 25 MG/1
25 TABLET, FILM COATED ORAL 2 TIMES DAILY PRN
Qty: 60 TABLET | Refills: 3 | Status: SHIPPED | OUTPATIENT
Start: 2025-06-12

## 2025-06-12 NOTE — TELEPHONE ENCOUNTER
Received request via: Pharmacy    Was the patient seen in the last year in this department? Yes    Does the patient have an active prescription (recently filled or refills available) for medication(s) requested? No    Pharmacy Name:   [facility]/pharmacy #[id number] - [place] - [address]  [address] [place] [address]  Phone: [phone] Fax: [phone]       Does the patient have long term Plus and need 100-day supply? (This applies to ALL medications) Patient does not have SCP

## 2025-06-13 ENCOUNTER — RESULTS FOLLOW-UP (OUTPATIENT)
Dept: MEDICAL GROUP | Facility: PHYSICIAN GROUP | Age: 65
End: 2025-06-13

## 2025-06-13 DIAGNOSIS — M79.662 PAIN AND SWELLING OF LOWER LEG, LEFT: Primary | ICD-10-CM

## 2025-06-13 DIAGNOSIS — M79.89 PAIN AND SWELLING OF LOWER LEG, LEFT: Primary | ICD-10-CM

## 2025-06-20 NOTE — Clinical Note
REFERRAL APPROVAL NOTICE         Sent on June 20, 2025                   Kelly Oakley  [address] [place] [address]                   Dear Ms. Oakley,    After a careful review of the medical information and benefit coverage, [facility] has processed your referral. See below for additional details.    If applicable, you must be actively enrolled with your insurance for coverage of the authorized service. If you have any questions regarding your coverage, please contact your insurance directly.    REFERRAL INFORMATION   Referral #:  [id number]  Referred-To Department    Referred-By Provider:  Vascular Surgery    EUGENIE Duncan   Department Of Surgery      [address]  [address]  [place] [address]  [phone] [address], [address]  [place] [id number] [phone]    Referral Start Date:  06/13/2025  Referral End Date:   06/13/2026             SCHEDULING  If you do not already have an appointment, please call [phone] to make an appointment.     MORE INFORMATION  If you do not already have a [email or web address] account, sign up at: [email or web address]  You can access your medical information, make appointments, see lab results, billing information, and more.  If you have questions regarding this referral, please contact  the [facility] Referrals department at:             [phone]. Monday - Friday 8:00AM - 5:00PM.     Sincerely,    [facility]

## 2025-06-25 ENCOUNTER — APPOINTMENT (OUTPATIENT)
Dept: MEDICAL GROUP | Facility: PHYSICIAN GROUP | Age: 65
End: 2025-06-25
Payer: COMMERCIAL

## 2025-07-15 ENCOUNTER — OFFICE VISIT (OUTPATIENT)
Facility: MEDICAL CENTER | Age: 65
End: 2025-07-15
Payer: COMMERCIAL

## 2025-07-15 VITALS
TEMPERATURE: 97 F | OXYGEN SATURATION: 95 % | WEIGHT: 195.77 LBS | HEIGHT: 64 IN | SYSTOLIC BLOOD PRESSURE: 126 MMHG | BODY MASS INDEX: 33.42 KG/M2 | HEART RATE: 60 BPM | DIASTOLIC BLOOD PRESSURE: 62 MMHG

## 2025-07-15 DIAGNOSIS — E78.5 DYSLIPIDEMIA: ICD-10-CM

## 2025-07-15 DIAGNOSIS — Z72.0 TOBACCO USE: ICD-10-CM

## 2025-07-15 DIAGNOSIS — I87.8 VENOUS STASIS: Primary | ICD-10-CM

## 2025-07-15 DIAGNOSIS — R09.89 BRUIT OF RIGHT CAROTID ARTERY: ICD-10-CM

## 2025-07-15 DIAGNOSIS — I10 PRIMARY HYPERTENSION: ICD-10-CM

## 2025-07-15 PROCEDURE — 3078F DIAST BP <80 MM HG: CPT | Performed by: SURGERY

## 2025-07-15 PROCEDURE — 3074F SYST BP LT 130 MM HG: CPT | Performed by: SURGERY

## 2025-07-15 PROCEDURE — 99204 OFFICE O/P NEW MOD 45 MIN: CPT | Performed by: SURGERY

## 2025-07-15 ASSESSMENT — FIBROSIS 4 INDEX: FIB4 SCORE: 3.68

## 2025-07-16 ENCOUNTER — HOSPITAL ENCOUNTER (OUTPATIENT)
Dept: LAB | Facility: MEDICAL CENTER | Age: 65
End: 2025-07-16
Payer: COMMERCIAL

## 2025-07-16 DIAGNOSIS — R79.89 ELEVATED FERRITIN: ICD-10-CM

## 2025-07-16 PROCEDURE — 81256 HFE GENE: CPT

## 2025-07-16 PROCEDURE — 36415 COLL VENOUS BLD VENIPUNCTURE: CPT

## 2025-07-17 ENCOUNTER — HOSPITAL ENCOUNTER (OUTPATIENT)
Dept: RADIOLOGY | Facility: MEDICAL CENTER | Age: 65
End: 2025-07-17
Attending: SURGERY
Payer: COMMERCIAL

## 2025-07-17 DIAGNOSIS — R09.89 BRUIT OF RIGHT CAROTID ARTERY: ICD-10-CM

## 2025-07-17 PROCEDURE — 93880 EXTRACRANIAL BILAT STUDY: CPT | Mod: 26 | Performed by: INTERNAL MEDICINE

## 2025-07-17 PROCEDURE — 93880 EXTRACRANIAL BILAT STUDY: CPT

## 2025-07-18 ENCOUNTER — TELEPHONE (OUTPATIENT)
Facility: MEDICAL CENTER | Age: 65
End: 2025-07-18
Payer: COMMERCIAL

## 2025-07-22 ENCOUNTER — OFFICE VISIT (OUTPATIENT)
Facility: MEDICAL CENTER | Age: 65
End: 2025-07-22
Payer: COMMERCIAL

## 2025-07-22 VITALS
BODY MASS INDEX: 29.79 KG/M2 | OXYGEN SATURATION: 97 % | TEMPERATURE: 97.6 F | DIASTOLIC BLOOD PRESSURE: 72 MMHG | SYSTOLIC BLOOD PRESSURE: 124 MMHG | WEIGHT: 174.49 LBS | HEIGHT: 64 IN | HEART RATE: 61 BPM

## 2025-07-22 DIAGNOSIS — I87.8 VENOUS STASIS: ICD-10-CM

## 2025-07-22 DIAGNOSIS — R09.89 BRUIT OF RIGHT CAROTID ARTERY: Primary | ICD-10-CM

## 2025-07-22 DIAGNOSIS — I10 PRIMARY HYPERTENSION: ICD-10-CM

## 2025-07-22 DIAGNOSIS — Z72.0 TOBACCO USE: ICD-10-CM

## 2025-07-22 PROCEDURE — 3078F DIAST BP <80 MM HG: CPT | Performed by: SURGERY

## 2025-07-22 PROCEDURE — 99213 OFFICE O/P EST LOW 20 MIN: CPT | Performed by: SURGERY

## 2025-07-22 PROCEDURE — 3074F SYST BP LT 130 MM HG: CPT | Performed by: SURGERY

## 2025-07-22 ASSESSMENT — FIBROSIS 4 INDEX: FIB4 SCORE: 3.68

## 2025-07-22 NOTE — PROGRESS NOTES
"  VASCULAR SURGERY SERVICE  OFFICE FOLLOW UP      Date: 7/22/2025    Referring Provider: KYLIE Malone     Consulting Physician: Jeannette Canales MD - [facility]      -------------------------------------------------------------------------------------------------     Chief complaint:  \"Here for follow-up\".    HPI:  Ms. Oakley comes to the clinic today for follow-up.  She is a 64 y.o. female who has been having problem with bilateral lower extremity swelling and pain for a number of years.  Patient has been wearing compression socks but still has problem with pain and swelling.  She underwent left greater saphenous vein ablation in the past by another surgeon.  Recent venous duplex showed no evidence of deep venous thrombosis.  Lower extremity arterial study was essentially normal with ABIs of 1.02 on the right and 1.06 on the left.  Patient was referred to vascular service.  When I last saw her, I detected right carotid bruits on physical exam and therefore ordered carotid duplex.      On follow-up today, she has no new complaints.  She denies any neurological symptom.  She is a longtime smoker.  She is trying to stop smoking.    Recent carotid duplex was read as less than 50% bilateral internal carotid artery stenosis; however, the images were suboptimal secondary to acoustic shadowing.    Past Medical History[1]    Past Surgical History[2]    Current Medications[3]    Social History     Socioeconomic History    Marital status:      Spouse name: Not on file    Number of children: Not on file    Years of education: Not on file    Highest education level: Not on file   Occupational History    Not on file   Tobacco Use    Smoking status: Some Days     Current packs/day: 0.50     Average packs/day: 0.5 packs/day for 40.0 years (20.0 ttl pk-yrs)     Types: Cigarettes    Smokeless tobacco: Never   Vaping Use    Vaping status: Never Used   Substance and Sexual Activity    Alcohol use: Not Currently     " Alcohol/week: 0.6 oz     Types: 1 Standard drinks or equivalent per week    Drug use: No    Sexual activity: Not Currently     Comment:    Other Topics Concern    Not on file   Social History Narrative    Not on file     Social Drivers of Health     Financial Resource Strain: Medium Risk (9/29/2020)    Overall Financial Resource Strain (CARDIA)     Difficulty of Paying Living Expenses: Somewhat hard   Food Insecurity: No Food Insecurity (1/1/2025)    Hunger Vital Sign     Worried About Running Out of Food in the Last Year: Never true     Ran Out of Food in the Last Year: Never true   Transportation Needs: No Transportation Needs (1/1/2025)    PRAPARE - Transportation     Lack of Transportation (Medical): No     Lack of Transportation (Non-Medical): No   Physical Activity: Unknown (9/29/2020)    Exercise Vital Sign     Days of Exercise per Week: Patient declined     Minutes of Exercise per Session: Patient declined   Stress: Stress Concern Present (9/29/2020)    [employer] - Occupational Stress Questionnaire     Feeling of Stress : Very much   Social Connections: Unknown (9/29/2020)    Social Connection and Isolation Panel [NHANES]     Frequency of Communication with Friends and Family: Patient declined     Frequency of Social Gatherings with Friends and Family: Patient declined     Attends Orthodoxy Services: Patient declined     Active Member of Clubs or Organizations: Patient declined     Attends Club or Organization Meetings: Patient declined     Marital Status: Patient declined   Intimate Partner Violence: Not At Risk (1/1/2025)    Humiliation, Afraid, Rape, and Kick questionnaire     Fear of Current or Ex-Partner: No     Emotionally Abused: No     Physically Abused: No     Sexually Abused: No   Housing Stability: Low Risk  (1/1/2025)    Housing Stability Vital Sign     Unable to Pay for Housing in the Last Year: No     Number of Times Moved in the Last Year: 0     Homeless  in the Last Year: No       Family History   Problem Relation Age of Onset    Cancer Mother         Breast/Liver    Diabetes Mother     Hypertension Mother     Hyperlipidemia Mother     Heart Disease Mother     Lung Disease Father         Emphysema    Psychiatric Illness Father         Paranoid schitzophenia    Psychiatric Illness Sister         Paronoid Schitzo-disorder, Bipolar    Arthritis Sister         RA, Fibromyalgia    Cancer Maternal Aunt         Breast    Cancer Paternal Aunt         Bone    Arthritis Paternal Aunt     Genetic Disorder Paternal Aunt         SLE    Heart Disease Maternal Grandmother     Hypertension Maternal Grandmother     Hyperlipidemia Maternal Grandmother     Genetic Disorder Maternal Grandmother         Brights disease    Stroke Maternal Grandmother     Heart Disease Maternal Grandfather     Hypertension Maternal Grandfather     Hyperlipidemia Maternal Grandfather     Stroke Paternal Grandmother     Hyperlipidemia Paternal Grandmother     Hypertension Paternal Grandmother     Heart Disease Paternal Grandfather     Hypertension Paternal Grandfather     Hyperlipidemia Paternal Grandfather        Allergies:  Macrodantin [nitrofurantoin macrocrystal], Pcn [penicillins], Doxycycline, Bactrim, Clarithromycin, Hydrochlorothiazide, and Omnicef    Review of Systems:    Constitutional: Negative for fever, chills, weight loss,   HENT:   Negative for hearing loss or tinnitus    Eyes:    Negative for blurred vision, double vision, or loss of vision  Respiratory:  Negative for cough, hemoptysis, or wheezing    Cardiac:  Negative for chest pain or palpitations or orthopnea  Vascular:  Negative for claudication or rest pain   Gastrointestinal: Negative for nausea, vomiting, or abdominal pain     Negative for hematochezia or melena   Genitourinary: Negative for dysuria, frequency, or hematuria   Musculoskeletal: Positive for lower extremity swelling   Skin:   Negative for itching or  "rash  Neurological:  Negative for dizziness, headaches, or tremors     Negative for speech disturbance     Negative for extremity weakness or paresthesias  Endo/Heme:  Negative for easy bruising or bleeding  Psychiatric:  Negative for depression, suicidal ideas, or hallucinations    Physical Exam:  /72 (BP Location: Left arm, Patient Position: Sitting, BP Cuff Size: Adult)   Pulse 61   Temp 36.4 °C (97.6 °F) (Temporal)   Ht 1.613 m (5' 3.5\")   Wt 79.2 kg (174 lb 7.9 oz)   SpO2 97%     Constitutional:          Alert, oriented, no acute distress  HEENT:                       Normocephalic and atraumatic, EOMI  Neck:                          Supple, no JVD, + right bruits.  Cardiovascular:         Regular rate and rhythm  Pulmonary:                Good air entry bilaterally  Abdominal:                Soft, non-tender, non-distended                                      Aortic impulse not widened  Musculoskeletal:       No edema, no tenderness  Neurological:             CN II-XII grossly intact, no focal deficits  Skin:                           Skin is warm and dry. No rash noted.  Psychiatric:                Normal mood and affect.  Lower extremities:    Scattered small varicosities and reticular veins in both lower legs.  No ulceration.  2+ bilateral edema.  Feet are warm, well-perfused.  Multiphasic Doppler flow signals are obtained over bilateral pedal arteries.     Labs:  Reviewed.       Radiology:  Reviewed.     Assessment:  - Bilateral lower extremity venous stasis disease and varicosities.  - Right carotid bruits.  - Tobacco use.  - Hypertension.  - Type 2 diabetes mellitus.  - Dyslipidemia.     Plan:  I had a long discussion with patient.  Carotid duplex results were reviewed with her.  Since the duplex is suboptimal secondary to acoustic shadowing, I discussed with patient the option of having a CTA of the neck obtained.  Patient indicated understanding and agreed.  I ordered the CTA to be done and " asked patient to see me after the study is done.    With regard to her lower extremity pain and swelling, the etiology is venous stasis disease and varicosities.  The main treatment for this condition would be compression socks and leg elevation.  Patient indicated understanding.  She already had the left greater saphenous vein ablated.      I strongly counseled patient to stop smoking.    Jeannette Canales MD  [facility]   [place] preferred or call my office [phone]  __________________________________________________________________  Patient:Kelly Oakley   MRN:[id number]   CSN:[id number]         [1]   Past Medical History:  Diagnosis Date    Acute cystitis with hematuria 02/14/2022    Acute respiratory failure with hypoxia (HCC) 12/31/2024    Adrenal nodule 2012 2015 / nonfunctional / benign    Anemia     Anesthesia     Anxiety     Panic    Arrhythmia     palpatations    Arthritis     Morning stiffness    Atherosclerosis of arteries 2013    seen on abdominal CT    Briseno's  esophagus     Bronchitis 2019    Chest pain at rest 09/11/2015    Chronic obstructive pulmonary disease (HCC) 11/30/2021    Cigarette smoker one half pack a day or less 05/28/2015    Depression     Fatigue 09/11/2015    GERD (gastroesophageal reflux disease)     Headache(784.0)     sinus headache    Heart burn     Heart murmur     Dr. Krause-Stress trend    High cholesterol     History of HPV infection     HLD (hyperlipidemia) 09/11/2015    HTN (hypertension) 09/11/2015    IBD (inflammatory bowel disease)     Dr. Cunningham    Indigestion     MRSA carrier 2008    nose cellulitis    Nocturnal hypoxemia 10/15/2015    OSTEOPOROSIS     Osteopenia    Pneumonia 2005    PONV (postoperative nausea and vomiting)     S/P appendectomy     S/P cholecystectomy     S/P hysterectomy with oophorectomy     endometriosis    Snoring     Ulcer     Barretts esophogitis    Urinary incontinence     Urinary tract infection, site not specified    [2]    Past Surgical History:  Procedure Laterality Date    PB KNEE SCOPE,DIAGNOSTIC Right 2/3/2021    Procedure: ARTHROSCOPY, KNEE;  Surgeon: Guillermo Wyman M.D.;  Location: SURGERY [facility];  Service: Orthopedics    MENISCECTOMY, KNEE, MEDIAL Right 2/3/2021    Procedure: MENISCECTOMY, KNEE, MEDIAL - FOR PARTIAL LATERAL;  Surgeon: Guillermo Wyman M.D.;  Location: SURGERY [facility];  Service: Orthopedics    CHONDROPLASTY Right 2/3/2021    Procedure: CHONDROPLASTY - AND WILBURN;  Surgeon: Guillermo Wyman M.D.;  Location: SURGERY [facility];  Service: Orthopedics    BREAST BIOPSY  1980's    benign left breast 35 years ago    ABDOMINAL HYSTERECTOMY TOTAL      mennorrogia    APPENDECTOMY      CHOLECYSTECTOMY      COLON RESECTION      Polyp removal    ENDOMETRIAL ABLATION      PRIMARY C SECTION      TONSILLECTOMY      TUBAL COAGULATION LAPAROSCOPIC BILATERAL      US-NEEDLE CORE BX-BREAST PANEL     [3]   Current Outpatient Medications   Medication Sig Dispense Refill    hydrALAZINE (APRESOLINE) 25 MG Tab Take 1 Tablet by mouth 2 times a day as needed (only if bp is 160 or above). 60 Tablet 3    dicyclomine (BENTYL) 20 MG Tab Take 1 Tablet by mouth every 6 hours as needed (abdominal cramping). 30 Tablet 0    sucralfate (CARAFATE) 1 GM Tab Take 1 Tablet by mouth 4 Times a Day,Before Meals and at Bedtime. 30 Tablet 0    ondansetron (ZOFRAN ODT) 4 MG TABLET DISPERSIBLE Take 1 Tablet by mouth every 8 hours as needed for Nausea/Vomiting. Dissolve in mouth. 10 Tablet 0    FLUoxetine (PROZAC) 10 MG Cap TAKE 1 CAPSULE BY MOUTH EVERY DAY 90 Capsule 3    amLODIPine (NORVASC) 5 MG Tab TAKE 1 TABLET BY MOUTH EVERY DAY 90 Tablet 3    spironolactone (ALDACTONE) 50 MG Tab TAKE 1 TABLET BY MOUTH EVERY DAY 90 Tablet 3    pravastatin (PRAVACHOL) 20 MG Tab TAKE 1 TABLET BY MOUTH EVERY DAY 90 Tablet 3    propranolol (INDERAL) 10 MG Tab TAKE 1 TABLET BY MOUTH TWICE A  Tablet 3    hydrOXYzine HCl (ATARAX) 25 MG Tab TAKE 1 TABLET BY  MOUTH TWICE A DAY AS NEEDED FOR ANXIETY 180 Tablet 3    tiotropium (SPIRIVA RESPIMAT) 2.5 mcg/Act Aero Soln INHALE 2 INHALATIONS EVERY DAY. 4 g 3    buPROPion SR (WELLBUTRIN-SR) 150 MG TABLET SR 12 HR sustained-release tablet Take 1 Tablet by mouth 2 times a day. 60 Tablet 1    mometasone-formoterol (DULERA) 200-5 MCG/ACT Aerosol Inhale 2 Puffs 2 times a day. 8.8 g 0    nicotine (NICODERM) 14 MG/24HR PATCH 24 HR Place 1 Patch on the skin every 24 hours. 30 Patch 1    irbesartan (AVAPRO) 300 MG Tab TAKE 1 TABLET BY MOUTH EVERY DAY 90 Tablet 3    fluocinonide (LIDEX) 0.05 % Cream AAA twice a day for 2-3 weeks at a time with 1-2 week break in between 120 g 3    tacrolimus (PROTOPIC) 0.1 % Ointment AAA twice a day 30 g 3    busPIRone (BUSPAR) 15 MG tablet TAKE 1 TABLET BY MOUTH TWICE A  Tablet 3    fluticasone (FLONASE) 50 MCG/ACT nasal spray ADMINISTER 2 SPRAYS INTO AFFECTED NOSTRIL(S) AT BEDTIME. 48 mL 2    esomeprazole (NEXIUM) 40 MG delayed-release capsule Take 1 Capsule by mouth every morning before breakfast. 90 Capsule 3    triamcinolone acetonide (KENALOG) 0.1 % Ointment Apply 1 Application  topically 2 times a day. 30 g 1    estradiol (YUVAFEM) 10 MCG Tab INSERT 1 TABLET INTO THE VAGINA TWO TIMES A WEEK. 24 Tablet 3    acetaminophen (TYLENOL) 325 MG Tab Take 650 mg by mouth every 6 hours as needed for Mild Pain or Fever.       No current facility-administered medications for this visit.

## 2025-07-24 LAB
HFE GENE MUT ANL BLD/T: NORMAL
HFE P.C282Y BLD/T QL: NEGATIVE
HFE P.H63D BLD/T QL: NORMAL
HFE P.S65C BLD/T QL: NEGATIVE

## 2025-07-26 ENCOUNTER — HOSPITAL ENCOUNTER (OUTPATIENT)
Dept: LAB | Facility: MEDICAL CENTER | Age: 65
End: 2025-07-26
Attending: SURGERY
Payer: COMMERCIAL

## 2025-07-26 DIAGNOSIS — R09.89 BRUIT OF RIGHT CAROTID ARTERY: ICD-10-CM

## 2025-07-26 LAB
ANION GAP SERPL CALC-SCNC: 10 MMOL/L (ref 7–16)
BUN SERPL-MCNC: 15 MG/DL (ref 8–22)
CALCIUM SERPL-MCNC: 9.8 MG/DL (ref 8.5–10.5)
CHLORIDE SERPL-SCNC: 102 MMOL/L (ref 96–112)
CO2 SERPL-SCNC: 23 MMOL/L (ref 20–33)
CREAT SERPL-MCNC: 0.95 MG/DL (ref 0.5–1.4)
GFR SERPLBLD CREATININE-BSD FMLA CKD-EPI: 67 ML/MIN/1.73 M 2
GLUCOSE SERPL-MCNC: 95 MG/DL (ref 65–99)
POTASSIUM SERPL-SCNC: 4.8 MMOL/L (ref 3.6–5.5)
SODIUM SERPL-SCNC: 135 MMOL/L (ref 135–145)

## 2025-07-26 PROCEDURE — 36415 COLL VENOUS BLD VENIPUNCTURE: CPT

## 2025-07-26 PROCEDURE — 80048 BASIC METABOLIC PNL TOTAL CA: CPT

## 2025-07-29 ENCOUNTER — HOSPITAL ENCOUNTER (OUTPATIENT)
Dept: RADIOLOGY | Facility: MEDICAL CENTER | Age: 65
End: 2025-07-29
Attending: SURGERY
Payer: COMMERCIAL

## 2025-07-29 DIAGNOSIS — R09.89 BRUIT OF RIGHT CAROTID ARTERY: ICD-10-CM

## 2025-07-29 PROCEDURE — 70498 CT ANGIOGRAPHY NECK: CPT

## 2025-07-29 PROCEDURE — 700117 HCHG RX CONTRAST REV CODE 255: Performed by: SURGERY

## 2025-07-29 RX ADMIN — IOHEXOL 100 ML: 350 INJECTION, SOLUTION INTRAVENOUS at 08:49

## 2025-08-07 ENCOUNTER — OFFICE VISIT (OUTPATIENT)
Facility: MEDICAL CENTER | Age: 65
End: 2025-08-07
Payer: COMMERCIAL

## 2025-08-07 VITALS
DIASTOLIC BLOOD PRESSURE: 74 MMHG | WEIGHT: 173.28 LBS | TEMPERATURE: 97.6 F | BODY MASS INDEX: 30.7 KG/M2 | SYSTOLIC BLOOD PRESSURE: 136 MMHG | HEART RATE: 65 BPM | HEIGHT: 63 IN | OXYGEN SATURATION: 97 %

## 2025-08-07 DIAGNOSIS — E78.5 DYSLIPIDEMIA: ICD-10-CM

## 2025-08-07 DIAGNOSIS — Z72.0 TOBACCO USE: ICD-10-CM

## 2025-08-07 DIAGNOSIS — I87.8 VENOUS STASIS: ICD-10-CM

## 2025-08-07 DIAGNOSIS — I10 PRIMARY HYPERTENSION: ICD-10-CM

## 2025-08-07 DIAGNOSIS — I65.21 STENOSIS OF RIGHT CAROTID ARTERY: Primary | ICD-10-CM

## 2025-08-07 PROCEDURE — 3078F DIAST BP <80 MM HG: CPT | Performed by: SURGERY

## 2025-08-07 PROCEDURE — 3075F SYST BP GE 130 - 139MM HG: CPT | Performed by: SURGERY

## 2025-08-07 PROCEDURE — 99213 OFFICE O/P EST LOW 20 MIN: CPT | Performed by: SURGERY

## 2025-08-07 ASSESSMENT — FIBROSIS 4 INDEX: FIB4 SCORE: 3.68

## 2025-08-11 ENCOUNTER — TELEPHONE (OUTPATIENT)
Dept: SURGERY | Facility: MEDICAL CENTER | Age: 65
End: 2025-08-11
Payer: COMMERCIAL

## 2025-08-12 ENCOUNTER — APPOINTMENT (OUTPATIENT)
Dept: MEDICAL GROUP | Facility: PHYSICIAN GROUP | Age: 65
End: 2025-08-12
Payer: COMMERCIAL

## 2025-08-12 PROBLEM — Z78.9: Status: ACTIVE | Noted: 2025-08-12

## 2025-08-12 ASSESSMENT — ENCOUNTER SYMPTOMS: NERVOUS/ANXIOUS: 1

## 2025-08-12 ASSESSMENT — FIBROSIS 4 INDEX: FIB4 SCORE: 3.68

## 2025-08-21 ENCOUNTER — APPOINTMENT (OUTPATIENT)
Dept: ADMISSIONS | Facility: MEDICAL CENTER | Age: 65
End: 2025-08-21
Attending: SURGERY
Payer: COMMERCIAL

## 2025-08-28 ENCOUNTER — PRE-ADMISSION TESTING (OUTPATIENT)
Dept: ADMISSIONS | Facility: MEDICAL CENTER | Age: 65
End: 2025-08-28
Attending: SURGERY
Payer: COMMERCIAL

## 2025-08-28 DIAGNOSIS — Z01.812 PRE-OPERATIVE LABORATORY EXAMINATION: Primary | ICD-10-CM

## 2025-08-28 DIAGNOSIS — Z01.810 PRE-OPERATIVE CARDIOVASCULAR EXAMINATION: ICD-10-CM

## 2025-08-28 LAB
ABO GROUP BLD: NORMAL
ANION GAP SERPL CALC-SCNC: 10 MMOL/L (ref 7–16)
BASOPHILS # BLD AUTO: 0.7 % (ref 0–1.8)
BASOPHILS # BLD: 0.04 K/UL (ref 0–0.12)
BLD GP AB SCN SERPL QL: NORMAL
BUN SERPL-MCNC: 14 MG/DL (ref 8–22)
CALCIUM SERPL-MCNC: 9.3 MG/DL (ref 8.5–10.5)
CHLORIDE SERPL-SCNC: 100 MMOL/L (ref 96–112)
CO2 SERPL-SCNC: 23 MMOL/L (ref 20–33)
CREAT SERPL-MCNC: 0.95 MG/DL (ref 0.5–1.4)
EKG IMPRESSION: NORMAL
EOSINOPHIL # BLD AUTO: 0.25 K/UL (ref 0–0.51)
EOSINOPHIL NFR BLD: 4.4 % (ref 0–6.9)
ERYTHROCYTE [DISTWIDTH] IN BLOOD BY AUTOMATED COUNT: 40.6 FL (ref 35.9–50)
EST. AVERAGE GLUCOSE BLD GHB EST-MCNC: 140 MG/DL
GFR SERPLBLD CREATININE-BSD FMLA CKD-EPI: 67 ML/MIN/1.73 M 2
GLUCOSE SERPL-MCNC: 94 MG/DL (ref 65–99)
HBA1C MFR BLD: 6.5 % (ref 4–5.6)
HCT VFR BLD AUTO: 36.1 % (ref 37–47)
HGB BLD-MCNC: 12.4 G/DL (ref 12–16)
IMM GRANULOCYTES # BLD AUTO: 0.03 K/UL (ref 0–0.11)
IMM GRANULOCYTES NFR BLD AUTO: 0.5 % (ref 0–0.9)
LYMPHOCYTES # BLD AUTO: 1.79 K/UL (ref 1–4.8)
LYMPHOCYTES NFR BLD: 31.8 % (ref 22–41)
MCH RBC QN AUTO: 29.8 PG (ref 27–33)
MCHC RBC AUTO-ENTMCNC: 34.3 G/DL (ref 32.2–35.5)
MCV RBC AUTO: 86.8 FL (ref 81.4–97.8)
MONOCYTES # BLD AUTO: 0.62 K/UL (ref 0–0.85)
MONOCYTES NFR BLD AUTO: 11 % (ref 0–13.4)
NEUTROPHILS # BLD AUTO: 2.9 K/UL (ref 1.82–7.42)
NEUTROPHILS NFR BLD: 51.6 % (ref 44–72)
NRBC # BLD AUTO: 0 K/UL
NRBC BLD-RTO: 0 /100 WBC (ref 0–0.2)
PLATELET # BLD AUTO: 204 K/UL (ref 164–446)
PMV BLD AUTO: 10 FL (ref 9–12.9)
POTASSIUM SERPL-SCNC: 4.6 MMOL/L (ref 3.6–5.5)
RBC # BLD AUTO: 4.16 M/UL (ref 4.2–5.4)
RH BLD: NORMAL
SODIUM SERPL-SCNC: 133 MMOL/L (ref 135–145)
WBC # BLD AUTO: 5.6 K/UL (ref 4.8–10.8)

## 2025-08-28 PROCEDURE — 86900 BLOOD TYPING SEROLOGIC ABO: CPT

## 2025-08-28 PROCEDURE — 86901 BLOOD TYPING SEROLOGIC RH(D): CPT

## 2025-08-28 PROCEDURE — 36415 COLL VENOUS BLD VENIPUNCTURE: CPT

## 2025-08-28 PROCEDURE — 80048 BASIC METABOLIC PNL TOTAL CA: CPT

## 2025-08-28 PROCEDURE — 86850 RBC ANTIBODY SCREEN: CPT

## 2025-08-28 PROCEDURE — 85025 COMPLETE CBC W/AUTO DIFF WBC: CPT

## 2025-08-28 PROCEDURE — 83036 HEMOGLOBIN GLYCOSYLATED A1C: CPT

## 2025-08-28 PROCEDURE — 93005 ELECTROCARDIOGRAM TRACING: CPT | Mod: TC

## (undated) DEVICE — HUMID-VENT HEAT AND MOISTURE EXCHANGE- (50/BX)

## (undated) DEVICE — DRAPE LARGE 3 QUARTER - (20/CA)

## (undated) DEVICE — GOWN SURGICAL X-LARGE ULTRA - FILM-REINFORCED (20/CA)

## (undated) DEVICE — ARTHROWAND TURBOVAC 3.5/90 SCT

## (undated) DEVICE — BAG SPONGE COUNT 10.25 X 32 - BLUE (250/CA)

## (undated) DEVICE — DRAPE LOWER EXTREMETY - (6/CA)

## (undated) DEVICE — SODIUM CHL IRRIGATION 0.9% 1000ML (12EA/CA)

## (undated) DEVICE — ELECTRODE 850 FOAM ADHESIVE - HYDROGEL RADIOTRNSPRNT (50/PK)

## (undated) DEVICE — SUTURE GENERAL

## (undated) DEVICE — SYRINGE DISP. 60 CC LL - (30/BX, 12BX/CA)**WHEN THESE ARE GONE ORDER #500206**

## (undated) DEVICE — SUTURE 3-0 PROLENE PS-1 (12PK/BX)

## (undated) DEVICE — GLOVE BIOGEL INDICATOR SZ 8 SURGICAL PF LTX - (50/BX 4BX/CA)

## (undated) DEVICE — GOWN WARMING STANDARD FLEX - (30/CA)

## (undated) DEVICE — SUCTION INSTRUMENT YANKAUER BULBOUS TIP W/O VENT (50EA/CA)

## (undated) DEVICE — SPONGE GAUZESTER 4 X 4 4PLY - (128PK/CA)

## (undated) DEVICE — GLOVE BIOGEL SZ 8 SURGICAL PF LTX - (50PR/BX 4BX/CA)

## (undated) DEVICE — TUBING PUMP WITH CONNECTOR REDEUCE (1EA)

## (undated) DEVICE — CHLORAPREP 26 ML APPLICATOR - ORANGE TINT(25/CA)

## (undated) DEVICE — NEPTUNE 4 PORT MANIFOLD - (20/PK)

## (undated) DEVICE — GLOVE, LITE (PAIR)

## (undated) DEVICE — LACTATED RINGERS INJ 1000 ML - (14EA/CA 60CA/PF)

## (undated) DEVICE — KIT ANESTHESIA W/CIRCUIT & 3/LT BAG W/FILTER (20EA/CA)

## (undated) DEVICE — ELECTRODE DUAL RETURN W/ CORD - (50/PK)

## (undated) DEVICE — SODIUM CHL. IRRIGATION 0.9% 3000ML (4EA/CA 65CA/PF)

## (undated) DEVICE — CANISTER SUCTION RIGID RED 1500CC (40EA/CA)

## (undated) DEVICE — PROTECTOR ULNA NERVE - (36PR/CA)

## (undated) DEVICE — MASK ANESTHESIA ADULT  - (100/CA)

## (undated) DEVICE — SENSOR SPO2 NEO LNCS ADHESIVE (20/BX) SEE USER NOTES

## (undated) DEVICE — TUBE CONNECTING SUCTION - CLEAR PLASTIC STERILE 72 IN (50EA/CA)

## (undated) DEVICE — HEAD HOLDER JUNIOR/ADULT

## (undated) DEVICE — WATER IRRIGATION STERILE 1000ML (12EA/CA)

## (undated) DEVICE — TUBING PATIENT W/CONNECTOR REDEUCE (1EA)

## (undated) DEVICE — NEEDLE SAFETY 18 GA X 1 1/2 IN (100EA/BX)

## (undated) DEVICE — BLADE SHAVER AGGRESSIVE PLUS 4.0MM ANGLED (5EA/BX)

## (undated) DEVICE — DRAPE SURGICAL U 77X120 - (10/CA)